# Patient Record
Sex: FEMALE | Race: WHITE | NOT HISPANIC OR LATINO | Employment: FULL TIME | URBAN - METROPOLITAN AREA
[De-identification: names, ages, dates, MRNs, and addresses within clinical notes are randomized per-mention and may not be internally consistent; named-entity substitution may affect disease eponyms.]

---

## 2017-01-03 ENCOUNTER — COMMUNICATION - HEALTHEAST (OUTPATIENT)
Dept: FAMILY MEDICINE | Facility: CLINIC | Age: 37
End: 2017-01-03

## 2017-01-03 ENCOUNTER — OFFICE VISIT - HEALTHEAST (OUTPATIENT)
Dept: FAMILY MEDICINE | Facility: CLINIC | Age: 37
End: 2017-01-03

## 2017-01-03 DIAGNOSIS — F39 MOOD DISORDER (H): ICD-10-CM

## 2017-01-03 ASSESSMENT — MIFFLIN-ST. JEOR: SCORE: 1221.98

## 2017-01-10 ENCOUNTER — COMMUNICATION - HEALTHEAST (OUTPATIENT)
Dept: FAMILY MEDICINE | Facility: CLINIC | Age: 37
End: 2017-01-10

## 2017-01-31 ENCOUNTER — OFFICE VISIT - HEALTHEAST (OUTPATIENT)
Dept: FAMILY MEDICINE | Facility: CLINIC | Age: 37
End: 2017-01-31

## 2017-01-31 DIAGNOSIS — F39 MOOD DISORDER (H): ICD-10-CM

## 2017-01-31 DIAGNOSIS — G25.81 RESTLESS LEGS SYNDROME (RLS): ICD-10-CM

## 2017-02-02 ENCOUNTER — COMMUNICATION - HEALTHEAST (OUTPATIENT)
Dept: FAMILY MEDICINE | Facility: CLINIC | Age: 37
End: 2017-02-02

## 2017-02-12 ENCOUNTER — RECORDS - HEALTHEAST (OUTPATIENT)
Dept: ADMINISTRATIVE | Facility: OTHER | Age: 37
End: 2017-02-12

## 2017-07-23 ENCOUNTER — RECORDS - HEALTHEAST (OUTPATIENT)
Dept: ADMINISTRATIVE | Facility: OTHER | Age: 37
End: 2017-07-23

## 2017-08-04 ENCOUNTER — COMMUNICATION - HEALTHEAST (OUTPATIENT)
Dept: FAMILY MEDICINE | Facility: CLINIC | Age: 37
End: 2017-08-04

## 2017-08-07 ENCOUNTER — OFFICE VISIT - HEALTHEAST (OUTPATIENT)
Dept: FAMILY MEDICINE | Facility: CLINIC | Age: 37
End: 2017-08-07

## 2017-08-07 DIAGNOSIS — A64 STD (FEMALE): ICD-10-CM

## 2017-08-07 LAB — HIV 1+2 AB+HIV1 P24 AG SERPL QL IA: NEGATIVE

## 2017-08-07 ASSESSMENT — MIFFLIN-ST. JEOR: SCORE: 1192.5

## 2017-08-08 ENCOUNTER — COMMUNICATION - HEALTHEAST (OUTPATIENT)
Dept: FAMILY MEDICINE | Facility: CLINIC | Age: 37
End: 2017-08-08

## 2017-08-08 LAB — SYPHILIS RPR SCREEN - HISTORICAL: NORMAL

## 2017-08-23 ENCOUNTER — RECORDS - HEALTHEAST (OUTPATIENT)
Dept: ADMINISTRATIVE | Facility: OTHER | Age: 37
End: 2017-08-23

## 2017-08-23 ENCOUNTER — COMMUNICATION - HEALTHEAST (OUTPATIENT)
Dept: FAMILY MEDICINE | Facility: CLINIC | Age: 37
End: 2017-08-23

## 2017-08-25 ENCOUNTER — RECORDS - HEALTHEAST (OUTPATIENT)
Dept: ADMINISTRATIVE | Facility: OTHER | Age: 37
End: 2017-08-25

## 2017-09-08 ENCOUNTER — OFFICE VISIT - HEALTHEAST (OUTPATIENT)
Dept: FAMILY MEDICINE | Facility: CLINIC | Age: 37
End: 2017-09-08

## 2017-09-08 DIAGNOSIS — J01.90 ACUTE SINUSITIS: ICD-10-CM

## 2017-10-30 ENCOUNTER — OFFICE VISIT - HEALTHEAST (OUTPATIENT)
Dept: FAMILY MEDICINE | Facility: CLINIC | Age: 37
End: 2017-10-30

## 2017-10-30 ENCOUNTER — COMMUNICATION - HEALTHEAST (OUTPATIENT)
Dept: FAMILY MEDICINE | Facility: CLINIC | Age: 37
End: 2017-10-30

## 2017-10-30 DIAGNOSIS — R09.89 SINUS COMPLAINT: ICD-10-CM

## 2017-11-14 ENCOUNTER — OFFICE VISIT - HEALTHEAST (OUTPATIENT)
Dept: FAMILY MEDICINE | Facility: CLINIC | Age: 37
End: 2017-11-14

## 2017-11-14 ENCOUNTER — HOSPITAL ENCOUNTER (OUTPATIENT)
Dept: LAB | Age: 37
Setting detail: SPECIMEN
Discharge: HOME | End: 2017-11-14

## 2017-11-14 DIAGNOSIS — K14.6 TONGUE SORE: ICD-10-CM

## 2017-11-14 DIAGNOSIS — K14.6 TONGUE BURNING SENSATION: ICD-10-CM

## 2018-01-23 ENCOUNTER — COMMUNICATION - HEALTHEAST (OUTPATIENT)
Dept: TELEHEALTH | Facility: CLINIC | Age: 38
End: 2018-01-23

## 2018-01-23 ENCOUNTER — COMMUNICATION - HEALTHEAST (OUTPATIENT)
Dept: HEALTH INFORMATION MANAGEMENT | Facility: CLINIC | Age: 38
End: 2018-01-23

## 2018-01-31 ENCOUNTER — COMMUNICATION - HEALTHEAST (OUTPATIENT)
Dept: FAMILY MEDICINE | Facility: CLINIC | Age: 38
End: 2018-01-31

## 2018-02-02 ENCOUNTER — OFFICE VISIT - HEALTHEAST (OUTPATIENT)
Dept: FAMILY MEDICINE | Facility: CLINIC | Age: 38
End: 2018-02-02

## 2018-02-02 DIAGNOSIS — F48.9 MENTAL HEALTH PROBLEM: ICD-10-CM

## 2018-02-15 ENCOUNTER — COMMUNICATION - HEALTHEAST (OUTPATIENT)
Dept: PHYSICAL MEDICINE AND REHAB | Facility: CLINIC | Age: 38
End: 2018-02-15

## 2018-02-19 ENCOUNTER — HOSPITAL ENCOUNTER (OUTPATIENT)
Dept: PHYSICAL MEDICINE AND REHAB | Facility: CLINIC | Age: 38
Discharge: HOME | End: 2018-02-19

## 2018-02-19 DIAGNOSIS — F34.1 PERSISTENT DEPRESSIVE DISORDER: ICD-10-CM

## 2018-03-08 ENCOUNTER — OFFICE VISIT - HEALTHEAST (OUTPATIENT)
Dept: BEHAVIORAL HEALTH | Facility: CLINIC | Age: 38
End: 2018-03-08

## 2018-03-08 DIAGNOSIS — F41.1 ANXIETY STATE: ICD-10-CM

## 2018-03-08 DIAGNOSIS — F32.9 MDD (MAJOR DEPRESSIVE DISORDER): ICD-10-CM

## 2018-03-08 ASSESSMENT — MIFFLIN-ST. JEOR: SCORE: 1218.81

## 2018-03-15 ENCOUNTER — COMMUNICATION - HEALTHEAST (OUTPATIENT)
Dept: BEHAVIORAL HEALTH | Facility: CLINIC | Age: 38
End: 2018-03-15

## 2018-03-16 ENCOUNTER — COMMUNICATION - HEALTHEAST (OUTPATIENT)
Dept: FAMILY MEDICINE | Facility: CLINIC | Age: 38
End: 2018-03-16

## 2018-03-22 ENCOUNTER — COMMUNICATION - HEALTHEAST (OUTPATIENT)
Dept: BEHAVIORAL HEALTH | Facility: CLINIC | Age: 38
End: 2018-03-22

## 2018-03-28 ENCOUNTER — COMMUNICATION - HEALTHEAST (OUTPATIENT)
Dept: BEHAVIORAL HEALTH | Facility: CLINIC | Age: 38
End: 2018-03-28

## 2018-03-28 ENCOUNTER — OFFICE VISIT - HEALTHEAST (OUTPATIENT)
Dept: BEHAVIORAL HEALTH | Facility: CLINIC | Age: 38
End: 2018-03-28

## 2018-03-28 DIAGNOSIS — F32.A DEPRESSION: ICD-10-CM

## 2018-03-28 DIAGNOSIS — F43.23 ADJUSTMENT DISORDER WITH MIXED ANXIETY AND DEPRESSED MOOD: ICD-10-CM

## 2018-04-06 ENCOUNTER — COMMUNICATION - HEALTHEAST (OUTPATIENT)
Dept: FAMILY MEDICINE | Facility: CLINIC | Age: 38
End: 2018-04-06

## 2018-04-06 ENCOUNTER — COMMUNICATION - HEALTHEAST (OUTPATIENT)
Dept: BEHAVIORAL HEALTH | Facility: CLINIC | Age: 38
End: 2018-04-06

## 2018-04-06 DIAGNOSIS — G25.81 RESTLESS LEGS SYNDROME (RLS): ICD-10-CM

## 2018-05-03 ENCOUNTER — COMMUNICATION - HEALTHEAST (OUTPATIENT)
Dept: BEHAVIORAL HEALTH | Facility: CLINIC | Age: 38
End: 2018-05-03

## 2018-05-03 ENCOUNTER — OFFICE VISIT - HEALTHEAST (OUTPATIENT)
Dept: BEHAVIORAL HEALTH | Facility: CLINIC | Age: 38
End: 2018-05-03

## 2018-05-03 DIAGNOSIS — F32.9 MDD (MAJOR DEPRESSIVE DISORDER): ICD-10-CM

## 2018-05-03 ASSESSMENT — MIFFLIN-ST. JEOR: SCORE: 1209.74

## 2018-07-13 ENCOUNTER — COMMUNICATION - HEALTHEAST (OUTPATIENT)
Dept: FAMILY MEDICINE | Facility: CLINIC | Age: 38
End: 2018-07-13

## 2018-07-13 ENCOUNTER — OFFICE VISIT - HEALTHEAST (OUTPATIENT)
Dept: FAMILY MEDICINE | Facility: CLINIC | Age: 38
End: 2018-07-13

## 2018-07-13 DIAGNOSIS — R10.11 RIGHT UPPER QUADRANT PAIN: ICD-10-CM

## 2018-07-13 DIAGNOSIS — R30.0 DYSURIA: ICD-10-CM

## 2018-07-13 LAB
ALBUMIN SERPL-MCNC: 4.1 G/DL (ref 3.5–5)
ALBUMIN UR-MCNC: NEGATIVE MG/DL
ALP SERPL-CCNC: 71 U/L (ref 45–120)
ALT SERPL W P-5'-P-CCNC: 11 U/L (ref 0–45)
ANION GAP SERPL CALCULATED.3IONS-SCNC: 9 MMOL/L (ref 5–18)
APPEARANCE UR: CLEAR
AST SERPL W P-5'-P-CCNC: 13 U/L (ref 0–40)
ATRIAL RATE - MUSE: 70 BPM
BACTERIA #/AREA URNS HPF: ABNORMAL HPF
BILIRUB SERPL-MCNC: 0.7 MG/DL (ref 0–1)
BILIRUB UR QL STRIP: NEGATIVE
BUN SERPL-MCNC: 10 MG/DL (ref 8–22)
CALCIUM SERPL-MCNC: 9.2 MG/DL (ref 8.5–10.5)
CHLORIDE BLD-SCNC: 106 MMOL/L (ref 98–107)
CO2 SERPL-SCNC: 24 MMOL/L (ref 22–31)
COLOR UR AUTO: YELLOW
CREAT SERPL-MCNC: 0.76 MG/DL (ref 0.6–1.1)
DIASTOLIC BLOOD PRESSURE - MUSE: NORMAL MMHG
GFR SERPL CREATININE-BSD FRML MDRD: >60 ML/MIN/1.73M2
GLUCOSE BLD-MCNC: 89 MG/DL (ref 70–125)
GLUCOSE UR STRIP-MCNC: NEGATIVE MG/DL
HGB UR QL STRIP: ABNORMAL
INTERPRETATION ECG - MUSE: NORMAL
KETONES UR STRIP-MCNC: NEGATIVE MG/DL
LEUKOCYTE ESTERASE UR QL STRIP: ABNORMAL
NITRATE UR QL: NEGATIVE
P AXIS - MUSE: 41 DEGREES
PH UR STRIP: 6.5 [PH] (ref 5–8)
POTASSIUM BLD-SCNC: 4.2 MMOL/L (ref 3.5–5)
PR INTERVAL - MUSE: 122 MS
PROT SERPL-MCNC: 7 G/DL (ref 6–8)
QRS DURATION - MUSE: 82 MS
QT - MUSE: 390 MS
QTC - MUSE: 421 MS
R AXIS - MUSE: 50 DEGREES
RBC #/AREA URNS AUTO: ABNORMAL HPF
SODIUM SERPL-SCNC: 139 MMOL/L (ref 136–145)
SP GR UR STRIP: 1.02 (ref 1–1.03)
SQUAMOUS #/AREA URNS AUTO: >100 LPF
SYSTOLIC BLOOD PRESSURE - MUSE: NORMAL MMHG
T AXIS - MUSE: 35 DEGREES
UROBILINOGEN UR STRIP-ACNC: ABNORMAL
VENTRICULAR RATE- MUSE: 70 BPM
WBC #/AREA URNS AUTO: ABNORMAL HPF

## 2018-07-13 ASSESSMENT — MIFFLIN-ST. JEOR: SCORE: 1227.88

## 2018-07-14 ENCOUNTER — COMMUNICATION - HEALTHEAST (OUTPATIENT)
Dept: FAMILY MEDICINE | Facility: CLINIC | Age: 38
End: 2018-07-14

## 2018-07-14 ENCOUNTER — HOSPITAL ENCOUNTER (OUTPATIENT)
Dept: ULTRASOUND IMAGING | Facility: HOSPITAL | Age: 38
Discharge: HOME | End: 2018-07-14

## 2018-07-14 DIAGNOSIS — K80.50 BILIARY COLIC: ICD-10-CM

## 2018-07-14 DIAGNOSIS — R10.11 RIGHT UPPER QUADRANT PAIN: ICD-10-CM

## 2018-07-14 DIAGNOSIS — R30.0 DYSURIA: ICD-10-CM

## 2018-07-14 LAB — BACTERIA SPEC CULT: NO GROWTH

## 2018-07-17 ENCOUNTER — OFFICE VISIT - HEALTHEAST (OUTPATIENT)
Dept: SURGERY | Facility: CLINIC | Age: 38
End: 2018-07-17

## 2018-07-17 DIAGNOSIS — K80.20 CALCULUS OF GALLBLADDER WITHOUT CHOLECYSTITIS WITHOUT OBSTRUCTION: ICD-10-CM

## 2018-07-17 ASSESSMENT — MIFFLIN-ST. JEOR: SCORE: 1230.14

## 2018-07-18 ENCOUNTER — ANESTHESIA - HEALTHEAST (OUTPATIENT)
Dept: SURGERY | Facility: AMBULATORY SURGERY CENTER | Age: 38
End: 2018-07-18

## 2018-07-18 ASSESSMENT — MIFFLIN-ST. JEOR: SCORE: 1230.14

## 2018-07-19 ENCOUNTER — SURGERY - HEALTHEAST (OUTPATIENT)
Dept: SURGERY | Facility: AMBULATORY SURGERY CENTER | Age: 38
End: 2018-07-19

## 2018-07-25 ENCOUNTER — COMMUNICATION - HEALTHEAST (OUTPATIENT)
Dept: SURGERY | Facility: CLINIC | Age: 38
End: 2018-07-25

## 2018-07-26 ENCOUNTER — COMMUNICATION - HEALTHEAST (OUTPATIENT)
Dept: SURGERY | Facility: CLINIC | Age: 38
End: 2018-07-26

## 2018-08-09 ENCOUNTER — COMMUNICATION - HEALTHEAST (OUTPATIENT)
Dept: FAMILY MEDICINE | Facility: CLINIC | Age: 38
End: 2018-08-09

## 2018-08-09 ENCOUNTER — COMMUNICATION - HEALTHEAST (OUTPATIENT)
Dept: SCHEDULING | Facility: CLINIC | Age: 38
End: 2018-08-09

## 2018-08-13 ENCOUNTER — OFFICE VISIT - HEALTHEAST (OUTPATIENT)
Dept: FAMILY MEDICINE | Facility: CLINIC | Age: 38
End: 2018-08-13

## 2018-08-13 DIAGNOSIS — R61 NIGHT SWEATS: ICD-10-CM

## 2018-08-13 DIAGNOSIS — Z90.49 S/P LAPAROSCOPIC CHOLECYSTECTOMY: ICD-10-CM

## 2018-08-13 LAB
ALBUMIN SERPL-MCNC: 3.2 G/DL (ref 3.5–5)
ALP SERPL-CCNC: 362 U/L (ref 45–120)
ALT SERPL W P-5'-P-CCNC: 74 U/L (ref 0–45)
ANION GAP SERPL CALCULATED.3IONS-SCNC: 13 MMOL/L (ref 5–18)
AST SERPL W P-5'-P-CCNC: 48 U/L (ref 0–40)
BASOPHILS # BLD AUTO: 0.1 THOU/UL (ref 0–0.2)
BASOPHILS NFR BLD AUTO: 1 % (ref 0–2)
BILIRUB SERPL-MCNC: 0.5 MG/DL (ref 0–1)
BUN SERPL-MCNC: 8 MG/DL (ref 8–22)
C REACTIVE PROTEIN LHE: 3.2 MG/DL (ref 0–0.8)
CALCIUM SERPL-MCNC: 9.3 MG/DL (ref 8.5–10.5)
CHLORIDE BLD-SCNC: 104 MMOL/L (ref 98–107)
CO2 SERPL-SCNC: 22 MMOL/L (ref 22–31)
CREAT SERPL-MCNC: 0.62 MG/DL (ref 0.6–1.1)
D DIMER PPP FEU-MCNC: 3.31 FEU UG/ML
EOSINOPHIL # BLD AUTO: 0.1 THOU/UL (ref 0–0.4)
EOSINOPHIL NFR BLD AUTO: 1 % (ref 0–6)
ERYTHROCYTE [DISTWIDTH] IN BLOOD BY AUTOMATED COUNT: 11 % (ref 11–14.5)
ERYTHROCYTE [SEDIMENTATION RATE] IN BLOOD BY WESTERGREN METHOD: 106 MM/HR (ref 0–20)
GFR SERPL CREATININE-BSD FRML MDRD: >60 ML/MIN/1.73M2
GLUCOSE BLD-MCNC: 88 MG/DL (ref 70–125)
HCT VFR BLD AUTO: 31.8 % (ref 35–47)
HGB BLD-MCNC: 10.8 G/DL (ref 12–16)
LDH SERPL L TO P-CCNC: 199 U/L (ref 125–220)
LYMPHOCYTES # BLD AUTO: 1.5 THOU/UL (ref 0.8–4.4)
LYMPHOCYTES NFR BLD AUTO: 17 % (ref 20–40)
MCH RBC QN AUTO: 28.8 PG (ref 27–34)
MCHC RBC AUTO-ENTMCNC: 33.8 G/DL (ref 32–36)
MCV RBC AUTO: 85 FL (ref 80–100)
MONOCYTES # BLD AUTO: 0.6 THOU/UL (ref 0–0.9)
MONOCYTES NFR BLD AUTO: 6 % (ref 2–10)
NEUTROPHILS # BLD AUTO: 6.9 THOU/UL (ref 2–7.7)
NEUTROPHILS NFR BLD AUTO: 76 % (ref 50–70)
PLATELET # BLD AUTO: 440 THOU/UL (ref 140–440)
PMV BLD AUTO: 7.2 FL (ref 7–10)
POTASSIUM BLD-SCNC: 4.4 MMOL/L (ref 3.5–5)
PROCALCITONIN SERPL-MCNC: 0.06 NG/ML (ref 0–0.49)
PROT SERPL-MCNC: 7.2 G/DL (ref 6–8)
RBC # BLD AUTO: 3.73 MILL/UL (ref 3.8–5.4)
SODIUM SERPL-SCNC: 139 MMOL/L (ref 136–145)
WBC: 9.1 THOU/UL (ref 4–11)

## 2018-08-13 ASSESSMENT — MIFFLIN-ST. JEOR: SCORE: 1221.07

## 2018-08-14 ENCOUNTER — SURGERY - HEALTHEAST (OUTPATIENT)
Dept: SURGERY | Facility: HOSPITAL | Age: 38
End: 2018-08-14

## 2018-08-14 ENCOUNTER — ANESTHESIA - HEALTHEAST (OUTPATIENT)
Dept: SURGERY | Facility: HOSPITAL | Age: 38
End: 2018-08-14

## 2018-08-14 ASSESSMENT — MIFFLIN-ST. JEOR: SCORE: 1223.79

## 2018-08-17 ENCOUNTER — OFFICE VISIT - HEALTHEAST (OUTPATIENT)
Dept: SURGERY | Facility: CLINIC | Age: 38
End: 2018-08-17

## 2018-08-17 ENCOUNTER — COMMUNICATION - HEALTHEAST (OUTPATIENT)
Dept: CARE COORDINATION | Facility: CLINIC | Age: 38
End: 2018-08-17

## 2018-08-20 ENCOUNTER — OFFICE VISIT - HEALTHEAST (OUTPATIENT)
Dept: FAMILY MEDICINE | Facility: CLINIC | Age: 38
End: 2018-08-20

## 2018-08-20 DIAGNOSIS — Z90.49 S/P LAPAROSCOPIC CHOLECYSTECTOMY: ICD-10-CM

## 2018-08-20 DIAGNOSIS — K83.9 BILE LEAK: ICD-10-CM

## 2018-08-20 DIAGNOSIS — D64.9 ANEMIA: ICD-10-CM

## 2018-08-20 DIAGNOSIS — R06.02 SOB (SHORTNESS OF BREATH): ICD-10-CM

## 2018-08-20 ASSESSMENT — MIFFLIN-ST. JEOR: SCORE: 1202.93

## 2018-08-30 ENCOUNTER — COMMUNICATION - HEALTHEAST (OUTPATIENT)
Dept: FAMILY MEDICINE | Facility: CLINIC | Age: 38
End: 2018-08-30

## 2018-08-31 ENCOUNTER — OFFICE VISIT - HEALTHEAST (OUTPATIENT)
Dept: FAMILY MEDICINE | Facility: CLINIC | Age: 38
End: 2018-08-31

## 2018-08-31 DIAGNOSIS — I80.8: ICD-10-CM

## 2018-08-31 DIAGNOSIS — R19.7 DIARRHEA: ICD-10-CM

## 2018-08-31 DIAGNOSIS — R11.2 NAUSEA WITH VOMITING: ICD-10-CM

## 2018-08-31 DIAGNOSIS — M79.662 PAIN OF LEFT LOWER LEG: ICD-10-CM

## 2018-09-05 ENCOUNTER — COMMUNICATION - HEALTHEAST (OUTPATIENT)
Dept: FAMILY MEDICINE | Facility: CLINIC | Age: 38
End: 2018-09-05

## 2018-09-13 ENCOUNTER — COMMUNICATION - HEALTHEAST (OUTPATIENT)
Dept: INTERNAL MEDICINE | Facility: CLINIC | Age: 38
End: 2018-09-13

## 2018-09-17 ENCOUNTER — COMMUNICATION - HEALTHEAST (OUTPATIENT)
Dept: FAMILY MEDICINE | Facility: CLINIC | Age: 38
End: 2018-09-17

## 2018-09-17 DIAGNOSIS — I80.9 SUPERFICIAL PHLEBITIS: ICD-10-CM

## 2018-09-19 ENCOUNTER — COMMUNICATION - HEALTHEAST (OUTPATIENT)
Dept: FAMILY MEDICINE | Facility: CLINIC | Age: 38
End: 2018-09-19

## 2018-09-19 ENCOUNTER — COMMUNICATION - HEALTHEAST (OUTPATIENT)
Dept: INTERNAL MEDICINE | Facility: CLINIC | Age: 38
End: 2018-09-19

## 2018-10-11 ENCOUNTER — COMMUNICATION - HEALTHEAST (OUTPATIENT)
Dept: BEHAVIORAL HEALTH | Facility: CLINIC | Age: 38
End: 2018-10-11

## 2018-11-01 ENCOUNTER — COMMUNICATION - HEALTHEAST (OUTPATIENT)
Dept: BEHAVIORAL HEALTH | Facility: CLINIC | Age: 38
End: 2018-11-01

## 2018-11-05 ENCOUNTER — OFFICE VISIT - HEALTHEAST (OUTPATIENT)
Dept: FAMILY MEDICINE | Facility: CLINIC | Age: 38
End: 2018-11-05

## 2018-11-05 DIAGNOSIS — R50.9 FEVER: ICD-10-CM

## 2018-11-05 DIAGNOSIS — J01.00 ACUTE MAXILLARY SINUSITIS, RECURRENCE NOT SPECIFIED: ICD-10-CM

## 2018-11-05 LAB
FLUAV AG SPEC QL IA: NORMAL
FLUBV AG SPEC QL IA: NORMAL

## 2018-11-05 ASSESSMENT — MIFFLIN-ST. JEOR: SCORE: 1221.07

## 2019-01-31 ENCOUNTER — COMMUNICATION - HEALTHEAST (OUTPATIENT)
Dept: ALLERGY | Facility: CLINIC | Age: 39
End: 2019-01-31

## 2019-05-03 ENCOUNTER — AMBULATORY - HEALTHEAST (OUTPATIENT)
Dept: OTHER | Facility: CLINIC | Age: 39
End: 2019-05-03

## 2019-05-03 ENCOUNTER — COMMUNICATION - HEALTHEAST (OUTPATIENT)
Dept: FAMILY MEDICINE | Facility: CLINIC | Age: 39
End: 2019-05-03

## 2019-05-03 ENCOUNTER — OFFICE VISIT - HEALTHEAST (OUTPATIENT)
Dept: FAMILY MEDICINE | Facility: CLINIC | Age: 39
End: 2019-05-03

## 2019-05-03 DIAGNOSIS — G25.81 RESTLESS LEGS SYNDROME (RLS): ICD-10-CM

## 2019-05-03 DIAGNOSIS — R35.0 URINARY FREQUENCY: ICD-10-CM

## 2019-05-03 LAB
ALBUMIN UR-MCNC: ABNORMAL MG/DL
APPEARANCE UR: CLEAR
BACTERIA #/AREA URNS HPF: ABNORMAL HPF
BILIRUB UR QL STRIP: ABNORMAL
COLOR UR AUTO: YELLOW
GLUCOSE UR STRIP-MCNC: NEGATIVE MG/DL
HGB UR QL STRIP: ABNORMAL
KETONES UR STRIP-MCNC: NEGATIVE MG/DL
LEUKOCYTE ESTERASE UR QL STRIP: NEGATIVE
MUCOUS THREADS #/AREA URNS LPF: ABNORMAL LPF
NITRATE UR QL: NEGATIVE
PH UR STRIP: 5.5 [PH] (ref 5–8)
RBC #/AREA URNS AUTO: ABNORMAL HPF
SP GR UR STRIP: 1.02 (ref 1–1.03)
SQUAMOUS #/AREA URNS AUTO: ABNORMAL LPF
UROBILINOGEN UR STRIP-ACNC: ABNORMAL
WBC #/AREA URNS AUTO: ABNORMAL HPF
YEAST #/AREA URNS HPF: ABNORMAL HPF

## 2019-05-04 LAB — BACTERIA SPEC CULT: NO GROWTH

## 2019-05-06 ENCOUNTER — COMMUNICATION - HEALTHEAST (OUTPATIENT)
Dept: FAMILY MEDICINE | Facility: CLINIC | Age: 39
End: 2019-05-06

## 2019-08-05 ENCOUNTER — OFFICE VISIT - HEALTHEAST (OUTPATIENT)
Dept: FAMILY MEDICINE | Facility: CLINIC | Age: 39
End: 2019-08-05

## 2019-08-05 DIAGNOSIS — I88.9 CERVICAL LYMPHADENITIS: ICD-10-CM

## 2019-08-05 DIAGNOSIS — J03.90 EXUDATIVE TONSILLITIS: ICD-10-CM

## 2019-08-05 DIAGNOSIS — R07.0 THROAT PAIN: ICD-10-CM

## 2019-08-05 LAB — DEPRECATED S PYO AG THROAT QL EIA: NORMAL

## 2019-08-06 LAB — GROUP A STREP BY PCR: NORMAL

## 2019-08-22 ENCOUNTER — COMMUNICATION - HEALTHEAST (OUTPATIENT)
Dept: TELEHEALTH | Facility: CLINIC | Age: 39
End: 2019-08-22

## 2019-09-26 ENCOUNTER — OFFICE VISIT - HEALTHEAST (OUTPATIENT)
Dept: INTERNAL MEDICINE | Facility: CLINIC | Age: 39
End: 2019-09-26

## 2019-09-26 DIAGNOSIS — J35.8 TONSIL STONE: ICD-10-CM

## 2019-09-26 ASSESSMENT — MIFFLIN-ST. JEOR: SCORE: 1261.89

## 2019-11-19 ENCOUNTER — OFFICE VISIT (OUTPATIENT)
Dept: PSYCHOLOGY | Facility: CLINIC | Age: 39
End: 2019-11-19
Payer: COMMERCIAL

## 2019-11-19 DIAGNOSIS — F41.1 GAD (GENERALIZED ANXIETY DISORDER): Primary | ICD-10-CM

## 2019-11-19 DIAGNOSIS — F33.0 MDD (MAJOR DEPRESSIVE DISORDER), RECURRENT EPISODE, MILD (H): ICD-10-CM

## 2019-11-19 PROCEDURE — 90791 PSYCH DIAGNOSTIC EVALUATION: CPT | Performed by: SOCIAL WORKER

## 2019-11-19 ASSESSMENT — COLUMBIA-SUICIDE SEVERITY RATING SCALE - C-SSRS
6. HAVE YOU EVER DONE ANYTHING, STARTED TO DO ANYTHING, OR PREPARED TO DO ANYTHING TO END YOUR LIFE?: NO
TOTAL  NUMBER OF INTERRUPTED ATTEMPTS PAST 3 MONTHS: NO
ATTEMPT LIFETIME: NO
1. IN THE PAST MONTH, HAVE YOU WISHED YOU WERE DEAD OR WISHED YOU COULD GO TO SLEEP AND NOT WAKE UP?: NO
TOTAL  NUMBER OF INTERRUPTED ATTEMPTS LIFETIME: NO
TOTAL  NUMBER OF ABORTED OR SELF INTERRUPTED ATTEMPTS PAST LIFETIME: NO
1. IN THE PAST MONTH, HAVE YOU WISHED YOU WERE DEAD OR WISHED YOU COULD GO TO SLEEP AND NOT WAKE UP?: NO
2. HAVE YOU ACTUALLY HAD ANY THOUGHTS OF KILLING YOURSELF LIFETIME?: NO
ATTEMPT PAST THREE MONTHS: NO
6. HAVE YOU EVER DONE ANYTHING, STARTED TO DO ANYTHING, OR PREPARED TO DO ANYTHING TO END YOUR LIFE?: NO
TOTAL  NUMBER OF ABORTED OR SELF INTERRUPTED ATTEMPTS PAST 3 MONTHS: NO
2. HAVE YOU ACTUALLY HAD ANY THOUGHTS OF KILLING YOURSELF?: NO

## 2019-11-19 ASSESSMENT — ANXIETY QUESTIONNAIRES
6. BECOMING EASILY ANNOYED OR IRRITABLE: SEVERAL DAYS
2. NOT BEING ABLE TO STOP OR CONTROL WORRYING: NEARLY EVERY DAY
GAD7 TOTAL SCORE: 13
IF YOU CHECKED OFF ANY PROBLEMS ON THIS QUESTIONNAIRE, HOW DIFFICULT HAVE THESE PROBLEMS MADE IT FOR YOU TO DO YOUR WORK, TAKE CARE OF THINGS AT HOME, OR GET ALONG WITH OTHER PEOPLE: VERY DIFFICULT
1. FEELING NERVOUS, ANXIOUS, OR ON EDGE: MORE THAN HALF THE DAYS
4. TROUBLE RELAXING: NEARLY EVERY DAY
5. BEING SO RESTLESS THAT IT IS HARD TO SIT STILL: SEVERAL DAYS
3. WORRYING TOO MUCH ABOUT DIFFERENT THINGS: NEARLY EVERY DAY
7. FEELING AFRAID AS IF SOMETHING AWFUL MIGHT HAPPEN: NOT AT ALL

## 2019-11-19 ASSESSMENT — PATIENT HEALTH QUESTIONNAIRE - PHQ9: SUM OF ALL RESPONSES TO PHQ QUESTIONS 1-9: 14

## 2019-11-19 NOTE — PROGRESS NOTES
"[facility]    PATIENT'S NAME: Mendy Roe  PREFERRED NAME:   Paula  PREFERRED PRONOUNS: She/Her/Hers/Herself  MRN:   [id number]  :   1980  ACCT. NUMBER: [id number]  DATE OF SERVICE: 19  START TIME: 1134  END TIME: 1235  PREFERRED PHONE: [phone]  May we leave a program related message: Yes    STANDARD DIAGNOSTIC ASSESSMENT    VIDEO VISIT: No    Identifying Information:  Patient is a 39 year old, .  The pronoun use throughout this assessment reflects the sex of the patient at birth.  Patient was referred for an assessment by writer, patient seen in previous clinic.  Patient attended the session alone.     The patient describes their cultural background as [place] citizen, with no report of cultural bias as a stressor.  Cultural influences and impact on patient's life structure, values, norms, and healthcare: N/A.  The patient reports there are no ethnic, cultural or Spiritism factors that may be relevant for therapy.  Patient identified her preferred language to be English. Patient reported she does not need the assistance of an  or other support involved in therapy. Modifications will not be used to assist communication in therapy. N/A  Patient reports she is able to understand written materials.    Chief Complaint:   The reason for seeking services at this time is: \" depression and anxiety \"      History of Presenting Concern:  The problem(s) began since 11 years of age. Patient has attempted to resolve these concerns in the past through psychiatry and therapy. Patient reports that other professional(s) are currently involved in providing support / services.      Social/Family History:  Patient reported she grew up in [place], [place].  They were raised by biological parents.  They were the third born of 3 children.  Parents  when the client was 10 years old. The client's mother did not remarry and remains single The client's father did not remarry " and remains single Patient reported that her childhood was good until 10, then divorce and moved to [place].  Patient described her current relationships with family of origin as get along.      Patient's highest education level was associate degree / vocational certificate. Patient did not identify any learning problems.     Patient reported the following relationship history  and seeing someone for the past 3 years.  Patient's current relationship status is partnered / significant other for 3 years.   Patient identified their sexual orientation as heterosexual.  Patient reported having 4 children.     Patient's current living/housing situation involves renting a property.  Patient identified partner, adult child, friends, therapist and co-worker as part of their support system.  Patient identified the quality of these relationships as good.      Patient is currently employed full time and reports they are able to function appropriately at work..  Patient did not serve in the .  Patient reports their finances are obtained through employment.  Patient does identify finances as a current stressor.      Patient reported that she has not been involved with the legal system.   Patient denies being on probation / parole / under the jurisdiction of the court.    Medical Issues:  Patient reports family history is not on file.    Patient has had a physical exam to rule out medical causes for current symptoms.  Date of last physical exam was within the past year. Client was encouraged to follow up with PCP if symptoms were to develop. The patient has a [facility] Provider, who is named Susan Galan.  Patient reports no current medical concerns.  They did not report dental concerns.  There are not significant appetite / nutritional concerns / weight changes.  The patient has not been diagnosed with an eating disorder.  The patient denies the presence of chronic or episodic pain.  Patient does report  a history of head injury / trauma / cognitive impairment.  Loss of mom and dad    Patient reports current meds as: lorazepam      Medication Adherence:  Patient reports taking prescribed medications as prescribed    Patient Allergies: [facility] chart      Medical History:  History reviewed. No pertinent past medical history.    Mental Health History:  Patient did report a family history of mental health concerns; see medical history section for details.  Patient previously received the following mental health diagnosis: Anxiety and Depression.  Patient reported symptoms began 11 years of age.   Patient has received the following mental health services in the past: therapy with writer and psychiatry with Dr. Monterroso. .  Hospitalizations: None.  Patient denies a history of civil commitment.  Patient is currently receiving the following services: psychiatry with Dr. Monterroso.  Next appointment: this week.        Current Mental Status Exam:   Appearance:  Appropriate   Eye Contact:  Good   Psychomotor:  Normal       Gait / station:  no problem  Attitude / Demeanor: Cooperative   Speech      Rate / Production: Normal       Volume:  Normal  volume      Language:  Rate/Production: Normal    Mood:   Anxious   Affect:   Appropriate   Thought Content: Clear   Thought Process: Coherent  Logical       Associations: Volume: Normal    Insight:   Fair   Judgment:  Intact   Orientation:  All  Attention/concentration: Good      Review of Symptoms:  Depression: Change in sleep, Lack of interest, Excessive or inappropriate guilt, Change in energy level, Difficulties concentrating, Change in appetite, Feelings of hopelessness, Low self-worth, Ruminations, Irritability, Feling sad, down, or depressed and Withdrawn  Patricia:  No Symptoms  Psychosis: No Symptoms  Anxiety: Excessive worry, Nervousness, Social anxiety, Sleep disturbance, Ruminations and Irritaiblity  Panic:  No symptoms  Post Traumatic Stress Disorder: No Symptoms  Eating  Disorder: No Symptoms  Oppositional Defiant Disorder:  No Symptoms  ADD / ADHD:  No symptoms  Conduct Disorder: No symptoms  Autism Spectrum Disorder: No symptoms  Obsessive Compulsive Disorder: No Symptoms  Other Compulsive Behaviors: N/A   Substance Use: No symptoms    Rating Scales:  PHQ9     PHQ-9 SCORE 11/19/2019   PHQ-9 Total Score 14     GAD7     MEG-7 SCORE 11/19/2019   Total Score 13     CGI   Clinical Global Impressions  Initial result:  Considering your total clinical experience with this particular patient population, how severe are the patient's symptoms at this time?: 3 (11/19/19 1130)  Most recent result:  Considering your total clinical experience with this particular patient population, how severe are the patient's symptoms at this time?: 3 (11/19/19 1130)    Substance Use History:  Patient did report a family history of substance use concerns; see medical history section for details.  Patient has not received chemical dependency treatment in the past.  Patient has not ever been to detox.      Patient is not currently receiving any chemical dependency treatment. Patient reported the following problems as a result of their substance use: N/A.     Patient reports using alcohol 3 times per year and has 1 beers, glasses of wine and mixed drinks at a time. Patient first started drinking at age 14.  Patient reported date of last use was long ago.  Patient reports heaviest use was when younger.  Patient denies using tobacco.  Patient denies using marijuana.  Patient denies using caffeine.  Patient denies cocaine/crack use.  Patient denies meth/amphetamine use.  Patient denies use of heroin  Patient denies use of other opiates.  Patient denies inhalant use  Patient denies use of benzodiazepines.  Patient denies use of hallucinogens.  Patient denies use of barbiturates, sedatives, or hypnotics.  Patient denies use of over the counter drugs.  Patient denies use of other substances.    CAGE-AID Total Score  11/19/2019   Total Score 0       Patient is not concerned about substance use.       Based on the negative CAGE score and clinical interview there  are not indications of drug or alcohol abuse.    Significant Losses / Trauma / Abuse / Neglect Issues:   There are indications or report of significant loss, trauma, abuse or neglect issues related to: death of parents and divorce / relational changes client's mother and father  and father attemted suicide    Concerns for possible neglect are not present.     Safety Assessment:  Current Safety Concerns:  [place] Suicide Severity Rating Scale (Lifetime/Recent)  [place] Suicide Severity Rating (Lifetime/Recent) 11/19/2019   1. Wish to be Dead (Lifetime) No   1. Wish to be Dead (Recent) No   2. Non-Specific Active Suicidal Thoughts (Lifetime) No   2. Non-Specific Active Suicidal Thoughts (Recent) No   Actual Attempt (Lifetime) No   Actual Attempt (Past 3 Months) No   Has subject engaged in non-suicidal self-injurious behavior? (Lifetime) No   Has subject engaged in non-suicidal self-injurious behavior? (Past 3 Months) No   Interrupted Attempts (Lifetime) No   Interrupted Attempts (Past 3 Months) No   Aborted or Self-Interrupted Attempt (Lifetime) No   Aborted or Self-Interrupted Attempt (Past 3 Months) No   Preparatory Acts or Behavior (Lifetime) No   Preparatory Acts or Behavior (Past 3 Months) No     Patient denies current homicidal ideation and behaviors.  Patient denies current self-injurious ideation and behaviors.    Patient denied risk behaviors associated with substance use.  Patient denies any high risk behaviors associated with mental health symptoms.  Patient reports the following current concerns for their personal safety: None.  Patient reports there are no firearms in the house.     History of Safety Concerns:  Patient denied a history of homicidal ideation.     Patient denied a history of self-injurious ideation and behaviors.    Patient denied a  history of personal safety concerns.    Patient denied a history of assaultive behaviors.    Patient denied a history of assaultive behaviors.    Patient denied a history of risk behaviors associated with substance use.  Patient denies any history of high risk behaviors associated with mental health symptoms.    Patient reports the following protective factors: positive relationships positive social network, sober network and positive family connections, forward/future oriented thinking, dedication to family/friends, safe and stable environment, effectively controls impulses, secure attachment, abstinence from substances, living with other people and structured day    See Preliminary Treatment Plan for Safety and Risk Management Plan    Patient's Strengths and Limitations:  Patient identified the following strengths or resources that will help her succeed in treatment: friends / good social support, family support, insight, intelligence, positive work environment, motivation, sense of humor and work ethic. Things that may interfere with the patient's success in treatment include: financial hardship.     Diagnostic Criteria:  A. Excessive anxiety and worry about a number of events or activities (such as work or school performance).   B. The person finds it difficult to control the worry.  C. Select 3 or more symptoms (required for diagnosis). Only one item is required in children.   - Restlessness or feeling keyed up or on edge.    - Being easily fatigued.    - Difficulty concentrating or mind going blank.    - Irritability.    - Muscle tension.    - Sleep disturbance (difficulty falling or staying asleep, or restless unsatisfying sleep).   D. The focus of the anxiety and worry is not confined to features of an Axis I disorder.  E. The anxiety, worry, or physical symptoms cause clinically significant distress or impairment in social, occupational, or other important areas of functioning.   F. The disturbance is not  due to the direct physiological effects of a substance (e.g., a drug of abuse, a medication) or a general medical condition (e.g., hyperthyroidism) and does not occur exclusively during a Mood Disorder, a Psychotic Disorder, or a Pervasive Developmental Disorder.  A) Recurrent episode(s) - symptoms have been present during the same 2-week period and represent a change from previous functioning 5 or more symptoms (required for diagnosis)   - Depressed mood. Note: In children and adolescents, can be irritable mood.     - Diminished interest or pleasure in all, or almost all, activities.    - Significant weight loss when not dieting decrease in appetite.    - Increased sleep.    - Psychomotor activity agitation.    - Fatigue or loss of energy.    - Feelings of worthlessness or inappropriate and excessive guilt.    - Diminished ability to think or concentrate, or indecisiveness.    - Recurrent thoughts of death (not just fear of dying), recurrent suicidal ideation without a specific plan, or a suicide attempt or a specific plan for committing suicide.   B) The symptoms cause clinically significant distress or impairment in social, occupational, or other important areas of functioning  C) The episode is not attributable to the physiological effects of a substance or to another medical condition  D) The occurence of major depressive episode is not better explained by other thought / psychotic disorders  E) There has never been a manic episode or hypomanic episode    Functional Status:  Patient's  symptoms have resulted in the following functional impairments: academic performance, childcare / parenting, educational activities, health maintenance, home life with exhusband, money management, relationship(s) and social interactions    DSM5 Diagnoses: (Sustained by DSM5 Criteria Listed Above)  Diagnoses: 296.31 (F33.0) Major Depressive Disorder, Recurrent Episode, Mild _ and With mixed features  300.02 (F41.1) Generalized  Anxiety Disorder  Psychosocial & Contextual Factors: ex-, children, grandchildren and financial situation   WHODAS:   WHODAS 2.0 Total Score 11/19/2019   Total Score 27       Preliminary Treatment Plan:  Plan for Safety and Risk Management:   Recommended that patient call 911 or go to the local ED should there be a change in any of these risk factors.     Collaboration:  Collaboration / coordination with other professionals is not indicated at this time.    Referral to another professional/service is not indicated at this time..  A Release of Information is not needed at this time.     Patient's identified n/A    Initial Treatment will focus on: Depressed Mood - motivation for change  Anxiety - mananging anxiety in the moment.     Resources/Service Plan:       services are not indicated.     Modifications to assist communication are not indicated.     Additional disability accomodations are not indicated     Discussed the general effects of drugs and alcohol on health and well-being.     Records were reviewed at time of assessment.    Report to child / adult protection services was NA.    Information in this assessment was obtained from the medical record and provided by patient who is a good historian.     Patient will have open access to their mental health medical record.    Patito Humphries, [facility]  November 19, 2019

## 2019-11-20 ASSESSMENT — ANXIETY QUESTIONNAIRES: GAD7 TOTAL SCORE: 13

## 2019-12-17 ENCOUNTER — OFFICE VISIT (OUTPATIENT)
Dept: PSYCHOLOGY | Facility: CLINIC | Age: 39
End: 2019-12-17
Payer: COMMERCIAL

## 2019-12-17 DIAGNOSIS — F41.1 GAD (GENERALIZED ANXIETY DISORDER): ICD-10-CM

## 2019-12-17 DIAGNOSIS — F33.0 MDD (MAJOR DEPRESSIVE DISORDER), RECURRENT EPISODE, MILD (H): Primary | ICD-10-CM

## 2019-12-17 PROCEDURE — 90834 PSYTX W PT 45 MINUTES: CPT | Performed by: SOCIAL WORKER

## 2019-12-17 ASSESSMENT — ANXIETY QUESTIONNAIRES
IF YOU CHECKED OFF ANY PROBLEMS ON THIS QUESTIONNAIRE, HOW DIFFICULT HAVE THESE PROBLEMS MADE IT FOR YOU TO DO YOUR WORK, TAKE CARE OF THINGS AT HOME, OR GET ALONG WITH OTHER PEOPLE: SOMEWHAT DIFFICULT
6. BECOMING EASILY ANNOYED OR IRRITABLE: SEVERAL DAYS
GAD7 TOTAL SCORE: 9
4. TROUBLE RELAXING: MORE THAN HALF THE DAYS
3. WORRYING TOO MUCH ABOUT DIFFERENT THINGS: MORE THAN HALF THE DAYS
7. FEELING AFRAID AS IF SOMETHING AWFUL MIGHT HAPPEN: NOT AT ALL
2. NOT BEING ABLE TO STOP OR CONTROL WORRYING: SEVERAL DAYS
5. BEING SO RESTLESS THAT IT IS HARD TO SIT STILL: MORE THAN HALF THE DAYS
1. FEELING NERVOUS, ANXIOUS, OR ON EDGE: SEVERAL DAYS

## 2019-12-17 ASSESSMENT — PATIENT HEALTH QUESTIONNAIRE - PHQ9: SUM OF ALL RESPONSES TO PHQ QUESTIONS 1-9: 13

## 2019-12-17 NOTE — PROGRESS NOTES
Progress Note    Patient Name: Mendy Roe  Date: 12/17/2019           Service Type: Individual  Video Visit: No     Session Start Time: 1536  Session End Time: 1626     Session Length: 50 minutes    Session #: 1    Attendees: Client     Treatment Plan Last Reviewed: 12/17/2019      PHQ-9 / MEG-7 : 13/9    DATA  Interactive Complexity: No  Crisis: No       Progress Since Last Session (Related to Symptoms / Goals / Homework):   Symptoms: first session since DA    Homework: first session since DA      Episode of Care Goals: first session since DA     Current / Ongoing Stressors and Concerns:   Ex, kids, grandchildren, partner     Treatment Objective(s) Addressed in This Session:     Patient will demonstrate and report a level of anxiety that can be managed at a lower level of care.  Absence of persistent anxious mood and report of reduced frequency and intensity of worry and absence of persistent anxious mood to acceptable levels, no panic attacks, report subjective comfort with rumination for a period of 90 days, within 6 months as clinically observed and by patient self-report.  Patient will demonstrate and report a level of depression that can be managed at a lower level of care.  Absence of persistent depression mood and report of reduced frequency and intensity of low mood and absence of persistent low energy and motivation to acceptable levels, report subjective improved motivation and increased energy for a period of 90 days, within 6 months as clinically observed and by patient self-report.     Intervention:   Motivational Interviewing    MI Intervention: Co-Developed Goal: depression and anxiety, Expressed Empathy/Understanding, Supported Autonomy, Collaboration, Evocation, Permission to raise concern or advise and Open-ended questions     Change Talk Expressed by the Patient: Desire to change Ability to change Reasons to change Need to change    Provider  Response to Change Talk: E - Evoked more info from patient about behavior change and A - Affirmed patient's thoughts, decisions, or attempts at behavior change          ASSESSMENT: Current Emotional / Mental Status (status of significant symptoms):   Risk status (Self / Other harm or suicidal ideation)   Patient denies current fears or concerns for personal safety.   Patient denies current or recent suicidal ideation or behaviors.   Patientdenies current or recent homicidal ideation or behaviors.   Patient denies current or recent self injurious behavior or ideation.   Patient denies other safety concerns.   Patient Patient reports there has been no change in risk factors since their last session.     PatientPatient reports there has been no change in protective factors since their last session.     Recommended that patient call 911 or go to the local ED should there be a change in any of these risk factors.     Appearance:   Appropriate    Eye Contact:   Good    Psychomotor Behavior: Normal    Attitude:   Cooperative    Orientation:   All   Speech    Rate / Production: Normal     Volume:  Normal    Mood:    Anxious    Affect:    Worrisome    Thought Content:  Clear    Thought Form:  Coherent    Insight:    Fair      Medication Review:   No changes to current psychiatric medication(s)     Medication Compliance:   Yes     Changes in Health Issues:   None reported     Chemical Use Review:   Substance Use: Chemical use reviewed, no active concerns identified      Tobacco Use: No current tobacco use.      Diagnosis:  1. MDD (major depressive disorder), recurrent episode, mild (H)    2. MEG (generalized anxiety disorder)        Collateral Reports Completed:   Not Applicable    PLAN: (Patient Tasks / Therapist Tasks / Other)  Patient to name her anxiety in the moment        RADHA Neves                                                          ______________________________________________________________________    Treatment Plan    Patient's Name: Mendy Roe  YOB: 1980    Date: 12/17/2019      DSM5 Diagnoses: 296.31 (F33.0) Major Depressive Disorder, Recurrent Episode, Mild _ and With mixed features or 300.02 (F41.1) Generalized Anxiety Disorder  Psychosocial / Contextual Factors: Ex, kids, grandchildren, partner  WHODAS: 27    Referral / Collaboration:  Referral to another professional/service is not indicated at this time..    Anticipated number of session or this episode of care: 20      MeasurableTreatment Goal(s) related to diagnosis / functional impairment(s)  Goal 1: Patient will report absence of persistent anxiety mood and report of reduced frequency and intensity of worry and absence of persistent anxious mood to acceptable levels, no panic attacks, report subjective comfort with rumination or a period of 90 days. Within 6 months as clinically observed and by patient self-report    I will know I've met my goal when manage my anxiety.      Objective #A (Patient Action)    Patient will demonstrate and report a level of anxiety that can be managed at a lower level of care.  Absence of persistent anxious mood and report of reduced frequency and intensity of worry and absence of persistent anxious mood to acceptable levels, no panic attacks, report subjective comfort with rumination for a period of 90 days, within 6 months as clinically observed and by patient self-report.  Status: New - Date: 12.17.19     Intervention(s)  Therapist will provide individual therapy to identify triggers to anxiety, gain feedback on helpful coping tools and thought-reframing techniques, and identify preferred way of being. Tx to include discuss current stressors and interpersonal conflicts and how to cope with these, coaching, diagnostic testing, referral for medication as indicated, use prescribed medication, cognitive restructuring,  interpersonal, family therapy, supportive therapy services.        Goal 2: Patient will report absence of persistent depression mood and report of reduced frequency and intensity of low mood and absence of persistent low energy and motivation to acceptable levels, report subjective improved motivation and increased energy for a period of 90 days, within 6 months as clinically observed and by patient self-report    I will know I've met my goal when feeling more balanced.      Objective #A (Patient Action)    Status: New - Date: 12.17.19     Patient will demonstrate and report a level of depression that can be managed at a lower level of care.  Absence of persistent depression mood and report of reduced frequency and intensity of low mood and absence of persistent low energy and motivation to acceptable levels, report subjective improved motivation and increased energy for a period of 90 days, within 6 months as clinically observed and by patient self-report.    Intervention(s)  Therapist will provide individual therapy to identify triggers to depression, gain feedback on helpful coping tools and thought-reframing techniques, and identify preferred way of being.  Tx to include discussion of current stressors and interpersonal conflicts and how to cope with these, coaching, diagnostic testing, referral for medication as indicated, use prescribed medication, cognitive restructuring, interpersonal, family therapy, supportive therapy services.        Patient has reviewed and agreed to the above plan.      Patito Humphries, [facility]  December 17, 2019

## 2019-12-18 ASSESSMENT — ANXIETY QUESTIONNAIRES: GAD7 TOTAL SCORE: 9

## 2020-01-14 ENCOUNTER — OFFICE VISIT (OUTPATIENT)
Dept: PSYCHOLOGY | Facility: CLINIC | Age: 40
End: 2020-01-14
Payer: COMMERCIAL

## 2020-01-14 DIAGNOSIS — F33.0 MDD (MAJOR DEPRESSIVE DISORDER), RECURRENT EPISODE, MILD (H): Primary | ICD-10-CM

## 2020-01-14 DIAGNOSIS — F41.1 GAD (GENERALIZED ANXIETY DISORDER): ICD-10-CM

## 2020-01-14 PROCEDURE — 90834 PSYTX W PT 45 MINUTES: CPT | Performed by: SOCIAL WORKER

## 2020-01-14 ASSESSMENT — ANXIETY QUESTIONNAIRES
1. FEELING NERVOUS, ANXIOUS, OR ON EDGE: MORE THAN HALF THE DAYS
3. WORRYING TOO MUCH ABOUT DIFFERENT THINGS: SEVERAL DAYS
IF YOU CHECKED OFF ANY PROBLEMS ON THIS QUESTIONNAIRE, HOW DIFFICULT HAVE THESE PROBLEMS MADE IT FOR YOU TO DO YOUR WORK, TAKE CARE OF THINGS AT HOME, OR GET ALONG WITH OTHER PEOPLE: VERY DIFFICULT
6. BECOMING EASILY ANNOYED OR IRRITABLE: SEVERAL DAYS
7. FEELING AFRAID AS IF SOMETHING AWFUL MIGHT HAPPEN: NOT AT ALL
2. NOT BEING ABLE TO STOP OR CONTROL WORRYING: SEVERAL DAYS
4. TROUBLE RELAXING: NEARLY EVERY DAY
GAD7 TOTAL SCORE: 10
5. BEING SO RESTLESS THAT IT IS HARD TO SIT STILL: MORE THAN HALF THE DAYS

## 2020-01-14 ASSESSMENT — PATIENT HEALTH QUESTIONNAIRE - PHQ9: SUM OF ALL RESPONSES TO PHQ QUESTIONS 1-9: 14

## 2020-01-14 NOTE — PROGRESS NOTES
Progress Note    Patient Name: Mendy Roe  Date: 1/14/2020             Service Type: Individual  Video Visit: No     Session Start Time: 1540 Session End Time: 1630     Session Length: 50 minutes    Session #: 2    Attendees: Client     Treatment Plan Last Reviewed:1/14/2020        PHQ-9 / MEG-7 : 14/10    DATA  Interactive Complexity: No  Crisis: No       Progress Since Last Session (Related to Symptoms / Goals / Homework):   Symptoms: No change patient reported depression and anxiety with living situation    Homework: Achieved / completed to satisfaction      Episode of Care Goals: Minimal progress - CONTEMPLATION (Considering change and yet undecided); Intervened by assessing the negative and positive thinking (ambivalence) about behavior change     Current / Ongoing Stressors and Concerns:   Ex, kids, grandchildren, partner     Treatment Objective(s) Addressed in This Session:     Patient will demonstrate and report a level of anxiety that can be managed at a lower level of care.  Absence of persistent anxious mood and report of reduced frequency and intensity of worry and absence of persistent anxious mood to acceptable levels, no panic attacks, report subjective comfort with rumination for a period of 90 days, within 6 months as clinically observed and by patient self-report.  Patient will demonstrate and report a level of depression that can be managed at a lower level of care.  Absence of persistent depression mood and report of reduced frequency and intensity of low mood and absence of persistent low energy and motivation to acceptable levels, report subjective improved motivation and increased energy for a period of 90 days, within 6 months as clinically observed and by patient self-report.     Intervention:   Therapist met with patient to review goals and interventions. Therapist utilized reflected listening as patient gave brief reflection of week.  Patient processed needing surgery and stress with living situation. Therapist reviewed with patient CPS would be contacted and need to assess safety if she moves in with her partner and he or she could face consequences if there were findings. Therapist encouraged patient to call CPS before moving in together and have them assess now. Therapist validated patient s frustrations and reiterated the importance of planning ahead.           Patient presented with an anxious affect. Patient was engaged in session and open to feedback. Patient reported no safety concerns.        ASSESSMENT: Current Emotional / Mental Status (status of significant symptoms):   Risk status (Self / Other harm or suicidal ideation)   Patient denies current fears or concerns for personal safety.   Patient denies current or recent suicidal ideation or behaviors.   Patientdenies current or recent homicidal ideation or behaviors.   Patient denies current or recent self injurious behavior or ideation.   Patient denies other safety concerns.   Patient reports there has been no change in risk factors since their last session.     Patientreports there has been no change in protective factors since their last session.  ]   Recommended that patient call 911 or go to the local ED should there be a change in any of these risk factors.     Appearance:   Appropriate    Eye Contact:   Good    Psychomotor Behavior: Normal    Attitude:   Cooperative    Orientation:   All   Speech    Rate / Production: Normal     Volume:  Normal    Mood:    Anxious  Depressed  Irritable    Affect:    Labile  Worrisome    Thought Content:  Clear    Thought Form:  Coherent    Insight:    Fair      Medication Review:   No changes to current psychiatric medication(s)     Medication Compliance:   Yes     Changes in Health Issues:   None reported     Chemical Use Review:   Substance Use: Chemical use reviewed, no active concerns identified      Tobacco Use: No current tobacco use.       Diagnosis:  1. MDD (major depressive disorder), recurrent episode, mild (H)    2. MEG (generalized anxiety disorder)        Collateral Reports Completed:   Not Applicable    PLAN: (Patient Tasks / Therapist Tasks / Other)  Patient to continue to name her anxiety in the moment  Patient to communicate CPS information Caputo RONNIE Humphries, LICSW                                                         ______________________________________________________________________    Treatment Plan    Patient's Name: Mendy Roe  YOB: 1980    Date: 12/17/2019      DSM5 Diagnoses: 296.31 (F33.0) Major Depressive Disorder, Recurrent Episode, Mild _ and With mixed features or 300.02 (F41.1) Generalized Anxiety Disorder  Psychosocial / Contextual Factors: Ex, kids, grandchildren, partner  WHODAS: 27    Referral / Collaboration:  Referral to another professional/service is not indicated at this time..    Anticipated number of session or this episode of care: 20      MeasurableTreatment Goal(s) related to diagnosis / functional impairment(s)  Goal 1: Patient will report absence of persistent anxiety mood and report of reduced frequency and intensity of worry and absence of persistent anxious mood to acceptable levels, no panic attacks, report subjective comfort with rumination or a period of 90 days. Within 6 months as clinically observed and by patient self-report    I will know I've met my goal when manage my anxiety.      Objective #A (Patient Action)    Patient will demonstrate and report a level of anxiety that can be managed at a lower level of care.  Absence of persistent anxious mood and report of reduced frequency and intensity of worry and absence of persistent anxious mood to acceptable levels, no panic attacks, report subjective comfort with rumination for a period of 90 days, within 6 months as clinically observed and by patient self-report.  Status: New - Date: 12.17.19      Intervention(s)  Therapist will provide individual therapy to identify triggers to anxiety, gain feedback on helpful coping tools and thought-reframing techniques, and identify preferred way of being. Tx to include discuss current stressors and interpersonal conflicts and how to cope with these, coaching, diagnostic testing, referral for medication as indicated, use prescribed medication, cognitive restructuring, interpersonal, family therapy, supportive therapy services.        Goal 2: Patient will report absence of persistent depression mood and report of reduced frequency and intensity of low mood and absence of persistent low energy and motivation to acceptable levels, report subjective improved motivation and increased energy for a period of 90 days, within 6 months as clinically observed and by patient self-report    I will know I've met my goal when feeling more balanced.      Objective #A (Patient Action)    Status: New - Date: 12.17.19     Patient will demonstrate and report a level of depression that can be managed at a lower level of care.  Absence of persistent depression mood and report of reduced frequency and intensity of low mood and absence of persistent low energy and motivation to acceptable levels, report subjective improved motivation and increased energy for a period of 90 days, within 6 months as clinically observed and by patient self-report.    Intervention(s)  Therapist will provide individual therapy to identify triggers to depression, gain feedback on helpful coping tools and thought-reframing techniques, and identify preferred way of being.  Tx to include discussion of current stressors and interpersonal conflicts and how to cope with these, coaching, diagnostic testing, referral for medication as indicated, use prescribed medication, cognitive restructuring, interpersonal, family therapy, supportive therapy services.        Patient has reviewed and agreed to the above  plan.      Autumn RONNIE Humphries, [facility]  December 17, 2019

## 2020-01-15 ASSESSMENT — ANXIETY QUESTIONNAIRES: GAD7 TOTAL SCORE: 10

## 2020-01-20 ENCOUNTER — OFFICE VISIT - HEALTHEAST (OUTPATIENT)
Dept: FAMILY MEDICINE | Facility: CLINIC | Age: 40
End: 2020-01-20

## 2020-01-20 DIAGNOSIS — J20.9 ACUTE BRONCHITIS WITH SYMPTOMS > 10 DAYS: ICD-10-CM

## 2020-01-20 ASSESSMENT — MIFFLIN-ST. JEOR: SCORE: 1276.07

## 2020-01-28 ENCOUNTER — OFFICE VISIT (OUTPATIENT)
Dept: PSYCHOLOGY | Facility: CLINIC | Age: 40
End: 2020-01-28
Payer: COMMERCIAL

## 2020-01-28 DIAGNOSIS — F33.0 MDD (MAJOR DEPRESSIVE DISORDER), RECURRENT EPISODE, MILD (H): Primary | ICD-10-CM

## 2020-01-28 DIAGNOSIS — F41.1 GAD (GENERALIZED ANXIETY DISORDER): ICD-10-CM

## 2020-01-28 PROCEDURE — 90834 PSYTX W PT 45 MINUTES: CPT | Performed by: SOCIAL WORKER

## 2020-01-28 ASSESSMENT — ANXIETY QUESTIONNAIRES
5. BEING SO RESTLESS THAT IT IS HARD TO SIT STILL: MORE THAN HALF THE DAYS
6. BECOMING EASILY ANNOYED OR IRRITABLE: MORE THAN HALF THE DAYS
IF YOU CHECKED OFF ANY PROBLEMS ON THIS QUESTIONNAIRE, HOW DIFFICULT HAVE THESE PROBLEMS MADE IT FOR YOU TO DO YOUR WORK, TAKE CARE OF THINGS AT HOME, OR GET ALONG WITH OTHER PEOPLE: VERY DIFFICULT
7. FEELING AFRAID AS IF SOMETHING AWFUL MIGHT HAPPEN: NOT AT ALL
2. NOT BEING ABLE TO STOP OR CONTROL WORRYING: MORE THAN HALF THE DAYS
GAD7 TOTAL SCORE: 12
3. WORRYING TOO MUCH ABOUT DIFFERENT THINGS: MORE THAN HALF THE DAYS
1. FEELING NERVOUS, ANXIOUS, OR ON EDGE: SEVERAL DAYS
4. TROUBLE RELAXING: NEARLY EVERY DAY

## 2020-01-28 ASSESSMENT — PATIENT HEALTH QUESTIONNAIRE - PHQ9: SUM OF ALL RESPONSES TO PHQ QUESTIONS 1-9: 15

## 2020-01-28 NOTE — PROGRESS NOTES
Progress Note    Patient Name: Mendy Roe  Date: 1/28/2020             Service Type: Individual  Video Visit: No     Session Start Time: 1503 Session End Time: 1553     Session Length: 50 minutes    Session #: 3    Attendees: Client     Treatment Plan Last Reviewed:1/28/2020        PHQ-9 / MEG-7 : 15/12    DATA  Interactive Complexity: No  Crisis: No       Progress Since Last Session (Related to Symptoms / Goals / Homework):   Symptoms: Worsening increase in PHQ-9 and MEG-7    Homework: Achieved / completed to satisfaction      Episode of Care Goals: Minimal progress - CONTEMPLATION (Considering change and yet undecided); Intervened by assessing the negative and positive thinking (ambivalence) about behavior change     Current / Ongoing Stressors and Concerns:   Ex, kids, grandchildren, partner     Treatment Objective(s) Addressed in This Session:     Patient will demonstrate and report a level of anxiety that can be managed at a lower level of care.  Absence of persistent anxious mood and report of reduced frequency and intensity of worry and absence of persistent anxious mood to acceptable levels, no panic attacks, report subjective comfort with rumination for a period of 90 days, within 6 months as clinically observed and by patient self-report.  Patient will demonstrate and report a level of depression that can be managed at a lower level of care.  Absence of persistent depression mood and report of reduced frequency and intensity of low mood and absence of persistent low energy and motivation to acceptable levels, report subjective improved motivation and increased energy for a period of 90 days, within 6 months as clinically observed and by patient self-report.     Intervention:   Therapist met with patient to review goals and interventions. Therapist utilized reflected listening as patient gave brief reflection of week. Patient reported feeling more prepared  for moving after discussing concerns with partner and understanding the process. Patient processed her continued frustrations with some family issues. Therapist supported patient and offered patient alternative perspective and reviewed strength-based modality with patient. Therapist encouraged patient to set healthy firm boundaries.      Patient presented with a bright affect. Patient was engaged in session and open to feedback. Patient reported no safety concerns.        ASSESSMENT: Current Emotional / Mental Status (status of significant symptoms):   Risk status (Self / Other harm or suicidal ideation)   Patient denies current fears or concerns for personal safety.   Patient denies current or recent suicidal ideation or behaviors.   Patient denies current or recent homicidal ideation or behaviors.   Patient denies current or recent self injurious behavior or ideation.   Patient denies other safety concerns.   Patient reports there has been no change in risk factors since their last session.     Patient reports there has been no change in protective factors since their last session.  ]   Recommended that patient call 911 or go to the local ED should there be a change in any of these risk factors.     Appearance:   Appropriate    Eye Contact:   Good    Psychomotor Behavior: Normal    Attitude:   Cooperative    Orientation:   All   Speech    Rate / Production: Normal     Volume:  Normal    Mood:    Anxious    Affect:    Bright    Thought Content:  Clear    Thought Form:  Coherent    Insight:    Fair      Medication Review:   No changes to current psychiatric medication(s)     Medication Compliance:   Yes     Changes in Health Issues:   None reported     Chemical Use Review:   Substance Use: Chemical use reviewed, no active concerns identified      Tobacco Use: No current tobacco use.      Diagnosis:  1. MDD (major depressive disorder), recurrent episode, mild (H)    2. MEG (generalized anxiety disorder)         Collateral Reports Completed:   Not Applicable    PLAN: (Patient Tasks / Therapist Tasks / Other)  Patient to continue to name her anxiety in the moment  Patient to communicate CPS information        Patito RONNIE Humphries, LICSW                                                         ______________________________________________________________________    Treatment Plan    Patient's Name: Mendy Roe  YOB: 1980    Date: 12/17/2019      DSM5 Diagnoses: 296.31 (F33.0) Major Depressive Disorder, Recurrent Episode, Mild _ and With mixed features or 300.02 (F41.1) Generalized Anxiety Disorder  Psychosocial / Contextual Factors: Ex, kids, grandchildren, partner  WHODAS: 27    Referral / Collaboration:  Referral to another professional/service is not indicated at this time..    Anticipated number of session or this episode of care: 20      MeasurableTreatment Goal(s) related to diagnosis / functional impairment(s)  Goal 1: Patient will report absence of persistent anxiety mood and report of reduced frequency and intensity of worry and absence of persistent anxious mood to acceptable levels, no panic attacks, report subjective comfort with rumination or a period of 90 days. Within 6 months as clinically observed and by patient self-report    I will know I've met my goal when manage my anxiety.      Objective #A (Patient Action)    Patient will demonstrate and report a level of anxiety that can be managed at a lower level of care.  Absence of persistent anxious mood and report of reduced frequency and intensity of worry and absence of persistent anxious mood to acceptable levels, no panic attacks, report subjective comfort with rumination for a period of 90 days, within 6 months as clinically observed and by patient self-report.  Status: New - Date: 12.17.19     Intervention(s)  Therapist will provide individual therapy to identify triggers to anxiety, gain feedback on helpful coping tools and  thought-reframing techniques, and identify preferred way of being. Tx to include discuss current stressors and interpersonal conflicts and how to cope with these, coaching, diagnostic testing, referral for medication as indicated, use prescribed medication, cognitive restructuring, interpersonal, family therapy, supportive therapy services.        Goal 2: Patient will report absence of persistent depression mood and report of reduced frequency and intensity of low mood and absence of persistent low energy and motivation to acceptable levels, report subjective improved motivation and increased energy for a period of 90 days, within 6 months as clinically observed and by patient self-report    I will know I've met my goal when feeling more balanced.      Objective #A (Patient Action)    Status: New - Date: 12.17.19     Patient will demonstrate and report a level of depression that can be managed at a lower level of care.  Absence of persistent depression mood and report of reduced frequency and intensity of low mood and absence of persistent low energy and motivation to acceptable levels, report subjective improved motivation and increased energy for a period of 90 days, within 6 months as clinically observed and by patient self-report.    Intervention(s)  Therapist will provide individual therapy to identify triggers to depression, gain feedback on helpful coping tools and thought-reframing techniques, and identify preferred way of being.  Tx to include discussion of current stressors and interpersonal conflicts and how to cope with these, coaching, diagnostic testing, referral for medication as indicated, use prescribed medication, cognitive restructuring, interpersonal, family therapy, supportive therapy services.        Patient has reviewed and agreed to the above plan.      Patito Humphries, [facility]  December 17, 2019

## 2020-01-29 ASSESSMENT — ANXIETY QUESTIONNAIRES: GAD7 TOTAL SCORE: 12

## 2020-02-06 ENCOUNTER — OFFICE VISIT - HEALTHEAST (OUTPATIENT)
Dept: FAMILY MEDICINE | Facility: CLINIC | Age: 40
End: 2020-02-06

## 2020-02-06 DIAGNOSIS — G25.81 RESTLESS LEGS SYNDROME (RLS): ICD-10-CM

## 2020-02-06 DIAGNOSIS — Z13.220 LIPID SCREENING: ICD-10-CM

## 2020-02-06 DIAGNOSIS — Z01.818 PRE-OP EVALUATION: ICD-10-CM

## 2020-02-06 DIAGNOSIS — J35.01 CHRONIC TONSILLITIS: ICD-10-CM

## 2020-02-06 LAB
ANION GAP SERPL CALCULATED.3IONS-SCNC: 8 MMOL/L (ref 5–18)
BASOPHILS # BLD AUTO: 0 THOU/UL (ref 0–0.2)
BASOPHILS NFR BLD AUTO: 1 % (ref 0–2)
BUN SERPL-MCNC: 9 MG/DL (ref 8–22)
CALCIUM SERPL-MCNC: 8.8 MG/DL (ref 8.5–10.5)
CHLORIDE BLD-SCNC: 107 MMOL/L (ref 98–107)
CHOLEST SERPL-MCNC: 170 MG/DL
CO2 SERPL-SCNC: 21 MMOL/L (ref 22–31)
CREAT SERPL-MCNC: 0.71 MG/DL (ref 0.6–1.1)
EOSINOPHIL # BLD AUTO: 0 THOU/UL (ref 0–0.4)
EOSINOPHIL NFR BLD AUTO: 1 % (ref 0–6)
ERYTHROCYTE [DISTWIDTH] IN BLOOD BY AUTOMATED COUNT: 10.7 % (ref 11–14.5)
FASTING STATUS PATIENT QL REPORTED: YES
GFR SERPL CREATININE-BSD FRML MDRD: >60 ML/MIN/1.73M2
GLUCOSE BLD-MCNC: 87 MG/DL (ref 70–125)
HCG UR QL: NEGATIVE
HCT VFR BLD AUTO: 40.5 % (ref 35–47)
HDLC SERPL-MCNC: 33 MG/DL
HGB BLD-MCNC: 13.7 G/DL (ref 12–16)
LDLC SERPL CALC-MCNC: 112 MG/DL
LYMPHOCYTES # BLD AUTO: 1.6 THOU/UL (ref 0.8–4.4)
LYMPHOCYTES NFR BLD AUTO: 28 % (ref 20–40)
MCH RBC QN AUTO: 29.9 PG (ref 27–34)
MCHC RBC AUTO-ENTMCNC: 33.7 G/DL (ref 32–36)
MCV RBC AUTO: 89 FL (ref 80–100)
MONOCYTES # BLD AUTO: 0.4 THOU/UL (ref 0–0.9)
MONOCYTES NFR BLD AUTO: 7 % (ref 2–10)
NEUTROPHILS # BLD AUTO: 3.7 THOU/UL (ref 2–7.7)
NEUTROPHILS NFR BLD AUTO: 64 % (ref 50–70)
PLATELET # BLD AUTO: 222 THOU/UL (ref 140–440)
PMV BLD AUTO: 8.6 FL (ref 7–10)
POTASSIUM BLD-SCNC: 4.3 MMOL/L (ref 3.5–5)
RBC # BLD AUTO: 4.57 MILL/UL (ref 3.8–5.4)
SODIUM SERPL-SCNC: 136 MMOL/L (ref 136–145)
TRIGL SERPL-MCNC: 125 MG/DL
WBC: 5.8 THOU/UL (ref 4–11)

## 2020-02-07 ENCOUNTER — COMMUNICATION - HEALTHEAST (OUTPATIENT)
Dept: FAMILY MEDICINE | Facility: CLINIC | Age: 40
End: 2020-02-07

## 2020-02-11 ENCOUNTER — OFFICE VISIT (OUTPATIENT)
Dept: PSYCHOLOGY | Facility: CLINIC | Age: 40
End: 2020-02-11
Payer: COMMERCIAL

## 2020-02-11 DIAGNOSIS — F33.0 MDD (MAJOR DEPRESSIVE DISORDER), RECURRENT EPISODE, MILD (H): Primary | ICD-10-CM

## 2020-02-11 DIAGNOSIS — F41.1 GAD (GENERALIZED ANXIETY DISORDER): ICD-10-CM

## 2020-02-11 PROCEDURE — 90834 PSYTX W PT 45 MINUTES: CPT | Performed by: SOCIAL WORKER

## 2020-02-11 ASSESSMENT — ANXIETY QUESTIONNAIRES
7. FEELING AFRAID AS IF SOMETHING AWFUL MIGHT HAPPEN: MORE THAN HALF THE DAYS
4. TROUBLE RELAXING: NEARLY EVERY DAY
3. WORRYING TOO MUCH ABOUT DIFFERENT THINGS: MORE THAN HALF THE DAYS
IF YOU CHECKED OFF ANY PROBLEMS ON THIS QUESTIONNAIRE, HOW DIFFICULT HAVE THESE PROBLEMS MADE IT FOR YOU TO DO YOUR WORK, TAKE CARE OF THINGS AT HOME, OR GET ALONG WITH OTHER PEOPLE: EXTREMELY DIFFICULT
GAD7 TOTAL SCORE: 14
6. BECOMING EASILY ANNOYED OR IRRITABLE: MORE THAN HALF THE DAYS
2. NOT BEING ABLE TO STOP OR CONTROL WORRYING: MORE THAN HALF THE DAYS
1. FEELING NERVOUS, ANXIOUS, OR ON EDGE: MORE THAN HALF THE DAYS
5. BEING SO RESTLESS THAT IT IS HARD TO SIT STILL: SEVERAL DAYS

## 2020-02-11 ASSESSMENT — PATIENT HEALTH QUESTIONNAIRE - PHQ9: SUM OF ALL RESPONSES TO PHQ QUESTIONS 1-9: 14

## 2020-02-11 NOTE — PROGRESS NOTES
Progress Note    Patient Name: Mendy Roe  Date: 2/11/2020               Service Type: Individual  Video Visit: No     Session Start Time: 1535 Session End Time: 1625     Session Length: 50 minutes    Session #: 4    Attendees: Client     Treatment Plan Last Reviewed:2/11/2020          PHQ-9 / MEG-7 : 14/14    DATA  Interactive Complexity: No  Crisis: No       Progress Since Last Session (Related to Symptoms / Goals / Homework):   Symptoms: no change PHQ-9 and increase MEG-7    Homework: Partially completed      Episode of Care Goals: Minimal progress - CONTEMPLATION (Considering change and yet undecided); Intervened by assessing the negative and positive thinking (ambivalence) about behavior change     Current / Ongoing Stressors and Concerns:   Ex, kids, grandchildren, partner     Treatment Objective(s) Addressed in This Session:     Patient will demonstrate and report a level of anxiety that can be managed at a lower level of care.  Absence of persistent anxious mood and report of reduced frequency and intensity of worry and absence of persistent anxious mood to acceptable levels, no panic attacks, report subjective comfort with rumination for a period of 90 days, within 6 months as clinically observed and by patient self-report.  Patient will demonstrate and report a level of depression that can be managed at a lower level of care.  Absence of persistent depression mood and report of reduced frequency and intensity of low mood and absence of persistent low energy and motivation to acceptable levels, report subjective improved motivation and increased energy for a period of 90 days, within 6 months as clinically observed and by patient self-report.     Intervention:   Therapist met with patient to review goals and interventions. Therapist utilized reflected listening as patient gave brief reflection of week. Patient reported feelings of frustrations with her  living situation and her partner s family. Therapist supported patient as she processed and validated patient. Therapist offered patient alternative perspective and displacement of where her frustration is. Therapist guided patient through establishing boundaries with her partner and managing her expectations.      Patient presented with an anxious affect. Patient was engaged in session and open to feedback. Patient reported no safety concerns.     ASSESSMENT: Current Emotional / Mental Status (status of significant symptoms):   Risk status (Self / Other harm or suicidal ideation)   Patient denies current fears or concerns for personal safety.   Patient denies current or recent suicidal ideation or behaviors.   Patient denies current or recent homicidal ideation or behaviors.   Patient denies current or recent self injurious behavior or ideation.   Patient denies other safety concerns.   Patient reports there has been no change in risk factors since their last session.     Patient reports there has been no change in protective factors since their last session.     Recommended that patient call 911 or go to the local ED should there be a change in any of these risk factors.     Appearance:   Appropriate    Eye Contact:   Good    Psychomotor Behavior: Normal    Attitude:   Cooperative    Orientation:   All   Speech    Rate / Production: Normal     Volume:  Normal    Mood:    Anxious  Irritable    Affect:    Labile  Worrisome    Thought Content:  Clear    Thought Form:  Coherent    Insight:    Fair      Medication Review:   No changes to current psychiatric medication(s)     Medication Compliance:   Yes     Changes in Health Issues:   None reported     Chemical Use Review:   Substance Use: Chemical use reviewed, no active concerns identified      Tobacco Use: No current tobacco use.      Diagnosis:  1. MDD (major depressive disorder), recurrent episode, mild (H)    2. MEG (generalized anxiety disorder)         Collateral Reports Completed:   Not Applicable    PLAN: (Patient Tasks / Therapist Tasks / Other)  Patient to continue to name her anxiety in the moment  Patient to communicate CPS information  Patient to be clear about expectations      Patito Humphries, [facility] Center[facility]                                                         ______________________________________________________________________    Treatment Plan    Patient's Name: Mendy Roe  YOB: 1980    Date: 12/17/2019      DSM5 Diagnoses: 296.31 (F33.0) Major Depressive Disorder, Recurrent Episode, Mild _ and With mixed features or 300.02 (F41.1) Generalized Anxiety Disorder  Psychosocial / Contextual Factors: Ex, kids, grandchildren, partner  WHODAS: 27    Referral / Collaboration:  Referral to another professional/service is not indicated at this time..    Anticipated number of session or this episode of care: 20      MeasurableTreatment Goal(s) related to diagnosis / functional impairment(s)  Goal 1: Patient will report absence of persistent anxiety mood and report of reduced frequency and intensity of worry and absence of persistent anxious mood to acceptable levels, no panic attacks, report subjective comfort with rumination or a period of 90 days. Within 6 months as clinically observed and by patient self-report    I will know I've met my goal when manage my anxiety.      Objective #A (Patient Action)    Patient will demonstrate and report a level of anxiety that can be managed at a lower level of care.  Absence of persistent anxious mood and report of reduced frequency and intensity of worry and absence of persistent anxious mood to acceptable levels, no panic attacks, report subjective comfort with rumination for a period of 90 days, within 6 months as clinically observed and by patient self-report.  Status: New - Date: 12.17.19     Intervention(s)  Therapist will provide individual therapy to identify triggers to anxiety, gain feedback  on helpful coping tools and thought-reframing techniques, and identify preferred way of being. Tx to include discuss current stressors and interpersonal conflicts and how to cope with these, coaching, diagnostic testing, referral for medication as indicated, use prescribed medication, cognitive restructuring, interpersonal, family therapy, supportive therapy services.        Goal 2: Patient will report absence of persistent depression mood and report of reduced frequency and intensity of low mood and absence of persistent low energy and motivation to acceptable levels, report subjective improved motivation and increased energy for a period of 90 days, within 6 months as clinically observed and by patient self-report    I will know I've met my goal when feeling more balanced.      Objective #A (Patient Action)    Status: New - Date: 12.17.19     Patient will demonstrate and report a level of depression that can be managed at a lower level of care.  Absence of persistent depression mood and report of reduced frequency and intensity of low mood and absence of persistent low energy and motivation to acceptable levels, report subjective improved motivation and increased energy for a period of 90 days, within 6 months as clinically observed and by patient self-report.    Intervention(s)  Therapist will provide individual therapy to identify triggers to depression, gain feedback on helpful coping tools and thought-reframing techniques, and identify preferred way of being.  Tx to include discussion of current stressors and interpersonal conflicts and how to cope with these, coaching, diagnostic testing, referral for medication as indicated, use prescribed medication, cognitive restructuring, interpersonal, family therapy, supportive therapy services.        Patient has reviewed and agreed to the above plan.      Patito Humphries, [facility]  December 17, 2019

## 2020-02-12 ASSESSMENT — ANXIETY QUESTIONNAIRES: GAD7 TOTAL SCORE: 14

## 2020-02-14 ENCOUNTER — COMMUNICATION - HEALTHEAST (OUTPATIENT)
Dept: FAMILY MEDICINE | Facility: CLINIC | Age: 40
End: 2020-02-14

## 2020-03-10 ENCOUNTER — OFFICE VISIT (OUTPATIENT)
Dept: PSYCHOLOGY | Facility: CLINIC | Age: 40
End: 2020-03-10
Payer: COMMERCIAL

## 2020-03-10 DIAGNOSIS — F41.1 GAD (GENERALIZED ANXIETY DISORDER): ICD-10-CM

## 2020-03-10 DIAGNOSIS — F33.0 MDD (MAJOR DEPRESSIVE DISORDER), RECURRENT EPISODE, MILD (H): Primary | ICD-10-CM

## 2020-03-10 PROCEDURE — 90834 PSYTX W PT 45 MINUTES: CPT | Performed by: SOCIAL WORKER

## 2020-03-10 ASSESSMENT — ANXIETY QUESTIONNAIRES
IF YOU CHECKED OFF ANY PROBLEMS ON THIS QUESTIONNAIRE, HOW DIFFICULT HAVE THESE PROBLEMS MADE IT FOR YOU TO DO YOUR WORK, TAKE CARE OF THINGS AT HOME, OR GET ALONG WITH OTHER PEOPLE: SOMEWHAT DIFFICULT
5. BEING SO RESTLESS THAT IT IS HARD TO SIT STILL: MORE THAN HALF THE DAYS
3. WORRYING TOO MUCH ABOUT DIFFERENT THINGS: SEVERAL DAYS
7. FEELING AFRAID AS IF SOMETHING AWFUL MIGHT HAPPEN: NOT AT ALL
GAD7 TOTAL SCORE: 8
1. FEELING NERVOUS, ANXIOUS, OR ON EDGE: SEVERAL DAYS
4. TROUBLE RELAXING: MORE THAN HALF THE DAYS
6. BECOMING EASILY ANNOYED OR IRRITABLE: SEVERAL DAYS
2. NOT BEING ABLE TO STOP OR CONTROL WORRYING: SEVERAL DAYS

## 2020-03-10 ASSESSMENT — PATIENT HEALTH QUESTIONNAIRE - PHQ9: SUM OF ALL RESPONSES TO PHQ QUESTIONS 1-9: 13

## 2020-03-10 NOTE — PROGRESS NOTES
Progress Note    Patient Name: Mendy Roe  Date: 3/10/2020                 Service Type: Individual  Video Visit: No     Session Start Time: 1533 Session End Time: 1623     Session Length: 50 minutes    Session #: 5    Attendees: Client     Treatment Plan Last Reviewed:3/10/2020            PHQ-9 / MEG-7 : 13/8    DATA  Interactive Complexity: No  Crisis: No       Progress Since Last Session (Related to Symptoms / Goals / Homework):   Symptoms: Improving decrease PHQ-9 and MEG-7    Homework: Partially completed      Episode of Care Goals: Minimal progress - CONTEMPLATION (Considering change and yet undecided); Intervened by assessing the negative and positive thinking (ambivalence) about behavior change     Current / Ongoing Stressors and Concerns:   Ex, kids, grandchildren, partner     Treatment Objective(s) Addressed in This Session:     Patient will demonstrate and report a level of anxiety that can be managed at a lower level of care.  Absence of persistent anxious mood and report of reduced frequency and intensity of worry and absence of persistent anxious mood to acceptable levels, no panic attacks, report subjective comfort with rumination for a period of 90 days, within 6 months as clinically observed and by patient self-report.  Patient will demonstrate and report a level of depression that can be managed at a lower level of care.  Absence of persistent depression mood and report of reduced frequency and intensity of low mood and absence of persistent low energy and motivation to acceptable levels, report subjective improved motivation and increased energy for a period of 90 days, within 6 months as clinically observed and by patient self-report.     Intervention:   Therapist met with patient to review goals and interventions. Therapist utilized reflected listening as patient gave brief reflection of week.  Patient processed her frustrations with a persistent  cough she has had since her surgery. Therapist supported patient and validated patient s frustrations. Patient processed her daughter graduating and hopes to be moving soon. Patient processed her anxiety and therapist suggested patient communicate her feelings to her partner along with expectations.    Patient presented with an anxious affect. Patient was engaged in session and open to feedback. Patient reported no safety concerns.     ASSESSMENT: Current Emotional / Mental Status (status of significant symptoms):   Risk status (Self / Other harm or suicidal ideation)   Patient denies current fears or concerns for personal safety.   Patient denies current or recent suicidal ideation or behaviors.   Patient denies current or recent homicidal ideation or behaviors.   Patient denies current or recent self injurious behavior or ideation.   Patient denies other safety concerns.   Patient reports there has been no change in risk factors since their last session.     Patient reports there has been no change in protective factors since their last session.     Recommended that patient call 911 or go to the local ED should there be a change in any of these risk factors.     Appearance:   Appropriate    Eye Contact:   Good    Psychomotor Behavior: Normal    Attitude:   Cooperative    Orientation:   All   Speech    Rate / Production: Normal     Volume:  Normal    Mood:    Anxious  Depressed    Affect:    Labile  Worrisome    Thought Content:  Clear    Thought Form:  Coherent    Insight:    Fair      Medication Review:   No changes to current psychiatric medication(s)     Medication Compliance:   Yes     Changes in Health Issues:   None reported     Chemical Use Review:   Substance Use: Chemical use reviewed, no active concerns identified      Tobacco Use: No current tobacco use.      Diagnosis:  1. MDD (major depressive disorder), recurrent episode, mild (H)    2. MEG (generalized anxiety disorder)         Collateral Reports  Completed:   Not Applicable    PLAN: (Patient Tasks / Therapist Tasks / Other)  Patient to continue to name her anxiety in the moment    Patient to be clear about expectations and emotions      Patito Humphries, LICSW                                                         ______________________________________________________________________    Treatment Plan    Patient's Name: Mendy Roe  YOB: 1980    Date: 12/17/2019      DSM5 Diagnoses: 296.31 (F33.0) Major Depressive Disorder, Recurrent Episode, Mild _ and With mixed features or 300.02 (F41.1) Generalized Anxiety Disorder  Psychosocial / Contextual Factors: Ex, kids, grandchildren, partner  WHODAS: 27    Referral / Collaboration:  Referral to another professional/service is not indicated at this time..    Anticipated number of session or this episode of care: 20      MeasurableTreatment Goal(s) related to diagnosis / functional impairment(s)  Goal 1: Patient will report absence of persistent anxiety mood and report of reduced frequency and intensity of worry and absence of persistent anxious mood to acceptable levels, no panic attacks, report subjective comfort with rumination or a period of 90 days. Within 6 months as clinically observed and by patient self-report    I will know I've met my goal when manage my anxiety.      Objective #A (Patient Action)    Patient will demonstrate and report a level of anxiety that can be managed at a lower level of care.  Absence of persistent anxious mood and report of reduced frequency and intensity of worry and absence of persistent anxious mood to acceptable levels, no panic attacks, report subjective comfort with rumination for a period of 90 days, within 6 months as clinically observed and by patient self-report.  Status: New - Date: 12.17.19     Intervention(s)  Therapist will provide individual therapy to identify triggers to anxiety, gain feedback on helpful coping tools and thought-reframing  techniques, and identify preferred way of being. Tx to include discuss current stressors and interpersonal conflicts and how to cope with these, coaching, diagnostic testing, referral for medication as indicated, use prescribed medication, cognitive restructuring, interpersonal, family therapy, supportive therapy services.        Goal 2: Patient will report absence of persistent depression mood and report of reduced frequency and intensity of low mood and absence of persistent low energy and motivation to acceptable levels, report subjective improved motivation and increased energy for a period of 90 days, within 6 months as clinically observed and by patient self-report    I will know I've met my goal when feeling more balanced.      Objective #A (Patient Action)    Status: New - Date: 12.17.19     Patient will demonstrate and report a level of depression that can be managed at a lower level of care.  Absence of persistent depression mood and report of reduced frequency and intensity of low mood and absence of persistent low energy and motivation to acceptable levels, report subjective improved motivation and increased energy for a period of 90 days, within 6 months as clinically observed and by patient self-report.    Intervention(s)  Therapist will provide individual therapy to identify triggers to depression, gain feedback on helpful coping tools and thought-reframing techniques, and identify preferred way of being.  Tx to include discussion of current stressors and interpersonal conflicts and how to cope with these, coaching, diagnostic testing, referral for medication as indicated, use prescribed medication, cognitive restructuring, interpersonal, family therapy, supportive therapy services.        Patient has reviewed and agreed to the above plan.      Patito Humphries, [facility]  December 17, 2019

## 2020-03-11 ASSESSMENT — ANXIETY QUESTIONNAIRES: GAD7 TOTAL SCORE: 8

## 2020-03-24 ENCOUNTER — VIRTUAL VISIT (OUTPATIENT)
Dept: PSYCHOLOGY | Facility: CLINIC | Age: 40
End: 2020-03-24
Payer: COMMERCIAL

## 2020-03-24 DIAGNOSIS — F41.1 GAD (GENERALIZED ANXIETY DISORDER): Primary | ICD-10-CM

## 2020-03-24 DIAGNOSIS — F33.0 MDD (MAJOR DEPRESSIVE DISORDER), RECURRENT EPISODE, MILD (H): ICD-10-CM

## 2020-03-24 PROCEDURE — 90832 PSYTX W PT 30 MINUTES: CPT | Mod: TEL | Performed by: SOCIAL WORKER

## 2020-03-24 NOTE — PROGRESS NOTES
"  Telephone Visit Note    Patient Name: Mendy Roe  Date: 3.24.20         Service Type: Telephone Visit     The patient has been notified of following:     \"This telephone visit will be conducted via a call between you and your provider. We have found that certain health care needs can be provided without the need for an office visit.  This service lets us provide the care you need with a short phone conversation.    If during the course of the call the provider feels a telephone visit is not appropriate, you will not be charged for this service.\"      Session Start Time: 1629  Session End Time: 1656      Session Length: 27minutes    Session #: 6    Attendees: Client     DATA      Progress Since Last Session (Related to Symptoms / Goals / Homework):   Symptoms: Worsening pt reports increase in anxiety and depression due to COVID-19      Episode of Care Goals: Minimal progress - CONTEMPLATION (Considering change and yet undecided); Intervened by assessing the negative and positive thinking (ambivalence) about behavior change     Current / Ongoing Stressors and Concerns:   Work overload, COVID-19, exposure       Intervention:    Therapist met with patient to review goals and interventions, via phone. Therapist utilized reflected listening as patient gave brief reflection of week. 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 Therapist supported patient as she processed and validated patient. Therapist reviewed alternative activities patient can look at participating in with her children while on quarantine. Therapist utilized strength-based modality with patient. Therapist reviewed effective coping skills and grounding techniques to manage anxiety.  Patient presented with an anxious affect. Patient was engaged in session and open to feedback. Patient reported no safety concerns.        ASSESSMENT: Current Emotional / Mental Status (status of significant symptoms):   Risk status (Self " / Other harm or suicidal ideation)   Patient denies current fears or concerns for personal safety.   Patient denies current or recent suicidal ideation or behaviors.   Patient denies current or recent homicidal ideation or behaviors.   Patient denies current or recent self injurious behavior or ideation.   Patient denies other safety concerns.   Patient reports there has been no change in risk factors since their last session.     Patient reports there has been no change in protective factors since their last session.     Recommended that patient call 911 or go to the local ED should there be a change in any of these risk factors.     Attitude:   Cooperative    Orientation:   All   Speech    Rate / Production: Normal     Volume:  Loud    Mood:    Angry  Anxious  Depressed  Irritable    Thought Content:  Clear    Thought Form:  Coherent    Insight:    Fair      Medication Compliance:   Yes     Changes in Health Issues:   None reported     Chemical Use Review:   Substance Use: Chemical use reviewed, no active concerns identified      Tobacco Use: No current tobacco use.      Diagnosis:  1. MEG (generalized anxiety disorder)    2. MDD (major depressive disorder), recurrent episode, mild (H)          PLAN: (Patient Tasks / Therapist Tasks / Other)  Patient to implement self-care and participate in new activities with her pamen      I have reviewed the note as documented above.  This accurately captures the substance of my conversation with the patient.  Patito Humphries, [facility]

## 2020-04-16 ENCOUNTER — TELEPHONE (OUTPATIENT)
Dept: PSYCHOLOGY | Facility: CLINIC | Age: 40
End: 2020-04-16

## 2020-04-21 ENCOUNTER — VIRTUAL VISIT (OUTPATIENT)
Dept: PSYCHOLOGY | Facility: CLINIC | Age: 40
End: 2020-04-21
Payer: COMMERCIAL

## 2020-04-21 DIAGNOSIS — F33.0 MDD (MAJOR DEPRESSIVE DISORDER), RECURRENT EPISODE, MILD (H): ICD-10-CM

## 2020-04-21 DIAGNOSIS — F41.1 GAD (GENERALIZED ANXIETY DISORDER): Primary | ICD-10-CM

## 2020-04-21 PROCEDURE — 90834 PSYTX W PT 45 MINUTES: CPT | Mod: GT | Performed by: SOCIAL WORKER

## 2020-04-21 ASSESSMENT — ANXIETY QUESTIONNAIRES
7. FEELING AFRAID AS IF SOMETHING AWFUL MIGHT HAPPEN: SEVERAL DAYS
IF YOU CHECKED OFF ANY PROBLEMS ON THIS QUESTIONNAIRE, HOW DIFFICULT HAVE THESE PROBLEMS MADE IT FOR YOU TO DO YOUR WORK, TAKE CARE OF THINGS AT HOME, OR GET ALONG WITH OTHER PEOPLE: SOMEWHAT DIFFICULT
1. FEELING NERVOUS, ANXIOUS, OR ON EDGE: MORE THAN HALF THE DAYS
3. WORRYING TOO MUCH ABOUT DIFFERENT THINGS: SEVERAL DAYS
GAD7 TOTAL SCORE: 11
4. TROUBLE RELAXING: MORE THAN HALF THE DAYS
6. BECOMING EASILY ANNOYED OR IRRITABLE: MORE THAN HALF THE DAYS
5. BEING SO RESTLESS THAT IT IS HARD TO SIT STILL: MORE THAN HALF THE DAYS
2. NOT BEING ABLE TO STOP OR CONTROL WORRYING: SEVERAL DAYS

## 2020-04-21 ASSESSMENT — PATIENT HEALTH QUESTIONNAIRE - PHQ9: SUM OF ALL RESPONSES TO PHQ QUESTIONS 1-9: 16

## 2020-04-21 NOTE — PROGRESS NOTES
Progress Note    Patient Name: Mendy Roe  Date: 4/21/2020               Service Type: Individual  Video Visit: Yes, the patient's condition can be safely assessed and treated via synchronous audio and visual telemedicine encounter.      Reason for Video Visit: Services only offered telehealth    Location of Originating Site: Patient's other car    Distant Site: Provider Remote Setting     Session Start Time: 1736 Session End Time: 1623     Session Length: 47 minutes    Session #: 5    Attendees: Client     Treatment Plan Last Reviewed:4/21/2020              PHQ-9 / MEG-7 : 16/11    DATA  Interactive Complexity: No  Crisis: No       Progress Since Last Session (Related to Symptoms / Goals / Homework):   Symptoms: Worsening increase PHQ-9 MEG-7    Homework: Partially completed      Episode of Care Goals: Minimal progress - CONTEMPLATION (Considering change and yet undecided); Intervened by assessing the negative and positive thinking (ambivalence) about behavior change     Current / Ongoing Stressors and Concerns:   COVID-19, isolation, moving, ex, kids, grandchildren, partner     Treatment Objective(s) Addressed in This Session:     Patient will demonstrate and report a level of anxiety that can be managed at a lower level of care.  Absence of persistent anxious mood and report of reduced frequency and intensity of worry and absence of persistent anxious mood to acceptable levels, no panic attacks, report subjective comfort with rumination for a period of 90 days, within 6 months as clinically observed and by patient self-report.  Patient will demonstrate and report a level of depression that can be managed at a lower level of care.  Absence of persistent depression mood and report of reduced frequency and intensity of low mood and absence of persistent low energy and motivation to acceptable levels, report subjective improved motivation and increased energy for a  period of 90 days, within 6 months as clinically observed and by patient self-report.     Intervention:   Therapist met with patient to review goals and interventions. Therapist utilized reflected listening as patient gave brief reflection of week.  Patient processed feeling isolated and frustrated with emotions. Therapist supported patient as she processed and validated patient. Therapist reviewed alternatives to seeing children and grandchildren. Therapist utilized strength-based modality with patient.     Patient presented with an anxious affect. Patient was engaged in session and open to feedback. Patient reported no safety concerns.     ASSESSMENT: Current Emotional / Mental Status (status of significant symptoms):   Risk status (Self / Other harm or suicidal ideation)   Patient denies current fears or concerns for personal safety.   Patient denies current or recent suicidal ideation or behaviors.   Patient denies current or recent homicidal ideation or behaviors.   Patient denies current or recent self injurious behavior or ideation.   Patient denies other safety concerns.   Patient reports there has been no change in risk factors since their last session.     Patient reports there has been no change in protective factors since their last session.     Recommended that patient call 911 or go to the local ED should there be a change in any of these risk factors.     Appearance:   Appropriate    Eye Contact:   Good    Psychomotor Behavior: Normal    Attitude:   Cooperative  Friendly Pleasant   Orientation:   All   Speech    Rate / Production: Normal/ Responsive    Volume:  Normal    Mood:    Anxious  Depressed    Affect:    Bright  Worrisome    Thought Content:  Clear    Thought Form:  Coherent    Insight:    Fair      Medication Review:   No changes to current psychiatric medication(s)     Medication Compliance:   Yes     Changes in Health Issues:   None reported     Chemical Use Review:   Substance Use:  Chemical use reviewed, no active concerns identified      Tobacco Use: No current tobacco use.      Diagnosis:  1. MEG (generalized anxiety disorder)    2. MDD (major depressive disorder), recurrent episode, mild (H)         Collateral Reports Completed:   Not Applicable    PLAN: (Patient Tasks / Therapist Tasks / Other)  Patient to continue to name her anxiety in the moment    Look at alternatives for seeing family     Patito Humphries, LICSW                                                         ______________________________________________________________________    Treatment Plan    Patient's Name: Mendy Roe  YOB: 1980    Date: 4.21.20       DSM5 Diagnoses: 296.31 (F33.0) Major Depressive Disorder, Recurrent Episode, Mild _ and With mixed features or 300.02 (F41.1) Generalized Anxiety Disorder  Psychosocial / Contextual Factors: Ex, kids, grandchildren, partner  WHODAS: 37    Referral / Collaboration:  Referral to another professional/service is not indicated at this time..    Anticipated number of session or this episode of care: 20      MeasurableTreatment Goal(s) related to diagnosis / functional impairment(s)  Goal 1: Patient will report absence of persistent anxiety mood and report of reduced frequency and intensity of worry and absence of persistent anxious mood to acceptable levels, no panic attacks, report subjective comfort with rumination or a period of 90 days. Within 6 months as clinically observed and by patient self-report    I will know I've met my goal when manage my anxiety.      Objective #A (Patient Action)    Patient will demonstrate and report a level of anxiety that can be managed at a lower level of care.  Absence of persistent anxious mood and report of reduced frequency and intensity of worry and absence of persistent anxious mood to acceptable levels, no panic attacks, report subjective comfort with rumination for a period of 90 days, within 6 months as clinically  observed and by patient self-report.  Status: Continued - Date(s): 4.21.20      Intervention(s)  Therapist will provide individual therapy to identify triggers to anxiety, gain feedback on helpful coping tools and thought-reframing techniques, and identify preferred way of being. Tx to include discuss current stressors and interpersonal conflicts and how to cope with these, coaching, diagnostic testing, referral for medication as indicated, use prescribed medication, cognitive restructuring, interpersonal, family therapy, supportive therapy services.        Goal 2: Patient will report absence of persistent depression mood and report of reduced frequency and intensity of low mood and absence of persistent low energy and motivation to acceptable levels, report subjective improved motivation and increased energy for a period of 90 days, within 6 months as clinically observed and by patient self-report    I will know I've met my goal when feeling more balanced.      Objective #A (Patient Action)    Status: Continued - Date(s):4.21.20     Patient will demonstrate and report a level of depression that can be managed at a lower level of care.  Absence of persistent depression mood and report of reduced frequency and intensity of low mood and absence of persistent low energy and motivation to acceptable levels, report subjective improved motivation and increased energy for a period of 90 days, within 6 months as clinically observed and by patient self-report.    Intervention(s)  Therapist will provide individual therapy to identify triggers to depression, gain feedback on helpful coping tools and thought-reframing techniques, and identify preferred way of being.  Tx to include discussion of current stressors and interpersonal conflicts and how to cope with these, coaching, diagnostic testing, referral for medication as indicated, use prescribed medication, cognitive restructuring, interpersonal, family therapy, supportive  therapy services.        Patient has reviewed and agreed to the above plan.      Patito Humphries, [facility]  4/21/2020

## 2020-04-22 ASSESSMENT — ANXIETY QUESTIONNAIRES: GAD7 TOTAL SCORE: 11

## 2020-04-29 ENCOUNTER — COMMUNICATION - HEALTHEAST (OUTPATIENT)
Dept: FAMILY MEDICINE | Facility: CLINIC | Age: 40
End: 2020-04-29

## 2020-04-30 ENCOUNTER — COMMUNICATION - HEALTHEAST (OUTPATIENT)
Dept: FAMILY MEDICINE | Facility: CLINIC | Age: 40
End: 2020-04-30

## 2020-04-30 ENCOUNTER — OFFICE VISIT - HEALTHEAST (OUTPATIENT)
Dept: FAMILY MEDICINE | Facility: CLINIC | Age: 40
End: 2020-04-30

## 2020-04-30 DIAGNOSIS — R42 LIGHTHEADEDNESS: ICD-10-CM

## 2020-05-05 ENCOUNTER — OFFICE VISIT - HEALTHEAST (OUTPATIENT)
Dept: FAMILY MEDICINE | Facility: CLINIC | Age: 40
End: 2020-05-05

## 2020-05-05 ENCOUNTER — COMMUNICATION - HEALTHEAST (OUTPATIENT)
Dept: FAMILY MEDICINE | Facility: CLINIC | Age: 40
End: 2020-05-05

## 2020-05-05 ENCOUNTER — VIRTUAL VISIT (OUTPATIENT)
Dept: PSYCHOLOGY | Facility: CLINIC | Age: 40
End: 2020-05-05
Payer: COMMERCIAL

## 2020-05-05 DIAGNOSIS — F41.1 GAD (GENERALIZED ANXIETY DISORDER): Primary | ICD-10-CM

## 2020-05-05 DIAGNOSIS — F33.0 MDD (MAJOR DEPRESSIVE DISORDER), RECURRENT EPISODE, MILD (H): ICD-10-CM

## 2020-05-05 DIAGNOSIS — J01.30 ACUTE NON-RECURRENT SPHENOIDAL SINUSITIS: ICD-10-CM

## 2020-05-05 DIAGNOSIS — R42 LIGHTHEADEDNESS: ICD-10-CM

## 2020-05-05 PROCEDURE — 90834 PSYTX W PT 45 MINUTES: CPT | Mod: GT | Performed by: SOCIAL WORKER

## 2020-05-05 ASSESSMENT — PATIENT HEALTH QUESTIONNAIRE - PHQ9: SUM OF ALL RESPONSES TO PHQ QUESTIONS 1-9: 16

## 2020-05-05 ASSESSMENT — ANXIETY QUESTIONNAIRES
IF YOU CHECKED OFF ANY PROBLEMS ON THIS QUESTIONNAIRE, HOW DIFFICULT HAVE THESE PROBLEMS MADE IT FOR YOU TO DO YOUR WORK, TAKE CARE OF THINGS AT HOME, OR GET ALONG WITH OTHER PEOPLE: SOMEWHAT DIFFICULT
4. TROUBLE RELAXING: MORE THAN HALF THE DAYS
3. WORRYING TOO MUCH ABOUT DIFFERENT THINGS: MORE THAN HALF THE DAYS
GAD7 TOTAL SCORE: 14
2. NOT BEING ABLE TO STOP OR CONTROL WORRYING: MORE THAN HALF THE DAYS
6. BECOMING EASILY ANNOYED OR IRRITABLE: MORE THAN HALF THE DAYS
7. FEELING AFRAID AS IF SOMETHING AWFUL MIGHT HAPPEN: SEVERAL DAYS
1. FEELING NERVOUS, ANXIOUS, OR ON EDGE: NEARLY EVERY DAY
5. BEING SO RESTLESS THAT IT IS HARD TO SIT STILL: MORE THAN HALF THE DAYS

## 2020-05-05 NOTE — PROGRESS NOTES
Progress Note    Patient Name: Mendy Roe  Date: 5/5/2020           Service Type: Individual  Video Visit: Yes, the patient's condition can be safely assessed and treated via synchronous audio and visual telemedicine encounter.      Reason for Video Visit: Services only offered telehealth    Location of Originating Site: Patient's other car    Distant Site: Provider Remote Setting     Consent:  The patient/guardian has verbally consented to: the potential risks and benefits of telemedicine (video visit) versus in person care; bill my insurance or make self-payment for services provided; and responsibility for payment of non-covered services.       Mode of Communication:  Video Conference via [email or web address]    Session Start Time: 1638 Session End Time: 1724     Session Length: 46 minutes    Session #: 5    Attendees: Client     Treatment Plan Last Reviewed:5/5/2020      PHQ-9 / MEG-7 : 16/14    DATA  Interactive Complexity: No  Crisis: No       Progress Since Last Session (Related to Symptoms / Goals / Homework):   Symptoms: No change  PHQ-9 and worsening increase MEG-7    Homework: Partially completed      Episode of Care Goals: Minimal progress - CONTEMPLATION (Considering change and yet undecided); Intervened by assessing the negative and positive thinking (ambivalence) about behavior change     Current / Ongoing Stressors and Concerns:   COVID-19, isolation, moving, ex, kids, grandchildren, partner     Treatment Objective(s) Addressed in This Session:     Patient will demonstrate and report a level of anxiety that can be managed at a lower level of care.  Absence of persistent anxious mood and report of reduced frequency and intensity of worry and absence of persistent anxious mood to acceptable levels, no panic attacks, report subjective comfort with rumination for a period of 90 days, within 6 months as clinically observed and by patient self-report.  Patient will  demonstrate and report a level of depression that can be managed at a lower level of care.  Absence of persistent depression mood and report of reduced frequency and intensity of low mood and absence of persistent low energy and motivation to acceptable levels, report subjective improved motivation and increased energy for a period of 90 days, within 6 months as clinically observed and by patient self-report.     Intervention:   Therapist met with patient to review goals and interventions. Therapist utilized reflected listening as patient gave brief reflection of week.  Patient processed feeling emotional after partial move in with her partner. Therapist supported patient as she processed meeting with CPS and conflict within the house. Patient reported meeting with CPS went well and more steps to follow. Patient processed conflict with in the home with partner's mom. Therapist coached patient through effective communication and encouraged patient to sit down with her partner and share her concerns.   Patient presented with an anxious affect. Patient was engaged in session and open to feedback. Patient reported no safety concerns.     ASSESSMENT: Current Emotional / Mental Status (status of significant symptoms):   Risk status (Self / Other harm or suicidal ideation)   Patient denies current fears or concerns for personal safety.   Patient denies current or recent suicidal ideation or behaviors.   Patient denies current or recent homicidal ideation or behaviors.   Patient denies current or recent self injurious behavior or ideation.   Patient denies other safety concerns.   Patient reports there has been no change in risk factors since their last session.     Patient reports there has been no change in protective factors since their last session.     Recommended that patient call 911 or go to the local ED should there be a change in any of these risk factors.     Appearance:   Appropriate    Eye Contact:   Good     Psychomotor Behavior: Agitated    Attitude:   Cooperative  Friendly Pleasant Indifferent   Orientation:   All   Speech    Rate / Production: Emotional Talkative    Volume:  Normal    Mood:    Anxious  Depressed  Irritable    Affect:    Worrisome    Thought Content:  Clear    Thought Form:  Coherent    Insight:    Fair      Medication Review:   No changes to current psychiatric medication(s)     Medication Compliance:   Yes     Changes in Health Issues:   None reported     Chemical Use Review:   Substance Use: Chemical use reviewed, no active concerns identified      Tobacco Use: No current tobacco use.      Diagnosis:  1. MEG (generalized anxiety disorder)    2. MDD (major depressive disorder), recurrent episode, mild (H)         Collateral Reports Completed:   Not Applicable    PLAN: (Patient Tasks / Therapist Tasks / Other)  Patient to continue to name her anxiety in the moment    Patient to speak to her partner about her concerns    Patito Humphries, [facility]                                                         ______________________________________________________________________    Treatment Plan    Patient's Name: Mendy Roe  YOB: 1980    Date: 4.21.20       DSM5 Diagnoses: 296.31 (F33.0) Major Depressive Disorder, Recurrent Episode, Mild _ and With mixed features or 300.02 (F41.1) Generalized Anxiety Disorder  Psychosocial / Contextual Factors: Ex, kids, grandchildren, partner  WHODAS: 37    Referral / Collaboration:  Referral to another professional/service is not indicated at this time..    Anticipated number of session or this episode of care: 20      MeasurableTreatment Goal(s) related to diagnosis / functional impairment(s)  Goal 1: Patient will report absence of persistent anxiety mood and report of reduced frequency and intensity of worry and absence of persistent anxious mood to acceptable levels, no panic attacks, report subjective comfort with rumination or a period of 90 days.  Within 6 months as clinically observed and by patient self-report    I will know I've met my goal when manage my anxiety.      Objective #A (Patient Action)    Patient will demonstrate and report a level of anxiety that can be managed at a lower level of care.  Absence of persistent anxious mood and report of reduced frequency and intensity of worry and absence of persistent anxious mood to acceptable levels, no panic attacks, report subjective comfort with rumination for a period of 90 days, within 6 months as clinically observed and by patient self-report.  Status: Continued - Date(s): 4.21.20      Intervention(s)  Therapist will provide individual therapy to identify triggers to anxiety, gain feedback on helpful coping tools and thought-reframing techniques, and identify preferred way of being. Tx to include discuss current stressors and interpersonal conflicts and how to cope with these, coaching, diagnostic testing, referral for medication as indicated, use prescribed medication, cognitive restructuring, interpersonal, family therapy, supportive therapy services.        Goal 2: Patient will report absence of persistent depression mood and report of reduced frequency and intensity of low mood and absence of persistent low energy and motivation to acceptable levels, report subjective improved motivation and increased energy for a period of 90 days, within 6 months as clinically observed and by patient self-report    I will know I've met my goal when feeling more balanced.      Objective #A (Patient Action)    Status: Continued - Date(s):4.21.20     Patient will demonstrate and report a level of depression that can be managed at a lower level of care.  Absence of persistent depression mood and report of reduced frequency and intensity of low mood and absence of persistent low energy and motivation to acceptable levels, report subjective improved motivation and increased energy for a period of 90 days, within 6  months as clinically observed and by patient self-report.    Intervention(s)  Therapist will provide individual therapy to identify triggers to depression, gain feedback on helpful coping tools and thought-reframing techniques, and identify preferred way of being.  Tx to include discussion of current stressors and interpersonal conflicts and how to cope with these, coaching, diagnostic testing, referral for medication as indicated, use prescribed medication, cognitive restructuring, interpersonal, family therapy, supportive therapy services.        Patient has reviewed and agreed to the above plan.      Patito Humphries, [facility]  4/21/2020

## 2020-05-06 ASSESSMENT — ANXIETY QUESTIONNAIRES: GAD7 TOTAL SCORE: 14

## 2020-05-19 ENCOUNTER — VIRTUAL VISIT (OUTPATIENT)
Dept: PSYCHOLOGY | Facility: CLINIC | Age: 40
End: 2020-05-19
Payer: COMMERCIAL

## 2020-05-19 DIAGNOSIS — F33.0 MDD (MAJOR DEPRESSIVE DISORDER), RECURRENT EPISODE, MILD (H): ICD-10-CM

## 2020-05-19 DIAGNOSIS — F41.1 GAD (GENERALIZED ANXIETY DISORDER): Primary | ICD-10-CM

## 2020-05-19 PROCEDURE — 90834 PSYTX W PT 45 MINUTES: CPT | Mod: GT | Performed by: SOCIAL WORKER

## 2020-05-19 ASSESSMENT — ANXIETY QUESTIONNAIRES
GAD7 TOTAL SCORE: 7
5. BEING SO RESTLESS THAT IT IS HARD TO SIT STILL: SEVERAL DAYS
1. FEELING NERVOUS, ANXIOUS, OR ON EDGE: SEVERAL DAYS
2. NOT BEING ABLE TO STOP OR CONTROL WORRYING: SEVERAL DAYS
6. BECOMING EASILY ANNOYED OR IRRITABLE: SEVERAL DAYS
3. WORRYING TOO MUCH ABOUT DIFFERENT THINGS: SEVERAL DAYS
IF YOU CHECKED OFF ANY PROBLEMS ON THIS QUESTIONNAIRE, HOW DIFFICULT HAVE THESE PROBLEMS MADE IT FOR YOU TO DO YOUR WORK, TAKE CARE OF THINGS AT HOME, OR GET ALONG WITH OTHER PEOPLE: SOMEWHAT DIFFICULT
4. TROUBLE RELAXING: MORE THAN HALF THE DAYS
7. FEELING AFRAID AS IF SOMETHING AWFUL MIGHT HAPPEN: NOT AT ALL

## 2020-05-19 ASSESSMENT — PATIENT HEALTH QUESTIONNAIRE - PHQ9: SUM OF ALL RESPONSES TO PHQ QUESTIONS 1-9: 10

## 2020-05-19 NOTE — PROGRESS NOTES
-                                           Progress Note    Patient Name: Mendy Roe  Date: 5/19/2020           Service Type: Individual  Video Visit: Yes, the patient's condition can be safely assessed and treated via synchronous audio and visual telemedicine encounter.      Reason for Video Visit: Services only offered telehealth    Location of Originating Site: Patient's other car    Distant Site: Provider Remote Setting     Consent:  The patient/guardian has verbally consented to: the potential risks and benefits of telemedicine (video visit) versus in person care; bill my insurance or make self-payment for services provided; and responsibility for payment of non-covered services.       Mode of Communication:  Video Conference via [employer]     Session Start Time: 1548 Session End Time: 1630     Session Length: 42 minutes    Session #: 6    Attendees: Client     Treatment Plan Last Reviewed:5/19/2020      PHQ-9 / MEG-7 : 10/7    DATA  Interactive Complexity: No  Crisis: No       Progress Since Last Session (Related to Symptoms / Goals / Homework):   Symptoms: Improving decrease pHQ-=9 MEG-7    Homework: Partially completed      Episode of Care Goals: Minimal progress - CONTEMPLATION (Considering change and yet undecided); Intervened by assessing the negative and positive thinking (ambivalence) about behavior change     Current / Ongoing Stressors and Concerns:   COVID-19, isolation, moving, ex, kids, grandchildren, partner     Treatment Objective(s) Addressed in This Session:     Patient will demonstrate and report a level of anxiety that can be managed at a lower level of care.  Absence of persistent anxious mood and report of reduced frequency and intensity of worry and absence of persistent anxious mood to acceptable levels, no panic attacks, report subjective comfort with rumination for a period of 90 days, within 6 months as clinically observed and by patient self-report.  Patient will  demonstrate and report a level of depression that can be managed at a lower level of care.  Absence of persistent depression mood and report of reduced frequency and intensity of low mood and absence of persistent low energy and motivation to acceptable levels, report subjective improved motivation and increased energy for a period of 90 days, within 6 months as clinically observed and by patient self-report.     Intervention: Patient forgot about about session and session started via phone late   Therapist met with patient to review goals and interventions. Therapist utilized reflected listening as patient gave brief reflection of week.  Patient reported having a good week after struggling. Therapist supported patient as she processed. Therapist shared concerns with patient about her daughters hyper focus on the virus and suggested patient speak to her daughter about possible therapy or speaking to her and provide her with the CDC and the WHO for resources.  Patient presented with a brighter affect. Patient was engaged in session and open to feedback. Patient reported no safety concerns.     ASSESSMENT: Current Emotional / Mental Status (status of significant symptoms):   Risk status (Self / Other harm or suicidal ideation)   Patient denies current fears or concerns for personal safety.   Patient denies current or recent suicidal ideation or behaviors.   Patient denies current or recent homicidal ideation or behaviors.   Patient denies current or recent self injurious behavior or ideation.   Patient denies other safety concerns.   Patient reports there has been no change in risk factors since their last session.     Patient reports there has been no change in protective factors since their last session.     Recommended that patient call 911 or go to the local ED should there be a change in any of these risk factors.     Appearance:   Appropriate    Eye Contact:   Good    Psychomotor Behavior: Agitated     Attitude:   Cooperative  Friendly Pleasant Indifferent   Orientation:   All   Speech    Rate / Production: Emotional Talkative    Volume:  Normal    Mood:    Anxious  Depressed  Irritable    Affect:    Worrisome    Thought Content:  Clear    Thought Form:  Coherent    Insight:    Fair      Medication Review:   No changes to current psychiatric medication(s)     Medication Compliance:   Yes     Changes in Health Issues:   None reported     Chemical Use Review:   Substance Use: Chemical use reviewed, no active concerns identified      Tobacco Use: No current tobacco use.      Diagnosis:  No diagnosis found.     Collateral Reports Completed:   Not Applicable    PLAN: (Patient Tasks / Therapist Tasks / Other)  Patient to continue to name her anxiety in the moment    Speak to her daughter about virus       Patito Humphries, [facility]   5/19/2020                                                         ______________________________________________________________________    Treatment Plan    Patient's Name: Mendy Roe  YOB: 1980    Date: 4.21.20       DSM5 Diagnoses: 296.31 (F33.0) Major Depressive Disorder, Recurrent Episode, Mild _ and With mixed features or 300.02 (F41.1) Generalized Anxiety Disorder  Psychosocial / Contextual Factors: Ex, kids, grandchildren, partner  WHODAS: 37    Referral / Collaboration:  Referral to another professional/service is not indicated at this time..    Anticipated number of session or this episode of care: 20      MeasurableTreatment Goal(s) related to diagnosis / functional impairment(s)  Goal 1: Patient will report absence of persistent anxiety mood and report of reduced frequency and intensity of worry and absence of persistent anxious mood to acceptable levels, no panic attacks, report subjective comfort with rumination or a period of 90 days. Within 6 months as clinically observed and by patient self-report    I will know I've met my goal when manage my anxiety.       Objective #A (Patient Action)    Patient will demonstrate and report a level of anxiety that can be managed at a lower level of care.  Absence of persistent anxious mood and report of reduced frequency and intensity of worry and absence of persistent anxious mood to acceptable levels, no panic attacks, report subjective comfort with rumination for a period of 90 days, within 6 months as clinically observed and by patient self-report.  Status: Continued - Date(s): 4.21.20      Intervention(s)  Therapist will provide individual therapy to identify triggers to anxiety, gain feedback on helpful coping tools and thought-reframing techniques, and identify preferred way of being. Tx to include discuss current stressors and interpersonal conflicts and how to cope with these, coaching, diagnostic testing, referral for medication as indicated, use prescribed medication, cognitive restructuring, interpersonal, family therapy, supportive therapy services.        Goal 2: Patient will report absence of persistent depression mood and report of reduced frequency and intensity of low mood and absence of persistent low energy and motivation to acceptable levels, report subjective improved motivation and increased energy for a period of 90 days, within 6 months as clinically observed and by patient self-report    I will know I've met my goal when feeling more balanced.      Objective #A (Patient Action)    Status: Continued - Date(s):4.21.20     Patient will demonstrate and report a level of depression that can be managed at a lower level of care.  Absence of persistent depression mood and report of reduced frequency and intensity of low mood and absence of persistent low energy and motivation to acceptable levels, report subjective improved motivation and increased energy for a period of 90 days, within 6 months as clinically observed and by patient self-report.    Intervention(s)  Therapist will provide individual therapy to  identify triggers to depression, gain feedback on helpful coping tools and thought-reframing techniques, and identify preferred way of being.  Tx to include discussion of current stressors and interpersonal conflicts and how to cope with these, coaching, diagnostic testing, referral for medication as indicated, use prescribed medication, cognitive restructuring, interpersonal, family therapy, supportive therapy services.        Patient has reviewed and agreed to the above plan.      Patito Humphries, [facility]  4/21/2020

## 2020-05-20 ASSESSMENT — ANXIETY QUESTIONNAIRES: GAD7 TOTAL SCORE: 7

## 2020-05-26 ENCOUNTER — TELEPHONE (OUTPATIENT)
Dept: PSYCHOLOGY | Facility: CLINIC | Age: 40
End: 2020-05-26

## 2020-05-26 ENCOUNTER — FCC EXTENDED DOCUMENTATION (OUTPATIENT)
Dept: PSYCHOLOGY | Facility: CLINIC | Age: 40
End: 2020-05-26

## 2020-05-26 NOTE — TELEPHONE ENCOUNTER
spoke to pt   asked for the name of the CPS worker that came out to home.  Pt could not remember. Therapist will follow up with [facility]

## 2020-05-26 NOTE — PROGRESS NOTES
[phone]  5.26.2020    Therapist call [facility] CPS [phone] and spoke to Letitia. [place] reported not being able to speak to therapist about patient without a JOSEPHINE. Therapist asked if anything was reportable at this time and reviewed patient reported CPS had went to the home and interviewed the minor. CPS told therapist to speak to patient.

## 2020-06-02 ENCOUNTER — VIRTUAL VISIT (OUTPATIENT)
Dept: PSYCHOLOGY | Facility: CLINIC | Age: 40
End: 2020-06-02
Payer: COMMERCIAL

## 2020-06-02 DIAGNOSIS — F33.0 MDD (MAJOR DEPRESSIVE DISORDER), RECURRENT EPISODE, MILD (H): ICD-10-CM

## 2020-06-02 DIAGNOSIS — F41.1 GAD (GENERALIZED ANXIETY DISORDER): Primary | ICD-10-CM

## 2020-06-02 PROCEDURE — 90834 PSYTX W PT 45 MINUTES: CPT | Mod: GT | Performed by: SOCIAL WORKER

## 2020-06-02 ASSESSMENT — ANXIETY QUESTIONNAIRES
2. NOT BEING ABLE TO STOP OR CONTROL WORRYING: SEVERAL DAYS
3. WORRYING TOO MUCH ABOUT DIFFERENT THINGS: SEVERAL DAYS
1. FEELING NERVOUS, ANXIOUS, OR ON EDGE: MORE THAN HALF THE DAYS
6. BECOMING EASILY ANNOYED OR IRRITABLE: MORE THAN HALF THE DAYS
IF YOU CHECKED OFF ANY PROBLEMS ON THIS QUESTIONNAIRE, HOW DIFFICULT HAVE THESE PROBLEMS MADE IT FOR YOU TO DO YOUR WORK, TAKE CARE OF THINGS AT HOME, OR GET ALONG WITH OTHER PEOPLE: SOMEWHAT DIFFICULT
7. FEELING AFRAID AS IF SOMETHING AWFUL MIGHT HAPPEN: SEVERAL DAYS
5. BEING SO RESTLESS THAT IT IS HARD TO SIT STILL: SEVERAL DAYS
4. TROUBLE RELAXING: MORE THAN HALF THE DAYS
GAD7 TOTAL SCORE: 10

## 2020-06-02 ASSESSMENT — PATIENT HEALTH QUESTIONNAIRE - PHQ9: SUM OF ALL RESPONSES TO PHQ QUESTIONS 1-9: 12

## 2020-06-02 NOTE — PROGRESS NOTES
-                                           Progress Note    Patient Name: Mendy Roe  Date: 6/2/2020         Service Type: Individual  Video Visit: Yes, the patient's condition can be safely assessed and treated via synchronous audio and visual telemedicine encounter.      Reason for Video Visit: Services only offered telehealth    Location of Originating Site: Patient's other car    Distant Site: Provider Remote Setting     Consent:  The patient/guardian has verbally consented to: the potential risks and benefits of telemedicine (video visit) versus in person care; bill my insurance or make self-payment for services provided; and responsibility for payment of non-covered services.       Mode of Communication:  Video Conference via [employer]     Session Start Time: 1630 Session End Time: 1715     Session Length: 45 minutes    Session #: 7    Attendees: Client     Treatment Plan Last Reviewed:6/2/2020        PHQ-9 / MEG-7 : 12/10    DATA  Interactive Complexity: No  Crisis: No       Progress Since Last Session (Related to Symptoms / Goals / Homework):   Symptoms: Worsening increase PHQ-9 MEG-7    Homework: Partially completed      Episode of Care Goals: Minimal progress - CONTEMPLATION (Considering change and yet undecided); Intervened by assessing the negative and positive thinking (ambivalence) about behavior change     Current / Ongoing Stressors and Concerns:   COVID-19, isolation, moving, ex, kids, grandchildren, partner     Treatment Objective(s) Addressed in This Session:     Patient will demonstrate and report a level of anxiety that can be managed at a lower level of care.  Absence of persistent anxious mood and report of reduced frequency and intensity of worry and absence of persistent anxious mood to acceptable levels, no panic attacks, report subjective comfort with rumination for a period of 90 days, within 6 months as clinically observed and by patient self-report.  Patient will demonstrate  and report a level of depression that can be managed at a lower level of care.  Absence of persistent depression mood and report of reduced frequency and intensity of low mood and absence of persistent low energy and motivation to acceptable levels, report subjective improved motivation and increased energy for a period of 90 days, within 6 months as clinically observed and by patient self-report.     Intervention: Patient forgot about about session and session started via phone late   Therapist met with patient to review goals and interventions. Therapist utilized reflected listening as patient gave brief reflection of week.  Patient processed her daughter being hospitalized after being injured at the riots.  Therapist supported patient as she processed and validated patient's emotions. Patient initiated discussion with previous phone call of CPS. Therapist reviewed HIMS needing record or documentation that CPS had been out to the home. Patient is going to e-mail document to therapist from CPS.    Patient presented with a brighter affect. Patient was engaged in session and open to feedback. Patient reported no safety concerns.     ASSESSMENT: Current Emotional / Mental Status (status of significant symptoms):   Risk status (Self / Other harm or suicidal ideation)   Patient denies current fears or concerns for personal safety.   Patient denies current or recent suicidal ideation or behaviors.   Patient denies current or recent homicidal ideation or behaviors.   Patient denies current or recent self injurious behavior or ideation.   Patient denies other safety concerns.   Patient reports there has been no change in risk factors since their last session.     Patient reports there has been no change in protective factors since their last session.     Recommended that patient call 911 or go to the local ED should there be a change in any of these risk factors.     Appearance:   Appropriate    Eye Contact:   Good     Psychomotor Behavior: Restless    Attitude:   Cooperative  Friendly Pleasant Indifferent   Orientation:   All   Speech    Rate / Production: Emotional Talkative    Volume:  Normal    Mood:    Anxious  Depressed    Affect:    Worrisome    Thought Content:  Clear    Thought Form:  Coherent    Insight:    Fair      Medication Review:   No changes to current psychiatric medication(s)     Medication Compliance:   Yes     Changes in Health Issues:   None reported     Chemical Use Review:   Substance Use: Chemical use reviewed, no active concerns identified      Tobacco Use: No current tobacco use.      Diagnosis:  1. MEG (generalized anxiety disorder)    2. MDD (major depressive disorder), recurrent episode, mild (H)         Collateral Reports Completed:   Not Applicable    PLAN: (Patient Tasks / Therapist Tasks / Other)  Patient to continue to name her anxiety in the moment    e-mail CPS doc      Patito Humphries, [facility]   6/2/2020                                                           ______________________________________________________________________    Treatment Plan    Patient's Name: Mendy Roe  YOB: 1980    Date: 4.21.20       DSM5 Diagnoses: 296.31 (F33.0) Major Depressive Disorder, Recurrent Episode, Mild _ and With mixed features or 300.02 (F41.1) Generalized Anxiety Disorder  Psychosocial / Contextual Factors: Ex, kids, grandchildren, partner  WHODAS: 37    Referral / Collaboration:  Referral to another professional/service is not indicated at this time..    Anticipated number of session or this episode of care: 20      MeasurableTreatment Goal(s) related to diagnosis / functional impairment(s)  Goal 1: Patient will report absence of persistent anxiety mood and report of reduced frequency and intensity of worry and absence of persistent anxious mood to acceptable levels, no panic attacks, report subjective comfort with rumination or a period of 90 days. Within 6 months as clinically  observed and by patient self-report    I will know I've met my goal when manage my anxiety.      Objective #A (Patient Action)    Patient will demonstrate and report a level of anxiety that can be managed at a lower level of care.  Absence of persistent anxious mood and report of reduced frequency and intensity of worry and absence of persistent anxious mood to acceptable levels, no panic attacks, report subjective comfort with rumination for a period of 90 days, within 6 months as clinically observed and by patient self-report.  Status: Continued - Date(s): 4.21.20      Intervention(s)  Therapist will provide individual therapy to identify triggers to anxiety, gain feedback on helpful coping tools and thought-reframing techniques, and identify preferred way of being. Tx to include discuss current stressors and interpersonal conflicts and how to cope with these, coaching, diagnostic testing, referral for medication as indicated, use prescribed medication, cognitive restructuring, interpersonal, family therapy, supportive therapy services.        Goal 2: Patient will report absence of persistent depression mood and report of reduced frequency and intensity of low mood and absence of persistent low energy and motivation to acceptable levels, report subjective improved motivation and increased energy for a period of 90 days, within 6 months as clinically observed and by patient self-report    I will know I've met my goal when feeling more balanced.      Objective #A (Patient Action)    Status: Continued - Date(s):4.21.20     Patient will demonstrate and report a level of depression that can be managed at a lower level of care.  Absence of persistent depression mood and report of reduced frequency and intensity of low mood and absence of persistent low energy and motivation to acceptable levels, report subjective improved motivation and increased energy for a period of 90 days, within 6 months as clinically observed and  by patient self-report.    Intervention(s)  Therapist will provide individual therapy to identify triggers to depression, gain feedback on helpful coping tools and thought-reframing techniques, and identify preferred way of being.  Tx to include discussion of current stressors and interpersonal conflicts and how to cope with these, coaching, diagnostic testing, referral for medication as indicated, use prescribed medication, cognitive restructuring, interpersonal, family therapy, supportive therapy services.        Patient has reviewed and agreed to the above plan.      Patito Humphries, [facility]  4/21/2020

## 2020-06-03 ASSESSMENT — ANXIETY QUESTIONNAIRES: GAD7 TOTAL SCORE: 10

## 2020-06-16 ENCOUNTER — VIRTUAL VISIT (OUTPATIENT)
Dept: PSYCHOLOGY | Facility: CLINIC | Age: 40
End: 2020-06-16
Payer: COMMERCIAL

## 2020-06-16 DIAGNOSIS — F41.1 GAD (GENERALIZED ANXIETY DISORDER): Primary | ICD-10-CM

## 2020-06-16 DIAGNOSIS — F33.0 MDD (MAJOR DEPRESSIVE DISORDER), RECURRENT EPISODE, MILD (H): ICD-10-CM

## 2020-06-16 PROCEDURE — 90834 PSYTX W PT 45 MINUTES: CPT | Mod: GT | Performed by: SOCIAL WORKER

## 2020-06-16 NOTE — PROGRESS NOTES
Progress Note    Patient Name: Mendy Roe  Date: 6/16/2020         Service Type: Individual  Video Visit: Yes, the patient's condition can be safely assessed and treated via synchronous audio and visual telemedicine encounter.      Reason for Video Visit: Services only offered telehealth    Location of Originating Site: Patient's other car    Distant Site: Provider Remote Setting     Consent:  The patient/guardian has verbally consented to: the potential risks and benefits of telemedicine (video visit) versus in person care; bill my insurance or make self-payment for services provided; and responsibility for payment of non-covered services.       Mode of Communication:  Video Conference via [employer]     Session Start Time: 1630 Session End Time: 1715     Session Length: 45 minutes    Session #: 8    Attendees: Client     Treatment Plan Last Reviewed:6/16/2020        PHQ-9 / MEG-7 : 12/10    DATA  Interactive Complexity: No  Crisis: No       Progress Since Last Session (Related to Symptoms / Goals / Homework):   Symptoms: Worsening increase PHQ-9 MEG-7    Homework: Achieved / completed to satisfaction      Episode of Care Goals: Minimal progress - CONTEMPLATION (Considering change and yet undecided); Intervened by assessing the negative and positive thinking (ambivalence) about behavior change     Current / Ongoing Stressors and Concerns:   COVID-19, isolation, moving, ex, kids, grandchildren, partner     Treatment Objective(s) Addressed in This Session:     Patient will demonstrate and report a level of anxiety that can be managed at a lower level of care.  Absence of persistent anxious mood and report of reduced frequency and intensity of worry and absence of persistent anxious mood to acceptable levels, no panic attacks, report subjective comfort with rumination for a period of 90 days, within 6 months as clinically observed and by patient self-report.  Patient  will demonstrate and report a level of depression that can be managed at a lower level of care.  Absence of persistent depression mood and report of reduced frequency and intensity of low mood and absence of persistent low energy and motivation to acceptable levels, report subjective improved motivation and increased energy for a period of 90 days, within 6 months as clinically observed and by patient self-report.     Intervention:    Therapist met with patient to review goals and interventions. Therapist utilized reflected listening as patient gave brief reflection of week.  Patient reported some conflict and growing pains in the house with moving in. Therapist supported patient as she processed and validated patient. Therapist educated patient on blended families and encouraged patient speak to her daughter about expectations and heathy boundaries. Therapist encouraged patient to offer therapy to her daughter and offered resources.   Patient presented with a brighter affect. Patient was engaged in session and open to feedback. Patient reported no safety concerns.   Document from CPS was received   ASSESSMENT: Current Emotional / Mental Status (status of significant symptoms):   Risk status (Self / Other harm or suicidal ideation)   Patient denies current fears or concerns for personal safety.   Patient denies current or recent suicidal ideation or behaviors.   Patient denies current or recent homicidal ideation or behaviors.   Patient denies current or recent self injurious behavior or ideation.   Patient denies other safety concerns.   Patient reports there has been no change in risk factors since their last session.     Patient reports there has been no change in protective factors since their last session.     Recommended that patient call 911 or go to the local ED should there be a change in any of these risk factors.     Appearance:   Appropriate    Eye Contact:   Good    Psychomotor Behavior: Restless     Attitude:   Cooperative  Friendly Pleasant Indifferent   Orientation:   All   Speech    Rate / Production: Emotional Talkative    Volume:  Normal    Mood:    Anxious  Depressed    Affect:    Worrisome    Thought Content:  Clear    Thought Form:  Coherent    Insight:    Fair      Medication Review:   No changes to current psychiatric medication(s)     Medication Compliance:   Yes     Changes in Health Issues:   None reported     Chemical Use Review:   Substance Use: Chemical use reviewed, no active concerns identified      Tobacco Use: No current tobacco use.      Diagnosis:  1. MEG (generalized anxiety disorder)    2. MDD (major depressive disorder), recurrent episode, mild (H)         Collateral Reports Completed:   Not Applicable    PLAN: (Patient Tasks / Therapist Tasks / Other)  Patient to continue to name her anxiety in the moment    Speak to her daughter    Patito Humphries, [facility]   6/16/2020                                                           ______________________________________________________________________    Treatment Plan    Patient's Name: Mendy Roe  YOB: 1980    Date: 4.21.20       DSM5 Diagnoses: 296.31 (F33.0) Major Depressive Disorder, Recurrent Episode, Mild _ and With mixed features or 300.02 (F41.1) Generalized Anxiety Disorder  Psychosocial / Contextual Factors: Ex, kids, grandchildren, partner  WHODAS: 37    Referral / Collaboration:  Referral to another professional/service is not indicated at this time..    Anticipated number of session or this episode of care: 20      MeasurableTreatment Goal(s) related to diagnosis / functional impairment(s)  Goal 1: Patient will report absence of persistent anxiety mood and report of reduced frequency and intensity of worry and absence of persistent anxious mood to acceptable levels, no panic attacks, report subjective comfort with rumination or a period of 90 days. Within 6 months as clinically observed and by patient  self-report    I will know I've met my goal when manage my anxiety.      Objective #A (Patient Action)    Patient will demonstrate and report a level of anxiety that can be managed at a lower level of care.  Absence of persistent anxious mood and report of reduced frequency and intensity of worry and absence of persistent anxious mood to acceptable levels, no panic attacks, report subjective comfort with rumination for a period of 90 days, within 6 months as clinically observed and by patient self-report.  Status: Continued - Date(s): 4.21.20      Intervention(s)  Therapist will provide individual therapy to identify triggers to anxiety, gain feedback on helpful coping tools and thought-reframing techniques, and identify preferred way of being. Tx to include discuss current stressors and interpersonal conflicts and how to cope with these, coaching, diagnostic testing, referral for medication as indicated, use prescribed medication, cognitive restructuring, interpersonal, family therapy, supportive therapy services.        Goal 2: Patient will report absence of persistent depression mood and report of reduced frequency and intensity of low mood and absence of persistent low energy and motivation to acceptable levels, report subjective improved motivation and increased energy for a period of 90 days, within 6 months as clinically observed and by patient self-report    I will know I've met my goal when feeling more balanced.      Objective #A (Patient Action)    Status: Continued - Date(s):4.21.20     Patient will demonstrate and report a level of depression that can be managed at a lower level of care.  Absence of persistent depression mood and report of reduced frequency and intensity of low mood and absence of persistent low energy and motivation to acceptable levels, report subjective improved motivation and increased energy for a period of 90 days, within 6 months as clinically observed and by patient  self-report.    Intervention(s)  Therapist will provide individual therapy to identify triggers to depression, gain feedback on helpful coping tools and thought-reframing techniques, and identify preferred way of being.  Tx to include discussion of current stressors and interpersonal conflicts and how to cope with these, coaching, diagnostic testing, referral for medication as indicated, use prescribed medication, cognitive restructuring, interpersonal, family therapy, supportive therapy services.        Patient has reviewed and agreed to the above plan.      Patito Humphries, [facility]  4/21/2020

## 2020-07-28 ENCOUNTER — VIRTUAL VISIT (OUTPATIENT)
Dept: PSYCHOLOGY | Facility: CLINIC | Age: 40
End: 2020-07-28
Payer: COMMERCIAL

## 2020-07-28 DIAGNOSIS — F41.1 GAD (GENERALIZED ANXIETY DISORDER): Primary | ICD-10-CM

## 2020-07-28 DIAGNOSIS — F33.0 MDD (MAJOR DEPRESSIVE DISORDER), RECURRENT EPISODE, MILD (H): ICD-10-CM

## 2020-07-28 PROCEDURE — 90834 PSYTX W PT 45 MINUTES: CPT | Mod: GT | Performed by: SOCIAL WORKER

## 2020-07-28 NOTE — PROGRESS NOTES
Progress Note    Patient Name: Mendy Roe  Date: 7/28/2020             Service Type: Individual  Video Visit: Yes, the patient's condition can be safely assessed and treated via synchronous audio and visual telemedicine encounter.      Reason for Video Visit: Services only offered telehealth    Location of Originating Site: Patient's other car    Distant Site: Provider Remote Setting     Consent:  The patient/guardian has verbally consented to: the potential risks and benefits of telemedicine (video visit) versus in person care; bill my insurance or make self-payment for services provided; and responsibility for payment of non-covered services.       Mode of Communication:  Video Conference via [email or web address]    Session Start Time: 1712 Session End Time: 1802     Session Length: 50 minutes    Session #: 10    Attendees: Client     Treatment Plan Last Reviewed 7/28/2020        PHQ-9 / MEG-7 : not assessed     DATA  Interactive Complexity: No  Crisis: No       Progress Since Last Session (Related to Symptoms / Goals / Homework):   Symptoms: No change  PHQ-9 and worsening increase MEG-7    Homework: Partially completed      Episode of Care Goals: Minimal progress - CONTEMPLATION (Considering change and yet undecided); Intervened by assessing the negative and positive thinking (ambivalence) about behavior change     Current / Ongoing Stressors and Concerns:   COVID-19, isolation, moving, ex, kids, grandchildren, partner     Treatment Objective(s) Addressed in This Session:     Patient will demonstrate and report a level of anxiety that can be managed at a lower level of care.  Absence of persistent anxious mood and report of reduced frequency and intensity of worry and absence of persistent anxious mood to acceptable levels, no panic attacks, report subjective comfort with rumination for a period of 90 days, within 6 months as clinically observed and by patient  self-report.  Patient will demonstrate and report a level of depression that can be managed at a lower level of care.  Absence of persistent depression mood and report of reduced frequency and intensity of low mood and absence of persistent low energy and motivation to acceptable levels, report subjective improved motivation and increased energy for a period of 90 days, within 6 months as clinically observed and by patient self-report.     Intervention:   Therapist met with patient to review goals and interventions. Therapist utilized reflected listening as patient gave brief reflection of week.  Patient processed conflict with her partner's family and needing move again due to how they are making the environment toxic. Therapist supported patient as she processed and validated patient. Therapist offered patient hope, resources and utilized strength-based modality with patient.   Patient presented with an anxious affect. Patient was engaged in session and open to feedback. Patient reported no safety concerns.     ASSESSMENT: Current Emotional / Mental Status (status of significant symptoms):   Risk status (Self / Other harm or suicidal ideation)   Patient denies current fears or concerns for personal safety.   Patient denies current or recent suicidal ideation or behaviors.   Patient denies current or recent homicidal ideation or behaviors.   Patient denies current or recent self injurious behavior or ideation.   Patient denies other safety concerns.   Patient reports there has been no change in risk factors since their last session.     Patient reports there has been no change in protective factors since their last session.     Recommended that patient call 911 or go to the local ED should there be a change in any of these risk factors.     Appearance:   Appropriate    Eye Contact:   Good    Psychomotor Behavior: Restless    Attitude:   Cooperative  Friendly Pleasant  Indifferent   Orientation:   All   Speech    Rate / Production: Emotional Talkative    Volume:  Normal    Mood:    Anxious  Depressed    Affect:    Worrisome    Thought Content:  Clear    Thought Form:  Coherent    Insight:    Fair      Medication Review:   No changes to current psychiatric medication(s)     Medication Compliance:   Yes     Changes in Health Issues:   None reported     Chemical Use Review:   Substance Use: Chemical use reviewed, no active concerns identified      Tobacco Use: No current tobacco use.      Diagnosis:  1. MEG (generalized anxiety disorder)    2. MDD (major depressive disorder), recurrent episode, mild (H)         Collateral Reports Completed:   Not Applicable    PLAN: (Patient Tasks / Therapist Tasks / Other)  Patient to continue to name her anxiety in the moment    Continue to look for places    Patito RONNIE Humphries, [facility]  7/28/2020                                                         ______________________________________________________________________    Treatment Plan    Patient's Name: Mendy Roe  YOB: 1980    Date: 4.21.20       DSM5 Diagnoses: 296.31 (F33.0) Major Depressive Disorder, Recurrent Episode, Mild _ and With mixed features or 300.02 (F41.1) Generalized Anxiety Disorder  Psychosocial / Contextual Factors: Ex, kids, grandchildren, partner  WHODAS: 37    Referral / Collaboration:  Referral to another professional/service is not indicated at this time..    Anticipated number of session or this episode of care: 20      MeasurableTreatment Goal(s) related to diagnosis / functional impairment(s)  Goal 1: Patient will report absence of persistent anxiety mood and report of reduced frequency and intensity of worry and absence of persistent anxious mood to acceptable levels, no panic attacks, report subjective comfort with rumination or a period of 90 days. Within 6 months as clinically observed and by patient self-report    I will know I've met my goal when  manage my anxiety.      Objective #A (Patient Action)    Patient will demonstrate and report a level of anxiety that can be managed at a lower level of care.  Absence of persistent anxious mood and report of reduced frequency and intensity of worry and absence of persistent anxious mood to acceptable levels, no panic attacks, report subjective comfort with rumination for a period of 90 days, within 6 months as clinically observed and by patient self-report.  Status: Continued - Date(s): 4.21.20      Intervention(s)  Therapist will provide individual therapy to identify triggers to anxiety, gain feedback on helpful coping tools and thought-reframing techniques, and identify preferred way of being. Tx to include discuss current stressors and interpersonal conflicts and how to cope with these, coaching, diagnostic testing, referral for medication as indicated, use prescribed medication, cognitive restructuring, interpersonal, family therapy, supportive therapy services.        Goal 2: Patient will report absence of persistent depression mood and report of reduced frequency and intensity of low mood and absence of persistent low energy and motivation to acceptable levels, report subjective improved motivation and increased energy for a period of 90 days, within 6 months as clinically observed and by patient self-report    I will know I've met my goal when feeling more balanced.      Objective #A (Patient Action)    Status: Continued - Date(s):4.21.20     Patient will demonstrate and report a level of depression that can be managed at a lower level of care.  Absence of persistent depression mood and report of reduced frequency and intensity of low mood and absence of persistent low energy and motivation to acceptable levels, report subjective improved motivation and increased energy for a period of 90 days, within 6 months as clinically observed and by patient self-report.    Intervention(s)  Therapist will provide  individual therapy to identify triggers to depression, gain feedback on helpful coping tools and thought-reframing techniques, and identify preferred way of being.  Tx to include discussion of current stressors and interpersonal conflicts and how to cope with these, coaching, diagnostic testing, referral for medication as indicated, use prescribed medication, cognitive restructuring, interpersonal, family therapy, supportive therapy services.        Patient has reviewed and agreed to the above plan.      Patito Humphries, [facility]  4/21/2020

## 2020-08-10 ENCOUNTER — RECORDS - HEALTHEAST (OUTPATIENT)
Dept: ADMINISTRATIVE | Facility: OTHER | Age: 40
End: 2020-08-10

## 2020-08-11 ENCOUNTER — VIRTUAL VISIT (OUTPATIENT)
Dept: PSYCHOLOGY | Facility: CLINIC | Age: 40
End: 2020-08-11
Payer: COMMERCIAL

## 2020-08-11 DIAGNOSIS — F33.0 MDD (MAJOR DEPRESSIVE DISORDER), RECURRENT EPISODE, MILD (H): ICD-10-CM

## 2020-08-11 DIAGNOSIS — F41.1 GAD (GENERALIZED ANXIETY DISORDER): Primary | ICD-10-CM

## 2020-08-11 PROCEDURE — 90834 PSYTX W PT 45 MINUTES: CPT | Mod: GT | Performed by: SOCIAL WORKER

## 2020-08-11 ASSESSMENT — ANXIETY QUESTIONNAIRES
2. NOT BEING ABLE TO STOP OR CONTROL WORRYING: SEVERAL DAYS
1. FEELING NERVOUS, ANXIOUS, OR ON EDGE: SEVERAL DAYS
6. BECOMING EASILY ANNOYED OR IRRITABLE: MORE THAN HALF THE DAYS
3. WORRYING TOO MUCH ABOUT DIFFERENT THINGS: MORE THAN HALF THE DAYS
7. FEELING AFRAID AS IF SOMETHING AWFUL MIGHT HAPPEN: NOT AT ALL
5. BEING SO RESTLESS THAT IT IS HARD TO SIT STILL: MORE THAN HALF THE DAYS
GAD7 TOTAL SCORE: 10
IF YOU CHECKED OFF ANY PROBLEMS ON THIS QUESTIONNAIRE, HOW DIFFICULT HAVE THESE PROBLEMS MADE IT FOR YOU TO DO YOUR WORK, TAKE CARE OF THINGS AT HOME, OR GET ALONG WITH OTHER PEOPLE: SOMEWHAT DIFFICULT
4. TROUBLE RELAXING: MORE THAN HALF THE DAYS

## 2020-08-11 ASSESSMENT — PATIENT HEALTH QUESTIONNAIRE - PHQ9: SUM OF ALL RESPONSES TO PHQ QUESTIONS 1-9: 14

## 2020-08-11 NOTE — PROGRESS NOTES
Progress Note    Patient Name: Mendy Roe  Date: 8/11/2020         Service Type: Individual  Video Visit: Yes, the patient's condition can be safely assessed and treated via synchronous audio and visual telemedicine encounter.      Reason for Video Visit: Services only offered telehealth    Location of Originating Site: Patient's other car    Distant Site: Provider Remote Setting     Consent:  The patient/guardian has verbally consented to: the potential risks and benefits of telemedicine (video visit) versus in person care; bill my insurance or make self-payment for services provided; and responsibility for payment of non-covered services.       Mode of Communication:  Video Conference via [employer]    Session Start Time: 1733 Session End Time: 1823     Session Length: 50 minutes    Session #: 11    Attendees: Client     Treatment Plan Last Reviewed 8/11/2020    PHQ-9 / MEG-7 : 14/10    DATA  Interactive Complexity: No  Crisis: No       Progress Since Last Session (Related to Symptoms / Goals / Homework):   Symptoms: Improving decrease PHQ-9 MEG-7    Homework: Partially completed      Episode of Care Goals: Minimal progress - CONTEMPLATION (Considering change and yet undecided); Intervened by assessing the negative and positive thinking (ambivalence) about behavior change     Current / Ongoing Stressors and Concerns:   COVID-19, isolation, moving, ex, kids, grandchildren, partner     Treatment Objective(s) Addressed in This Session:     Patient will demonstrate and report a level of anxiety that can be managed at a lower level of care.  Absence of persistent anxious mood and report of reduced frequency and intensity of worry and absence of persistent anxious mood to acceptable levels, no panic attacks, report subjective comfort with rumination for a period of 90 days, within 6 months as clinically observed and by patient self-report.  Patient will demonstrate and  report a level of depression that can be managed at a lower level of care.  Absence of persistent depression mood and report of reduced frequency and intensity of low mood and absence of persistent low energy and motivation to acceptable levels, report subjective improved motivation and increased energy for a period of 90 days, within 6 months as clinically observed and by patient self-report.     Intervention:  Video froze continued audio via phone   Therapist met with patient to review goals and interventions. Therapist utilized reflected listening as patient gave brief reflection of week.  Patient processed continued stress at home and looking for a new place. Therapist supported patient as she processed. Therapist encouraged patient to speak to her daughter and and assist her in setting boundaries around the home. Patient was engaged in session and open to feedback. Patient reported no safety concerns.     ASSESSMENT: Current Emotional / Mental Status (status of significant symptoms):   Risk status (Self / Other harm or suicidal ideation)   Patient denies current fears or concerns for personal safety.   Patient denies current or recent suicidal ideation or behaviors.   Patient denies current or recent homicidal ideation or behaviors.   Patient denies current or recent self injurious behavior or ideation.   Patient denies other safety concerns.   Patient reports there has been no change in risk factors since their last session.     Patient reports there has been no change in protective factors since their last session.     Recommended that patient call 911 or go to the local ED should there be a change in any of these risk factors.     Appearance:   Appropriate    Eye Contact:   Good    Psychomotor Behavior: Restless    Attitude:   Cooperative  Friendly Pleasant Indifferent   Orientation:   All   Speech    Rate / Production: Emotional Talkative    Volume:  Normal    Mood:    Anxious  Depressed  Irritable     Affect:    Worrisome    Thought Content:  Clear    Thought Form:  Coherent    Insight:    Fair      Medication Review:   No changes to current psychiatric medication(s)     Medication Compliance:   Yes     Changes in Health Issues:   None reported     Chemical Use Review:   Substance Use: Chemical use reviewed, no active concerns identified      Tobacco Use: No current tobacco use.      Diagnosis:  1. MEG (generalized anxiety disorder)    2. MDD (major depressive disorder), recurrent episode, mild (H)         Collateral Reports Completed:   Not Applicable    PLAN: (Patient Tasks / Therapist Tasks / Other)  Patient to continue to name her anxiety in the moment    Continue to look for places  patient to speak to her daughter and and assist her in setting boundaries around the home.     Patito Humphries, [facility]  8/11/2020                                                             ______________________________________________________________________    Treatment Plan    Patient's Name: Mendy Roe  YOB: 1980    Date: 4.21.20       DSM5 Diagnoses: 296.31 (F33.0) Major Depressive Disorder, Recurrent Episode, Mild _ and With mixed features or 300.02 (F41.1) Generalized Anxiety Disorder  Psychosocial / Contextual Factors: Ex, kids, grandchildren, partner  WHODAS: 37    Referral / Collaboration:  Referral to another professional/service is not indicated at this time..    Anticipated number of session or this episode of care: 20      MeasurableTreatment Goal(s) related to diagnosis / functional impairment(s)  Goal 1: Patient will report absence of persistent anxiety mood and report of reduced frequency and intensity of worry and absence of persistent anxious mood to acceptable levels, no panic attacks, report subjective comfort with rumination or a period of 90 days. Within 6 months as clinically observed and by patient self-report    I will know I've met my goal when manage my anxiety.      Objective #A  (Patient Action)    Patient will demonstrate and report a level of anxiety that can be managed at a lower level of care.  Absence of persistent anxious mood and report of reduced frequency and intensity of worry and absence of persistent anxious mood to acceptable levels, no panic attacks, report subjective comfort with rumination for a period of 90 days, within 6 months as clinically observed and by patient self-report.  Status: Continued - Date(s): 4.21.20      Intervention(s)  Therapist will provide individual therapy to identify triggers to anxiety, gain feedback on helpful coping tools and thought-reframing techniques, and identify preferred way of being. Tx to include discuss current stressors and interpersonal conflicts and how to cope with these, coaching, diagnostic testing, referral for medication as indicated, use prescribed medication, cognitive restructuring, interpersonal, family therapy, supportive therapy services.        Goal 2: Patient will report absence of persistent depression mood and report of reduced frequency and intensity of low mood and absence of persistent low energy and motivation to acceptable levels, report subjective improved motivation and increased energy for a period of 90 days, within 6 months as clinically observed and by patient self-report    I will know I've met my goal when feeling more balanced.      Objective #A (Patient Action)    Status: Continued - Date(s):4.21.20     Patient will demonstrate and report a level of depression that can be managed at a lower level of care.  Absence of persistent depression mood and report of reduced frequency and intensity of low mood and absence of persistent low energy and motivation to acceptable levels, report subjective improved motivation and increased energy for a period of 90 days, within 6 months as clinically observed and by patient self-report.    Intervention(s)  Therapist will provide individual therapy to identify triggers to  depression, gain feedback on helpful coping tools and thought-reframing techniques, and identify preferred way of being.  Tx to include discussion of current stressors and interpersonal conflicts and how to cope with these, coaching, diagnostic testing, referral for medication as indicated, use prescribed medication, cognitive restructuring, interpersonal, family therapy, supportive therapy services.        Patient has reviewed and agreed to the above plan.      Patito Humphries, [facility]  4/21/2020

## 2020-08-12 ASSESSMENT — ANXIETY QUESTIONNAIRES: GAD7 TOTAL SCORE: 10

## 2020-08-18 ENCOUNTER — RECORDS - HEALTHEAST (OUTPATIENT)
Dept: ADMINISTRATIVE | Facility: OTHER | Age: 40
End: 2020-08-18

## 2020-08-18 LAB
ALT SERPL W/O P-5'-P-CCNC: 24 U/L (ref 0–78)
AST SERPL-CCNC: 16 U/L (ref 0–37)
CREAT SERPL-MCNC: 0.81 MG/DL (ref 0.6–1.02)

## 2020-08-21 ENCOUNTER — RECORDS - HEALTHEAST (OUTPATIENT)
Dept: HEALTH INFORMATION MANAGEMENT | Facility: CLINIC | Age: 40
End: 2020-08-21

## 2020-08-25 ENCOUNTER — VIRTUAL VISIT (OUTPATIENT)
Dept: PSYCHOLOGY | Facility: CLINIC | Age: 40
End: 2020-08-25
Payer: COMMERCIAL

## 2020-08-25 DIAGNOSIS — F41.1 GAD (GENERALIZED ANXIETY DISORDER): Primary | ICD-10-CM

## 2020-08-25 DIAGNOSIS — F33.0 MDD (MAJOR DEPRESSIVE DISORDER), RECURRENT EPISODE, MILD (H): ICD-10-CM

## 2020-08-25 PROCEDURE — 90834 PSYTX W PT 45 MINUTES: CPT | Mod: GT | Performed by: SOCIAL WORKER

## 2020-08-25 ASSESSMENT — ANXIETY QUESTIONNAIRES
2. NOT BEING ABLE TO STOP OR CONTROL WORRYING: SEVERAL DAYS
3. WORRYING TOO MUCH ABOUT DIFFERENT THINGS: MORE THAN HALF THE DAYS
4. TROUBLE RELAXING: MORE THAN HALF THE DAYS
5. BEING SO RESTLESS THAT IT IS HARD TO SIT STILL: MORE THAN HALF THE DAYS
GAD7 TOTAL SCORE: 11
6. BECOMING EASILY ANNOYED OR IRRITABLE: MORE THAN HALF THE DAYS
7. FEELING AFRAID AS IF SOMETHING AWFUL MIGHT HAPPEN: SEVERAL DAYS
1. FEELING NERVOUS, ANXIOUS, OR ON EDGE: SEVERAL DAYS

## 2020-08-25 ASSESSMENT — PATIENT HEALTH QUESTIONNAIRE - PHQ9: SUM OF ALL RESPONSES TO PHQ QUESTIONS 1-9: 14

## 2020-08-25 NOTE — PROGRESS NOTES
Progress Note    Patient Name: Mendy Roe  Date: 8/25/2020         Service Type: Individual  Video Visit: Yes, the patient's condition can be safely assessed and treated via synchronous audio and visual telemedicine encounter.      Reason for Video Visit: Services only offered telehealth    Location of Originating Site: Patient's other car    Distant Site: Provider Remote Setting     Consent:  The patient/guardian has verbally consented to: the potential risks and benefits of telemedicine (video visit) versus in person care; bill my insurance or make self-payment for services provided; and responsibility for payment of non-covered services.       Mode of Communication:  Video Conference via [employer]    Session Start Time: 1734 Session End Time: 1821     Session Length: 47 minutes    Session #: 12    Attendees: Client     Treatment Plan Last Reviewed 8/11/2020    PHQ-9 / MEG-7 : 14/11    DATA  Interactive Complexity: No  Crisis: No       Progress Since Last Session (Related to Symptoms / Goals / Homework):   Symptoms: No change  PHQ-9     worsening increase MEG-7    Homework: Partially completed      Episode of Care Goals: Minimal progress - CONTEMPLATION (Considering change and yet undecided); Intervened by assessing the negative and positive thinking (ambivalence) about behavior change     Current / Ongoing Stressors and Concerns:   COVID-19, isolation, moving, ex, kids, grandchildren, partner     Treatment Objective(s) Addressed in This Session:     Patient will demonstrate and report a level of anxiety that can be managed at a lower level of care.  Absence of persistent anxious mood and report of reduced frequency and intensity of worry and absence of persistent anxious mood to acceptable levels, no panic attacks, report subjective comfort with rumination for a period of 90 days, within 6 months as clinically observed and by patient self-report.  Patient will  demonstrate and report a level of depression that can be managed at a lower level of care.  Absence of persistent depression mood and report of reduced frequency and intensity of low mood and absence of persistent low energy and motivation to acceptable levels, report subjective improved motivation and increased energy for a period of 90 days, within 6 months as clinically observed and by patient self-report.     Intervention:  Video froze continued audio via phone   Therapist met with patient to review goals and interventions. Therapist utilized reflected listening as patient gave brief reflection of week.  Patient processed continued conflict and concerns with a friend. Therapist supported patient as she processed and validated patient. Therapist reviewed effective communication through conflict and encouraged patient to communicate her expectations with her partner. Patient was engaged in session and open to feedback. Patient reported no safety concerns.     ASSESSMENT: Current Emotional / Mental Status (status of significant symptoms):   Risk status (Self / Other harm or suicidal ideation)   Patient denies current fears or concerns for personal safety.   Patient denies current or recent suicidal ideation or behaviors.   Patient denies current or recent homicidal ideation or behaviors.   Patient denies current or recent self injurious behavior or ideation.   Patient denies other safety concerns.   Patient reports there has been no change in risk factors since their last session.     Patient reports there has been no change in protective factors since their last session.     Recommended that patient call 911 or go to the local ED should there be a change in any of these risk factors.     Appearance:   Appropriate    Eye Contact:   Good    Psychomotor Behavior: Restless    Attitude:   Cooperative  Friendly Pleasant   Orientation:   All   Speech    Rate / Production: Emotional Talkative    Volume:  Normal     Mood:    Anxious  Depressed    Affect:    Worrisome    Thought Content:  Clear    Thought Form:  Coherent    Insight:    Fair      Medication Review:   No changes to current psychiatric medication(s)     Medication Compliance:   Yes     Changes in Health Issues:   None reported     Chemical Use Review:   Substance Use: Chemical use reviewed, no active concerns identified      Tobacco Use: No current tobacco use.      Diagnosis:  1. MEG (generalized anxiety disorder)    2. MDD (major depressive disorder), recurrent episode, mild (H)         Collateral Reports Completed:   Not Applicable    PLAN: (Patient Tasks / Therapist Tasks / Other)  Patient to continue to name her anxiety in the moment    Continue to look for places  patient to speak to her daughter and and assist her in setting boundaries around the home.   Communicate expectations.    Patito Humphries, [facility]  8/25/2020                                                             ______________________________________________________________________    Treatment Plan    Patient's Name: Mendy Roe  YOB: 1980    Date: 4.21.20       DSM5 Diagnoses: 296.31 (F33.0) Major Depressive Disorder, Recurrent Episode, Mild _ and With mixed features or 300.02 (F41.1) Generalized Anxiety Disorder  Psychosocial / Contextual Factors: Ex, kids, grandchildren, partner  WHODAS: 37    Referral / Collaboration:  Referral to another professional/service is not indicated at this time..    Anticipated number of session or this episode of care: 20      MeasurableTreatment Goal(s) related to diagnosis / functional impairment(s)  Goal 1: Patient will report absence of persistent anxiety mood and report of reduced frequency and intensity of worry and absence of persistent anxious mood to acceptable levels, no panic attacks, report subjective comfort with rumination or a period of 90 days. Within 6 months as clinically observed and by patient self-report    I will know  I've met my goal when manage my anxiety.      Objective #A (Patient Action)    Patient will demonstrate and report a level of anxiety that can be managed at a lower level of care.  Absence of persistent anxious mood and report of reduced frequency and intensity of worry and absence of persistent anxious mood to acceptable levels, no panic attacks, report subjective comfort with rumination for a period of 90 days, within 6 months as clinically observed and by patient self-report.  Status: Continued - Date(s): 4.21.20      Intervention(s)  Therapist will provide individual therapy to identify triggers to anxiety, gain feedback on helpful coping tools and thought-reframing techniques, and identify preferred way of being. Tx to include discuss current stressors and interpersonal conflicts and how to cope with these, coaching, diagnostic testing, referral for medication as indicated, use prescribed medication, cognitive restructuring, interpersonal, family therapy, supportive therapy services.        Goal 2: Patient will report absence of persistent depression mood and report of reduced frequency and intensity of low mood and absence of persistent low energy and motivation to acceptable levels, report subjective improved motivation and increased energy for a period of 90 days, within 6 months as clinically observed and by patient self-report    I will know I've met my goal when feeling more balanced.      Objective #A (Patient Action)    Status: Continued - Date(s):4.21.20     Patient will demonstrate and report a level of depression that can be managed at a lower level of care.  Absence of persistent depression mood and report of reduced frequency and intensity of low mood and absence of persistent low energy and motivation to acceptable levels, report subjective improved motivation and increased energy for a period of 90 days, within 6 months as clinically observed and by patient  self-report.    Intervention(s)  Therapist will provide individual therapy to identify triggers to depression, gain feedback on helpful coping tools and thought-reframing techniques, and identify preferred way of being.  Tx to include discussion of current stressors and interpersonal conflicts and how to cope with these, coaching, diagnostic testing, referral for medication as indicated, use prescribed medication, cognitive restructuring, interpersonal, family therapy, supportive therapy services.        Patient has reviewed and agreed to the above plan.      Patito Humphries, [facility]  4/21/2020

## 2020-08-26 ASSESSMENT — ANXIETY QUESTIONNAIRES: GAD7 TOTAL SCORE: 11

## 2020-09-14 ENCOUNTER — OFFICE VISIT - HEALTHEAST (OUTPATIENT)
Dept: FAMILY MEDICINE | Facility: CLINIC | Age: 40
End: 2020-09-14

## 2020-09-14 DIAGNOSIS — R11.0 NAUSEA: ICD-10-CM

## 2020-09-14 DIAGNOSIS — Z23 NEED FOR VACCINATION: ICD-10-CM

## 2020-09-14 DIAGNOSIS — Z00.00 ROUTINE GENERAL MEDICAL EXAMINATION AT A HEALTH CARE FACILITY: ICD-10-CM

## 2020-09-14 ASSESSMENT — ANXIETY QUESTIONNAIRES
7. FEELING AFRAID AS IF SOMETHING AWFUL MIGHT HAPPEN: NOT AT ALL
IF YOU CHECKED OFF ANY PROBLEMS ON THIS QUESTIONNAIRE, HOW DIFFICULT HAVE THESE PROBLEMS MADE IT FOR YOU TO DO YOUR WORK, TAKE CARE OF THINGS AT HOME, OR GET ALONG WITH OTHER PEOPLE: SOMEWHAT DIFFICULT
5. BEING SO RESTLESS THAT IT IS HARD TO SIT STILL: SEVERAL DAYS
1. FEELING NERVOUS, ANXIOUS, OR ON EDGE: SEVERAL DAYS
2. NOT BEING ABLE TO STOP OR CONTROL WORRYING: SEVERAL DAYS
4. TROUBLE RELAXING: NEARLY EVERY DAY
6. BECOMING EASILY ANNOYED OR IRRITABLE: MORE THAN HALF THE DAYS
GAD7 TOTAL SCORE: 9
3. WORRYING TOO MUCH ABOUT DIFFERENT THINGS: SEVERAL DAYS

## 2020-09-14 ASSESSMENT — MIFFLIN-ST. JEOR: SCORE: 1308.04

## 2020-09-14 ASSESSMENT — PATIENT HEALTH QUESTIONNAIRE - PHQ9: SUM OF ALL RESPONSES TO PHQ QUESTIONS 1-9: 13

## 2020-09-22 ENCOUNTER — VIRTUAL VISIT (OUTPATIENT)
Dept: PSYCHOLOGY | Facility: CLINIC | Age: 40
End: 2020-09-22
Payer: COMMERCIAL

## 2020-09-22 DIAGNOSIS — F33.0 MDD (MAJOR DEPRESSIVE DISORDER), RECURRENT EPISODE, MILD (H): ICD-10-CM

## 2020-09-22 DIAGNOSIS — F41.1 GAD (GENERALIZED ANXIETY DISORDER): Primary | ICD-10-CM

## 2020-09-22 PROCEDURE — 90834 PSYTX W PT 45 MINUTES: CPT | Mod: TEL | Performed by: SOCIAL WORKER

## 2020-09-22 ASSESSMENT — ANXIETY QUESTIONNAIRES
7. FEELING AFRAID AS IF SOMETHING AWFUL MIGHT HAPPEN: SEVERAL DAYS
1. FEELING NERVOUS, ANXIOUS, OR ON EDGE: MORE THAN HALF THE DAYS
2. NOT BEING ABLE TO STOP OR CONTROL WORRYING: MORE THAN HALF THE DAYS
3. WORRYING TOO MUCH ABOUT DIFFERENT THINGS: MORE THAN HALF THE DAYS
GAD7 TOTAL SCORE: 16
6. BECOMING EASILY ANNOYED OR IRRITABLE: NEARLY EVERY DAY
4. TROUBLE RELAXING: NEARLY EVERY DAY
5. BEING SO RESTLESS THAT IT IS HARD TO SIT STILL: NEARLY EVERY DAY

## 2020-09-22 ASSESSMENT — PATIENT HEALTH QUESTIONNAIRE - PHQ9: SUM OF ALL RESPONSES TO PHQ QUESTIONS 1-9: 18

## 2020-09-22 NOTE — PROGRESS NOTES
"                                           Progress Note    Patient Name: Mendy Roe  Date: 9/22/2020         Service Type: Individual  Video Visit: no  The patient has been notified of the following:      \"We have found that certain health care needs can be provided without the need for a face to face visit.  This service lets us provide the care you need with a phone conversation.       I will have full access to your [facility] medical record during this entire phone call.   I will be taking notes for your medical record.      Since this is like an office visit, we will bill your insurance company for this service.       There are potential benefits and risks of telephone visits (e.g. limits to patient confidentiality) that differ from in-person visits.?  Confidentiality still applies for telephone services, and nobody will record the visit.  It is important to be in a quiet, private space that is free of distractions (including cell phone or other devices) during the visit.??      If during the course of the call I believe a telephone visit is not appropriate, you will not be charged for this service\"     Consent has been obtained for this service by care team member: Yes     Session Start Time: 1535 Session End Time: 1625     Session Length: 50 minutes    Session #: 13    Attendees: Client     Treatment Plan Last Reviewed 9/22/2020 due 12/22/2020    PHQ-9 / MEG-7 : 18/16    DATA  Interactive Complexity: No  Crisis: No       Progress Since Last Session (Related to Symptoms / Goals / Homework):   Symptoms: Worsening increase PHQ-9 MEG-7    Homework: Partially completed      Episode of Care Goals: Minimal progress - CONTEMPLATION (Considering change and yet undecided); Intervened by assessing the negative and positive thinking (ambivalence) about behavior change     Current / Ongoing Stressors and Concerns:   COVID-19, isolation, moving, ex, kids, grandchildren, partner     Treatment Objective(s) Addressed in " This Session:     Patient will demonstrate and report a level of anxiety that can be managed at a lower level of care.  Absence of persistent anxious mood and report of reduced frequency and intensity of worry and absence of persistent anxious mood to acceptable levels, no panic attacks, report subjective comfort with rumination for a period of 90 days, within 6 months as clinically observed and by patient self-report.  Patient will demonstrate and report a level of depression that can be managed at a lower level of care.  Absence of persistent depression mood and report of reduced frequency and intensity of low mood and absence of persistent low energy and motivation to acceptable levels, report subjective improved motivation and increased energy for a period of 90 days, within 6 months as clinically observed and by patient self-report.     Intervention:  Video froze continued audio via phone   Therapist met with patient to review goals and interventions. Therapist utilized reflected listening as patient gave brief reflection of week.  Patient reported high stress with living situation and feeling uneasy. Therapist supported patient as she processed and validated patient. Therapist suggested patient spend some time outside her house with friends. Therapist utilized strength-based modality with patient and ACT.  Patient was engaged in session and open to feedback. Patient reported no safety concerns.     ASSESSMENT: Current Emotional / Mental Status (status of significant symptoms):   Risk status (Self / Other harm or suicidal ideation)   Patient denies current fears or concerns for personal safety.   Patient denies current or recent suicidal ideation or behaviors.   Patient denies current or recent homicidal ideation or behaviors.   Patient denies current or recent self injurious behavior or ideation.   Patient denies other safety concerns.   Patient reports there has been no change in risk factors since their last  session.     Patient reports there has been no change in protective factors since their last session.     Recommended that patient call 911 or go to the local ED should there be a change in any of these risk factors.     Appearance:   Appropriate    Eye Contact:   Good    Psychomotor Behavior: Restless    Attitude:   Cooperative  Friendly Pleasant   Orientation:   All   Speech    Rate / Production: Emotional Talkative    Volume:  Normal    Mood:    Anxious  Depressed    Affect:    Worrisome    Thought Content:  Clear    Thought Form:  Coherent    Insight:    Fair      Medication Review:   No changes to current psychiatric medication(s)     Medication Compliance:   Yes     Changes in Health Issues:   None reported     Chemical Use Review:   Substance Use: Chemical use reviewed, no active concerns identified      Tobacco Use: No current tobacco use.      Diagnosis:  1. MEG (generalized anxiety disorder)    2. MDD (major depressive disorder), recurrent episode, mild (H)         Collateral Reports Completed:   Not Applicable    PLAN: (Patient Tasks / Therapist Tasks / Other)  Patient to continue to name her anxiety in the moment    Continue to look for places  patient to speak to her daughter and and assist her in setting boundaries around the home.   Communicate expectations  Make time for self outside the house with friends    Patito Humphries, [facility]  9/22/2020                                                             ______________________________________________________________________    Treatment Plan    Patient's Name: Mendy Roe  YOB: 1980    Date: 9/22/2020       DSM5 Diagnoses: 296.31 (F33.0) Major Depressive Disorder, Recurrent Episode, Mild _ and With mixed features or 300.02 (F41.1) Generalized Anxiety Disorder  Psychosocial / Contextual Factors: Ex, kids, grandchildren, partner  WHODAS: 28    Referral / Collaboration:  Referral to another professional/service is not indicated at this  time..    Anticipated number of session or this episode of care: 20      MeasurableTreatment Goal(s) related to diagnosis / functional impairment(s)  Goal 1: Patient will report absence of persistent anxiety mood and report of reduced frequency and intensity of worry and absence of persistent anxious mood to acceptable levels, no panic attacks, report subjective comfort with rumination or a period of 90 days. Within 6 months as clinically observed and by patient self-report    I will know I've met my goal when manage my anxiety.      Objective #A (Patient Action)    Patient will demonstrate and report a level of anxiety that can be managed at a lower level of care.  Absence of persistent anxious mood and report of reduced frequency and intensity of worry and absence of persistent anxious mood to acceptable levels, no panic attacks, report subjective comfort with rumination for a period of 90 days, within 6 months as clinically observed and by patient self-report.  Status: Continued - Date(s): 9/22/2020     Intervention(s)  Therapist will provide individual therapy to identify triggers to anxiety, gain feedback on helpful coping tools and thought-reframing techniques, and identify preferred way of being. Tx to include discuss current stressors and interpersonal conflicts and how to cope with these, coaching, diagnostic testing, referral for medication as indicated, use prescribed medication, cognitive restructuring, interpersonal, family therapy, supportive therapy services.        Goal 2: Patient will report absence of persistent depression mood and report of reduced frequency and intensity of low mood and absence of persistent low energy and motivation to acceptable levels, report subjective improved motivation and increased energy for a period of 90 days, within 6 months as clinically observed and by patient self-report    I will know I've met my goal when feeling more balanced.      Objective #A (Patient  Action)    Status: Continued - Date(s): 9/22/2020    Patient will demonstrate and report a level of depression that can be managed at a lower level of care.  Absence of persistent depression mood and report of reduced frequency and intensity of low mood and absence of persistent low energy and motivation to acceptable levels, report subjective improved motivation and increased energy for a period of 90 days, within 6 months as clinically observed and by patient self-report.    Intervention(s)  Therapist will provide individual therapy to identify triggers to depression, gain feedback on helpful coping tools and thought-reframing techniques, and identify preferred way of being.  Tx to include discussion of current stressors and interpersonal conflicts and how to cope with these, coaching, diagnostic testing, referral for medication as indicated, use prescribed medication, cognitive restructuring, interpersonal, family therapy, supportive therapy services.        Patient has reviewed and agreed to the above plan.      Patito Humphries, [facility]  9/22/2020

## 2020-09-23 ASSESSMENT — ANXIETY QUESTIONNAIRES: GAD7 TOTAL SCORE: 16

## 2020-10-06 ENCOUNTER — VIRTUAL VISIT (OUTPATIENT)
Dept: PSYCHOLOGY | Facility: CLINIC | Age: 40
End: 2020-10-06
Payer: COMMERCIAL

## 2020-10-06 DIAGNOSIS — F41.1 GAD (GENERALIZED ANXIETY DISORDER): Primary | ICD-10-CM

## 2020-10-06 PROCEDURE — 90834 PSYTX W PT 45 MINUTES: CPT | Mod: GT | Performed by: SOCIAL WORKER

## 2020-10-06 ASSESSMENT — ANXIETY QUESTIONNAIRES
5. BEING SO RESTLESS THAT IT IS HARD TO SIT STILL: MORE THAN HALF THE DAYS
3. WORRYING TOO MUCH ABOUT DIFFERENT THINGS: MORE THAN HALF THE DAYS
GAD7 TOTAL SCORE: 12
6. BECOMING EASILY ANNOYED OR IRRITABLE: MORE THAN HALF THE DAYS
4. TROUBLE RELAXING: NEARLY EVERY DAY
2. NOT BEING ABLE TO STOP OR CONTROL WORRYING: MORE THAN HALF THE DAYS
7. FEELING AFRAID AS IF SOMETHING AWFUL MIGHT HAPPEN: NOT AT ALL
1. FEELING NERVOUS, ANXIOUS, OR ON EDGE: SEVERAL DAYS

## 2020-10-06 ASSESSMENT — PATIENT HEALTH QUESTIONNAIRE - PHQ9: SUM OF ALL RESPONSES TO PHQ QUESTIONS 1-9: 15

## 2020-10-06 NOTE — PROGRESS NOTES
Progress Note    Patient Name: Mendy Roe  Date: 10/6/2020         Service Type: Individual  Video Visit:Yes, the patient's condition can be safely assessed and treated via synchronous audio and visual telemedicine encounter.      Reason for Video Visit: Services only offered telehealth    Location of Originating Site: Patient's home    Distant Site: Provider Remote Setting home office      Consent:  The patient/guardian has verbally consented to: the potential risks and benefits of telemedicine (video visit) versus in person care; bill my insurance or make self-payment for services provided; and responsibility for payment of non-covered services.      Mode of transmission-[employer]      Session Start Time: 1435 Session End Time: 1525     Session Length: 50 minutes    Session #: 14    Attendees: Client     Treatment Plan Last Reviewed 9/22/2020 due 12/22/2020    PHQ-9 / MEG-7 : 15/12    DATA  Interactive Complexity: No  Crisis: No       Progress Since Last Session (Related to Symptoms / Goals / Homework):   Symptoms: Improving decrease PHQ-9 MEG-7    Homework: Partially completed      Episode of Care Goals: Minimal progress - CONTEMPLATION (Considering change and yet undecided); Intervened by assessing the negative and positive thinking (ambivalence) about behavior change     Current / Ongoing Stressors and Concerns:   COVID-19, isolation, moving, ex, kids, grandchildren, partner     Treatment Objective(s) Addressed in This Session:     Patient will demonstrate and report a level of anxiety that can be managed at a lower level of care.  Absence of persistent anxious mood and report of reduced frequency and intensity of worry and absence of persistent anxious mood to acceptable levels, no panic attacks, report subjective comfort with rumination for a period of 90 days, within 6 months as clinically observed and by patient self-report.  Patient will demonstrate and report  a level of depression that can be managed at a lower level of care.  Absence of persistent depression mood and report of reduced frequency and intensity of low mood and absence of persistent low energy and motivation to acceptable levels, report subjective improved motivation and increased energy for a period of 90 days, within 6 months as clinically observed and by patient self-report.     Intervention:     Therapist met with patient to review goals and interventions. Therapist utilized reflected listening as patient gave brief reflection of week.  Patient reported looking at more places and getting the feeling her partner doesn't want to move in. Therapist supported patient as she processed Patient processed her plan to keep looking and for her and the girls to move out on their own. Therapist encouraged patient to speak to her partner and communicate her emotions. Patient presented with an anxious affect. Patient was engaged in session and open to feedback. Patient reported no safety concerns.     ASSESSMENT: Current Emotional / Mental Status (status of significant symptoms):   Risk status (Self / Other harm or suicidal ideation)   Patient denies current fears or concerns for personal safety.   Patient denies current or recent suicidal ideation or behaviors.   Patient denies current or recent homicidal ideation or behaviors.   Patient denies current or recent self injurious behavior or ideation.   Patient denies other safety concerns.   Patient reports there has been no change in risk factors since their last session.     Patient reports there has been no change in protective factors since their last session.     Recommended that patient call 911 or go to the local ED should there be a change in any of these risk factors.     Appearance:   Appropriate    Eye Contact:   Good    Psychomotor Behavior: Restless    Attitude:   Cooperative  Friendly Pleasant   Orientation:   All   Speech    Rate / Production: Emotional  Talkative    Volume:  Normal    Mood:    Anxious  Depressed    Affect:    Worrisome    Thought Content:  Clear    Thought Form:  Coherent    Insight:    Fair      Medication Review:   No changes to current psychiatric medication(s)     Medication Compliance:   Yes     Changes in Health Issues:   None reported     Chemical Use Review:   Substance Use: Chemical use reviewed, no active concerns identified      Tobacco Use: No current tobacco use.      Diagnosis:  1. MEG (generalized anxiety disorder)    2. MDD (major depressive disorder), recurrent episode, mild (H)         Collateral Reports Completed:   Not Applicable    PLAN: (Patient Tasks / Therapist Tasks / Other)  Patient to continue to name her anxiety in the moment    Continue to look for places  patient to speak to her daughter and and assist her in setting boundaries around the home.   Communicate expectations  Make time for self outside the house with friends  communicate   Patito Humphries, [facility]  10/6/2020                                                             ______________________________________________________________________    Treatment Plan    Patient's Name: Mendy Roe  YOB: 1980    Date: 9/22/2020       DSM5 Diagnoses: 296.31 (F33.0) Major Depressive Disorder, Recurrent Episode, Mild _ and With mixed features or 300.02 (F41.1) Generalized Anxiety Disorder  Psychosocial / Contextual Factors: Ex, kids, grandchildren, partner  WHODAS: 28    Referral / Collaboration:  Referral to another professional/service is not indicated at this time..    Anticipated number of session or this episode of care: 20      MeasurableTreatment Goal(s) related to diagnosis / functional impairment(s)  Goal 1: Patient will report absence of persistent anxiety mood and report of reduced frequency and intensity of worry and absence of persistent anxious mood to acceptable levels, no panic attacks, report subjective comfort with rumination or a period  of 90 days. Within 6 months as clinically observed and by patient self-report    I will know I've met my goal when manage my anxiety.      Objective #A (Patient Action)    Patient will demonstrate and report a level of anxiety that can be managed at a lower level of care.  Absence of persistent anxious mood and report of reduced frequency and intensity of worry and absence of persistent anxious mood to acceptable levels, no panic attacks, report subjective comfort with rumination for a period of 90 days, within 6 months as clinically observed and by patient self-report.  Status: Continued - Date(s): 9/22/2020     Intervention(s)  Therapist will provide individual therapy to identify triggers to anxiety, gain feedback on helpful coping tools and thought-reframing techniques, and identify preferred way of being. Tx to include discuss current stressors and interpersonal conflicts and how to cope with these, coaching, diagnostic testing, referral for medication as indicated, use prescribed medication, cognitive restructuring, interpersonal, family therapy, supportive therapy services.        Goal 2: Patient will report absence of persistent depression mood and report of reduced frequency and intensity of low mood and absence of persistent low energy and motivation to acceptable levels, report subjective improved motivation and increased energy for a period of 90 days, within 6 months as clinically observed and by patient self-report    I will know I've met my goal when feeling more balanced.      Objective #A (Patient Action)    Status: Continued - Date(s): 9/22/2020    Patient will demonstrate and report a level of depression that can be managed at a lower level of care.  Absence of persistent depression mood and report of reduced frequency and intensity of low mood and absence of persistent low energy and motivation to acceptable levels, report subjective improved motivation and increased energy for a period of 90  days, within 6 months as clinically observed and by patient self-report.    Intervention(s)  Therapist will provide individual therapy to identify triggers to depression, gain feedback on helpful coping tools and thought-reframing techniques, and identify preferred way of being.  Tx to include discussion of current stressors and interpersonal conflicts and how to cope with these, coaching, diagnostic testing, referral for medication as indicated, use prescribed medication, cognitive restructuring, interpersonal, family therapy, supportive therapy services.        Patient has reviewed and agreed to the above plan.      Patito Humphries, [facility]  9/22/2020

## 2020-10-07 ASSESSMENT — ANXIETY QUESTIONNAIRES: GAD7 TOTAL SCORE: 12

## 2020-10-30 ENCOUNTER — VIRTUAL VISIT (OUTPATIENT)
Dept: FAMILY MEDICINE | Facility: OTHER | Age: 40
End: 2020-10-30
Payer: COMMERCIAL

## 2020-10-30 PROCEDURE — 99421 OL DIG E/M SVC 5-10 MIN: CPT | Performed by: FAMILY MEDICINE

## 2020-10-30 NOTE — PROGRESS NOTES
"Date: 10/30/2020 14:46:37  Clinician: Benita Park  Clinician NPI: [id number]  Patient: Mendy Roe  Patient : 1980  Patient Address: [address], [place] [address]  Patient Phone: [phone]  Visit Protocol: URI  Patient Summary:  Mendy is a 40 year old ( : 1980 ) female who initiated a [facility] Visit for COVID-19 (Coronavirus) evaluation and screening. When asked the question \"Please sign me up to receive news, health information and promotions from [facility].\", Mendy responded \"No\".    Mendy states her symptoms started 1-2 days ago.   Her symptoms consist of a headache, nasal congestion, nausea, facial pain or pressure, myalgia, malaise, a sore throat, and rhinitis.   Symptom details     Nasal secretions: The color of her mucus is clear.    Sore throat: Mendy reports having mild throat pain (1-3 on a 10 point pain scale), does not have exudate on her tonsils, and can swallow liquids. She is not sure if the lymph nodes in her neck are enlarged. A rash has not appeared on the skin since the sore throat started.     Facial pain or pressure: The facial pain or pressure does not feel worse when bending or leaning forward.     Headache: She states the headache is severe (7-9 on a 10 point pain scale).      Mendy denies having ear pain, wheezing, fever, cough, chills, teeth pain, ageusia, diarrhea, anosmia, and vomiting. She also denies having recent facial or sinus surgery in the past 60 days and taking antibiotic medication in the past month. She is not experiencing dyspnea.   Precipitating events  Within the past week, Mendy has not been exposed to someone with strep throat. She has not recently been exposed to someone with influenza. Mendy has been in close contact with the following high risk individuals: children under the age of 5, immunocompromised people, adults 65 or older, pregnant women, and people with asthma, heart disease or diabetes.   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 In the past 14 days, Mendy has worked or volunteered at a hospital or a clinic. 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   Mendy has had a close contact with a laboratory-confirmed COVID-19 patient within 14 days of symptom onset. She was not exposed at her work. Date Mendy was exposed to the laboratory-confirmed COVID-19 patient: 10/19/2020   Additional information about contact with COVID-19 (Coronavirus) patient as reported by the patient (free text): My daughter. She gave me a ride to and from an appointment to last week.    Since December 2019, Mendy has not been diagnosed with lab-confirmed COVID-19 test and has had upper respiratory infection (URI) or influenza-like illness.      Date(s) of previous URI or influenza-like illness (free-text): March 2020     Symptoms Mendy experienced during previous URI or influenza-like illness as reported by the patient (free-text): Bronchitis        Pertinent medical history  Mendy typically gets a yeast infection when she takes antibiotics. She has used fluconazole (Diflucan) to treat previous yeast infections. 2 doses of fluconazole (Diflucan) has typically been needed for symptoms to resolve in the past.  Mendy does not need a return to work/school note.   Weight: 145 lbs   Mendy does not smoke or use smokeless tobacco.   She denies pregnancy and denies breastfeeding. She does not menstruate.   Weight: 145 lbs  Reason for repeat visit for the same protocol within 24 hours:  I didn't mean to hit yes to rash after sore throat.  See the History of referred by protocol and completed visits section for details on previous visits (visits currently in queue to be diagnosed will not appear in this section).    MEDICATIONS:  "Tylenol 8 Hour oral, ibuprofen oral, rizatriptan oral, lorazepam oral, ALLERGIES: Cymbalta, Augmentin  Clinician Response:  Dear Mendy,   Your symptoms show that you may have coronavirus (COVID-19). This illness can cause fever, cough and trouble breathing. Many people get a mild case and get better on their own. Some people can get very sick.  What should I do?  We would like to test you for this virus.   1. Please call [phone] to schedule your visit. Explain that you were referred by [facility] to have a COVID-19 test. Be [facility].  The following will serve as your written order for this COVID Test, ordered by me, for the indication of suspected COVID [Z20.828]: The test will be ordered in [employer], our electronic health record, after you are scheduled. It will show as ordered and authorized by Denilson Priest MD.  Order: COVID-19 (Coronavirus) PCR for SYMPTOMATIC testing from [facility].   2. When it's time for your COVID test:  Stay at least 6 feet away from others. (If someone will drive you to your test, stay in the backseat, as far away from the  as you can.)   Cover your mouth and nose with a mask, tissue or washcloth.  Go straight to the testing site. Don't make any stops on the way there or back.      3.Starting now: Stay home and away from others (self-isolate) until:   You've had no fever---and no medicine that reduces fever---for one full day (24 hours). And...   Your other symptoms have gotten better. For example, your cough or breathing has improved. And...   At least 10 days have passed since your symptoms started.       During this time, don't leave the house except for testing or medical care.   Stay in your own room, even for meals. Use your own bathroom if you can.   Stay away from others in your home. No hugging, kissing or shaking hands. No visitors.  Don't go to work, school or anywhere else.    Clean \"high touch\" surfaces often (doorknobs, counters, handles, " etc.). Use a household cleaning spray or wipes. You'll find a full list of  on the EPA website: www.epa.gov/pesticide-registration/list-n-disinfectants-use-against-sars-cov-2.   Cover your mouth and nose with a mask, tissue or washcloth to avoid spreading germs.  Wash your hands and face often. Use soap and water.  Caregivers in these groups are at risk for severe illness due to COVID-19:  o People 65 years and older  o People who live in a nursing home or long-term care facility  o People with chronic disease (lung, heart, cancer, diabetes, kidney, liver, immunologic)  o People who have a weakened immune system, including those who:   Are in cancer treatment  Take medicine that weakens the immune system, such as corticosteroids  Had a bone marrow or organ transplant  Have an immune deficiency  Have poorly controlled HIV or AIDS  Are obese (body mass index of 40 or higher)  Smoke regularly   o Caregivers should wear gloves while washing dishes, handling laundry and cleaning bedrooms and bathrooms.  o Use caution when washing and drying laundry: Don't shake dirty laundry, and use the warmest water setting that you can.  o For more tips, go to www.cdc.gov/coronavirus/2019-ncov/downloads/10Things.pdf.    4.Sign up for [employer]. We know it's scary to hear that you might have COVID-19. We want to track your symptoms to make sure you're okay over the next 2 weeks. Please look for an email from [employer]iz---this is a free, online program that we'll use to keep in touch. To sign up, follow the link in the email. Learn more at [email or web address]  How can I take care of myself?   Get lots of rest. Drink extra fluids (unless a doctor has told you not to).   Take Tylenol (acetaminophen) for fever or pain. If you have liver or kidney problems, ask your family doctor if it's okay to take Tylenol.   Adults can take either:    650 mg (two 325 mg pills) every 4 to 6 hours, or...   1,000 mg (two 500 mg  pills) every 8 hours as needed.    Note: Don't take more than 3,000 mg in one day. Acetaminophen is found in many medicines (both prescribed and over-the-counter medicines). Read all labels to be sure you don't take too much.   For children, check the Tylenol bottle for the right dose. The dose is based on the child's age or weight.    If you have other health problems (like cancer, heart failure, an organ transplant or severe kidney disease): Call your specialty clinic if you don't feel better in the next 2 days.       Know when to call 911. Emergency warning signs include:    Trouble breathing or shortness of breath Pain or pressure in the chest that doesn't go away Feeling confused like you haven't felt before, or not being able to wake up Bluish-colored lips or face.  Where can I get more information?   [facility] -- About COVID-19: [email or web address]   CDC -- What to Do If You're Sick: www.cdc.gov/coronavirus/2019-ncov/about/steps-when-sick.html   CDC -- Ending Home Isolation: www.cdc.gov/coronavirus/2019-ncov/hcp/disposition-in-home-patients.html   CDC -- Caring for Someone: www.cdc.gov/coronavirus/2019-ncov/if-you-are-sick/care-for-someone.html   [facility] -- Interim Guidance for Hospital Discharge to Home: www.health.[facility].[place]/diseases/coronavirus/hcp/hospdischarge.pdf   [facility] clinical trials (COVID-19 research studies): clinicalaffairs.[facility]/n-clinical-trials    Below are the COVID-19 hotlines at the [place] Department of Health ([facility]). Interpreters are available.    For health questions: Call 584-963-9677 or 1-595.849.7442 (7 a.m. to 7 p.m.) For questions about schools and childcare: Call 943-608-8022 or 1-512.552.6057 (7 a.m. to 7 p.m.)    Diagnosis: Cough  Diagnosis ICD: R05

## 2020-11-03 ENCOUNTER — AMBULATORY - HEALTHEAST (OUTPATIENT)
Dept: FAMILY MEDICINE | Facility: CLINIC | Age: 40
End: 2020-11-03

## 2020-11-03 DIAGNOSIS — Z20.822 SUSPECTED 2019 NOVEL CORONAVIRUS INFECTION: ICD-10-CM

## 2020-11-04 ENCOUNTER — OFFICE VISIT - HEALTHEAST (OUTPATIENT)
Dept: FAMILY MEDICINE | Facility: CLINIC | Age: 40
End: 2020-11-04

## 2020-11-04 DIAGNOSIS — J01.91 ACUTE RECURRENT SINUSITIS, UNSPECIFIED LOCATION: ICD-10-CM

## 2020-11-05 ENCOUNTER — COMMUNICATION - HEALTHEAST (OUTPATIENT)
Dept: SCHEDULING | Facility: CLINIC | Age: 40
End: 2020-11-05

## 2020-11-17 ENCOUNTER — VIRTUAL VISIT (OUTPATIENT)
Dept: PSYCHOLOGY | Facility: CLINIC | Age: 40
End: 2020-11-17
Payer: COMMERCIAL

## 2020-11-17 DIAGNOSIS — F33.0 MDD (MAJOR DEPRESSIVE DISORDER), RECURRENT EPISODE, MILD (H): Primary | ICD-10-CM

## 2020-11-17 DIAGNOSIS — F41.1 GAD (GENERALIZED ANXIETY DISORDER): ICD-10-CM

## 2020-11-17 PROCEDURE — 90834 PSYTX W PT 45 MINUTES: CPT | Mod: GT | Performed by: SOCIAL WORKER

## 2020-11-17 ASSESSMENT — ANXIETY QUESTIONNAIRES
5. BEING SO RESTLESS THAT IT IS HARD TO SIT STILL: NEARLY EVERY DAY
2. NOT BEING ABLE TO STOP OR CONTROL WORRYING: SEVERAL DAYS
6. BECOMING EASILY ANNOYED OR IRRITABLE: MORE THAN HALF THE DAYS
3. WORRYING TOO MUCH ABOUT DIFFERENT THINGS: MORE THAN HALF THE DAYS
7. FEELING AFRAID AS IF SOMETHING AWFUL MIGHT HAPPEN: NOT AT ALL
4. TROUBLE RELAXING: NEARLY EVERY DAY
GAD7 TOTAL SCORE: 13
1. FEELING NERVOUS, ANXIOUS, OR ON EDGE: MORE THAN HALF THE DAYS

## 2020-11-17 ASSESSMENT — PATIENT HEALTH QUESTIONNAIRE - PHQ9: SUM OF ALL RESPONSES TO PHQ QUESTIONS 1-9: 13

## 2020-11-17 NOTE — PROGRESS NOTES
Progress Note    Patient Name: Mendy Roe  Date: 11/17/2020         Service Type: Individual  Video Visit:Yes, the patient's condition can be safely assessed and treated via synchronous audio and visual telemedicine encounter.      Reason for Video Visit: Services only offered telehealth    Location of Originating Site: Patient's home    Distant Site: Provider Remote Setting home office      Consent:  The patient/guardian has verbally consented to: the potential risks and benefits of telemedicine (video visit) versus in person care; bill my insurance or make self-payment for services provided; and responsibility for payment of non-covered services.      Mode of transmission-[employer]      Session Start Time: 1635 Session End Time: 1725     Session Length: 50 minutes    Session #: 15    Attendees: Client     Treatment Plan Last Reviewed 9/22/2020 due 12/22/2020    PHQ-9 / MEG-7 : 13/13    DATA  Interactive Complexity: No  Crisis: No       Progress Since Last Session (Related to Symptoms / Goals / Homework):   Symptoms: Worsening increase MEG-7    Homework: Partially completed      Episode of Care Goals: Minimal progress - CONTEMPLATION (Considering change and yet undecided); Intervened by assessing the negative and positive thinking (ambivalence) about behavior change     Current / Ongoing Stressors and Concerns:   COVID-19, isolation, moving, ex, kids, grandchildren, partner     Treatment Objective(s) Addressed in This Session:     Patient will demonstrate and report a level of anxiety that can be managed at a lower level of care.  Absence of persistent anxious mood and report of reduced frequency and intensity of worry and absence of persistent anxious mood to acceptable levels, no panic attacks, report subjective comfort with rumination for a period of 90 days, within 6 months as clinically observed and by patient self-report.  Patient will demonstrate and report a  level of depression that can be managed at a lower level of care.  Absence of persistent depression mood and report of reduced frequency and intensity of low mood and absence of persistent low energy and motivation to acceptable levels, report subjective improved motivation and increased energy for a period of 90 days, within 6 months as clinically observed and by patient self-report.     Intervention:     Therapist met with patient to review goals and interventions. Therapist utilized reflected listening as patient gave brief reflection of week.  Patient processed finding a house, making an offer and the offer being accepted. Therapist supported patient as she processed and validated patient's hard work and emotions. Patient processed some continued stress and therapist reviewed different options with patient. Patient reported some restlessness and therapist encouraged patient to identify trigger and assisted patient.   Patient presented with an anxious affect. Patient was engaged in session and open to feedback. Patient reported no safety concerns.     ASSESSMENT: Current Emotional / Mental Status (status of significant symptoms):   Risk status (Self / Other harm or suicidal ideation)   Patient denies current fears or concerns for personal safety.   Patient denies current or recent suicidal ideation or behaviors.   Patient denies current or recent homicidal ideation or behaviors.   Patient denies current or recent self injurious behavior or ideation.   Patient denies other safety concerns.   Patient reports there has been no change in risk factors since their last session.     Patient reports there has been no change in protective factors since their last session.     Recommended that patient call 911 or go to the local ED should there be a change in any of these risk factors.     Appearance:   Appropriate    Eye Contact:   Good    Psychomotor Behavior: Restless    Attitude:   Cooperative  Friendly  Pleasant   Orientation:   All   Speech    Rate / Production: Emotional Talkative    Volume:  Normal    Mood:    Anxious  Depressed    Affect:    Worrisome    Thought Content:  Clear    Thought Form:  Coherent    Insight:    Fair      Medication Review:   No changes to current psychiatric medication(s)     Medication Compliance:   Yes     Changes in Health Issues:   None reported     Chemical Use Review:   Substance Use: Chemical use reviewed, no active concerns identified      Tobacco Use: No current tobacco use.      Diagnosis:  1. MDD (major depressive disorder), recurrent episode, mild (H)    2. MEG (generalized anxiety disorder)         Collateral Reports Completed:   Not Applicable    PLAN: (Patient Tasks / Therapist Tasks / Other)  Get ready for move and make list for furniture       Patito RONNIE Humphries, [facility]  11/17/2020                                                             ______________________________________________________________________    Treatment Plan    Patient's Name: Mendy Roe  YOB: 1980    Date: 9/22/2020       DSM5 Diagnoses: 296.31 (F33.0) Major Depressive Disorder, Recurrent Episode, Mild _ and With mixed features or 300.02 (F41.1) Generalized Anxiety Disorder  Psychosocial / Contextual Factors: Ex, kids, grandchildren, partner  WHODAS: 28    Referral / Collaboration:  Referral to another professional/service is not indicated at this time..    Anticipated number of session or this episode of care: 20      MeasurableTreatment Goal(s) related to diagnosis / functional impairment(s)  Goal 1: Patient will report absence of persistent anxiety mood and report of reduced frequency and intensity of worry and absence of persistent anxious mood to acceptable levels, no panic attacks, report subjective comfort with rumination or a period of 90 days. Within 6 months as clinically observed and by patient self-report    I will know I've met my goal when manage my anxiety.       Objective #A (Patient Action)    Patient will demonstrate and report a level of anxiety that can be managed at a lower level of care.  Absence of persistent anxious mood and report of reduced frequency and intensity of worry and absence of persistent anxious mood to acceptable levels, no panic attacks, report subjective comfort with rumination for a period of 90 days, within 6 months as clinically observed and by patient self-report.  Status: Continued - Date(s): 9/22/2020     Intervention(s)  Therapist will provide individual therapy to identify triggers to anxiety, gain feedback on helpful coping tools and thought-reframing techniques, and identify preferred way of being. Tx to include discuss current stressors and interpersonal conflicts and how to cope with these, coaching, diagnostic testing, referral for medication as indicated, use prescribed medication, cognitive restructuring, interpersonal, family therapy, supportive therapy services.        Goal 2: Patient will report absence of persistent depression mood and report of reduced frequency and intensity of low mood and absence of persistent low energy and motivation to acceptable levels, report subjective improved motivation and increased energy for a period of 90 days, within 6 months as clinically observed and by patient self-report    I will know I've met my goal when feeling more balanced.      Objective #A (Patient Action)    Status: Continued - Date(s): 9/22/2020    Patient will demonstrate and report a level of depression that can be managed at a lower level of care.  Absence of persistent depression mood and report of reduced frequency and intensity of low mood and absence of persistent low energy and motivation to acceptable levels, report subjective improved motivation and increased energy for a period of 90 days, within 6 months as clinically observed and by patient self-report.    Intervention(s)  Therapist will provide individual therapy to  identify triggers to depression, gain feedback on helpful coping tools and thought-reframing techniques, and identify preferred way of being.  Tx to include discussion of current stressors and interpersonal conflicts and how to cope with these, coaching, diagnostic testing, referral for medication as indicated, use prescribed medication, cognitive restructuring, interpersonal, family therapy, supportive therapy services.        Patient has reviewed and agreed to the above plan.      Patito Humphries, [facility]  9/22/2020

## 2020-11-18 ASSESSMENT — ANXIETY QUESTIONNAIRES: GAD7 TOTAL SCORE: 13

## 2020-12-08 ENCOUNTER — COMMUNICATION - HEALTHEAST (OUTPATIENT)
Dept: NURSING | Facility: CLINIC | Age: 40
End: 2020-12-08

## 2020-12-08 ENCOUNTER — AMBULATORY - HEALTHEAST (OUTPATIENT)
Dept: CARE COORDINATION | Facility: CLINIC | Age: 40
End: 2020-12-08

## 2020-12-08 DIAGNOSIS — J31.0 CHRONIC RHINITIS: ICD-10-CM

## 2020-12-22 ENCOUNTER — VIRTUAL VISIT (OUTPATIENT)
Dept: PSYCHOLOGY | Facility: CLINIC | Age: 40
End: 2020-12-22
Payer: COMMERCIAL

## 2020-12-22 DIAGNOSIS — F33.0 MDD (MAJOR DEPRESSIVE DISORDER), RECURRENT EPISODE, MILD (H): ICD-10-CM

## 2020-12-22 DIAGNOSIS — F41.1 GAD (GENERALIZED ANXIETY DISORDER): Primary | ICD-10-CM

## 2020-12-22 PROCEDURE — 90834 PSYTX W PT 45 MINUTES: CPT | Mod: GT | Performed by: SOCIAL WORKER

## 2020-12-22 ASSESSMENT — ANXIETY QUESTIONNAIRES
4. TROUBLE RELAXING: NEARLY EVERY DAY
3. WORRYING TOO MUCH ABOUT DIFFERENT THINGS: MORE THAN HALF THE DAYS
7. FEELING AFRAID AS IF SOMETHING AWFUL MIGHT HAPPEN: SEVERAL DAYS
2. NOT BEING ABLE TO STOP OR CONTROL WORRYING: MORE THAN HALF THE DAYS
6. BECOMING EASILY ANNOYED OR IRRITABLE: MORE THAN HALF THE DAYS
GAD7 TOTAL SCORE: 14
1. FEELING NERVOUS, ANXIOUS, OR ON EDGE: MORE THAN HALF THE DAYS
5. BEING SO RESTLESS THAT IT IS HARD TO SIT STILL: MORE THAN HALF THE DAYS

## 2020-12-22 ASSESSMENT — PATIENT HEALTH QUESTIONNAIRE - PHQ9: SUM OF ALL RESPONSES TO PHQ QUESTIONS 1-9: 16

## 2020-12-22 NOTE — PROGRESS NOTES
Progress Note    Patient Name: Mendy Roe  Date: 12/22/2020         Service Type: Individual  Video Visit:Yes, the patient's condition can be safely assessed and treated via synchronous audio and visual telemedicine encounter.      Reason for Video Visit: Services only offered telehealth    Location of Originating Site: Patient's home    Distant Site: Provider Remote Setting home office      Consent:  The patient/guardian has verbally consented to: the potential risks and benefits of telemedicine (video visit) versus in person care; bill my insurance or make self-payment for services provided; and responsibility for payment of non-covered services.      Mode of transmission-[employer]      Session Start Time: 1635 Session End Time: 1721     Session Length: 46 minutes    Session #: 16    Attendees: Client     Treatment Plan Last Reviewed 12/22/2020 due 3/22/2021    PHQ-9 / MEG-7 : 16/14    DATA  Interactive Complexity: No  Crisis: No       Progress Since Last Session (Related to Symptoms / Goals / Homework):   Symptoms: Worsening increase PHQ-9    Homework: Partially completed      Episode of Care Goals: Minimal progress - PREPARATION (Decided to change - considering how); Intervened by negotiating a change plan and determining options / strategies for behavior change, identifying triggers, exploring social supports, and working towards setting a date to begin behavior change     Current / Ongoing Stressors and Concerns:   COVID-19, isolation, moving, ex, kids, grandchildren, partner     Treatment Objective(s) Addressed in This Session:     Patient will demonstrate and report a level of anxiety that can be managed at a lower level of care.  Absence of persistent anxious mood and report of reduced frequency and intensity of worry and absence of persistent anxious mood to acceptable levels, no panic attacks, report subjective comfort with rumination for a period of 90 days,  within 6 months as clinically observed and by patient self-report.  Patient will demonstrate and report a level of depression that can be managed at a lower level of care.  Absence of persistent depression mood and report of reduced frequency and intensity of low mood and absence of persistent low energy and motivation to acceptable levels, report subjective improved motivation and increased energy for a period of 90 days, within 6 months as clinically observed and by patient self-report.     Intervention:     Therapist met with patient to review goals and interventions. Therapist utilized reflected listening as patient gave brief reflection of week.  Patient processed getting ready to move in 9 days and anxiety. Therapist supported patient as she processed and validated patient. Therapist encouraged patient to be present and for patient to manage her anxiety in the moment so she can enjoy and be excited.  Patient presented with an anxious affect. Patient was engaged in session and open to feedback. Patient reported no safety concerns.   Lost feed at 1711 finished via phone   ASSESSMENT: Current Emotional / Mental Status (status of significant symptoms):   Risk status (Self / Other harm or suicidal ideation)   Patient denies current fears or concerns for personal safety.   Patient denies current or recent suicidal ideation or behaviors.   Patient denies current or recent homicidal ideation or behaviors.   Patient denies current or recent self injurious behavior or ideation.   Patient denies other safety concerns.   Patient reports there has been no change in risk factors since their last session.     Patient reports there has been no change in protective factors since their last session.     Recommended that patient call 911 or go to the local ED should there be a change in any of these risk factors.     Appearance:   Appropriate    Eye Contact:   Good    Psychomotor Behavior: Restless    Attitude:   Cooperative   Friendly Pleasant   Orientation:   All   Speech    Rate / Production: Emotional Talkative    Volume:  Normal    Mood:    Anxious    Affect:    Bright  Worrisome    Thought Content:  Clear    Thought Form:  Coherent    Insight:    Fair      Medication Review:   No changes to current psychiatric medication(s)     Medication Compliance:   Yes     Changes in Health Issues:   None reported     Chemical Use Review:   Substance Use: Chemical use reviewed, no active concerns identified      Tobacco Use: No current tobacco use.      Diagnosis:  1. MEG (generalized anxiety disorder)    2. MDD (major depressive disorder), recurrent episode, mild (H)         Collateral Reports Completed:   Not Applicable    PLAN: (Patient Tasks / Therapist Tasks / Other)  Manage anxiety and be present      Patito RONNIE Humphries, [facility]  12/22/2020                                                             ______________________________________________________________________    Treatment Plan    Patient's Name: Mendy Roe  YOB: 1980    Date: 12/22/2020       DSM5 Diagnoses: 296.31 (F33.0) Major Depressive Disorder, Recurrent Episode, Mild _ and With mixed features or 300.02 (F41.1) Generalized Anxiety Disorder  Psychosocial / Contextual Factors: Ex, kids, grandchildren, partner  WHODAS: 30    Referral / Collaboration:  Referral to another professional/service is not indicated at this time..    Anticipated number of session or this episode of care: 20      MeasurableTreatment Goal(s) related to diagnosis / functional impairment(s)  Goal 1: Patient will report absence of persistent anxiety mood and report of reduced frequency and intensity of worry and absence of persistent anxious mood to acceptable levels, no panic attacks, report subjective comfort with rumination or a period of 90 days. Within 6 months as clinically observed and by patient self-report    I will know I've met my goal when manage my anxiety.      Objective #A  (Patient Action)    Patient will demonstrate and report a level of anxiety that can be managed at a lower level of care.  Absence of persistent anxious mood and report of reduced frequency and intensity of worry and absence of persistent anxious mood to acceptable levels, no panic attacks, report subjective comfort with rumination for a period of 90 days, within 6 months as clinically observed and by patient self-report.  Status: Continued - Date(s): 12/22/2020    Intervention(s)  Therapist will provide individual therapy to identify triggers to anxiety, gain feedback on helpful coping tools and thought-reframing techniques, and identify preferred way of being. Tx to include discuss current stressors and interpersonal conflicts and how to cope with these, coaching, diagnostic testing, referral for medication as indicated, use prescribed medication, cognitive restructuring, interpersonal, family therapy, supportive therapy services.        Goal 2: Patient will report absence of persistent depression mood and report of reduced frequency and intensity of low mood and absence of persistent low energy and motivation to acceptable levels, report subjective improved motivation and increased energy for a period of 90 days, within 6 months as clinically observed and by patient self-report    I will know I've met my goal when feeling more balanced.      Objective #A (Patient Action)    Status: Continued - Date(s): 12/22/2020    Patient will demonstrate and report a level of depression that can be managed at a lower level of care.  Absence of persistent depression mood and report of reduced frequency and intensity of low mood and absence of persistent low energy and motivation to acceptable levels, report subjective improved motivation and increased energy for a period of 90 days, within 6 months as clinically observed and by patient self-report.    Intervention(s)  Therapist will provide individual therapy to identify  triggers to depression, gain feedback on helpful coping tools and thought-reframing techniques, and identify preferred way of being.  Tx to include discussion of current stressors and interpersonal conflicts and how to cope with these, coaching, diagnostic testing, referral for medication as indicated, use prescribed medication, cognitive restructuring, interpersonal, family therapy, supportive therapy services.        Patient has reviewed and agreed to the above plan.      Patito Humphries, [facility]  12/22/2020

## 2020-12-23 ASSESSMENT — ANXIETY QUESTIONNAIRES: GAD7 TOTAL SCORE: 14

## 2021-01-03 ENCOUNTER — HEALTH MAINTENANCE LETTER (OUTPATIENT)
Age: 41
End: 2021-01-03

## 2021-01-05 ENCOUNTER — VIRTUAL VISIT (OUTPATIENT)
Dept: PSYCHOLOGY | Facility: CLINIC | Age: 41
End: 2021-01-05
Payer: COMMERCIAL

## 2021-01-05 DIAGNOSIS — F41.1 GAD (GENERALIZED ANXIETY DISORDER): Primary | ICD-10-CM

## 2021-01-05 DIAGNOSIS — F33.0 MDD (MAJOR DEPRESSIVE DISORDER), RECURRENT EPISODE, MILD (H): ICD-10-CM

## 2021-01-05 PROCEDURE — 90834 PSYTX W PT 45 MINUTES: CPT | Mod: GT | Performed by: SOCIAL WORKER

## 2021-01-05 ASSESSMENT — PATIENT HEALTH QUESTIONNAIRE - PHQ9: SUM OF ALL RESPONSES TO PHQ QUESTIONS 1-9: 12

## 2021-01-05 ASSESSMENT — ANXIETY QUESTIONNAIRES
2. NOT BEING ABLE TO STOP OR CONTROL WORRYING: MORE THAN HALF THE DAYS
3. WORRYING TOO MUCH ABOUT DIFFERENT THINGS: NEARLY EVERY DAY
1. FEELING NERVOUS, ANXIOUS, OR ON EDGE: MORE THAN HALF THE DAYS
7. FEELING AFRAID AS IF SOMETHING AWFUL MIGHT HAPPEN: SEVERAL DAYS
5. BEING SO RESTLESS THAT IT IS HARD TO SIT STILL: MORE THAN HALF THE DAYS
6. BECOMING EASILY ANNOYED OR IRRITABLE: MORE THAN HALF THE DAYS
GAD7 TOTAL SCORE: 15
4. TROUBLE RELAXING: NEARLY EVERY DAY

## 2021-01-05 NOTE — PROGRESS NOTES
Progress Note    Patient Name: Mendy Roe  Date: 1/5/2021         Service Type: Individual  Video Visit:Yes, the patient's condition can be safely assessed and treated via synchronous audio and visual telemedicine encounter.      Reason for Video Visit: Services only offered telehealth    Location of Originating Site: Patient's home    Distant Site: Provider Remote Setting home office      Consent:  The patient/guardian has verbally consented to: the potential risks and benefits of telemedicine (video visit) versus in person care; bill my insurance or make self-payment for services provided; and responsibility for payment of non-covered services.      Mode of transmission-[employer]      Session Start Time: 1636 Session End Time: 1722     Session Length: 46 minutes    Session #: 17    Attendees: Client     Treatment Plan Last Reviewed 12/22/2020 due 3/22/2021    PHQ-9 / MEG-7 : 12/15    DATA  Interactive Complexity: No  Crisis: No       Progress Since Last Session (Related to Symptoms / Goals / Homework):   Symptoms: Worsening increase MEG-7    Homework: Partially completed      Episode of Care Goals: Minimal progress - PREPARATION (Decided to change - considering how); Intervened by negotiating a change plan and determining options / strategies for behavior change, identifying triggers, exploring social supports, and working towards setting a date to begin behavior change     Current / Ongoing Stressors and Concerns:   COVID-19, isolation, moving, ex, kids, grandchildren, partner     Treatment Objective(s) Addressed in This Session:     Patient will demonstrate and report a level of anxiety that can be managed at a lower level of care.  Absence of persistent anxious mood and report of reduced frequency and intensity of worry and absence of persistent anxious mood to acceptable levels, no panic attacks, report subjective comfort with rumination for a period of 90 days,  within 6 months as clinically observed and by patient self-report.  Patient will demonstrate and report a level of depression that can be managed at a lower level of care.  Absence of persistent depression mood and report of reduced frequency and intensity of low mood and absence of persistent low energy and motivation to acceptable levels, report subjective improved motivation and increased energy for a period of 90 days, within 6 months as clinically observed and by patient self-report.     Intervention:     Therapist met with patient to review goals and interventions. Therapist utilized reflected listening as patient gave brief reflection of week.  Patient processed heightened anxiety after conflict continued with partner's mother. Therapist supported patient and validated patients emotions. Therapist named patient's anxiety with future predicting behavior and naming all outcomes. Therapist utilized strength-based modality with patient.  Patient presented with an anxious affect. Patient was engaged in session and open to feedback. Patient reported no safety concerns.     ASSESSMENT: Current Emotional / Mental Status (status of significant symptoms):   Risk status (Self / Other harm or suicidal ideation)   Patient denies current fears or concerns for personal safety.   Patient denies current or recent suicidal ideation or behaviors.   Patient denies current or recent homicidal ideation or behaviors.   Patient denies current or recent self injurious behavior or ideation.   Patient denies other safety concerns.   Patient reports there has been no change in risk factors since their last session.     Patient reports there has been no change in protective factors since their last session.     Recommended that patient call 911 or go to the local ED should there be a change in any of these risk factors.     Appearance:   Appropriate    Eye Contact:   Good    Psychomotor Behavior: Restless    Attitude:   Cooperative   Friendly Pleasant   Orientation:   All   Speech    Rate / Production: Emotional Talkative    Volume:  Normal    Mood:    Anxious    Affect:    Bright  Worrisome    Thought Content:  Clear    Thought Form:  Coherent    Insight:    Fair      Medication Review:   No changes to current psychiatric medication(s)     Medication Compliance:   Yes     Changes in Health Issues:   None reported     Chemical Use Review:   Substance Use: Chemical use reviewed, no active concerns identified      Tobacco Use: No current tobacco use.      Diagnosis:  1. MEG (generalized anxiety disorder)    2. MDD (major depressive disorder), recurrent episode, mild (H)         Collateral Reports Completed:   Not Applicable    PLAN: (Patient Tasks / Therapist Tasks / Other)  Manage anxiety and be present    Name all outcomes  Patito Humphries, [facility]  1/5/2021                                                             ______________________________________________________________________    Treatment Plan    Patient's Name: Mendy Roe  YOB: 1980    Date: 12/22/2020       DSM5 Diagnoses: 296.31 (F33.0) Major Depressive Disorder, Recurrent Episode, Mild _ and With mixed features or 300.02 (F41.1) Generalized Anxiety Disorder  Psychosocial / Contextual Factors: Ex, kids, grandchildren, partner  WHODAS: 30    Referral / Collaboration:  Referral to another professional/service is not indicated at this time..    Anticipated number of session or this episode of care: 20      MeasurableTreatment Goal(s) related to diagnosis / functional impairment(s)  Goal 1: Patient will report absence of persistent anxiety mood and report of reduced frequency and intensity of worry and absence of persistent anxious mood to acceptable levels, no panic attacks, report subjective comfort with rumination or a period of 90 days. Within 6 months as clinically observed and by patient self-report    I will know I've met my goal when manage my anxiety.       Objective #A (Patient Action)    Patient will demonstrate and report a level of anxiety that can be managed at a lower level of care.  Absence of persistent anxious mood and report of reduced frequency and intensity of worry and absence of persistent anxious mood to acceptable levels, no panic attacks, report subjective comfort with rumination for a period of 90 days, within 6 months as clinically observed and by patient self-report.  Status: Continued - Date(s): 12/22/2020    Intervention(s)  Therapist will provide individual therapy to identify triggers to anxiety, gain feedback on helpful coping tools and thought-reframing techniques, and identify preferred way of being. Tx to include discuss current stressors and interpersonal conflicts and how to cope with these, coaching, diagnostic testing, referral for medication as indicated, use prescribed medication, cognitive restructuring, interpersonal, family therapy, supportive therapy services.        Goal 2: Patient will report absence of persistent depression mood and report of reduced frequency and intensity of low mood and absence of persistent low energy and motivation to acceptable levels, report subjective improved motivation and increased energy for a period of 90 days, within 6 months as clinically observed and by patient self-report    I will know I've met my goal when feeling more balanced.      Objective #A (Patient Action)    Status: Continued - Date(s): 12/22/2020    Patient will demonstrate and report a level of depression that can be managed at a lower level of care.  Absence of persistent depression mood and report of reduced frequency and intensity of low mood and absence of persistent low energy and motivation to acceptable levels, report subjective improved motivation and increased energy for a period of 90 days, within 6 months as clinically observed and by patient self-report.    Intervention(s)  Therapist will provide individual therapy to  identify triggers to depression, gain feedback on helpful coping tools and thought-reframing techniques, and identify preferred way of being.  Tx to include discussion of current stressors and interpersonal conflicts and how to cope with these, coaching, diagnostic testing, referral for medication as indicated, use prescribed medication, cognitive restructuring, interpersonal, family therapy, supportive therapy services.        Patient has reviewed and agreed to the above plan.      Patito Humphries, [facility]  12/22/2020

## 2021-01-06 ASSESSMENT — ANXIETY QUESTIONNAIRES: GAD7 TOTAL SCORE: 15

## 2021-02-02 ENCOUNTER — VIRTUAL VISIT (OUTPATIENT)
Dept: PSYCHOLOGY | Facility: CLINIC | Age: 41
End: 2021-02-02
Payer: COMMERCIAL

## 2021-02-02 DIAGNOSIS — F33.0 MDD (MAJOR DEPRESSIVE DISORDER), RECURRENT EPISODE, MILD (H): ICD-10-CM

## 2021-02-02 DIAGNOSIS — F41.1 GAD (GENERALIZED ANXIETY DISORDER): Primary | ICD-10-CM

## 2021-02-02 PROCEDURE — 90834 PSYTX W PT 45 MINUTES: CPT | Mod: 95 | Performed by: SOCIAL WORKER

## 2021-02-02 ASSESSMENT — ANXIETY QUESTIONNAIRES
1. FEELING NERVOUS, ANXIOUS, OR ON EDGE: MORE THAN HALF THE DAYS
7. FEELING AFRAID AS IF SOMETHING AWFUL MIGHT HAPPEN: SEVERAL DAYS
GAD7 TOTAL SCORE: 16
5. BEING SO RESTLESS THAT IT IS HARD TO SIT STILL: NEARLY EVERY DAY
3. WORRYING TOO MUCH ABOUT DIFFERENT THINGS: MORE THAN HALF THE DAYS
6. BECOMING EASILY ANNOYED OR IRRITABLE: NEARLY EVERY DAY
2. NOT BEING ABLE TO STOP OR CONTROL WORRYING: MORE THAN HALF THE DAYS
4. TROUBLE RELAXING: NEARLY EVERY DAY

## 2021-02-02 ASSESSMENT — PATIENT HEALTH QUESTIONNAIRE - PHQ9: SUM OF ALL RESPONSES TO PHQ QUESTIONS 1-9: 16

## 2021-02-02 NOTE — PROGRESS NOTES
Progress Note    Patient Name: Mendy Roe  Date: 2/2/2021         Service Type: Individual  Video Visit:Yes, the patient's condition can be safely assessed and treated via synchronous audio and visual telemedicine encounter.      Reason for Video Visit: Services only offered telehealth    Location of Originating Site: Patient's home    Distant Site: Provider Remote Setting home office      Consent:  The patient/guardian has verbally consented to: the potential risks and benefits of telemedicine (video visit) versus in person care; bill my insurance or make self-payment for services provided; and responsibility for payment of non-covered services.      Mode of transmission-[employer]      Session Start Time: 1638 Session End Time: 1725     Session Length: 47 minutes    Session #: 18    Attendees: Client     Treatment Plan Last Reviewed 12/22/2020 due 3/22/2021    PHQ-9 / MEG-7 : 16/16    DATA  Interactive Complexity: No  Crisis: No       Progress Since Last Session (Related to Symptoms / Goals / Homework):   Symptoms: Worsening  increase PHQ-9 MEG-7    Homework: Partially completed      Episode of Care Goals: Minimal progress - PREPARATION (Decided to change - considering how); Intervened by negotiating a change plan and determining options / strategies for behavior change, identifying triggers, exploring social supports, and working towards setting a date to begin behavior change     Current / Ongoing Stressors and Concerns:   COVID-19, isolation, moving, ex, kids, grandchildren, partner     Treatment Objective(s) Addressed in This Session:     Patient will demonstrate and report a level of anxiety that can be managed at a lower level of care.  Absence of persistent anxious mood and report of reduced frequency and intensity of worry and absence of persistent anxious mood to acceptable levels, no panic attacks, report subjective comfort with rumination for a period of 90  days, within 6 months as clinically observed and by patient self-report.  Patient will demonstrate and report a level of depression that can be managed at a lower level of care.  Absence of persistent depression mood and report of reduced frequency and intensity of low mood and absence of persistent low energy and motivation to acceptable levels, report subjective improved motivation and increased energy for a period of 90 days, within 6 months as clinically observed and by patient self-report.     Intervention:     Therapist met with patient to review goals and interventions. Therapist utilized reflected listening as patient gave brief reflection of week.  Patient processed some conflict within the house. Therapist supported patient as she processed. Therapist encouraged patient to communicate expectations together or separately with her daughters and setting limits. Therapist shared concern with a disconnect and/or power play for her attention.  Patient presented with an anxious affect. Patient was engaged in session and open to feedback. Patient reported no safety concerns.     ASSESSMENT: Current Emotional / Mental Status (status of significant symptoms):   Risk status (Self / Other harm or suicidal ideation)   Patient denies current fears or concerns for personal safety.   Patient denies current or recent suicidal ideation or behaviors.   Patient denies current or recent homicidal ideation or behaviors.   Patient denies current or recent self injurious behavior or ideation.   Patient denies other safety concerns.   Patient reports there has been no change in risk factors since their last session.     Patient reports there has been no change in protective factors since their last session.     Recommended that patient call 911 or go to the local ED should there be a change in any of these risk factors.     Appearance:   Appropriate    Eye Contact:   Good    Psychomotor Behavior: Restless     Attitude:   Cooperative  Friendly Pleasant   Orientation:   All   Speech    Rate / Production: Emotional Talkative    Volume:  Normal    Mood:    Anxious    Affect:    Bright  Worrisome    Thought Content:  Clear    Thought Form:  Coherent    Insight:    Fair      Medication Review:   No changes to current psychiatric medication(s)     Medication Compliance:   Yes     Changes in Health Issues:   None reported     Chemical Use Review:   Substance Use: Chemical use reviewed, no active concerns identified      Tobacco Use: No current tobacco use.      Diagnosis:  1. MEG (generalized anxiety disorder)    2. MDD (major depressive disorder), recurrent episode, mild (H)         Collateral Reports Completed:   Not Applicable    PLAN: (Patient Tasks / Therapist Tasks / Other)  Communicate expectations    Patito Humphries, [facility]  2/2/2021                                                             ______________________________________________________________________    Treatment Plan    Patient's Name: Mendy Roe  YOB: 1980    Date: 12/22/2020       DSM5 Diagnoses: 296.31 (F33.0) Major Depressive Disorder, Recurrent Episode, Mild _ and With mixed features or 300.02 (F41.1) Generalized Anxiety Disorder  Psychosocial / Contextual Factors: Ex, kids, grandchildren, partner  WHODAS: 30    Referral / Collaboration:  Referral to another professional/service is not indicated at this time..    Anticipated number of session or this episode of care: 20      MeasurableTreatment Goal(s) related to diagnosis / functional impairment(s)  Goal 1: Patient will report absence of persistent anxiety mood and report of reduced frequency and intensity of worry and absence of persistent anxious mood to acceptable levels, no panic attacks, report subjective comfort with rumination or a period of 90 days. Within 6 months as clinically observed and by patient self-report    I will know I've met my goal when manage my anxiety.       Objective #A (Patient Action)    Patient will demonstrate and report a level of anxiety that can be managed at a lower level of care.  Absence of persistent anxious mood and report of reduced frequency and intensity of worry and absence of persistent anxious mood to acceptable levels, no panic attacks, report subjective comfort with rumination for a period of 90 days, within 6 months as clinically observed and by patient self-report.  Status: Continued - Date(s): 12/22/2020    Intervention(s)  Therapist will provide individual therapy to identify triggers to anxiety, gain feedback on helpful coping tools and thought-reframing techniques, and identify preferred way of being. Tx to include discuss current stressors and interpersonal conflicts and how to cope with these, coaching, diagnostic testing, referral for medication as indicated, use prescribed medication, cognitive restructuring, interpersonal, family therapy, supportive therapy services.        Goal 2: Patient will report absence of persistent depression mood and report of reduced frequency and intensity of low mood and absence of persistent low energy and motivation to acceptable levels, report subjective improved motivation and increased energy for a period of 90 days, within 6 months as clinically observed and by patient self-report    I will know I've met my goal when feeling more balanced.      Objective #A (Patient Action)    Status: Continued - Date(s): 12/22/2020    Patient will demonstrate and report a level of depression that can be managed at a lower level of care.  Absence of persistent depression mood and report of reduced frequency and intensity of low mood and absence of persistent low energy and motivation to acceptable levels, report subjective improved motivation and increased energy for a period of 90 days, within 6 months as clinically observed and by patient self-report.    Intervention(s)  Therapist will provide individual therapy to  identify triggers to depression, gain feedback on helpful coping tools and thought-reframing techniques, and identify preferred way of being.  Tx to include discussion of current stressors and interpersonal conflicts and how to cope with these, coaching, diagnostic testing, referral for medication as indicated, use prescribed medication, cognitive restructuring, interpersonal, family therapy, supportive therapy services.        Patient has reviewed and agreed to the above plan.      Patito Humphries, [facility]  12/22/2020

## 2021-02-03 ASSESSMENT — ANXIETY QUESTIONNAIRES: GAD7 TOTAL SCORE: 16

## 2021-02-09 ENCOUNTER — OFFICE VISIT - HEALTHEAST (OUTPATIENT)
Dept: FAMILY MEDICINE | Facility: CLINIC | Age: 41
End: 2021-02-09

## 2021-02-09 ENCOUNTER — COMMUNICATION - HEALTHEAST (OUTPATIENT)
Dept: FAMILY MEDICINE | Facility: CLINIC | Age: 41
End: 2021-02-09

## 2021-02-09 ENCOUNTER — AMBULATORY - HEALTHEAST (OUTPATIENT)
Dept: FAMILY MEDICINE | Facility: CLINIC | Age: 41
End: 2021-02-09

## 2021-02-09 DIAGNOSIS — Z20.822 SUSPECTED COVID-19 VIRUS INFECTION: ICD-10-CM

## 2021-02-09 DIAGNOSIS — J02.9 SORE THROAT: ICD-10-CM

## 2021-02-10 ENCOUNTER — COMMUNICATION - HEALTHEAST (OUTPATIENT)
Dept: FAMILY MEDICINE | Facility: CLINIC | Age: 41
End: 2021-02-10

## 2021-02-10 ENCOUNTER — COMMUNICATION - HEALTHEAST (OUTPATIENT)
Dept: SCHEDULING | Facility: CLINIC | Age: 41
End: 2021-02-10

## 2021-02-10 LAB
SARS-COV-2 PCR COMMENT: NORMAL
SARS-COV-2 RNA SPEC QL NAA+PROBE: NEGATIVE
SARS-COV-2 VIRUS SPECIMEN SOURCE: NORMAL

## 2021-02-16 ENCOUNTER — VIRTUAL VISIT (OUTPATIENT)
Dept: PSYCHOLOGY | Facility: CLINIC | Age: 41
End: 2021-02-16
Payer: COMMERCIAL

## 2021-02-16 DIAGNOSIS — F41.1 GAD (GENERALIZED ANXIETY DISORDER): Primary | ICD-10-CM

## 2021-02-16 DIAGNOSIS — F33.0 MDD (MAJOR DEPRESSIVE DISORDER), RECURRENT EPISODE, MILD (H): ICD-10-CM

## 2021-02-16 PROCEDURE — 90834 PSYTX W PT 45 MINUTES: CPT | Mod: GT | Performed by: SOCIAL WORKER

## 2021-02-16 NOTE — PROGRESS NOTES
Progress Note    Patient Name: Mendy Roe  Date: 2/16/2021         Service Type: Individual  Video Visit:Yes, the patient's condition can be safely assessed and treated via synchronous audio and visual telemedicine encounter.      Reason for Video Visit: Services only offered telehealth    Location of Originating Site: Patient's home    Distant Site: Provider Remote Setting home office      Consent:  The patient/guardian has verbally consented to: the potential risks and benefits of telemedicine (video visit) versus in person care; bill my insurance or make self-payment for services provided; and responsibility for payment of non-covered services.      Mode of transmission-[employer]      Session Start Time: 1636 Session End Time: 1724     Session Length: 48 minutes    Session #: 18    Attendees: Client     Treatment Plan Last Reviewed 12/22/2020 due 3/22/2021    PHQ-9 / MEG-7 : not assessed    DATA  Interactive Complexity: No  Crisis: No       Progress Since Last Session (Related to Symptoms / Goals / Homework):   Symptoms: Worsening per patient    Homework: Partially completed      Episode of Care Goals: Minimal progress - PREPARATION (Decided to change - considering how); Intervened by negotiating a change plan and determining options / strategies for behavior change, identifying triggers, exploring social supports, and working towards setting a date to begin behavior change     Current / Ongoing Stressors and Concerns:   COVID-19, isolation, moving, ex, kids, grandchildren, partner     Treatment Objective(s) Addressed in This Session:     Patient will demonstrate and report a level of anxiety that can be managed at a lower level of care.  Absence of persistent anxious mood and report of reduced frequency and intensity of worry and absence of persistent anxious mood to acceptable levels, no panic attacks, report subjective comfort with rumination for a period of 90  days, within 6 months as clinically observed and by patient self-report.  Patient will demonstrate and report a level of depression that can be managed at a lower level of care.  Absence of persistent depression mood and report of reduced frequency and intensity of low mood and absence of persistent low energy and motivation to acceptable levels, report subjective improved motivation and increased energy for a period of 90 days, within 6 months as clinically observed and by patient self-report.     Intervention:     Therapist met with patient to review goals and interventions. Therapist utilized reflected listening as patient gave brief reflection of week.  Patient processed continued conflict within the home. Therapist supported patient as she processed. Therapist encouraged patient to sit down individually with her daughters and utilize effective communication to acknowledge feelings and set expectations.   Patient presented with an anxious affect. Patient was engaged in session and open to feedback. Patient reported no safety concerns.     ASSESSMENT: Current Emotional / Mental Status (status of significant symptoms):   Risk status (Self / Other harm or suicidal ideation)   Patient denies current fears or concerns for personal safety.   Patient denies current or recent suicidal ideation or behaviors.   Patient denies current or recent homicidal ideation or behaviors.   Patient denies current or recent self injurious behavior or ideation.   Patient denies other safety concerns.   Patient reports there has been no change in risk factors since their last session.     Patient reports there has been no change in protective factors since their last session.     Recommended that patient call 911 or go to the local ED should there be a change in any of these risk factors.     Appearance:   Appropriate    Eye Contact:   Good    Psychomotor Behavior: Restless    Attitude:   Cooperative  Friendly  Pleasant   Orientation:   All   Speech    Rate / Production: Emotional Talkative    Volume:  Normal    Mood:    Anxious    Affect:    Worrisome    Thought Content:  Clear    Thought Form:  Coherent    Insight:    Fair      Medication Review:   No changes to current psychiatric medication(s)     Medication Compliance:   Yes     Changes in Health Issues:   None reported     Chemical Use Review:   Substance Use: Chemical use reviewed, no active concerns identified      Tobacco Use: No current tobacco use.      Diagnosis:  1. MEG (generalized anxiety disorder)    2. MDD (major depressive disorder), recurrent episode, mild (H)         Collateral Reports Completed:   Not Applicable    PLAN: (Patient Tasks / Therapist Tasks / Other)  Communicate expectations validate emotions and set boundaries     Patito RONNIE Humphries, [facility]  2/16/2021                                                             ______________________________________________________________________    Treatment Plan    Patient's Name: Mendy Roe  YOB: 1980    Date: 12/22/2020       DSM5 Diagnoses: 296.31 (F33.0) Major Depressive Disorder, Recurrent Episode, Mild _ and With mixed features or 300.02 (F41.1) Generalized Anxiety Disorder  Psychosocial / Contextual Factors: Ex, kids, grandchildren, partner  WHODAS: 30    Referral / Collaboration:  Referral to another professional/service is not indicated at this time..    Anticipated number of session or this episode of care: 20      MeasurableTreatment Goal(s) related to diagnosis / functional impairment(s)  Goal 1: Patient will report absence of persistent anxiety mood and report of reduced frequency and intensity of worry and absence of persistent anxious mood to acceptable levels, no panic attacks, report subjective comfort with rumination or a period of 90 days. Within 6 months as clinically observed and by patient self-report    I will know I've met my goal when manage my anxiety.       Objective #A (Patient Action)    Patient will demonstrate and report a level of anxiety that can be managed at a lower level of care.  Absence of persistent anxious mood and report of reduced frequency and intensity of worry and absence of persistent anxious mood to acceptable levels, no panic attacks, report subjective comfort with rumination for a period of 90 days, within 6 months as clinically observed and by patient self-report.  Status: Continued - Date(s): 12/22/2020    Intervention(s)  Therapist will provide individual therapy to identify triggers to anxiety, gain feedback on helpful coping tools and thought-reframing techniques, and identify preferred way of being. Tx to include discuss current stressors and interpersonal conflicts and how to cope with these, coaching, diagnostic testing, referral for medication as indicated, use prescribed medication, cognitive restructuring, interpersonal, family therapy, supportive therapy services.        Goal 2: Patient will report absence of persistent depression mood and report of reduced frequency and intensity of low mood and absence of persistent low energy and motivation to acceptable levels, report subjective improved motivation and increased energy for a period of 90 days, within 6 months as clinically observed and by patient self-report    I will know I've met my goal when feeling more balanced.      Objective #A (Patient Action)    Status: Continued - Date(s): 12/22/2020    Patient will demonstrate and report a level of depression that can be managed at a lower level of care.  Absence of persistent depression mood and report of reduced frequency and intensity of low mood and absence of persistent low energy and motivation to acceptable levels, report subjective improved motivation and increased energy for a period of 90 days, within 6 months as clinically observed and by patient self-report.    Intervention(s)  Therapist will provide individual therapy to  identify triggers to depression, gain feedback on helpful coping tools and thought-reframing techniques, and identify preferred way of being.  Tx to include discussion of current stressors and interpersonal conflicts and how to cope with these, coaching, diagnostic testing, referral for medication as indicated, use prescribed medication, cognitive restructuring, interpersonal, family therapy, supportive therapy services.        Patient has reviewed and agreed to the above plan.      Patito Humphries, [facility]  12/22/2020

## 2021-03-02 ENCOUNTER — RECORDS - HEALTHEAST (OUTPATIENT)
Dept: ADMINISTRATIVE | Facility: OTHER | Age: 41
End: 2021-03-02

## 2021-03-23 ENCOUNTER — OFFICE VISIT - HEALTHEAST (OUTPATIENT)
Dept: FAMILY MEDICINE | Facility: CLINIC | Age: 41
End: 2021-03-23

## 2021-03-23 ENCOUNTER — RECORDS - HEALTHEAST (OUTPATIENT)
Dept: GENERAL RADIOLOGY | Facility: CLINIC | Age: 41
End: 2021-03-23

## 2021-03-23 DIAGNOSIS — M25.511 PAIN IN RIGHT SHOULDER: ICD-10-CM

## 2021-03-23 DIAGNOSIS — M25.511 ACUTE PAIN OF RIGHT SHOULDER: ICD-10-CM

## 2021-03-30 ENCOUNTER — VIRTUAL VISIT (OUTPATIENT)
Dept: PSYCHOLOGY | Facility: CLINIC | Age: 41
End: 2021-03-30
Payer: COMMERCIAL

## 2021-03-30 DIAGNOSIS — F41.1 GAD (GENERALIZED ANXIETY DISORDER): Primary | ICD-10-CM

## 2021-03-30 DIAGNOSIS — F33.0 MDD (MAJOR DEPRESSIVE DISORDER), RECURRENT EPISODE, MILD (H): ICD-10-CM

## 2021-03-30 PROCEDURE — 90834 PSYTX W PT 45 MINUTES: CPT | Mod: GT | Performed by: SOCIAL WORKER

## 2021-03-30 ASSESSMENT — ANXIETY QUESTIONNAIRES
1. FEELING NERVOUS, ANXIOUS, OR ON EDGE: MORE THAN HALF THE DAYS
2. NOT BEING ABLE TO STOP OR CONTROL WORRYING: MORE THAN HALF THE DAYS
7. FEELING AFRAID AS IF SOMETHING AWFUL MIGHT HAPPEN: SEVERAL DAYS
GAD7 TOTAL SCORE: 16
6. BECOMING EASILY ANNOYED OR IRRITABLE: NEARLY EVERY DAY
3. WORRYING TOO MUCH ABOUT DIFFERENT THINGS: MORE THAN HALF THE DAYS
5. BEING SO RESTLESS THAT IT IS HARD TO SIT STILL: NEARLY EVERY DAY
4. TROUBLE RELAXING: NEARLY EVERY DAY

## 2021-03-30 ASSESSMENT — PATIENT HEALTH QUESTIONNAIRE - PHQ9: SUM OF ALL RESPONSES TO PHQ QUESTIONS 1-9: 21

## 2021-03-30 NOTE — PROGRESS NOTES
Progress Note    Patient Name: Mendy Roe  Date: 3/30/2021         Service Type: Individual  Video Visit:Yes, the patient's condition can be safely assessed and treated via synchronous audio and visual telemedicine encounter.      Reason for Video Visit: Services only offered telehealth    Location of Originating Site: Patient's home    Distant Site: Provider Remote Setting home office      Consent:  The patient/guardian has verbally consented to: the potential risks and benefits of telemedicine (video visit) versus in person care; bill my insurance or make self-payment for services provided; and responsibility for payment of non-covered services.      Mode of transmission-[employer]      Session Start Time: 1333       Session End Time: 1420     Session Length: 47 minutes    Session #: 19    Attendees: Client     Treatment Plan Last Reviewed 3/30/21 due 6/30/21    PHQ-9 / MEG-7 : 21/16    DATA  Interactive Complexity: No  Crisis: No       Progress Since Last Session (Related to Symptoms / Goals / Homework):   Symptoms: Worsening phq-9 meg-7    Homework: Partially completed      Episode of Care Goals: Minimal progress - PREPARATION (Decided to change - considering how); Intervened by negotiating a change plan and determining options / strategies for behavior change, identifying triggers, exploring social supports, and working towards setting a date to begin behavior change     Current / Ongoing Stressors and Concerns:   COVID-19, isolation, moving, ex, kids, grandchildren, partner     Treatment Objective(s) Addressed in This Session:     Patient will demonstrate and report a level of anxiety that can be managed at a lower level of care.  Absence of persistent anxious mood and report of reduced frequency and intensity of worry and absence of persistent anxious mood to acceptable levels, no panic attacks, report subjective comfort with rumination for a period of 90 days,  within 6 months as clinically observed and by patient self-report.  Patient will demonstrate and report a level of depression that can be managed at a lower level of care.  Absence of persistent depression mood and report of reduced frequency and intensity of low mood and absence of persistent low energy and motivation to acceptable levels, report subjective improved motivation and increased energy for a period of 90 days, within 6 months as clinically observed and by patient self-report.     Intervention:  [id number] 24 minutes video  [id number] 23 phone   Therapist met with patient to review goals and interventions. Therapist utilized reflected listening as patient gave brief reflection of week.  Patient processed work, relationship and conflict. Therapist supported patient as she processed and shared concerns with patient's increase in depression and anxiety. Therapist reviewed changes in patients self-care and encouraged patient to invest in self.  Patient presented with an anxious affect. Patient was engaged in session and open to feedback. Patient reported no safety concerns.     ASSESSMENT: Current Emotional / Mental Status (status of significant symptoms):   Risk status (Self / Other harm or suicidal ideation)   Patient denies current fears or concerns for personal safety.   Patient denies current or recent suicidal ideation or behaviors.   Patient denies current or recent homicidal ideation or behaviors.   Patient denies current or recent self injurious behavior or ideation.   Patient denies other safety concerns.   Patient reports there has been no change in risk factors since their last session.     Patient reports there has been no change in protective factors since their last session.     Recommended that patient call 911 or go to the local ED should there be a change in any of these risk factors.     Appearance:   Appropriate    Eye Contact:   Good    Psychomotor Behavior: Restless     Attitude:   Cooperative  Friendly Pleasant   Orientation:   All   Speech    Rate / Production: Emotional Talkative    Volume:  Normal    Mood:    Anxious    Affect:    Worrisome    Thought Content:  Clear    Thought Form:  Coherent    Insight:    Fair      Medication Review:   No changes to current psychiatric medication(s)     Medication Compliance:   Yes     Changes in Health Issues:   None reported     Chemical Use Review:   Substance Use: Chemical use reviewed, no active concerns identified      Tobacco Use: No current tobacco use.      Diagnosis:  1. MEG (generalized anxiety disorder)    2. MDD (major depressive disorder), recurrent episode, mild (H)         Collateral Reports Completed:   Not Applicable    PLAN: (Patient Tasks / Therapist Tasks / Other)   patient to invest in self.     Patito TRUJILLO Whitneywan, [facility] 3/30/2021                                                             ______________________________________________________________________    Treatment Plan    Patient's Name: Mendy Roe  YOB: 1980    Date: 3/30/2021      DSM5 Diagnoses: 296.31 (F33.0) Major Depressive Disorder, Recurrent Episode, Mild _ and With mixed features or 300.02 (F41.1) Generalized Anxiety Disorder  Psychosocial / Contextual Factors: Ex, kids, grandchildren, partner  WHODAS: 45    Referral / Collaboration:  Referral to another professional/service is not indicated at this time..    Anticipated number of session or this episode of care: 20      MeasurableTreatment Goal(s) related to diagnosis / functional impairment(s)  Goal 1: Patient will report absence of persistent anxiety mood and report of reduced frequency and intensity of worry and absence of persistent anxious mood to acceptable levels, no panic attacks, report subjective comfort with rumination or a period of 90 days. Within 6 months as clinically observed and by patient self-report    I will know I've met my goal when manage my anxiety.       Objective #A (Patient Action)    Patient will demonstrate and report a level of anxiety that can be managed at a lower level of care.  Absence of persistent anxious mood and report of reduced frequency and intensity of worry and absence of persistent anxious mood to acceptable levels, no panic attacks, report subjective comfort with rumination for a period of 90 days, within 6 months as clinically observed and by patient self-report.  Status: Continued - Date(s): 3/30/2021    Intervention(s)  Therapist will provide individual therapy to identify triggers to anxiety, gain feedback on helpful coping tools and thought-reframing techniques, and identify preferred way of being. Tx to include discuss current stressors and interpersonal conflicts and how to cope with these, coaching, diagnostic testing, referral for medication as indicated, use prescribed medication, cognitive restructuring, interpersonal, family therapy, supportive therapy services.        Goal 2: Patient will report absence of persistent depression mood and report of reduced frequency and intensity of low mood and absence of persistent low energy and motivation to acceptable levels, report subjective improved motivation and increased energy for a period of 90 days, within 6 months as clinically observed and by patient self-report    I will know I've met my goal when feeling more balanced.      Objective #A (Patient Action)    Status: Continued - Date(s): 3/30/2021    Patient will demonstrate and report a level of depression that can be managed at a lower level of care.  Absence of persistent depression mood and report of reduced frequency and intensity of low mood and absence of persistent low energy and motivation to acceptable levels, report subjective improved motivation and increased energy for a period of 90 days, within 6 months as clinically observed and by patient self-report.    Intervention(s)  Therapist will provide individual therapy to  identify triggers to depression, gain feedback on helpful coping tools and thought-reframing techniques, and identify preferred way of being.  Tx to include discussion of current stressors and interpersonal conflicts and how to cope with these, coaching, diagnostic testing, referral for medication as indicated, use prescribed medication, cognitive restructuring, interpersonal, family therapy, supportive therapy services.        Patient has reviewed and agreed to the above plan.      Patito Humphries, [facility]  3/30/2021

## 2021-03-31 ASSESSMENT — ANXIETY QUESTIONNAIRES: GAD7 TOTAL SCORE: 16

## 2021-04-05 ENCOUNTER — COMMUNICATION - HEALTHEAST (OUTPATIENT)
Dept: ADMINISTRATIVE | Facility: CLINIC | Age: 41
End: 2021-04-05

## 2021-04-19 ENCOUNTER — VIRTUAL VISIT (OUTPATIENT)
Dept: PSYCHOLOGY | Facility: CLINIC | Age: 41
End: 2021-04-19
Payer: COMMERCIAL

## 2021-04-19 DIAGNOSIS — F41.1 GAD (GENERALIZED ANXIETY DISORDER): Primary | ICD-10-CM

## 2021-04-19 DIAGNOSIS — F33.0 MDD (MAJOR DEPRESSIVE DISORDER), RECURRENT EPISODE, MILD (H): ICD-10-CM

## 2021-04-19 PROCEDURE — 90834 PSYTX W PT 45 MINUTES: CPT | Mod: 95 | Performed by: SOCIAL WORKER

## 2021-04-19 ASSESSMENT — ANXIETY QUESTIONNAIRES
GAD7 TOTAL SCORE: 17
2. NOT BEING ABLE TO STOP OR CONTROL WORRYING: MORE THAN HALF THE DAYS
3. WORRYING TOO MUCH ABOUT DIFFERENT THINGS: MORE THAN HALF THE DAYS
5. BEING SO RESTLESS THAT IT IS HARD TO SIT STILL: NEARLY EVERY DAY
1. FEELING NERVOUS, ANXIOUS, OR ON EDGE: MORE THAN HALF THE DAYS
7. FEELING AFRAID AS IF SOMETHING AWFUL MIGHT HAPPEN: MORE THAN HALF THE DAYS
6. BECOMING EASILY ANNOYED OR IRRITABLE: NEARLY EVERY DAY
4. TROUBLE RELAXING: NEARLY EVERY DAY

## 2021-04-19 ASSESSMENT — PATIENT HEALTH QUESTIONNAIRE - PHQ9: SUM OF ALL RESPONSES TO PHQ QUESTIONS 1-9: 16

## 2021-04-19 NOTE — PROGRESS NOTES
Progress Note    Patient Name: Mendy Roe  Date: 4/19/2021         Service Type: Individual  Video Visit:Yes, the patient's condition can be safely assessed and treated via synchronous audio and visual telemedicine encounter.      Reason for Video Visit: Services only offered telehealth    Location of Originating Site: Patient's home    Distant Site: Provider Remote Setting home office      Consent:  The patient/guardian has verbally consented to: the potential risks and benefits of telemedicine (video visit) versus in person care; bill my insurance or make self-payment for services provided; and responsibility for payment of non-covered services.      Mode of transmission-[employer]      Session Start Time: 1732       Session End Time: 1821     Session Length: 38-52 minutes    Session #: 20    Attendees: Client     Treatment Plan Last Reviewed 3/30/21 due 6/30/21    PHQ-9 / MEG-7 : 16/17    DATA  Interactive Complexity: No  Crisis: No       Progress Since Last Session (Related to Symptoms / Goals / Homework):   Symptoms: Improving phq-9 worsening meg-7    Homework: Partially completed      Episode of Care Goals: Minimal progress - PREPARATION (Decided to change - considering how); Intervened by negotiating a change plan and determining options / strategies for behavior change, identifying triggers, exploring social supports, and working towards setting a date to begin behavior change     Current / Ongoing Stressors and Concerns:   COVID-19, isolation, moving, ex, kids, grandchildren, partner     Treatment Objective(s) Addressed in This Session:     Patient will demonstrate and report a level of anxiety that can be managed at a lower level of care.  Absence of persistent anxious mood and report of reduced frequency and intensity of worry and absence of persistent anxious mood to acceptable levels, no panic attacks, report subjective comfort with rumination for a period  of 90 days, within 6 months as clinically observed and by patient self-report.  Patient will demonstrate and report a level of depression that can be managed at a lower level of care.  Absence of persistent depression mood and report of reduced frequency and intensity of low mood and absence of persistent low energy and motivation to acceptable levels, report subjective improved motivation and increased energy for a period of 90 days, within 6 months as clinically observed and by patient self-report.     Intervention:     Therapist met with patient to review goals and interventions. Therapist utilized reflected listening as patient gave brief reflection of week.  Patient processed continued frustrations with daughters and partner. Therapist supported patient as she processed and validated patient. Therapist suggested family session with therapist or daughters therapist to open up communications of emotions and understanding expectations.  Patient presented with an anxious affect. Patient was engaged in session and open to feedback. Patient reported no safety concerns.     ASSESSMENT: Current Emotional / Mental Status (status of significant symptoms):   Risk status (Self / Other harm or suicidal ideation)   Patient denies current fears or concerns for personal safety.   Patient denies current or recent suicidal ideation or behaviors.   Patient denies current or recent homicidal ideation or behaviors.   Patient denies current or recent self injurious behavior or ideation.   Patient denies other safety concerns.   Patient reports there has been no change in risk factors since their last session.     Patient reports there has been no change in protective factors since their last session.     Recommended that patient call 911 or go to the local ED should there be a change in any of these risk factors.     Appearance:   Appropriate    Eye Contact:   Good    Psychomotor Behavior: Restless    Attitude:   Cooperative   Friendly Pleasant   Orientation:   All   Speech    Rate / Production: Emotional Talkative    Volume:  Normal    Mood:    Anxious    Affect:    Worrisome    Thought Content:  Clear    Thought Form:  Coherent    Insight:    Fair      Medication Review:   No changes to current psychiatric medication(s)     Medication Compliance:   Yes     Changes in Health Issues:   None reported     Chemical Use Review:   Substance Use: Chemical use reviewed, no active concerns identified      Tobacco Use: No current tobacco use.      Diagnosis:  1. MEG (generalized anxiety disorder)    2. MDD (major depressive disorder), recurrent episode, mild (H)         Collateral Reports Completed:   Not Applicable    PLAN: (Patient Tasks / Therapist Tasks / Other)  Speak to daughters about household expectations.     Patito RUIZAndres Olsnowan, [facility] 4/19/2021                                                             ______________________________________________________________________    Treatment Plan    Patient's Name: Mendy Roe  YOB: 1980    Date: 3/30/2021      DSM5 Diagnoses: 296.31 (F33.0) Major Depressive Disorder, Recurrent Episode, Mild _ and With mixed features or 300.02 (F41.1) Generalized Anxiety Disorder  Psychosocial / Contextual Factors: Ex, kids, grandchildren, partner  WHODAS: 45    Referral / Collaboration:  Referral to another professional/service is not indicated at this time..    Anticipated number of session or this episode of care: 20      MeasurableTreatment Goal(s) related to diagnosis / functional impairment(s)  Goal 1: Patient will report absence of persistent anxiety mood and report of reduced frequency and intensity of worry and absence of persistent anxious mood to acceptable levels, no panic attacks, report subjective comfort with rumination or a period of 90 days. Within 6 months as clinically observed and by patient self-report    I will know I've met my goal when manage my anxiety.      Objective  #A (Patient Action)    Patient will demonstrate and report a level of anxiety that can be managed at a lower level of care.  Absence of persistent anxious mood and report of reduced frequency and intensity of worry and absence of persistent anxious mood to acceptable levels, no panic attacks, report subjective comfort with rumination for a period of 90 days, within 6 months as clinically observed and by patient self-report.  Status: Continued - Date(s): 3/30/2021    Intervention(s)  Therapist will provide individual therapy to identify triggers to anxiety, gain feedback on helpful coping tools and thought-reframing techniques, and identify preferred way of being. Tx to include discuss current stressors and interpersonal conflicts and how to cope with these, coaching, diagnostic testing, referral for medication as indicated, use prescribed medication, cognitive restructuring, interpersonal, family therapy, supportive therapy services.        Goal 2: Patient will report absence of persistent depression mood and report of reduced frequency and intensity of low mood and absence of persistent low energy and motivation to acceptable levels, report subjective improved motivation and increased energy for a period of 90 days, within 6 months as clinically observed and by patient self-report    I will know I've met my goal when feeling more balanced.      Objective #A (Patient Action)    Status: Continued - Date(s): 3/30/2021    Patient will demonstrate and report a level of depression that can be managed at a lower level of care.  Absence of persistent depression mood and report of reduced frequency and intensity of low mood and absence of persistent low energy and motivation to acceptable levels, report subjective improved motivation and increased energy for a period of 90 days, within 6 months as clinically observed and by patient self-report.    Intervention(s)  Therapist will provide individual therapy to identify  triggers to depression, gain feedback on helpful coping tools and thought-reframing techniques, and identify preferred way of being.  Tx to include discussion of current stressors and interpersonal conflicts and how to cope with these, coaching, diagnostic testing, referral for medication as indicated, use prescribed medication, cognitive restructuring, interpersonal, family therapy, supportive therapy services.        Patient has reviewed and agreed to the above plan.      Patito Humphries, [facility]  3/30/2021

## 2021-04-20 ASSESSMENT — ANXIETY QUESTIONNAIRES: GAD7 TOTAL SCORE: 17

## 2021-05-03 ENCOUNTER — OFFICE VISIT - HEALTHEAST (OUTPATIENT)
Dept: PHYSICAL THERAPY | Facility: REHABILITATION | Age: 41
End: 2021-05-03

## 2021-05-03 ENCOUNTER — RECORDS - HEALTHEAST (OUTPATIENT)
Dept: ADMINISTRATIVE | Facility: OTHER | Age: 41
End: 2021-05-03

## 2021-05-03 DIAGNOSIS — M25.511 ACUTE PAIN OF RIGHT SHOULDER: ICD-10-CM

## 2021-05-03 DIAGNOSIS — M25.611 DECREASED RIGHT SHOULDER RANGE OF MOTION: ICD-10-CM

## 2021-05-24 ENCOUNTER — VIRTUAL VISIT (OUTPATIENT)
Dept: PSYCHOLOGY | Facility: CLINIC | Age: 41
End: 2021-05-24
Payer: COMMERCIAL

## 2021-05-24 ENCOUNTER — OFFICE VISIT - HEALTHEAST (OUTPATIENT)
Dept: PHYSICAL THERAPY | Facility: REHABILITATION | Age: 41
End: 2021-05-24

## 2021-05-24 ENCOUNTER — RECORDS - HEALTHEAST (OUTPATIENT)
Dept: ADMINISTRATIVE | Facility: CLINIC | Age: 41
End: 2021-05-24

## 2021-05-24 DIAGNOSIS — M25.611 DECREASED RIGHT SHOULDER RANGE OF MOTION: ICD-10-CM

## 2021-05-24 DIAGNOSIS — F33.0 MDD (MAJOR DEPRESSIVE DISORDER), RECURRENT EPISODE, MILD (H): ICD-10-CM

## 2021-05-24 DIAGNOSIS — F41.1 GAD (GENERALIZED ANXIETY DISORDER): Primary | ICD-10-CM

## 2021-05-24 DIAGNOSIS — M25.511 ACUTE PAIN OF RIGHT SHOULDER: ICD-10-CM

## 2021-05-24 PROCEDURE — 90834 PSYTX W PT 45 MINUTES: CPT | Mod: 95 | Performed by: SOCIAL WORKER

## 2021-05-24 NOTE — PROGRESS NOTES
Progress Note    Patient Name: Mendy Roe  Date: 5/24/2021         Service Type: Individual  Video Visit:Yes, the patient's condition can be safely assessed and treated via synchronous audio and visual telemedicine encounter.      Reason for Video Visit: Services only offered telehealth    Location of Originating Site: Patient's home    Distant Site: Provider Remote Setting home office      Consent:  The patient/guardian has verbally consented to: the potential risks and benefits of telemedicine (video visit) versus in person care; bill my insurance or make self-payment for services provided; and responsibility for payment of non-covered services.      Mode of transmission-[employer]      Session Start Time: 1732       Session End Time: 1822     Session Length: 38-52 minutes    Session #: 21    Attendees: Client     Treatment Plan Last Reviewed 3/30/21 due 6/30/21    PHQ-9 / MEG-7 : 16/17 last session    DATA  Interactive Complexity: No  Crisis: No       Progress Since Last Session (Related to Symptoms / Goals / Homework):   Symptoms: Improving phq-9 worsening meg-7    Homework: Partially completed      Episode of Care Goals: Minimal progress - PREPARATION (Decided to change - considering how); Intervened by negotiating a change plan and determining options / strategies for behavior change, identifying triggers, exploring social supports, and working towards setting a date to begin behavior change     Current / Ongoing Stressors and Concerns:   COVID-19, isolation, moving, ex, kids, grandchildren, partner     Treatment Objective(s) Addressed in This Session:     Patient will demonstrate and report a level of anxiety that can be managed at a lower level of care.  Absence of persistent anxious mood and report of reduced frequency and intensity of worry and absence of persistent anxious mood to acceptable levels, no panic attacks, report subjective comfort with rumination  for a period of 90 days, within 6 months as clinically observed and by patient self-report.  Patient will demonstrate and report a level of depression that can be managed at a lower level of care.  Absence of persistent depression mood and report of reduced frequency and intensity of low mood and absence of persistent low energy and motivation to acceptable levels, report subjective improved motivation and increased energy for a period of 90 days, within 6 months as clinically observed and by patient self-report.     Intervention:     Therapist met with patient to review goals and interventions. Therapist utilized reflected listening as patient gave brief reflection of week.  Patient processed her partner moving out due to conflict with her children. Therapist supported, validated and shared concerns with patient. Therapist offered patient hope and encouraged patient to be open and honest with her duaghters and her partner. Patient presented with an anxious affect. Patient was engaged in session and open to feedback. Patient reported no safety concerns.     ASSESSMENT: Current Emotional / Mental Status (status of significant symptoms):   Risk status (Self / Other harm or suicidal ideation)   Patient denies current fears or concerns for personal safety.   Patient denies current or recent suicidal ideation or behaviors.   Patient denies current or recent homicidal ideation or behaviors.   Patient denies current or recent self injurious behavior or ideation.   Patient denies other safety concerns.   Patient reports there has been no change in risk factors since their last session.     Patient reports there has been no change in protective factors since their last session.     Recommended that patient call 911 or go to the local ED should there be a change in any of these risk factors.     Appearance:   Appropriate    Eye Contact:   Good    Psychomotor Behavior: Restless    Attitude:   Cooperative  Friendly  Pleasant   Orientation:   All   Speech    Rate / Production: Emotional Talkative    Volume:  Normal    Mood:    Anxious  Depressed  Sad    Affect:    Tearful Worrisome    Thought Content:  Clear    Thought Form:  Coherent    Insight:    Fair      Medication Review:   No changes to current psychiatric medication(s)     Medication Compliance:   Yes     Changes in Health Issues:   None reported     Chemical Use Review:   Substance Use: Chemical use reviewed, no active concerns identified      Tobacco Use: No current tobacco use.      Diagnosis:  1. MEG (generalized anxiety disorder)    2. MDD (major depressive disorder), recurrent episode, mild (H)         Collateral Reports Completed:   Not Applicable    PLAN: (Patient Tasks / Therapist Tasks / Other)  Therapist offered patient hope and encouraged patient to be open and honest with her duaghters and her partner.    Patito Humphries, [facility] 5/24/2021                                                             ______________________________________________________________________    Treatment Plan    Patient's Name: Mendy Roe  YOB: 1980    Date: 3/30/2021      DSM5 Diagnoses: 296.31 (F33.0) Major Depressive Disorder, Recurrent Episode, Mild _ and With mixed features or 300.02 (F41.1) Generalized Anxiety Disorder  Psychosocial / Contextual Factors: Ex, kids, grandchildren, partner  WHODAS: 45    Referral / Collaboration:  Referral to another professional/service is not indicated at this time..    Anticipated number of session or this episode of care: 20      MeasurableTreatment Goal(s) related to diagnosis / functional impairment(s)  Goal 1: Patient will report absence of persistent anxiety mood and report of reduced frequency and intensity of worry and absence of persistent anxious mood to acceptable levels, no panic attacks, report subjective comfort with rumination or a period of 90 days. Within 6 months as clinically observed and by patient  self-report    I will know I've met my goal when manage my anxiety.      Objective #A (Patient Action)    Patient will demonstrate and report a level of anxiety that can be managed at a lower level of care.  Absence of persistent anxious mood and report of reduced frequency and intensity of worry and absence of persistent anxious mood to acceptable levels, no panic attacks, report subjective comfort with rumination for a period of 90 days, within 6 months as clinically observed and by patient self-report.  Status: Continued - Date(s): 3/30/2021    Intervention(s)  Therapist will provide individual therapy to identify triggers to anxiety, gain feedback on helpful coping tools and thought-reframing techniques, and identify preferred way of being. Tx to include discuss current stressors and interpersonal conflicts and how to cope with these, coaching, diagnostic testing, referral for medication as indicated, use prescribed medication, cognitive restructuring, interpersonal, family therapy, supportive therapy services.        Goal 2: Patient will report absence of persistent depression mood and report of reduced frequency and intensity of low mood and absence of persistent low energy and motivation to acceptable levels, report subjective improved motivation and increased energy for a period of 90 days, within 6 months as clinically observed and by patient self-report    I will know I've met my goal when feeling more balanced.      Objective #A (Patient Action)    Status: Continued - Date(s): 3/30/2021    Patient will demonstrate and report a level of depression that can be managed at a lower level of care.  Absence of persistent depression mood and report of reduced frequency and intensity of low mood and absence of persistent low energy and motivation to acceptable levels, report subjective improved motivation and increased energy for a period of 90 days, within 6 months as clinically observed and by patient  self-report.    Intervention(s)  Therapist will provide individual therapy to identify triggers to depression, gain feedback on helpful coping tools and thought-reframing techniques, and identify preferred way of being.  Tx to include discussion of current stressors and interpersonal conflicts and how to cope with these, coaching, diagnostic testing, referral for medication as indicated, use prescribed medication, cognitive restructuring, interpersonal, family therapy, supportive therapy services.        Patient has reviewed and agreed to the above plan.      Patito Humphries, [facility]  3/30/2021

## 2021-05-25 ENCOUNTER — RECORDS - HEALTHEAST (OUTPATIENT)
Dept: ADMINISTRATIVE | Facility: CLINIC | Age: 41
End: 2021-05-25

## 2021-05-26 ASSESSMENT — PATIENT HEALTH QUESTIONNAIRE - PHQ9: SUM OF ALL RESPONSES TO PHQ QUESTIONS 1-9: 13

## 2021-05-28 ASSESSMENT — ANXIETY QUESTIONNAIRES: GAD7 TOTAL SCORE: 9

## 2021-05-28 NOTE — TELEPHONE ENCOUNTER
----- Message from Jamir Galan MD sent at 5/3/2019  3:31 PM CDT -----  UA not suggestive of UTI, however, based on the presenting symptoms will go ahead and treat empirically,and wait for the culture for further direction.   antibiotic order has been sent to pharmacy .

## 2021-05-28 NOTE — PROGRESS NOTES
UA not suggestive of UTI, however, based on the presenting symptoms will go ahead and treat empirically,and wait for the culture for further direction.   See epic for antibiotic order.

## 2021-05-28 NOTE — PROGRESS NOTES
Assessment/Plan:        1. Urinary frequency  - Urinalysis-UC if Indicated  UA not suggestive of UTI, however, based on the presenting symptoms will go ahead and treat empirically,and wait for the culture for further direction.   antibiotic order has been sent to pharmacy     2. Restless Legs Syndrome  Refill   - rOPINIRole (REQUIP) 0.25 MG tablet; Take 1 tablet (0.25 mg total) by mouth at bedtime as needed.  Dispense: 30 tablet; Refill: 5            Subjective:    Patient ID:   Mendy Roe is a 38 y.o. female Presenting with urinary frequency, and dysuria for the past week with associated lower abdominal discomfort, and nausea in the past few days.  She has felt chilled at times but denies any fevers or having back pain and she wants to rule out urinary tract infection.     She also needs a refill on her Requip for treatment of    Review of Systems  Allergy: reviewed  General : negative  A complete 5 point review of systems was obtained and is negative other than what is stated in the HPI.      The following patient's history were reviewed and updated as appropriate:   She  has a past medical history of Anxiety, Depression, Shortness of breath, and Sleep apnea..      Outpatient Encounter Medications as of 5/3/2019   Medication Sig Dispense Refill     cetirizine (ZYRTEC) 10 MG tablet Take 10 mg by mouth daily as needed for allergies.       ibuprofen (ADVIL,MOTRIN) 200 MG tablet Take 800 mg by mouth every 6 (six) hours as needed for pain.       LORazepam (ATIVAN) 1 MG tablet Take 1 mg by mouth every 6 (six) hours as needed for anxiety.       ondansetron (ZOFRAN) 4 MG tablet Take 1 tablet (4 mg total) by mouth daily as needed for nausea. 30 tablet 1     ranitidine (ZANTAC) 150 MG tablet Take 150 mg by mouth every 12 (twelve) hours.       rOPINIRole (REQUIP) 0.25 MG tablet Take 0.25-0.5 mg by mouth at bedtime as needed.       [DISCONTINUED] clindamycin (CLEOCIN T) 1 % lotion Apply 1 application topically as  needed.   2     [DISCONTINUED] ferrous sulfate 325 (65 FE) MG tablet Take 1 tablet (325 mg total) by mouth 3 (three) times a day with meals. 90 tablet 1     [DISCONTINUED] HYDROcodone-acetaminophen (NORCO) 7.5-325 mg per tablet Take 1 tablet by mouth every 6 (six) hours as needed for pain.       [DISCONTINUED] hydrOXYzine HCl (ATARAX) 10 MG tablet Take 1 tablet (10 mg total) by mouth 3 (three) times a day as needed for anxiety. 90 tablet 0     [DISCONTINUED] methylPREDNISolone (MEDROL DOSEPACK) 4 mg tablet Follow package directions 21 tablet 0     [DISCONTINUED] metroNIDAZOLE (FLAGYL) 500 MG tablet Take 1 tablet (500 mg total) by mouth 3 (three) times a day. 21 tablet 0     [DISCONTINUED] mometasone (ELOCON) 0.1 % cream Apply 1 application topically as needed.   2     [DISCONTINUED] ondansetron (ZOFRAN ODT) 4 MG disintegrating tablet Take 1 tablet (4 mg total) by mouth every 8 (eight) hours as needed for nausea. 15 tablet 0     No facility-administered encounter medications on file as of 5/3/2019.          Objective:   /58 (Patient Site: Right Arm, Patient Position: Sitting, Cuff Size: Adult Regular)   Pulse 78   Wt 136 lb 9.6 oz (62 kg)   SpO2 97%   BMI 24.98 kg/m        Physical Exam  General Appearance:    Alert,  no acute distress, well hydrated    Abdomen:      Soft, Non tender non distended  , normal bowel sounds, no rebound or guarding,      Skin:   Skin color, texture, turgor normal, no rashes or lesions

## 2021-05-28 NOTE — TELEPHONE ENCOUNTER
Patient Returning Call  Reason for call:  Patient called back  Information relayed to patient:  Yes  Patient has additional questions:  No  If YES, what are your questions/concerns:  N/A  Okay to leave a detailed message?: Task complete

## 2021-05-30 VITALS — HEIGHT: 61 IN | BODY MASS INDEX: 26 KG/M2 | WEIGHT: 137.7 LBS

## 2021-05-31 VITALS — BODY MASS INDEX: 25.51 KG/M2 | WEIGHT: 135 LBS

## 2021-05-31 VITALS — HEIGHT: 61 IN | WEIGHT: 131.2 LBS | BODY MASS INDEX: 24.77 KG/M2

## 2021-05-31 VITALS — BODY MASS INDEX: 25.62 KG/M2 | WEIGHT: 135.6 LBS

## 2021-06-01 ENCOUNTER — COMMUNICATION - HEALTHEAST (OUTPATIENT)
Dept: PHYSICAL THERAPY | Facility: REHABILITATION | Age: 41
End: 2021-06-01

## 2021-06-01 VITALS
WEIGHT: 136 LBS | WEIGHT: 136 LBS | BODY MASS INDEX: 25.03 KG/M2 | BODY MASS INDEX: 25.03 KG/M2 | HEIGHT: 62 IN | HEIGHT: 62 IN

## 2021-06-01 VITALS — HEIGHT: 61 IN | BODY MASS INDEX: 25.49 KG/M2 | WEIGHT: 135 LBS

## 2021-06-01 VITALS — BODY MASS INDEX: 24.18 KG/M2 | WEIGHT: 132.2 LBS

## 2021-06-01 VITALS — BODY MASS INDEX: 25.89 KG/M2 | HEIGHT: 61 IN

## 2021-06-01 VITALS — BODY MASS INDEX: 25.86 KG/M2 | HEIGHT: 61 IN | WEIGHT: 137 LBS

## 2021-06-01 VITALS — BODY MASS INDEX: 23.92 KG/M2 | WEIGHT: 130 LBS | HEIGHT: 62 IN

## 2021-06-01 VITALS — HEIGHT: 61 IN | BODY MASS INDEX: 26.24 KG/M2 | WEIGHT: 139 LBS

## 2021-06-01 VITALS — WEIGHT: 134.6 LBS | BODY MASS INDEX: 24.77 KG/M2 | HEIGHT: 62 IN

## 2021-06-01 VITALS — WEIGHT: 134.2 LBS | BODY MASS INDEX: 24.55 KG/M2

## 2021-06-01 NOTE — PROGRESS NOTES
"Internal Medicine Office Visit  [facility]  Patient Name: Mendy Roe  Patient Age: 39 y.o.  YOB: 1980  MRN: [id number]    Date of Visit: 2019  Reason for Office Visit:   Chief Complaint   Patient presents with     Sore Throat     Patient has been getting tonsil stones for a month. Always has a feeling of \"food\" being stuck. Rash on roof of mouth. No fevers. Pain while swallowing somtimes.            Assessment / Plan / Medical Decision Makin. Tonsil stone  - Ambulatory referral to ENT    For patient's petechial rash on her from all thought to be secondary to her denture with recommendation to follow-up with her dentist.    Patient advised if symptoms persist, worsen or new symptoms arise they are to seek medical care.  All patients questions addressed. Patient verbalized understanding and agreement with plan.       Orders Placed This Encounter   Procedures     Ambulatory referral to ENT   Followup: Return in about 4 weeks (around 10/24/2019). earlier if needed.    Health Maintenance Review  Health Maintenance   Topic Date Due     PREVENTIVE CARE VISIT  1980     HPV TEST  1978     TD 18+ HE  2018     INFLUENZA VACCINE RULE BASED (1) 2019     PAP SMEAR  2021     ADVANCE CARE PLANNING  2024     HIV SCREENING  Completed     TDAP ADULT ONE TIME DOSE  Completed         I am having La Roe maintain her ranitidine, ibuprofen, cetirizine, LORazepam, ondansetron, and rOPINIRole.      HPI:  Mendy Roe is a 39 y.o. year old who presents to the office today Presents to clinic today with concern for recurrent tonsillar stones.  She reports ongoing for the last month she does state she feels like she gets food stuck in her tonsils.  She has previously been seen by ENT for sinus though not for her tonsils she was seen in 2019 due to sore throat Quick strep was negative.    Patient reports a petechial rash " on the roof of her mouth she does have a full upper denture that she wears daily she notes it does change and feels it has worsened.        Review of Systems- pertinent positive in bold:  Constitutional: Fever, chills, night sweats, fainting, weight change, fatigue, dizziness, sleeping difficulties, loud snoring/pauses in breathing  Eyes: change in vision, blurred or double vision, redness/eye pain  Ears, nose, mouth, throat: change in hearing, ear pain, hoarseness, difficulty swallowing  Respiratory: shortness of breath, cough, bloody sputum, wheezing  Cardiovascular: chest pain, palpitations   Gastrointestinal: abdominal pain, heartburn/indigestion, nausea/vomiting, change in appetite, change in bowel habits, constipation or diarrhea, rectal bleeding/dark stools, difficulty swallowing  Urinary: painful urination, frequent urination, urinary urgency/incontinence, blood in urine/dark urine, nocturia (new or increasing), muscle cramps, swelling of hands, feet, ankles, leg pain/redness  Skin: change in moles/freckles, rash, nodules  Hematologic/lymphatic: swollen lymph glands, abnormal bruising/bleeding  Endocrine: excessive thirst/urination, cold or heat intolerance  Neurologic/emotional: worrisome memory change, numbness/tingling, anxiety, mood swings      Current Scheduled Meds:  Outpatient Encounter Medications as of 9/26/2019   Medication Sig Dispense Refill     cetirizine (ZYRTEC) 10 MG tablet Take 10 mg by mouth daily as needed for allergies.       ibuprofen (ADVIL,MOTRIN) 200 MG tablet Take 800 mg by mouth every 6 (six) hours as needed for pain.       LORazepam (ATIVAN) 1 MG tablet Take 1 mg by mouth every 6 (six) hours as needed for anxiety.       ondansetron (ZOFRAN) 4 MG tablet Take 1 tablet (4 mg total) by mouth daily as needed for nausea. 30 tablet 1     ranitidine (ZANTAC) 150 MG tablet Take 150 mg by mouth every 12 (twelve) hours.       rOPINIRole (REQUIP) 0.25 MG tablet Take 1 tablet (0.25 mg total) by  "mouth at bedtime as needed. 30 tablet 5     No facility-administered encounter medications on file as of 2019.      Past Medical History:   Diagnosis Date     Anxiety      Depression      Shortness of breath      Sleep apnea     Has had 2 sleep studies in past     Past Surgical History:   Procedure Laterality Date      SECTION, CLASSIC       [place] LAP,CHOLECYSTECTOMY N/A 2018    Procedure: CHOLECYSTECTOMY, LAPAROSCOPIC;  Surgeon: Marta Bishop MD;  Location: [facility];  Service: General     [place] LAP,DIAGNOSTIC ABDOMEN N/A 2018    Procedure:  DIAGNOSTIC LAPAROSCOPY;  Surgeon: Marta Bishop MD;  [place] [facility] [place]  Service: General     SINUS SURGERY       TUBAL LIGATION       TYMPANOSTOMY TUBE PLACEMENT       WISDOM TOOTH EXTRACTION       Social History     Tobacco Use     Smoking status: Former Smoker     Types: Cigarettes     Last attempt to quit: 2016     Years since quittin.8     Smokeless tobacco: Never Used   Substance Use Topics     Alcohol use: No     Drug use: No     Family History   Problem Relation Age of Onset     Depression Mother      Valvular heart disease Mother      Depression Father      Cerebral aneurysm Father      Depression Daughter      Heart attack Paternal Grandmother      Mental illness Daughter      Depression Daughter      No Medical Problems Daughter      Bipolar disorder Brother      Objective / Physical Examination:  Vitals:    19 0931   BP: 110/70   Pulse: 79   Resp: 20   Temp: 99  F (37.2  C)   SpO2: 98%   Weight: 143 lb (64.9 kg)   Height: 5' 2\" (1.575 m)     Wt Readings from Last 3 Encounters:   19 143 lb (64.9 kg)   19 144 lb 1 oz (65.3 kg)   19 136 lb 9.6 oz (62 kg)     Body mass index is 26.16 kg/m .     General Appearance: Alert and oriented, cooperative, affect appropriate, speech clear, in no apparent distress  Head: Normocephalic, atraumatic  Eyes:  Conjunctivae clear and sclerae non-icteric  Nose: " Septum midline, nares patent,  mucosa moist and without drainage  Throat: Lips and mucosa moist.  Patient removes her fold upper denture she does have a petechial rash on both of her mouth posteriorly posterior pharynx no erythema exudate no tonsillary stones noted  Neck: Supple, trachea midline. No cervical adenopathy  Lungs: Clear to auscultation bilaterally. No adventitious lung sounds noted. Normal inspiratory and expiratory effort  Cardiovascular: Regular rate, normal S1, S2. No murmurs, rubs, or gallops  Neuro: Alert and oriented, follows commands appropriately.         Lilly Lino, CNP

## 2021-06-02 VITALS — BODY MASS INDEX: 24.66 KG/M2 | HEIGHT: 62 IN | WEIGHT: 134 LBS

## 2021-06-03 VITALS — BODY MASS INDEX: 24.98 KG/M2 | WEIGHT: 136.6 LBS

## 2021-06-03 VITALS
RESPIRATION RATE: 20 BRPM | HEIGHT: 62 IN | SYSTOLIC BLOOD PRESSURE: 110 MMHG | OXYGEN SATURATION: 98 % | WEIGHT: 143 LBS | TEMPERATURE: 99 F | HEART RATE: 79 BPM | BODY MASS INDEX: 26.31 KG/M2 | DIASTOLIC BLOOD PRESSURE: 70 MMHG

## 2021-06-03 VITALS — WEIGHT: 144.06 LBS | BODY MASS INDEX: 26.35 KG/M2

## 2021-06-04 VITALS
OXYGEN SATURATION: 99 % | SYSTOLIC BLOOD PRESSURE: 105 MMHG | HEIGHT: 62 IN | TEMPERATURE: 98.8 F | DIASTOLIC BLOOD PRESSURE: 72 MMHG | BODY MASS INDEX: 26.89 KG/M2 | WEIGHT: 146.13 LBS | HEART RATE: 87 BPM | RESPIRATION RATE: 16 BRPM

## 2021-06-04 VITALS
DIASTOLIC BLOOD PRESSURE: 76 MMHG | WEIGHT: 151.2 LBS | TEMPERATURE: 98.7 F | HEART RATE: 100 BPM | OXYGEN SATURATION: 98 % | SYSTOLIC BLOOD PRESSURE: 126 MMHG | BODY MASS INDEX: 27.65 KG/M2

## 2021-06-04 VITALS
HEART RATE: 82 BPM | DIASTOLIC BLOOD PRESSURE: 70 MMHG | SYSTOLIC BLOOD PRESSURE: 98 MMHG | BODY MASS INDEX: 26.94 KG/M2 | WEIGHT: 147.3 LBS | OXYGEN SATURATION: 96 %

## 2021-06-04 VITALS — TEMPERATURE: 98.1 F | OXYGEN SATURATION: 99 %

## 2021-06-05 VITALS
HEIGHT: 61 IN | HEART RATE: 64 BPM | BODY MASS INDEX: 29.42 KG/M2 | DIASTOLIC BLOOD PRESSURE: 64 MMHG | SYSTOLIC BLOOD PRESSURE: 118 MMHG | WEIGHT: 155.8 LBS | OXYGEN SATURATION: 100 %

## 2021-06-05 VITALS
OXYGEN SATURATION: 98 % | WEIGHT: 153.2 LBS | BODY MASS INDEX: 28.95 KG/M2 | DIASTOLIC BLOOD PRESSURE: 70 MMHG | SYSTOLIC BLOOD PRESSURE: 100 MMHG | HEART RATE: 80 BPM

## 2021-06-05 NOTE — PROGRESS NOTES
Subjective:      Patient ID: Mendy Roe is a 39 y.o. female.    Chief Complaint:    Patient presents with weeks worth of ST.  Getting a tonsillectomy in 2 weeks.  Patient has also had a productive cough for 10 days.        The following portions of the patient's history were reviewed and updated as appropriate: allergies, current medications, past family history, past medical history, past social history, past surgical history and problem list.       Past Medical History:   Diagnosis Date     Anxiety      Depression      Shortness of breath      Sleep apnea     Has had 2 sleep studies in past       Past Surgical History:   Procedure Laterality Date      SECTION, CLASSIC       [place] LAP,CHOLECYSTECTOMY N/A 2018    Procedure: CHOLECYSTECTOMY, LAPAROSCOPIC;  Surgeon: Marta Bishop MD;  Location: [facility];  Service: General     [place] LAP,DIAGNOSTIC ABDOMEN N/A 2018    Procedure:  DIAGNOSTIC LAPAROSCOPY;  Surgeon: Marta Bishop MD;  Location: [facility] [place];  Service: General     SINUS SURGERY       TUBAL LIGATION       TYMPANOSTOMY TUBE PLACEMENT       WISDOM TOOTH EXTRACTION         Family History   Problem Relation Age of Onset     Depression Mother      Valvular heart disease Mother      Depression Father      Cerebral aneurysm Father      Depression Daughter      Heart attack Paternal Grandmother      Mental illness Daughter      Depression Daughter      No Medical Problems Daughter      Bipolar disorder Brother        Social History     Tobacco Use     Smoking status: Former Smoker     Types: Cigarettes     Last attempt to quit: 2016     Years since quitting: 3.1     Smokeless tobacco: Never Used   Substance Use Topics     Alcohol use: No     Drug use: No       Review of Systems   Constitutional: Positive for fatigue and fever.   HENT: Positive for sore throat.    Eyes: Negative.    Respiratory: Positive for cough.    Cardiovascular: Negative.    Gastrointestinal: Negative.   "  Skin: Negative.    All other systems reviewed and are negative.      Objective:     /72 (Patient Site: Right Arm, Patient Position: Sitting, Cuff Size: Adult Regular)   Pulse 87   Temp 98.8  F (37.1  C) (Oral)   Resp 16   Ht 5' 2\" (1.575 m)   Wt 146 lb 2 oz (66.3 kg)   SpO2 99%   BMI 26.73 kg/m      Physical Exam  Vitals signs and nursing note reviewed.   Constitutional:       General: She is not in acute distress.     Appearance: Normal appearance. She is normal weight. She is not ill-appearing, toxic-appearing or diaphoretic.   HENT:      Right Ear: Tympanic membrane, ear canal and external ear normal.      Left Ear: Tympanic membrane, ear canal and external ear normal.      Nose: Nose normal.      Mouth/Throat:      Mouth: Mucous membranes are moist.      Pharynx: Oropharynx is clear. Posterior oropharyngeal erythema present.   Eyes:      Extraocular Movements: Extraocular movements intact.      Conjunctiva/sclera: Conjunctivae normal.   Neck:      Musculoskeletal: No neck rigidity or muscular tenderness.   Cardiovascular:      Rate and Rhythm: Normal rate and regular rhythm.      Pulses: Normal pulses.      Heart sounds: Normal heart sounds.   Pulmonary:      Effort: Pulmonary effort is normal. No respiratory distress.      Breath sounds: No stridor. Rhonchi present. No wheezing or rales.   Skin:     General: Skin is warm and dry.      Capillary Refill: Capillary refill takes less than 2 seconds.   Neurological:      General: No focal deficit present.      Mental Status: She is alert.   Psychiatric:         Mood and Affect: Mood normal.         Assessment:     Procedures    The encounter diagnosis was Acute bronchitis with symptoms > 10 days.    Plan:     zpak and tessalon perls     "

## 2021-06-05 NOTE — PROGRESS NOTES
Preoperative Exam    Scheduled Procedure: Tonsillectomy W/ Adenoidectomy   Surgery Date:  02/24/2020  Surgery Location: [facility] day surgery    Surgeon:  Dr. Alvarez      Assessment/Plan:     1. Pre-op evaluation    - Basic Metabolic Panel  - HM1(CBC and Differential)  - Pregnancy (Beta-hCG, Qual), Urine  - HM1 (CBC with Diff)    2. Chronic tonsillitis  Per ENT    3. Restless Legs Syndrome      Surgical Procedure Risk: Low (reported cardiac risk generally < 1%)  Have you had prior anesthesia?: Yes  Have you or any family members had a previous anesthesia reaction:  No  Do you or any family members have a history of a clotting or bleeding disorder?: No  Cardiac Risk Assessment: no increased risk for major cardiac complications    APPROVAL GIVEN to proceed with proposed procedure, without further diagnostic evaluation        Functional Status: Independent  Patient plans to recover at home with family.     Subjective:      Mendy Roe is a 39 y.o. female with a history of chronic inflamed tonsils, tonsil stones and recurrent tonsillitis, presenting for a preoperative consultation undergoing aforementioned procedure.      All other systems reviewed and are negative, other than those listed in the HPI.    Pertinent History  Do you have difficulty breathing or chest pain after walking up a flight of stairs: No  History of obstructive sleep apnea: No  Steroid use in the last 6 months: No  Frequent Aspirin/NSAID use: No  Prior Blood Transfusion: No  Prior Blood Transfusion Reaction: No  If for some reason prior to, during or after the procedure, if it is medically indicated, would you be willing to have a blood transfusion?:  There is no transfusion refusal.    Current Outpatient Medications   Medication Sig Dispense Refill     cetirizine (ZYRTEC) 10 MG tablet Take 10 mg by mouth daily as needed for allergies.       ibuprofen (ADVIL,MOTRIN) 200 MG tablet Take 800 mg by mouth every 6 (six) hours as needed for  pain.       LORazepam (ATIVAN) 1 MG tablet Take 1 mg by mouth every 6 (six) hours as needed for anxiety.       ondansetron (ZOFRAN) 4 MG tablet Take 1 tablet (4 mg total) by mouth daily as needed for nausea. 30 tablet 1     ranitidine (ZANTAC) 150 MG tablet Take 150 mg by mouth every 12 (twelve) hours.       rOPINIRole (REQUIP) 0.25 MG tablet Take 1 tablet (0.25 mg total) by mouth at bedtime as needed. 30 tablet 5     No current facility-administered medications for this visit.         Allergies   Allergen Reactions     Augmentin [Amoxicillin-Pot Clavulanate] Nausea Only     Buspirone Nausea Only     Duloxetine Rash     Lamotrigine Rash       Patient Active Problem List   Diagnosis     Attention Deficit Disorder Without Hyperactivity     Amenorrhea     Sudden Redness Of The Skin (Flushing)     Nicotine Dependence     Constipation     Abdominal Pain In Multiple Locations     Midback Pain     Obstructive Sleep Apnea     Restless Legs Syndrome     Chronic Rhinitis     Cervical Radiculopathy     Disease Of The Nails     Mood disorder (H)     Calculus of gallbladder without cholecystitis without obstruction     Postoperative intra-abdominal abscess, initial encounter     S/P laparoscopic cholecystectomy     Elevated lipase     LFT elevation     Bile leak       Past Medical History:   Diagnosis Date     Anxiety      Depression      Shortness of breath      Sleep apnea     Has had 2 sleep studies in past       Past Surgical History:   Procedure Laterality Date      SECTION, CLASSIC       [place] LAP,CHOLECYSTECTOMY N/A 2018    Procedure: CHOLECYSTECTOMY, LAPAROSCOPIC;  Surgeon: Marta Bishop MD;  Location: [facility] [place];  Service: General     NM LAP,DIAGNOSTIC ABDOMEN N/A 2018    Procedure:  DIAGNOSTIC LAPAROSCOPY;  Surgeon: Marta Bishop MD;  Location: [facility];  Service: General     SINUS SURGERY       TUBAL LIGATION       TYMPANOSTOMY TUBE PLACEMENT       WISDOM TOOTH EXTRACTION          Social History     Socioeconomic History     Marital status:      Spouse name: Not on file     Number of children: Not on file     Years of education: Not on file     Highest education level: Not on file   Occupational History     Not on file   Social Needs     Financial resource strain: Not on file     Food insecurity:     Worry: Not on file     Inability: Not on file     Transportation needs:     Medical: Not on file     Non-medical: Not on file   Tobacco Use     Smoking status: Former Smoker     Types: Cigarettes     Last attempt to quit: 12/2/2016     Years since quitting: 3.1     Smokeless tobacco: Never Used   Substance and Sexual Activity     Alcohol use: No     Drug use: No     Sexual activity: Yes     Partners: Male     Birth control/protection: Surgical   Lifestyle     Physical activity:     Days per week: Not on file     Minutes per session: Not on file     Stress: Not on file   Relationships     Social connections:     Talks on phone: Not on file     Gets together: Not on file     Attends Je[employer]'s witness service: Not on file     Active member of club or organization: Not on file     Attends meetings of clubs or organizations: Not on file     Relationship status: Not on file     Intimate partner violence:     Fear of current or ex partner: Not on file     Emotionally abused: Not on file     Physically abused: Not on file     Forced sexual activity: Not on file   Other Topics Concern     Not on file   Social History Narrative     Not on file             Objective:     Vitals:    02/06/20 1005   BP: 98/70   Pulse: 82   SpO2: 96%   Weight: 147 lb 4.8 oz (66.8 kg)         Physical Exam:  General Appearance:    Alert, well hydrated, no distress,    Eyes:    PERRL, conjunctiva/corneas clear,    Throat:   Lips, mucosa, and tongue normal; gums normal   Neck:   Supple, symmetrical, trachea midline, no adenopathy;        thyroid:  No enlargement/tenderness/nodules; no carotid    bruit or JVD   Lungs:      Clear to auscultation bilaterally, respirations unlabored   Heart:    Regular rate and rhythm, S1 and S2 normal, no murmur, rub   or gallop   Abdomen:     Soft, non-tender, normal bowel sounds, no rebound or guarding, no masses, no organomegaly   Extremities:   Extremities normal, atraumatic, no cyanosis or edema   Skin:   Skin color, texture, turgor normal, no rashes or lesions        Labs:  Recent Results (from the past 24 hour(s))   Basic Metabolic Panel    Collection Time: 02/06/20 10:31 AM   Result Value Ref Range    Sodium 136 136 - 145 mmol/L    Potassium 4.3 3.5 - 5.0 mmol/L    Chloride 107 98 - 107 mmol/L    CO2 21 (L) 22 - 31 mmol/L    Anion Gap, Calculation 8 5 - 18 mmol/L    Glucose 87 70 - 125 mg/dL    Calcium 8.8 8.5 - 10.5 mg/dL    BUN 9 8 - 22 mg/dL    Creatinine 0.71 0.60 - 1.10 mg/dL    GFR MDRD Af Amer >60 >60 mL/min/1.73m2    GFR MDRD Non Af Amer >60 >60 mL/min/1.73m2                                         Yes    HM1 (CBC with Diff)    Collection Time: 02/06/20 10:31 AM   Result Value Ref Range    WBC 5.8 4.0 - 11.0 thou/uL    RBC 4.57 3.80 - 5.40 mill/uL    Hemoglobin 13.7 12.0 - 16.0 g/dL    Hematocrit 40.5 35.0 - 47.0 %    MCV 89 80 - 100 fL    MCH 29.9 27.0 - 34.0 pg    MCHC 33.7 32.0 - 36.0 g/dL    RDW 10.7 (L) 11.0 - 14.5 %    Platelets 222 140 - 440 thou/uL    MPV 8.6 7.0 - 10.0 fL    Neutrophils % 64 50 - 70 %    Lymphocytes % 28 20 - 40 %    Monocytes % 7 2 - 10 %    Eosinophils % 1 0 - 6 %    Basophils % 1 0 - 2 %    Neutrophils Absolute 3.7 2.0 - 7.7 thou/uL    Lymphocytes Absolute 1.6 0.8 - 4.4 thou/uL    Monocytes Absolute 0.4 0.0 - 0.9 thou/uL    Eosinophils Absolute 0.0 0.0 - 0.4 thou/uL    Basophils Absolute 0.0 0.0 - 0.2 thou/uL   Pregnancy (Beta-hCG, Qual), Urine    Collection Time: 02/06/20 10:51 AM   Result Value Ref Range    Pregnancy Test, Urine Negative Negative       Immunization History   Administered Date(s) Administered     DT (pediatric) 09/01/2006     HPV  Quadrivalent 02/13/2007, 04/23/2007, 08/24/2007     Hep A, historic 09/24/2008, 03/12/2009     Influenza,seasonal quad, PF 11/05/2013     Pneumo Polysac 23-V 09/11/2008     Td,adult,historic,unspecified 09/24/2008     Tdap 09/24/2008           Electronically signed by Jamir Galan MD 02/06/20 9:56 AM

## 2021-06-06 NOTE — TELEPHONE ENCOUNTER
Who is calling:  Patient  Reason for Call:  The patient is asking to add thyroid cascade.  Writer checked with lab and sample from 2/6/20 cannot be used.  Patient stated no order needed and she will get it drawn at her job.  FYI.  Date of last appointment with primary care: NA  Okay to leave a detailed message: No

## 2021-06-07 NOTE — PROGRESS NOTES
"Mendy Roe is a 39 y.o. female who is being evaluated via a billable video visit.      The patient has been notified of following:     \"This video visit will be conducted via a call between you and your physician/provider. We have found that certain health care needs can be provided without the need for an in-person physical exam.  This service lets us provide the care you need with a video conversation.  If a prescription is necessary we can send it directly to your pharmacy.  If lab work is needed we can place an order for that and you can then stop by our lab to have the test done at a later time.    Video visits are billed at different rates depending on your insurance coverage. Please reach out to your insurance provider with any questions.    If during the course of the call the physician/provider feels a video visit is not appropriate, you will not be charged for this service.\"    Patient has given verbal consent to a Video visit? Yes    Patient would like to receive their AVS by AVS Preference: Matt.    Patient would like the video invitation sent by: Text to cell phone: [phone]    Will anyone else be joining your video visit? No        Video Start Time: 12:54 PM      Assessment/Plan:        1. Lightheadedness  Discussed the common and usual culprit for his problem to consider middle ear problems versus orthostatic hypotension.  According to the comment of blood pressure check by her friend today is within the normal range.  She is advised to keep hydrated and if not better to follow-up in office for face-to-face evaluation  Patient is understanding agreeable to the plan of care          Subjective:    Patient ID:   Mendy Roe is a 39 y.o. female having intermittent sensation of lightheadedness and spinning sensation with no rhyme or reason she can think of for the past 2 weeks.  This may happen when just sitting or standing or with position changes.  She attest to trying to keeping " herself hydrated drinking lots of water.  She denies any hearing or sinus problems and her blood pressure has not been checked regularly and does not think that she has low blood pressure.      Review of Systems  Allergy: reviewed  General : negative  A complete 5 point review of systems was obtained and is negative other than what is stated in the HPI.       The following patient's history were reviewed and updated as appropriate:   She  has a past medical history of Anxiety, Depression, Shortness of breath, and Sleep apnea..      Outpatient Encounter Medications as of 4/30/2020   Medication Sig Dispense Refill     azelastine (ASTELIN) 137 mcg (0.1 %) nasal spray U 1 SPR IEN BID       cetirizine (ZYRTEC) 10 MG tablet Take 10 mg by mouth daily as needed for allergies.       ibuprofen (ADVIL,MOTRIN) 200 MG tablet Take 800 mg by mouth every 6 (six) hours as needed for pain.       LORazepam (ATIVAN) 1 MG tablet Take 1 mg by mouth every 6 (six) hours as needed for anxiety.       ondansetron (ZOFRAN) 4 MG tablet Take 1 tablet (4 mg total) by mouth daily as needed for nausea. 30 tablet 1     ranitidine (ZANTAC) 150 MG tablet Take 150 mg by mouth every 12 (twelve) hours.       rOPINIRole (REQUIP) 0.25 MG tablet Take 1 tablet (0.25 mg total) by mouth at bedtime as needed. 30 tablet 5     No facility-administered encounter medications on file as of 4/30/2020.          Objective:   /76 Comment: pt reported  Pulse 100 Comment: pt reported  Temp 98.7  F (37.1  C) Comment: pt reported  Wt 151 lb 3.2 oz (68.6 kg) Comment: pt reported  SpO2 98%   BMI 27.65 kg/m            Video-Visit Details     Type of service:  Video Visit    Video End Time (time video stopped): 1:07 PM  Originating Location (pt. Location): Home    Distant Location (provider location):  [facility]     Platform used for Video Visit: Christofer Galan MD

## 2021-06-07 NOTE — TELEPHONE ENCOUNTER
New Appointment Needed  What is the reason for the visit:    Office visit in person, PCP not available on Tuesdays - Follow up dizziness, PCP request  Provider Preference: Any available  How soon do you need to be seen?: 5/5/20  Waitlist offered?: No  Okay to leave a detailed message:  Yes

## 2021-06-07 NOTE — PROGRESS NOTES
Subjective:   Mendy Roe is a 39 y.o. female who is here for evaluation of     Mendy Roe is a 39 y.o. female having intermittent sensation of lightheadedness and spinning sensation with no rhyme or reason she can think of for the past 3 weeks.  Dizziness happens at different positions, including sitting or standing or lying down. She reports that she had the last episode last night and it usually last for 5 to 10 minutes. She reports that she gets up to 5 times a day. She reports that she drinks enough water and she eats healthy. . She denies aural pressure, otalgia, otorrhea tinnitus, hearing loss.       Outside reports reviewed: Lab reports and Office notes.  The following portions of the patient's history were reviewed and updated as appropriate: allergies, current medications, past family history, past medical history, past social history, past surgical history and problem list.    Review of Systems  A 12 point comprehensive review of systems was negative except as noted.      Objective:   Temp 98.1  F (36.7  C) (Oral)   SpO2 99%    Temp 98.1  F (36.7  C) (Oral)   SpO2 99%   General appearance: alert, appears stated age and cooperative  Head: Normocephalic, without obvious abnormality, atraumatic, sinuses tender to percussion  Eyes: conjunctivae/corneas clear. PERRL, EOM's intact. Fundi benign.  Ears: abnormal TM right ear - bulging and abnormal TM left ear - bulging  Nose: Nares normal. Septum midline. Mucosa normal. No drainage or sinus tenderness.  Throat: lips, mucosa, and tongue normal; teeth and gums normal  Neck: no adenopathy, no carotid bruit, no JVD, supple, symmetrical, trachea midline and thyroid not enlarged, symmetric, no tenderness/mass/nodules  Pulses: 2+ and symmetric  Skin: Skin color, texture, turgor normal. No rashes or lesions  Lymph nodes: Cervical, supraclavicular, and axillary nodes normal.  Neurologic: Grossly normal      Assessment/Plan:   1. Lightheadedness  2. Acute  non-recurrent sphenoidal sinusitis  Normal orthostatic blood pressure. Examination was unremarkable except for inflamed TMs. Recommend treatment with oral steroid and have patient follow up with neurologist if symptoms persist. Patient verbalized understanding.   - predniSONE (DELTASONE) 20 MG tablet; Take 30 mg by mouth daily for 5 days.  Dispense: 8 tablet; Refill: 0

## 2021-06-07 NOTE — TELEPHONE ENCOUNTER
Pt is not available at anytime with PCP availability - please advise if exception or if pt should see other provider

## 2021-06-08 NOTE — PROGRESS NOTES
ASSESSMENT:  1. Mood disorder  Start weaning off Celexa and start taking Seroquel. Start with 25 mg at bedtime and go up to 50 mg at bed time and then follow up with Jaclyn Watts on January 31st.   - QUEtiapine (SEROQUEL) 50 MG tablet; Take 1 tablet (50 mg total) by mouth at bedtime.  Dispense: 60 tablet; Refill: 0    PLAN:  Patient Instructions   Slowly wean off of Celexa 20 mg over the next 2 weeks. Take 20 mg daily for the next 3 days, then begin 10 mg daily, then wean to 10 mg every other day before discontinuing entirely.  Begin Seroquel 15 mg once daily at this time. Increase to 15 mg of Seroquel twice daily after weaning off of Celexa entirely.   Present to your therapist next week as scheduled.   Present to Jaclyn Watts CNP on December 31 as scheduled.   Continue on your regimen of Requip 3-4 nights per week.       No orders of the defined types were placed in this encounter.    There are no discontinued medications.    No Follow-up on file.    CHIEF COMPLAINT:  Chief Complaint   Patient presents with     Follow-up     No concerns feeling good       HISTORY OF PRESENT ILLNESS:  Mendy is a 36 y.o. female presenting to the clinic today for follow up for her mental health. She is beginning psychotherapy next week. She is scheduled to consult with Jaclyn Watts CNP in mental health on January 31. Her PHQ-9 score is currently 12. She reports that she is doing well mentally at this time. She has continued to take 20 mg of Celexa daily, as prescribed. She does not carlos. She does not have ideations of suicide or self harm at this time. Her mood disorder questionnaire is positive for bipolar. She has experienced significant relief from 1 mg of Ativan every 6 hours as needed for her anxiety symptoms.     Sleeping difficulty: She has had significant sleeping difficulty for many years. She has attempted treatment with Unisom, Benadryl, Advil PM, melatonin, and trazodone, but has not experienced relief from doing  "so. She clarifies that her sleeping difficulty worsened while on trazodone. She is agreeable to beginning a trial of Seroquel at this time. Her daytime wakefulness is significantly inhibited secondary to her sleeping difficulty.     Restless leg syndrome: Requip has provided relief from her restless leg syndrome symptoms. She has decreased her Requip dosage to 0.25-0.50 mg 3-4 times per week secondary to developing leg cramps on 0.25 mg of Requip daily.     REVIEW OF SYSTEMS:   She denies sudden mood shifts, periods of \"extreme\" happiness, or periods of \"extreme\" sadness. She develops significant leg cramps if she takes Requip nightly. All other systems are negative.    PFSH:  Her 15 year old daughter lives with her. Her 13 year old daughter lives with her ex-. She has two adult children who live on their own.   Allergies   Allergen Reactions     Augmentin [Amoxicillin-Pot Clavulanate]        TOBACCO USE:  History   Smoking Status     Former Smoker     Packs/day: 0.25     Types: Cigarettes     Quit date: 12/2/2016   Smokeless Tobacco     Not on file     Comment: In the process of quiting        VITALS:  Vitals:    01/03/17 1004   BP: 120/76   Pulse: 80   SpO2: 99%   Weight: 137 lb 11.2 oz (62.5 kg)   Height: 5' 1\" (1.549 m)     Wt Readings from Last 3 Encounters:   01/03/17 137 lb 11.2 oz (62.5 kg)   11/28/16 136 lb 12.8 oz (62.1 kg)   08/29/16 140 lb 1.6 oz (63.5 kg)     Body mass index is 26.02 kg/(m^2).    PHYSICAL EXAM:  Physical Examination: General appearance - alert, well appearing, and in no distress  Neurological: alert, oriented, normal speech, no focal findings or movement disorder noted  Psychiatric: Normal affect. Does not appear anxious or depressed.    QUALITY MEASURES:  The following are part of a depression follow up plan for the patient:  under care of mental health counselor, mental health screening assessment and psychiatric follow-up    ADDITIONAL HISTORY SUMMARIZED (2): None.  DECISION " TO OBTAIN EXTRA INFORMATION (1): None.   RADIOLOGY TESTS (1): None.  LABS (1): None.  MEDICINE TESTS (1): None.  INDEPENDENT REVIEW (2 each): None.       The visit lasted a total of 26 minutes face to face with the patient. Over 50% of the time was spent counseling and educating the patient about mental health.    Rober KING, am scribing for and in the presence of, Promise Amajiri, FNP.    IRosangela FNP, personally performed the services described in this documentation, as scribed by Rober Tuttle in my presence, and it is both accurate and complete.    MEDICATIONS:  Current Outpatient Prescriptions   Medication Sig Dispense Refill     citalopram (CELEXA) 20 MG tablet Take 1 tablet (20 mg total) by mouth daily. 30 tablet 0     ibuprofen (ADVIL,MOTRIN) 800 MG tablet Take 800 mg by mouth 3 (three) times a day.       loratadine (CLARITIN) 10 mg tablet Take 10 mg by mouth daily as needed for allergies.       LORazepam (ATIVAN) 1 MG tablet Take 1 tablet (1 mg total) by mouth every 6 (six) hours as needed for anxiety. 10 tablet 0     ranitidine (ZANTAC) 150 MG tablet Take 150 mg by mouth every 12 (twelve) hours.       buPROPion (WELLBUTRIN SR) 150 MG 12 hr tablet TAKE 1 TABLET BY MOUTH TWICE A DAY  1     citalopram (CELEXA) 10 MG tablet TAKE 1 TABLET (10 MG TOTAL) BY MOUTH DAILY. 90 tablet 1     hydrOXYzine (ATARAX) 25 MG tablet Take  mg by mouth bedtime as needed (FOR SLEEP).       ondansetron (ZOFRAN) 4 MG tablet Take 4 mg by mouth every 8 (eight) hours as needed for nausea.       QUEtiapine (SEROQUEL) 50 MG tablet Take 1 tablet (50 mg total) by mouth 2 (two) times a day. 60 tablet 0     rOPINIRole (REQUIP) 0.25 MG tablet TAKE 1-2 TABLETS (0.25-0.5 MG TOTAL) BY MOUTH DAILY. 1-3 HOURS BEFORE BEDTIME 90 tablet 1     No current facility-administered medications for this visit.        Total data points: 0

## 2021-06-08 NOTE — PROGRESS NOTES
PSYCHIATRY  CLINIC CONSULT     DIAGNOSIS   1. Restless Legs Syndrome  Ferritin   2. Mood disorder  Vitamin D, Total (25-Hydroxy)     Rule out bipolar disorder      PLAN   1. Ongoing education given regarding diagnostic and treatment options risks, benefits and alternatives with adequate verbalization of understanding.  2.  Lamictal 25 mg daily at bedtime ×2 weeks then 50 mg daily at bedtime ×2 weeks then 100 mg daily at bedtime.  Hydroxyzine pamoate 25-50 milligrams daily at bedtime when necessary for sleep.  3. Crisis management planning in place.  4. Continue with Primary Care Provider.  For the restless leg symptoms will check her ferritin level.  Also check a vitamin D level due to mood symptoms and follow-up as needed  5. Continue regular therapy sessions  6. Psychotherapy provided in-session.  7. Medication side effects/warning signs reviewed including SJS s/s.   8.  I did inform patient about my departure from [facility] system.  Patient was recommended to follow up with a psychiatric provider at [facility] or myself at my [place] office.  Follow-up appointment in 7-8 weeks.    Risk Assessment: Patient able to contract for safety           DATE OF SERVICE   1/31/2017         CHIEF COMPLAINT   Chief Complaint   Patient presents with     Consult          CONSULT REQUEST BY   PCP     HISTORY OF PRESENT ILLNESS   This is a 36 y.o. female with history of depression and anxiety.  Patient states that first developed depression symptoms around the age of 10 and first treated with antidepressants at the age of 17.  No hospitalizations related to this.  She did have a couple suicidal attempts as a teenager.    Patient has been on multiple antidepressants in the past.  She states that a few have helped her feel slightly better but she's never had good symptom relief.  Finds that trazodone is actually activating and increases her insomnia.  He is able to endorse some manic symptoms in the past including excessive  shopping, racing thoughts, chronic insomnia and being more talkative and outgoing that will last for extended period of time.    She did see her PCP for her mood symptoms in the last few months.  Seroquel was prescribed.  Patient states she took 25 mg daily at bedtime but found it too much a.m. sedation so she stopped this..    Patient does see Patito at  [facility] in [place] for individual therapy.  They did discuss the possibility of a borderline personality diagnosis however patient denies any chronic suicidal ideations or SIB    PSYCHIATRIC ROS:  Mood:  -Depression:currently mild sx  -Patricia:MDQ positive 10/13  Impaired Sleep: yes  Impaired Energy: yes  Change of Interest/Anhedonia: yes  Appetite/Weight Changes: no  Concentration Changes: yes  Negative cognitions of self: no  Tearfulness: no  Anxiety/Panic: yes  SI/VI/HI: no  Psychosis:  no  Eating Disorder: History of anorexic behavior in her teenage years  SIB: NO     CHEMICAL DEPENDENCY HISTORY   History   Drug Use No       History   Alcohol Use No       History   Smoking Status     Former Smoker     Packs/day: 0.25     Types: Cigarettes     Quit date: 12/2/2016   Smokeless Tobacco     Not on file     Comment: In the process of quiting        Treatment: denies  Detox: denies  Legal: denies     website reviewed to check CS use     PAST PSYCHIATRIC HISTORY   Psychiatric provider: PCP  Therapist: Patito at [facility]  Hospitalizations: none  History of Commitments: none  Past Medications:  Paxil-no effect, Celexa-mild effect, Wellbutrin-no effect, trazodone-increased insomnia, Effexor-no effect  ECT:  no  Suicide Attempts/Gun Access: as a teen       PAST MEDICAL HISTORY   Past Medical History   Diagnosis Date     Anxiety      Depression        Past Surgical History   Procedure Laterality Date     [place] tooth extraction       Tubal ligation       Sinus surgery       Tympanostomy tube placement         Primary Care Provider: POLO Martínez      ALLERGIES:  Augmentin [amoxicillin-pot clavulanate]       MEDICATIONS   Current Outpatient Prescriptions   Medication Sig Dispense Refill     loratadine (CLARITIN) 10 mg tablet Take 10 mg by mouth daily as needed for allergies.       LORazepam (ATIVAN) 1 MG tablet Take 1 tablet (1 mg total) by mouth every 6 (six) hours as needed for anxiety. 10 tablet 0     rOPINIRole (REQUIP) 0.25 MG tablet TAKE 1-2 TABLETS (0.25-0.5 MG TOTAL) BY MOUTH DAILY. 1-3 HOURS BEFORE BEDTIME 90 tablet 1     sulfamethoxazole-trimethoprim (SEPTRA DS) 800-160 mg per tablet Take by mouth.       buPROPion (WELLBUTRIN SR) 150 MG 12 hr tablet TAKE 1 TABLET BY MOUTH TWICE A DAY  1     citalopram (CELEXA) 10 MG tablet TAKE 1 TABLET (10 MG TOTAL) BY MOUTH DAILY. 90 tablet 1     citalopram (CELEXA) 20 MG tablet Take 1 tablet (20 mg total) by mouth daily. 30 tablet 0     hydrOXYzine (ATARAX) 25 MG tablet Take  mg by mouth bedtime as needed (FOR SLEEP).       ondansetron (ZOFRAN) 4 MG tablet Take 4 mg by mouth every 8 (eight) hours as needed for nausea.       QUEtiapine (SEROQUEL) 50 MG tablet Take 1 tablet (50 mg total) by mouth 2 (two) times a day. 60 tablet 0     ranitidine (ZANTAC) 150 MG tablet Take 150 mg by mouth every 12 (twelve) hours.       No current facility-administered medications for this visit.        Medication adherence: Reviewed risk/benefits of medication , Patient able to verbalize understanding of side effects  and Patient verbally consents to taking medications  Medication side effects: hypersomnolence  Benefit: yes       ROS   A 12 point comprehensive review of systems was negative except as noted.  complain of chronic restless leg symptoms.  States she did have her normal sleep study and these symptoms were not noted at that time.  She does take Requip on a when necessary basis.  Finds that the daily use of this does cause  night time leg cramps.        FAMILY HISTORY   Family History   Problem Relation Age of Onset     Depression  Mother      Depression Father      Cerebral aneurysm Father      Depression Daughter      Mental illness Daughter      Depression Daughter      No Medical Problems Daughter      Bipolar disorder Brother         Psychiatric:  parents both with mood symptoms including depression unsure if there is any bipolar disorder.  Her brother was given a bipolar diagnosis in the past.       SOCIAL HISTORY   Social History     Social History     Marital status:      Spouse name: N/A     Number of children: N/A     Years of education: N/A     Occupational History     Not on file.     Social History Main Topics     Smoking status: Former Smoker     Packs/day: 0.25     Types: Cigarettes     Quit date: 12/2/2016     Smokeless tobacco: Not on file      Comment: In the process of quiting      Alcohol use No     Drug use: No     Sexual activity: Yes     Partners: Male     Birth control/ protection: Surgical     Other Topics Concern     Not on file     Social History Narrative       Born and Raised:  born in [place] and raised in [place] and [place].  Parents  when she was 10 years old.  She has 2 older siblings.  Denies any trauma or abuse growing up.  Occupation:  full-time as a LPN at [employer]  Marital Status:  currently going through a divorce.  They were together for 24 years.  Children:  4 children ages 22, 19, 15, 13 with the same father.  Living Situation: Living arrangements - the patient lives with their family.   from her  since September 2016.  Living with her 15-year-old daughter.  13-year-old daughter lives with her .       MENTAL STATUS EXAM   Appearance:  Clean/neat  Mood:  dysthymic  Affect: mood congruent  Suicidal Ideation: absent  Homicidal Ideation: absent  Thought process: normal  Thought content: Normal  Fund of Knowledge: Sufficient  Attention/Concentration: intact  Language ability: intact  Memory: recent and remote memory intact  Insight and  Judgement: good  Orientation: person, place, time and situation  Psychomotor Behavior: normal  Muscle Strength and Tone: normal  Gait and Station: normal gait and station       PHYSICAL EXAM   Vitals:   Visit Vitals     LMP 12/08/2016       General appearance - alert, well appearing, and in no distress  Mental status - alert, oriented to person, place, and time  Neurological - alert, oriented, normal speech, no focal findings or movement disorder noted         LABS   personally reviewed.   Office Visit on 08/29/2016   Component Date Value     Color, UA 08/29/2016 Yellow      Clarity, UA 08/29/2016 Clear      Glucose, UA 08/29/2016 Negative      Bilirubin, UA 08/29/2016 Negative      Ketones, UA 08/29/2016 Negative      Specific Gravity, UA 08/29/2016 <=1.005      Blood, UA 08/29/2016 Trace*     pH, UA 08/29/2016 6.0      Protein, UA 08/29/2016 Negative      Urobilinogen, UA 08/29/2016 0.2 E.U./dL      Nitrite, UA 08/29/2016 Negative      Leukocytes, UA 08/29/2016 Negative      Bacteria, UA 08/29/2016 Few*     RBC, UA 08/29/2016 0-2      WBC, UA 08/29/2016 0-5      Squam Epithel, UA 08/29/2016 0-5      Chlamydia trachomatis, A* 08/29/2016 Negative      Neisseria gonorrhoeae, A* 08/29/2016 Negative      HIV Antigen / Antibody 08/29/2016 Negative      Hepatitis A Antibody, IgM 08/29/2016 Negative      Hepatitis B Core Layla, IgM 08/29/2016 Negative      Hepatitis B Surface Ag 08/29/2016 Negative      Hepatitis C Ab 08/29/2016 Negative      Syphilis Screen Cascade 08/29/2016 Non-Reactive      Yeast Result 08/29/2016 No yeast seen      Trichomonas 08/29/2016 No Trichomonas seen      Clue Cells, Wet Prep 08/29/2016 No Clue cells seen      Culture 08/29/2016 No Growth              Total time  50 minutes with > 50% spent on coordination of cares and psycho-education.        Jaclyn Watts, CNP

## 2021-06-11 NOTE — PROGRESS NOTES
FEMALE PREVENTATIVE EXAM    Assessment and Plan:       1. Routine general medical examination at a health care facility    2. Need for vaccination  - Tdap vaccine,  6yo or older,  IM    3. Nausea  Refill   - ondansetron (ZOFRAN) 4 MG tablet; Take 1 tablet (4 mg total) by mouth daily as needed for nausea.  Dispense: 30 tablet; Refill: 3   Recent CMP, cholesterol and CBC reviewed.     Next follow up:  Return in about 1 year (around 9/14/2021) for Annual physical, or sooner with any issues or concerns.    Immunization Review  Adult Imm Review: Due today, orders placed    I discussed the following with the patient:   Adult Healthy Living: Importance of regular exercise  Healthy nutrition    She had a nl pap done by her GYN abou 2 months ago.   Nl Mammogram on 7/14/20     Subjective:   Chief Complaint: Mendy Roe is an 40 y.o. female here for a preventative health visit.      she has no other issues or concerns.       Healthy Habits  Are you taking a daily aspirin? No  Do you typically exercising at least 40 min, 3-4 times per week?  Yes  Do you usually eat at least 4 servings of fruit and vegetables a day, include whole grains and fiber and avoid regularly eating high fat foods? NO  Have you had an eye exam in the past two years? Yes  Do you see a dentist twice per year? Yes  Do you have any concerns regarding sleep? No    Safety Screen  If you own firearms, are they secured in a locked gun cabinet or with trigger locks? NO  Do you feel you are safe where you are living?: Yes (9/14/2020  8:28 AM)  Do you feel you are safe in your relationship(s)?: Yes (9/14/2020  8:28 AM)      Review of Systems  Allergy: reviewed  General : negative  Ophthalmic : negative  ENT : negative  Respiratory : no cough, shortness of breath, or wheezing  Cardiovascular : no chest pain or dyspnea on exertion  Gastrointestinal : intermittent nausea, abdominal pain, consulting with [facility], change in bowel habits, or black or bloody  "stools  Genito-Urinary :  no dysuria, trouble voiding, or hematuria  Dermatological : negative    Musculoskeletal : negative  Neurological : negative  Hematological and Lymphatic : negative  Endocrine : negative          Cancer Screening       Status Date      PAP SMEAR Overdue 8/30/2019      Done 8/30/2016 [facility].  Per pt     Patient has more history with this topic...          Patient Care Team:  Jamir Galan MD as PCP - General (Family Medicine)  Jamir Galan MD as Assigned PCP        History     Reviewed By Date/Time Sections Reviewed    Jmair Galan MD 9/14/2020  8:37 AM Medical, Surgical, Family    QingArvind chester CMA 9/14/2020  8:26 AM Tobacco            Objective:   Vital Signs:   Visit Vitals  /64   Pulse 64   Ht 5' 1.25\" (1.556 m)   Wt 155 lb 12.8 oz (70.7 kg)   SpO2 100%   BMI 29.20 kg/m           PHYSICAL EXAM  /64   Pulse 64   Ht 5' 1.25\" (1.556 m)   Wt 155 lb 12.8 oz (70.7 kg)   SpO2 100%   BMI 29.20 kg/m      General Appearance:    Alert, cooperative, no distress, appears stated age   Head:    Normocephalic, without obvious abnormality, atraumatic   Eyes:    PERRL, conjunctiva/corneas clear, EOM's intact, fundi     benign, both eyes   Ears:    Normal TM's and external ear canals, both ears   Nose:   Nares normal, septum midline, mucosa normal, no drainage     or sinus tenderness   Throat:   Lips, mucosa, and tongue normal; teeth and gums normal   Neck:   Supple, symmetrical, trachea midline, no adenopathy;     thyroid:  no enlargement/tenderness/nodules; no carotid    bruit or JVD   Back:     Symmetric, no curvature, ROM normal, no CVA tenderness   Lungs:     Clear to auscultation bilaterally, respirations unlabored   Chest Wall:    No tenderness or deformity    Heart:    Regular rate and rhythm, S1 and S2 normal, no murmur, rub    or gallop   Abdomen:     Soft, non-tender, bowel sounds active all four quadrants,     no masses, no " organomegaly   Extremities:   Extremities normal, atraumatic, no cyanosis or edema   Pulses:   2+ and symmetric all extremities   Skin:   Skin color, texture, turgor normal, no rashes or lesions   Lymph nodes:   Cervical, supraclavicular, and axillary nodes normal   Neurologic:   CNII-XII intact, normal strength, sensation and reflexes     throughout                   Additional Screenings Completed Today:

## 2021-06-12 NOTE — PROGRESS NOTES
"  Office Visit - Follow Up   Mendy Roe   36 y.o. female    Date of Visit: 8/7/2017    Chief Complaint   Patient presents with     Exposure to STD     No exposure-NO sx        Assessment and Plan   1. STD (female)  Discussed safe sexual practice in detail  See orders for STD cultures and assays  Will call pt with results   - Chlamydia trachomatis & Neisseria gonorrhoeae, Amplified Detection  - Syphilis Screen, [facility]  - HIV Antigen/Antibody Screening [facility]       History of Present Illness   This 36 y.o. old female patient presents for sexually transmitted disease check. Sexual history reviewed with the patient. STD exposure: sexual contact with individual with uncertain background 1 month ago.  Previous history of STD:  none. Current symptoms include none.  Contraception: IUD.  Patient reports that she has been somewhat anxious and depressed.  She will be seeing Jaclyn Watts tomorrow at [facility] in [place].  She reported the Lamictal is working fine but her anxiety is still out that because she has been going through a lot with her ex- and her teenage children.    Review of Systems: A comprehensive review of systems was negative except as noted.     Medications, Allergies and Problem List   Reviewed and updated     Physical Exam   General Appearance: Well groomed    /70  Pulse 64  Ht 5' 1\" (1.549 m)  Wt 131 lb 3.2 oz (59.5 kg)  LMP 06/15/2017  SpO2 98%  BMI 24.79 kg/m2    Pelvic:Normally developed genitalia with no external lesions or eruptions. Vagina and cervix show no lesions, inflammation, discharge or tenderness. No cystocele, No rectocele.  No adnexal mass or tenderness.         Additional Information   Current Outpatient Prescriptions   Medication Sig Dispense Refill     ibuprofen (ADVIL,MOTRIN) 200 MG tablet Take 800 mg by mouth every 6 (six) hours as needed for pain.       lamoTRIgine (LAMICTAL) 100 MG tablet Take 1 tablet (100 mg total) by mouth daily. " 30 tablet 1     loratadine (CLARITIN) 10 mg tablet Take 10 mg by mouth daily as needed for allergies.       LORazepam (ATIVAN) 1 MG tablet Take 1 tablet (1 mg total) by mouth every 6 (six) hours as needed for anxiety. 10 tablet 0     ranitidine (ZANTAC) 150 MG tablet Take 150 mg by mouth every 12 (twelve) hours.       rOPINIRole (REQUIP) 0.25 MG tablet TAKE 1-2 TABLETS (0.25-0.5 MG TOTAL) BY MOUTH DAILY. 1-3 HOURS BEFORE BEDTIME 90 tablet 1     famotidine (PEPCID) 20 MG tablet Take 1 tablet (20 mg total) by mouth 2 (two) times a day. 30 tablet 0     HYDROcodone-acetaminophen 5-325 mg per tablet Take 1 tablet by mouth every 4 (four) hours as needed for pain. 10 tablet 0     ondansetron (ZOFRAN) 4 MG tablet Take 4 mg by mouth every 8 (eight) hours as needed for nausea.       No current facility-administered medications for this visit.      Allergies   Allergen Reactions     Augmentin [Amoxicillin-Pot Clavulanate]      Social History   Substance Use Topics     Smoking status: Former Smoker     Packs/day: 0.25     Types: Cigarettes     Quit date: 12/2/2016     Smokeless tobacco: None      Comment: In the process of quiting      Alcohol use No     Reviewed previous STD results.    Time: total time spent with the patient was 25 minutes of which >50% was spent in counseling and coordination of care     Rosangela Kahn, CNP

## 2021-06-12 NOTE — PROGRESS NOTES
Assessment:   The encounter diagnosis was Acute sinusitis.     Plan:     Medications Ordered   Medications     azithromycin (ZITHROMAX) 250 MG tablet     Sig: Take 500 mg (2 x 250 mg tablets) on day 1 followed by 250 mg (1 tablet) on days 2-5.     Dispense:  6 tablet     Refill:  0     methylPREDNISolone (MEDROL DOSEPACK) 4 mg tablet     Sig: follow package directions     Dispense:  21 tablet     Refill:  0     Patient Instructions     You may want to try warm salt water gargles or rinses to feel better or help prevent another bout in the future. Mix 1 teaspoon of salt in 8 ounces of water, gargle, and spit. Do this several times a day for several days. Do not swallow the mixture.  Based on the information that you have provided, I have placed an order for you to start treatment.  View your full visit summary for details. Click on the link below to access your visit summary.    Your pharmacist will address any questions you may have about taking the medication.    Return for further follow up if needed. Call [phone] or schedule an appointment via [employer]..    Subjective:   Mendy Roe is a 37 y.o. female who submitted an eVisit request for evaluation of her Sinus Problem.  See the questionnaire and message section of encounter report for information related to history of present illness and review of systems.    The following portions of the patient's history were reviewed and updated as appropriate:  She  does not have any pertinent problems on file.  She is allergic to augmentin [amoxicillin-pot clavulanate]..     Objective:   No exam performed today, patient submitted as eVisit.

## 2021-06-13 NOTE — PROGRESS NOTES
Assessment:   The encounter diagnosis was Sinus complaint.   Mendy,    I sent in a prescription for you per your request on your evisit. It is a little early for antibiotic treatment. I would recommend you wait a few days before starting your antibiotic. If you do not feel better in a 3-4 more days then you may start the prescription. The reason why I am asking you to do this is because many sinusitis infections are due to viruses which do not need antibiotics. If symptoms are not better by 7-10 days then it might be reasonable to treat with antibiotics. It is important to not overuse antibiotics too much because bacteria are getting smarter and they can develop resistance if you use it too often and when you really need it for a much more serious infections, it might not work as well. Drink plenty of fluids and get some rest. I also recommend nasal irrigation like netti pots if needed and ibuprofen as needed.     David Ordaz MD   Plan:     Medications Ordered   Medications     sulfamethoxazole-trimethoprim (SEPTRA DS) 800-160 mg per tablet     Sig: Take 1 tablet by mouth 2 (two) times a day for 10 days. For infection     Dispense:  20 tablet     Refill:  0     There are no Patient Instructions on file for this visit.  Return in 2 weeks (on 11/13/2017) for further follow up if needed. Call [phone] or schedule an appointment via [employer]..    Subjective:   Mendy Roe is a 37 y.o. female who submitted an eVisit request for evaluation of her Sinus Problem.  See the questionnaire and message section of encounter report for information related to history of present illness and review of systems.    The following portions of the patient's history were reviewed and updated as appropriate:  She  does not have any pertinent problems on file.  She is allergic to augmentin [amoxicillin-pot clavulanate]..     Objective:   No exam performed today, patient submitted as eVisit.

## 2021-06-13 NOTE — PROGRESS NOTES
"TCM DISCHARGE FOLLOW UP CALL      \"Hi, my name is  Elmira, and I am calling from [facility].  I am calling to follow up and see how things are going for you after your recent hospitalization.      Tell me how you are doing now that you are home?\"  - Doing much better, still have some pain but I am trying to take ibuprofen and I am back to work today.      Discharge Instructions     Do you understand your discharge instructions?  Pt. Response:  Yes      \"Has an appointment with your primary care provider been scheduled?\"  No    \"If yes, when is that appointment?    N/A    If no, date of appointment scheduled: Not interested in scheduling       Medications    \"Did you have any medication changes?   No     Do you have any concerns with obtaining the medications or questions about your medications that you would like a RN to review with you?  No      \"When you see the provider, I would recommend that you bring your medications with you.\"      Call Summary    \"What questions or concerns do you have about your recent visit and your follow-up care?\"  None              - RN Triage Number is [phone] if patient needs to be immediatly transferred -         \"If you have questions or things don't continue to improve, we encourage you contact us through the main clinic number at [phone].  Even if the clinic is not open, triage nurses are available 24/7 to help you.          The Clinic Community Health Worker spoke with the patient today due to ED/Hospital Discharge to discuss possible Clinic Care Coordination enrollment.  The service was described to the patient and immediate needs were discussed.  The patient declined enrollment at this time.  The PCP is encouraged to refer in the future if the patient's needs change.  "

## 2021-06-14 ENCOUNTER — OFFICE VISIT - HEALTHEAST (OUTPATIENT)
Dept: PHYSICAL THERAPY | Facility: REHABILITATION | Age: 41
End: 2021-06-14

## 2021-06-14 DIAGNOSIS — M25.611 DECREASED RIGHT SHOULDER RANGE OF MOTION: ICD-10-CM

## 2021-06-14 DIAGNOSIS — M25.511 ACUTE PAIN OF RIGHT SHOULDER: ICD-10-CM

## 2021-06-14 NOTE — PROGRESS NOTES
Subjective:      Patient ID: Mendy Roe is a 37 y.o. female.    Chief Complaint:    HPI Mendy Roe is a 37 y.o. female who presents today complaining of mouth sores x 3 weeks. The sores are like enlarged taste buds. She was in the urgency room for similar symptoms and she was given a shot of decadron to bring down the swelling. She has been feeling fatigued along with the tongue sores. The tongue is very sore and painful and spreads to the top of the back of the throat. She does wear a full top set of dentures which she started wearing at the end of last December. She has the sensation that she burnt her tongue all the time. Denies any fever. She is a former smoker and she has a 20 pack year history. She denies any new sexual partners. She has had cancer sores on the inside of the lip before, but never a cold sore.         Past Medical History:   Diagnosis Date     Anxiety      Depression        Past Surgical History:   Procedure Laterality Date     SINUS SURGERY       TUBAL LIGATION       TYMPANOSTOMY TUBE PLACEMENT       WISDOM TOOTH EXTRACTION         Family History   Problem Relation Age of Onset     Depression Mother      Valvular heart disease Mother      Depression Father      Cerebral aneurysm Father      Depression Daughter      Heart attack Paternal Grandmother      Mental illness Daughter      Depression Daughter      No Medical Problems Daughter      Bipolar disorder Brother        Social History   Substance Use Topics     Smoking status: Former Smoker     Packs/day: 0.25     Types: Cigarettes     Quit date: 12/2/2016     Smokeless tobacco: None      Comment: In the process of quiting      Alcohol use No       Review of Systems   Constitutional: Positive for fatigue. Negative for fever.   HENT: Positive for sore throat. Negative for ear pain.         Positive for tongue sores.    Skin: Negative for rash.       Objective:     /58  Pulse 94  Temp 99.3  F (37.4  C) (Oral)   Resp 16   Wt 135 lb (61.2 kg)  SpO2 99%  BMI 25.51 kg/m2    Physical Exam   Constitutional: She appears well-developed and well-nourished. No distress.   HENT:   Head: Normocephalic and atraumatic.   Mouth/Throat: She has dentures. No oral lesions.   Posterior taste buds appear enlarged, but are normal in color.  There are no oral lesions present.  She does have tongue fissures present there are no gingival or lip lesions or chelations.  She is wearing dentures on the top, but has her original teeth on bottom.   Eyes: Conjunctivae are normal.   Neck: Normal range of motion. Neck supple.   Lymphadenopathy:     She has no cervical adenopathy.   Skin: She is not diaphoretic.     Labs:  Recent Results (from the past 24 hour(s))   Rapid Strep A Screen-Throat   Result Value Ref [facility]    Rapid Strep A Antigen No Group A Strep detected, presumptive negative No Group A Strep detected, presumptive negative         Assessment:     Procedures    1. Tongue burning sensation  viscous lidocaine HC (LIDOCAINE) 2 % Soln viscous solution    dexamethasone injection 10 mg (DECADRON)   2. Tongue sore  Rapid Strep A Screen-Throat    Group A Strep, RNA Direct Detection, Throat         Patient Instructions   1. Your tongue does not have an specific lesion that has a viral appearance to it. Some tongue pain and sores can be caused by vitamin deficiencies (Vitamin B12, iron, folate, zinc, and vitamin B6). These may be tested by your primary care provider.   2. Your rapid strep test was negative today. You will only be notified of your confirmatory strep test results if they are positive and require a treatment.  3. Follow up with your primary for further evaluation.  4. There is a burning tongue syndrome that does not have a known cause, but other test's should be run before giving this diagnosis.

## 2021-06-15 PROBLEM — F39 MOOD DISORDER (H): Status: ACTIVE | Noted: 2017-01-31

## 2021-06-15 NOTE — PROGRESS NOTES
Assessment/Plan:        1. Mental health problem  Previously diagnosed severe depression vs bipolar depression.   Discussed referral to psychiatry for further evaluation and management  - Ambulatory referral to Psychiatry     Visit we also discussed weaning off Lamictal over the next 2 weeks and start medication pending psychiatry input          Subjective:    Patient ID:   Mendy Roe is a 37 y.o. female who is on Lamictal for treating Bipolar Depression, comes in with concerns about taking this medication.  She states that been having side effects from taking this medication, causing her to have mouth sores, getting rashes on her chest and feeling dizzy at times and wants to discuss alternative option.  In further view of her diagnosis, she questions her diagnosis and says that she's been told different diagnosis ranging from severe depression to bipolar.  She would like a second or third opinion on her diagnosis before even trying a different medication.      allergies, current medications, past family history, past medical history, past social history, past surgical history and problem list.    Review of Systems  A complete 10 point review of systems was obtained and is negative other than what is stated in the HPI.           Objective:   /68 (Patient Site: Right Arm, Patient Position: Sitting, Cuff Size: Adult Regular)  Pulse 76  Temp 98.5  F (36.9  C) (Oral)   Wt 135 lb 9.6 oz (61.5 kg)  SpO2 99%  BMI 25.62 kg/m2      Physical Exam  General Appearance:    Alert, cooperative, no distress,    Eyes:    PERRL, conjunctiva/corneas clear,    Throat:   Lips, mucosa, and tongue normal; teeth and gums normal   Neck:   Supple, symmetrical, trachea midline, no adenopathy;        thyroid:  No enlargement/tenderness/nodules; no carotid    bruit or JVD   Lungs:     Clear to auscultation bilaterally, respirations unlabored   Heart:    Regular rate and rhythm, S1 and S2 normal, no murmur, rub   or gallop    Abdomen:     Soft, non-tender, normal bowel sounds, no rebound or guarding, no masses, no organomegaly   Extremities:   Extremities normal, atraumatic, no cyanosis or edema   Skin:   Skin color, texture, turgor normal, no rashes or lesions

## 2021-06-15 NOTE — TELEPHONE ENCOUNTER
Covid19 result came back negative. Ok to return to work? Needs work note to return. Was out 2/8/21 thru now.  Kristen Gudino CSS

## 2021-06-15 NOTE — PATIENT INSTRUCTIONS - HE
Dear Mendy Roe,    Your symptoms show that you may have coronavirus (COVID-19). This illness can cause fever, cough and trouble breathing. Many people get a mild case and get better on their own. Some people can get very sick.    Because you also reported sore throat I would like to also test you for Strep Throat to determine if we need to treat you for that as well.    What should I do?  We would like to test you for Covid-19 virus and Strep Throat. I have placed orders for these tests.     For all employees or close contacts (except [employer] [facility] and [employer] - see below), go to your [employer] home page and scroll down to the section that says  You have an appointment that needs to be scheduled  and click the large green button that says  Schedule Now  and follow the steps to find the next available opening.  It is important that when you are asked what the reason for your appointment is that you mention you need BOTH Covid and Strep tests.  tests.     If you are unable to complete these steps or if you cannot find any available times, please call [phone] to schedule employee testing.     [facility] employees or close contacts, please call [phone].   [facility] ([employer]) employees or close contacts call [phone].    Return to work/school/ guidance:  Please let your workplace manager and staffing office know when your quarantine ends     We can t give you an exact date as it depends on the above. You can calculate this on your own or work with your manager/staffing office to calculate this. (For example if you were exposed on 10/4, you would have to quarantine for 14 full days. That would be through 10/18. You could return on 10/19.)      If you receive a positive COVID-19 test result, follow the guidance of the those who are giving you the results. Usually the return to work is 10 (or in some cases 20 days from symptom onset.) If you work at [employer], you must also be cleared by  Employee Occupational Health and Safety to return to work.        If you receive a negative COVID-19 test result and did not have a high risk exposure to someone with a known positive COVID-19 test, you can return to work once you're free of fever for 24 hours without fever-reducing medication and your symptoms are improving or resolved.      If you receive a negative COVID-19 test and If you had a high risk exposure to someone who has tested positive for COVID-19 then you can return to work 14 days after your last contact with the positive individual    Note: If you have ongoing exposure to the covid positive person, this quarantine period may be more than 14 days. (For example, if you are continued to be exposed to your child who tested positive and cannot isolate from them, then the quarantine of 7-14 days can't start until your child is no longer contagious. This is typically 10 days from onset of the child's symptoms. So the total duration may be 17-24 days in this case.)    Sign up for [employer].   We know it's scary to hear that you might have COVID-19. We want to track your symptoms to make sure you're okay over the next 2 weeks. Please look for an email from [employer]--this is a free, online program that we'll use to keep in touch. To sign up, follow the link in the email you will receive. Learn more at [email or web address]    How can I take care of myself?    Get lots of rest. Drink extra fluids (unless a doctor has told you not to)    Take Tylenol (acetaminophen) or ibuprofen for fever or pain. If you have liver or kidney problems, ask your family doctor if it's okay to take Tylenol o ibuprofen    If you have other health problems (like cancer, heart failure, an organ transplant or severe kidney disease): Call your specialty clinic if you don't feel better in the next 2 days.    Know when to call 911. Emergency warning signs include:  o Trouble breathing or shortness of breath  o Pain  or pressure in the chest that doesn't go away  o Feeling confused like you haven't felt before, or not being able to wake up  o Bluish-colored lips or face    Where can I get more information?  [facility] - About COVID-19:   [email or web address]    CDC - What to Do If You're Sick:   www.cdc.gov/coronavirus/2019-ncov/about/steps-when-sick.html

## 2021-06-16 PROBLEM — K83.9 BILE LEAK: Status: ACTIVE | Noted: 2018-08-13

## 2021-06-16 PROBLEM — R10.2 PELVIC PAIN IN FEMALE: Status: ACTIVE | Noted: 2020-12-07

## 2021-06-16 PROBLEM — K80.20 CALCULUS OF GALLBLADDER WITHOUT CHOLECYSTITIS WITHOUT OBSTRUCTION: Status: ACTIVE | Noted: 2018-07-17

## 2021-06-16 NOTE — PROGRESS NOTES
Correct pharmacy verified with patient and confirmed in snapshot? [x] yes []no    Charge captured ? [x] yes  [] no    Medications Phoned  to Pharmacy [] yes [x]no  Name of Pharmacist:  List Medications, including dose, quantity and instructions      Medication Prescriptions given to patient   [] yes  [x] no   List the name of the drug the prescription was written for.       Medications ordered this visit were e-scribed.  Verified by order class [x] yes  [] no   citalopram 10mg, hydroxyzine 10mg  Medication changes or discontinuations were communicated to patient's pharmacy: [] yes  [x] no    UA collected [] yes  [x] no    [place] Prescription Monitoring Program Reviewed? [x] yes  [] no    Referrals were made to:   Genetic testing    Future appointment was made: [x] yes  [] no  5/3/18  Dictation completed at time of chart check: [x] yes  [] no    I have checked the documentation for today s encounters and the above information has been reviewed and completed.

## 2021-06-16 NOTE — TELEPHONE ENCOUNTER
Incoming fax from pharmacy for acetaminophen-codeine (TYLENOL #3) 300-30 mg per tablet.  Please initiate PA process.    Ramandeep Head

## 2021-06-16 NOTE — PROGRESS NOTES
ASSESSMENT:  1. Acute pain of right shoulder  - XR Shoulder Right 2 or More VWS; Future  - acetaminophen-codeine (TYLENOL #3) 300-30 mg per tablet; Take 1 tablet by mouth 2 (two) times a day as needed for pain.  Dispense: 20 tablet; Refill: 0  - Ambulatory referral to PT/OT    PLAN / MDM:  I do think that she has a musculoskeletal pain though it appears to be more of soft tissue.  I will get an x-ray to evaluated at this time, and the x-ray did show normal alignment, no fractures which was discussed with the patient at the clinic.  I think that we will have her do physical therapy at this point and if there is no improvement with physical therapy will consider further evaluation.  She is agreeable with the plan.    Orders Placed This Encounter   Procedures     XR Shoulder Right 2 or More VWS     Standing Status:   Future     Number of Occurrences:   1     Standing Expiration Date:   3/23/2022     Order Specific Question:   Reason for Exam (Describe Symptoms):     Answer:   Shoulder Pain     Order Specific Question:   Is the patient pregnant?     Answer:   No     Order Specific Question:   Can the procedure be changed per Radiologist protocol?     Answer:   Yes     Ambulatory referral to PT/OT     Referral Priority:   Routine     Referral Type:   Physical Therapy or Occupational Therapy     Referral Reason:   Evaluation and Treatment     Requested Specialty:   Physical Therapy     Number of Visits Requested:   1     Medications Discontinued During This Encounter   Medication Reason     azithromycin (ZITHROMAX Z-CRISTIANE) 250 MG tablet Therapy completed     ibuprofen (ADVIL,MOTRIN) 600 MG tablet Therapy completed       No follow-ups on file.      CHIEF COMPLAINT:  Chief Complaint   Patient presents with     Shoulder Pain     right shoulder  no injury x 3 weeks- just woke up with pain       HISTORY OF PRESENT ILLNESS:  Mendy is a 40 y.o. female presenting to the clinic today with concerns of from having sudden onset of  right-sided shoulder pain that she noticed that it suddenly about 3 weeks ago.  I noted that she had woken up with the pain.  She had no prior injury.  Noted that she is unable to move shoulder without having a lot of discomfort and pain.  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  She noted some numbness in the hand which could be as a result of her previous history of neuropathy.  But she has no weaknesses.  She has been taking some ibuprofen as she can and that has not been very helpful.    REVIEW OF SYSTEMS:   She noted no headaches or complaints.  Does not have any skin rash.  Has had no fevers and no chills.  All other systems are negative.    PFSH:  Reviewed, as below.    Social History     Tobacco Use   Smoking Status Former Smoker     Types: Cigarettes     Quit date: 2016     Years since quittin.3   Smokeless Tobacco Never Used       Family History   Problem Relation Age of Onset     Depression Mother      Valvular heart disease Mother      Depression Father      Cerebral aneurysm Father      Depression Daughter      Heart attack Paternal Grandmother      Mental illness Daughter      Depression Daughter      No Medical Problems Daughter      Bipolar disorder Brother        Social History     Socioeconomic History     Marital status: Single     Spouse name: Not on file     Number of children: Not on file     Years of education: Not on file     Highest education level: Not on file   Occupational History     Not on file   Social Needs     Financial resource strain: Not on file     Food insecurity     Worry: Not on file     Inability: Not on file     Transportation needs     Medical: Not on file     Non-medical: Not on file   Tobacco Use     Smoking status: Former Smoker     Types: Cigarettes     Quit date: 2016     Years since quittin.3     Smokeless tobacco: Never Used   Substance and Sexual Activity     Alcohol use: No     Drug use: No     Sexual activity: Yes     Partners:  Male     Birth control/protection: Surgical   Lifestyle     Physical activity     Days per week: Not on file     Minutes per session: Not on file     Stress: Not on file   Relationships     Social connections     Talks on phone: Not on file     Gets together: Not on file     Attends [employer] service: Not on file     Active member of club or organization: Not on file     Attends meetings of clubs or organizations: Not on file     Relationship status: Not on file     Intimate partner violence     Fear of current or ex partner: Not on file     Emotionally abused: Not on file     Physically abused: Not on file     Forced sexual activity: Not on file   Other Topics Concern     Not on file   Social History Narrative     Not on file       Past Surgical History:   Procedure Laterality Date      SECTION, CLASSIC       CO LAP,CHOLECYSTECTOMY N/A 2018    Procedure: CHOLECYSTECTOMY, LAPAROSCOPIC;  Surgeon: Marta Bishop MD;  Location: [facility];  Service: General     CO LAP,DIAGNOSTIC ABDOMEN N/A 2018    Procedure:  DIAGNOSTIC LAPAROSCOPY;  Surgeon: Marta Bishop MD;  Location: [facility] [place];  Service: General     SINUS SURGERY       TUBAL LIGATION       TYMPANOSTOMY TUBE PLACEMENT       WISDOM TOOTH EXTRACTION         Allergies   Allergen Reactions     Augmentin [Amoxicillin-Pot Clavulanate] Nausea Only     Buspirone Nausea Only     Duloxetine Rash     Lamotrigine Rash       Active Ambulatory Problems     Diagnosis Date Noted     Attention Deficit Disorder Without Hyperactivity      Amenorrhea      Sudden Redness Of The Skin (Flushing)      Nicotine Dependence      Constipation      Abdominal Pain In Multiple Locations      Midback Pain      Obstructive Sleep Apnea      Restless Legs Syndrome      Chronic Rhinitis      Cervical Radiculopathy      Disease Of The Nails      Mood disorder (H) 2017     Calculus of gallbladder without cholecystitis without obstruction 2018      Postoperative intra-abdominal abscess, initial encounter 08/13/2018     S/P laparoscopic cholecystectomy      Elevated lipase      LFT elevation      Bile leak 08/13/2018     Pelvic pain in female 12/07/2020     Resolved Ambulatory Problems     Diagnosis Date Noted     Acute bronchitis      Drip Or Drainage Down Throat From Above      Acute serous otitis media      Acute Sinusitis      Acute pharyngitis      Stye In The Right Eye      Hematuria      Cough      Snoring (Symptom)      Past Medical History:   Diagnosis Date     Anxiety      Depression      Shortness of breath      Sleep apnea        Current Outpatient Medications   Medication Sig Dispense Refill     azelastine (ASTELIN) 137 mcg (0.1 %) nasal spray U 1 SPR IEN BID       cetirizine (ZYRTEC) 10 MG tablet Take 10 mg by mouth daily as needed for allergies.       cholecalciferol, vitamin D3, 5,000 unit Tab Take 5,000 Units by mouth.       fluticasone propionate (FLONASE ALLERGY RELIEF) 50 mcg/actuation nasal spray 1 spray into each nostril 2 (two) times a day. 16 g 1     gabapentin (NEURONTIN) 100 MG capsule        hyoscyamine (LEVSIN/SL) 0.125 mg SL tablet PLACE 1 T SUBLINGUAL ROUTE TID PRN       ibuprofen (ADVIL,MOTRIN) 200 MG tablet Take 800 mg by mouth every 6 (six) hours as needed for pain.       LORazepam (ATIVAN) 1 MG tablet Take 1 mg by mouth every 6 (six) hours as needed for anxiety.       ondansetron (ZOFRAN) 4 MG tablet Take 1 tablet (4 mg total) by mouth daily as needed for nausea. 30 tablet 3     rizatriptan (MAXALT) 5 MG tablet        rOPINIRole (REQUIP) 0.25 MG tablet Take 1 tablet (0.25 mg total) by mouth at bedtime as needed. 30 tablet 5     spironolactone (ALDACTONE) 50 MG tablet TAKE 2 TABLETS BY MOUTH AT BEDTIME. DO NOT TAKE IF PREGNANT. MAY LOWER BLOOD PRESSURE. CALL PHYSICIAN IF DIZZINESS DEVELOPS       acetaminophen-codeine (TYLENOL #3) 300-30 mg per tablet Take 1 tablet by mouth 2 (two) times a day as needed for pain. 20 tablet 0      No current facility-administered medications for this visit.        VITALS:  Vitals:    03/23/21 1631   BP: 100/70   Patient Site: Left Arm   Patient Position: Sitting   Cuff Size: Adult Regular   Pulse: 80   SpO2: 98%   Weight: 153 lb 3.2 oz (69.5 kg)     Wt Readings from Last 3 Encounters:   03/23/21 153 lb 3.2 oz (69.5 kg)   12/07/20 150 lb (68 kg)   09/14/20 155 lb 12.8 oz (70.7 kg)     Body mass index is 28.95 kg/m .    PHYSICAL EXAM:  General Appearance: Alert, cooperative, no distress, appears stated age  HEENT: Pupils are equal and reactive, extraocular motions is normal.   Lungs: Clear to auscultation bilaterally, respirations unlabored  Heart: Regular rhythm and normal rate,S1 and S2 normal, no murmur, rub, or gallop  Abdomen: Soft  Musculoskeletal: Normal range of motion noted on the other upper lower extremities. No joint swelling or deformity.  She does have some mild tenderness noted in the anterior aspect of the right shoulder joint.  There is limitation of movement on the right shoulder joint.  Limitation involves the external and internal rotation both with resistance and without resistance.  Also with abduction.  There is also difficulty with patient attempting to touch the back which is a deep internal rotation.  Her neck is supple and trapezius muscles are not in spasms.  Neurologic:  Alert and oriented times 3 Cranial nerves II-XII intact.   Psychiatric: Normal mood and affect.    MEDICATIONS:  Current Outpatient Medications   Medication Sig Dispense Refill     azelastine (ASTELIN) 137 mcg (0.1 %) nasal spray U 1 SPR IEN BID       cetirizine (ZYRTEC) 10 MG tablet Take 10 mg by mouth daily as needed for allergies.       cholecalciferol, vitamin D3, 5,000 unit Tab Take 5,000 Units by mouth.       fluticasone propionate (FLONASE ALLERGY RELIEF) 50 mcg/actuation nasal spray 1 spray into each nostril 2 (two) times a day. 16 g 1     gabapentin (NEURONTIN) 100 MG capsule        hyoscyamine  (LEVSIN/SL) 0.125 mg SL tablet PLACE 1 T SUBLINGUAL ROUTE TID PRN       ibuprofen (ADVIL,MOTRIN) 200 MG tablet Take 800 mg by mouth every 6 (six) hours as needed for pain.       LORazepam (ATIVAN) 1 MG tablet Take 1 mg by mouth every 6 (six) hours as needed for anxiety.       ondansetron (ZOFRAN) 4 MG tablet Take 1 tablet (4 mg total) by mouth daily as needed for nausea. 30 tablet 3     rizatriptan (MAXALT) 5 MG tablet        rOPINIRole (REQUIP) 0.25 MG tablet Take 1 tablet (0.25 mg total) by mouth at bedtime as needed. 30 tablet 5     spironolactone (ALDACTONE) 50 MG tablet TAKE 2 TABLETS BY MOUTH AT BEDTIME. DO NOT TAKE IF PREGNANT. MAY LOWER BLOOD PRESSURE. CALL PHYSICIAN IF DIZZINESS DEVELOPS       acetaminophen-codeine (TYLENOL #3) 300-30 mg per tablet Take 1 tablet by mouth 2 (two) times a day as needed for pain. 20 tablet 0     No current facility-administered medications for this visit.

## 2021-06-16 NOTE — TELEPHONE ENCOUNTER
Central PA team  [phone]  Pool: [facility] PA MED ([id number])          PA has been initiated.       PA form completed and faxed insurance via Cover My Meds     Key:  [id number]     Medication:  Acetaminophen-Codeine #3 300-30MG tablets    Insurance:  [employer]        Response will be received via fax and may take up to 5-10 business days depending on plan

## 2021-06-16 NOTE — PROGRESS NOTES
Correct pharmacy verified with patient and confirmed in snapshot? [x] yes []no    Charge captured ? [x] yes  [] no    Medications Phoned  to Pharmacy [] yes [x]no  Name of Pharmacist:  List Medications, including dose, quantity and instructions      Medication Prescriptions given to patient   [] yes  [x] no   List the name of the drug the prescription was written for.       Medications ordered this visit were e-scribed.  Verified by order class [x] yes  [] no    Medication changes or discontinuations were communicated to patient's pharmacy: [x] yes  [] no   Start Buspar 5 mg TID PRN   Start Cymbalta 30 mg once daily  Discontinue Lamictal    UA collected [] yes  [x] no    [place] Prescription Monitoring Program Reviewed? [x] yes  [] no    Referrals were made to: NA      Future appointment was made: [x] yes  [] no  5/3/18 with Melissa Hanson CNP    Dictation completed at time of chart check: [x] yes  [] no    I have checked the documentation for today s encounters and the above information has been reviewed and completed.

## 2021-06-16 NOTE — PROGRESS NOTES
Psychiatric  Progress Note  Date of visit:3/28/2018         Discussion of Care and Treatment Recommendations:   This is a 37 y.o. female with  a history of depression anxiety restless leg syndrome who presents to the clinic to establish psychiatric care for medication management for a follow up appointment   Past Medications:  Paxil-no effect, Celexa-mild effect, Wellbutrin-no effect,  Effexor - migraine , trazodone -increased insomnia, Effexor-no effect.  Lamotrigine-per patient gave her rashes and mild sores  Duloxetine - Allergic reaction = itching, rashes    Buspar - , nausea    Last visit 3/8/18 Recommendation at last visit .  1. Continue psychotherapy :   at the [facility] and plans to continue working with her  2.  Discontinue lamotrigine: Patient has not been taking it for more than 3 weeks and does not want to continue taking it she is citing side effects-rashes and mouth sores  3.  Start Cymbalta 30 mg daily and titrate up in the next few weeks per patient's preference  4. Start BuSpar 5 mg 3 times daily as needed -anxiety  5.  Wean off lorazepam then discontinue: Has some pills left from her last prescription by previous provider in November 2017.  Patient tells me she does not take it frequently  6.  Return to the clinic in 6-8 weeks call in between visits with any questions or concerns     Patient and I reviewed diagnosis and treatment plan and patient agrees with following recommendations:  Ongoing education given regarding diagnostic and treatment options with adequate verbalization of understanding.  Plan   1.Restart Celexa 10 mg daily   2- Start Hydroxyzine - Atarax - 10 mg TID PRN   3- GeneSight testing   7- RTC- MAy 3rd         Diagnoses:     MDD  Anxiety unspecified     Patient Active Problem List   Diagnosis     Attention Deficit Disorder Without Hyperactivity     Acute Bronchitis     Amenorrhea     Sudden Redness Of The Skin (Flushing)     Drip Or Drainage Down Throat From  "Above     Nicotine Dependence     Acute Serous Otitis Media     Acute Sinusitis     Acute Pharyngitis     Stye In The Right Eye     Constipation     Hematuria     Cough     Abdominal Pain In Multiple Locations     Snoring (Symptom)     Midback Pain     Obstructive Sleep Apnea     Restless Legs Syndrome     Chronic Rhinitis     Cervical Radiculopathy     Disease Of The Nails     Mood disorder             Chief Complaint / Subjective:    Chief complaint: \" I had a reaction to Cymbalta \"     History of Present Illness:   Per patient's statement -she had an allergic reaction to Cymbalta and did not tolerate BuSpar.  She stopped taking both medications and is now considering restarting Celexa which she tried over 10 years ago.  She is also open to trying hydroxyzine instead of BuSpar for anxiety.  My recommendation is to do GeneSight testing as she has not tolerated many medications in the past .  She is agreeable to this.  She will get in touch with her insurance company first to figure out coverage before we could proceed.                                            Mental Status Examination:   General: Adequate hygiene, cooperative  Speech: Normal in rate and tone  Language: Intact  Thought process: Coherent  Thought content:                           Auditory hallucinations- absent                           Visual Hallucinations - Absent                           Delusions Absent                           Loose Associations:  No                          Suicidal thoughts: Absent                          Homicidal thoughts: Absent                       Affect: Neutral  Mood: Neutral   Intellectual functioning: Within normal limits  Memory: Within normal limits  Fund of knowledge: Average  Attention and concentration: Within normal limits  Gait: Steady  Psychomotor activity: Calm, no agitation  Muscles: No atrophy, no abnormal movements  InSight and judgment: Fair    Medication changes: See above   Medication adherence: " "compliant  Medication side effects: se above    Information about medications: Side effects, benefits and alternative treatments discussed and patient agrees with capacity to do so.    Psychotherapy: Supportive therapy day-to-day living    Education: Diet, exercise, abstinence from drugs and alcohol, patient will not drive if sedated and medications or  under influence of any substance    Lab Results:   Personally reviewed and discussed with the patient    Lab Results   Component Value Date    WBC 14.2 (H) 10/30/2014    HGB 15.1 10/30/2014    HCT 44.5 10/30/2014     10/30/2014    ALT 18 03/13/2017    AST 14 03/13/2017     03/13/2017    K 3.4 (L) 03/13/2017     (H) 03/13/2017    CREATININE 0.78 03/13/2017    BUN 7 (L) 03/13/2017    CO2 23 03/13/2017    TSH 0.90 04/26/2011       Vital signs:    Vitals:    03/28/18 0831   BP: 123/74   Patient Site: Right Arm   Patient Position: Sitting   Cuff Size: Adult Regular   Pulse: 80   Resp: 18   Temp: 98.5  F (36.9  C)   TempSrc: Oral   Height: 5' 1\" (1.549 m)     Allergies:   Allergies   Allergen Reactions     Augmentin [Amoxicillin-Pot Clavulanate]      Buspirone Nausea Only     Duloxetine Rash          Medications:       Current Outpatient Prescriptions on File Prior to Visit   Medication Sig Dispense Refill     busPIRone (BUSPAR) 5 MG tablet Take 1 tablet (5 mg total) by mouth 3 (three) times a day as needed. 90 tablet 0     DULoxetine (CYMBALTA) 30 MG capsule Take 1 capsule (30 mg total) by mouth daily. 30 capsule 1     ibuprofen (ADVIL,MOTRIN) 200 MG tablet Take 800 mg by mouth every 6 (six) hours as needed for pain.       loratadine (CLARITIN) 10 mg tablet Take 10 mg by mouth daily as needed for allergies.       LORazepam (ATIVAN) 1 MG tablet Take 1 tablet (1 mg total) by mouth every 6 (six) hours as needed for anxiety. 10 tablet 0     ranitidine (ZANTAC) 150 MG tablet Take 150 mg by mouth every 12 (twelve) hours.       rOPINIRole (REQUIP) 0.25 MG " tablet TAKE 1-2 TABLETS (0.25-0.5 MG TOTAL) BY MOUTH DAILY. 1-3 HOURS BEFORE BEDTIME 90 tablet 1     No current facility-administered medications on file prior to visit.             Review of Systems:    Otherwise reminder of review of systems is negative    Coordination of Care:   More than 25 minutes spent on this visit  with more than 50% of time spent on coordination of care including: Educating patient about diagnosis, prognosis, side effects and benefits of medications, diet, exercise.  Time also spent on entering orders and preparing documentation for the visit.Time also spent providing supportive therapy regarding above issues.    This note was created using a dictation system. All typing errors or contextual distortion is unintentional and software inherent.
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
## 2021-06-16 NOTE — PROGRESS NOTES
Reason for visit - Initial Consult  Sleep - Doesn't sleep well. Averages 6 hours a night. Difficulty falling asleep and staying asleep.   Depression -  Yes, severe, all day, everyday. Ever since going off the Lamictal it has gotten worse.   Anxiety - Moderate. Daily. Ativan helps with it, also helps her fall asleep sometimes.  Panic attacks - Yes. Often, but not daily.  SI/HI - Denies.  Therapist - Yes, sees Patito Humphries at [facility], every couple of weeks. Found it to be helpful.  Side effects from medication - Lamictal. Stopped taking it due to the side effects, mouth sores, dizziness and rashes.     ZANE HARRIS  Search Criteria: Last Name 'Zane' and First Name 'Mendy' and  =  and Request Period = '  to ' - 3 out of 3 Recipients Selected.  Fill Date Product, Str, Form Qty Days Pt ID Prescriber Written RX# N/R* Pharm **MED+  ---------- -------------------------------- ------ ---- --------- ---------- ---------- ------------ ----- --------- ------  2017 LORAZEPAM 1 MG TABLET 30.00 30 [id number] 2017 [id number] R [id number] 00.0  2017 LORAZEPAM 1 MG TABLET 30.00 30 [id number] 2017 [id number] N [id number] 00.0  2017 LORAZEPAM 0.5 MG TABLET 10.00 10 [id number] 2017 [id number] N [id number] 00.0  2017 HYDROCODONE-ACETAMIN 5-325 MG 10.00 2 [id number] 2017 [id number] N [id number] 25.0  *N/R N=New R=Refill  +MED Daily  Prescribers for prescriptions listed  ----------------------------------------------------------------------------------------------------------------------------------  [id number] ERYN KURTZ MD; [address], [place] [address]  [id number] TATA ABRAMS NP; [facility], [address] [address], [facility] [address]  [id number] ANKIT MANJARREZ; DR[facility] ANKIT [employer]IR[facility] [employer], [address] [facility] [address]  Pharmacies that dispensed prescriptions  listed  ----------------------------------------------------------------------------------------------------------------------------------  [id number] [facility] : [id number], [address] [address],  Patients that match search criteria  ----------------------------------------------------------------------------------------------------------------------------------  [id number] ZANE HARRIS,  76; [address], [place] [address]  [id number] ZANE HARRIS,  [birth year]; [address], [place] [address]  [id number] ZANE HARRIS,  76;  [address], [place] [address]  MED Summary  This section displays cumulative MED values by unique recipient. The MED Max value is the maximum occurrence of cumulative MED  sustained for any 3 consecutive days. This value is calculated based on prescriptions dispensed during the date range requested.  -----------------------------------------------------------------------------------------------------------------------------------  0 STEVEN DEGROOT; 1980; [facility] [address] [place] [address]  0 STEVEN DEGROOT; 1980; [address], [place] [address]

## 2021-06-16 NOTE — PROGRESS NOTES
"Reason for visit - follow up  Sleep - disrupted. Trouble falling asleep and staying asleep  Depression - 5/5  Anxiety - 4/5  Panic attacks - \"little bit\"  SI/HI - denies  Therapist - was seeing every week, now every 2 weeks  Side effects from medication - cymbalta and buspirone    Patient was referred to genetic testing. Patient was called after she left appointment to advise her to check with her insurance to see if they would cover this lab.  "

## 2021-06-16 NOTE — PROGRESS NOTES
Date of Service:  3/8/2018    Name:  Mendy Roe  :  1980  MRN:  [id number]    HPI:   Mendy Roe is a 37 y.o. female with a history of depression anxiety restless leg syndrome who presents to the clinic to establish psychiatric care for medication management.  Patient  previously saw Jaclyn Watts before she left [facility].  At this time she had a diagnosis of bipolar affective disorder.  However patient does not agree with his diagnosis and recently had a new DA completed which concluded her diagnoses were discussed depression.      Past Medications:  Paxil-no effect, Celexa-mild effect, Wellbutrin-no effect,  Effexor - migraine , trazodone -increased insomnia, Effexor-no effect.  Lamotrigine-per patient gave her rashes and mild sores  Today patient is denies yary or  hypomania symptoms.  She is endorsing depression symptoms which include lack of motivation, feeling down, isolation, having no energy, lack of interest in pleasurable activities, easily irritable, and feeling most of the days.  She denies suicidal homicidal ideations.  She does not appear to be in any apparent distress.  She does not appear to be in any imminent danger to herself or others.  She tells me she feels safe and verbally contracts for safety.  She also denies hallucinations delusions or paranoia.   She tells me that she is currently going through a divorce but has also started dating.  They have been  from her ex-about a year and a half now.  Financial constraints enforcement contributed to depression.  She is currently feeling therapist to [facility].  She stopped taking her lamotrigine over 3 weeks ago.  She has continued to take lorazepam at bedtime from a prescription from November and states that she is using it sparingly because he had nothing as prescribed for her.  She would like to be started on antidepressant medications today.  After reviewing her past trials of medications  lisinopril on trying her on Cymbalta which she has never tried in the past.  She tells me she is very sensitive to medications and would like to start on a low-dose.  And therefore we will start him on 30 mg of Cymbalta plan to titrate in the next few weeks to 60 mg.  In addition we will start on BuSpar 5 mg 3 times daily as needed for anxiety.  He denies cutting herself from the lorazepam as my plan is to discontinue this medications and treat anxiety with none- benzodiazepines options if possible.  Patient is agreeable to this plan.      Psychiatric History:  Current psychiatrist: None   Current psychotherapist:  Patito Humphries for psychotherapy at the [facility] [place] and plans to continue working with her  Current : None  Diagnoses: Depression   Suicide attempts:: Patient reports she had a suicide attempt when she was a teenager but did not tell anyone about it at the time and did not require medical treatment  Current medications: Lamotrigine , Lorazepam - currently not taking .  Electroconvulsive therapy: Denies   Judicial commitments: Denies .    Previous mental health diagnosis:    History of bipolar disorder and borderline personality disorder diagnoses in the chart; also history of depressive disorder    Chemical use History:                      Patient denied any current or past illegal drug use.  Patient reports she quit smoking in December 2016.      Past Medical History:         Patient Active Problem List   Diagnosis     Attention Deficit Disorder Without Hyperactivity     Acute Bronchitis     Amenorrhea     Sudden Redness Of The Skin (Flushing)     Drip Or Drainage Down Throat From Above     Nicotine Dependence     Acute Serous Otitis Media     Acute Sinusitis     Acute Pharyngitis     Stye In The Right Eye     Constipation     Hematuria     Cough     Abdominal Pain In Multiple Locations     Snoring (Symptom)     Midback Pain     Obstructive Sleep Apnea     Restless Legs  Syndrome     Chronic Rhinitis     Cervical Radiculopathy     Disease Of The Nails     Mood disorder          Past Medical History:   Diagnosis Date     Anxiety      Depression        Past Surgical History:   Procedure Laterality Date     SINUS SURGERY       TUBAL LIGATION       TYMPANOSTOMY TUBE PLACEMENT       WISDOM TOOTH EXTRACTION          Family Psychiatric History:      Mental illness:aunt with schizophrenia ,Bipolar disorder in her brother, father  and mother - depression   Addiction: Denies  Suicide: Denies      Social History:       Marital Status :  with divorce pending  Number of children:  4 girls,   Current living circumstances: lives in a Apartment with her 16-year-old daughter, her 20-year-old daughter, and her 20-year-old daughter's two children.  Current sources of financial support: working Full time       Obstetric History:  Last menstrual period:   Tubes tired per patient      History:  Denied  service.    Access to weapons  Denies access to weapons.           Trauma & Abuse History:  Major accidents and injuries: Denies   Concussion or traumatic brain injury: Denies .  Abuse: Denies      Birth & Development History:  City and state of birth: [place]   Highest education achieved: LPN   Legal History:  Denies       [place] Prescription Monitoring Program:  No worrisome pharmacy activity.  Not indicated for this patient.    Medications:   These were reviewed.    Current Outpatient Prescriptions on File Prior to Visit   Medication Sig Dispense Refill     ibuprofen (ADVIL,MOTRIN) 200 MG tablet Take 800 mg by mouth every 6 (six) hours as needed for pain.       loratadine (CLARITIN) 10 mg tablet Take 10 mg by mouth daily as needed for allergies.       LORazepam (ATIVAN) 1 MG tablet Take 1 tablet (1 mg total) by mouth every 6 (six) hours as needed for anxiety. 10 tablet 0     ranitidine (ZANTAC) 150 MG tablet Take 150 mg by mouth every 12 (twelve) hours.       rOPINIRole (REQUIP)  "0.25 MG tablet TAKE 1-2 TABLETS (0.25-0.5 MG TOTAL) BY MOUTH DAILY. 1-3 HOURS BEFORE BEDTIME 90 tablet 1     lamoTRIgine (LAMICTAL) 100 MG tablet Take 100 mg by mouth daily.       No current facility-administered medications on file prior to visit.      Lab Results:   Personally reviewed and discussed with the patient    Lab Results   Component Value Date    WBC 14.2 (H) 10/30/2014    HGB 15.1 10/30/2014    HCT 44.5 10/30/2014     10/30/2014    ALT 18 03/13/2017    AST 14 03/13/2017     03/13/2017    K 3.4 (L) 03/13/2017     (H) 03/13/2017    CREATININE 0.78 03/13/2017    BUN 7 (L) 03/13/2017    CO2 23 03/13/2017    TSH 0.90 04/26/2011     No results found for: PHENYTOIN, PHENOBARB, VALPROATE, CBMZ    Vital signs:  Vitals:    03/08/18 1250   BP: 117/67   Patient Site: Right Arm   Patient Position: Sitting   Cuff Size: Adult Regular   Pulse: 69   Resp: 16   Temp: 98.4  F (36.9  C)   TempSrc: Oral   Weight: 137 lb (62.1 kg)   Height: 5' 1\" (1.549 m)     Allergies:   Augmentin [amoxicillin-pot clavulanate]  Allergies   Allergen Reactions     Augmentin [Amoxicillin-Pot Clavulanate]          Associated Clinical Documents:       Notes reviewed in EPIC and [facility] including: medication reconciliation, progress notes, recent labs, PMH, and OSH records.    ROS:       10 point ROS was negative except for the items listed in HPI.  No Medication s/e's      MSE:      Alert & oriented x 3.   Appearance: Appears stated age, casually dressed.  Speech: Normal rate, rhythm and tone.  Gait: Normal.  Musculoskeletal: Normal strength, no abnormal movements.  Mood/Affect: Neutral.  Thought Process: Normal rate, logical.  Thought Content: No suicide or homicide ideation.  Associations: Intact, no delusions.  Perceptions: No hallucinations.  Memory: recent and remote memory intact.  Attention span and concentration: normal.  Language: Intact.  Fund of Knowledge: Normal.  Insight and Judgement: Adequate.    Clinical Outcome " Measures:  1. PHQ-9: Total score : 18    Impression:      Persistent Depressive Disorder  Anxiety    Plan:       Patient and I reviewed diagnosis and treatment plan.   Reviewed risks/benefits of medication with patient.  Ongoing education given regarding diagnostic and treatment options with adequate verbalization of understanding  Patient agrees with following recommendations:    1. Continue psychotherapy :  Patito Humphries for psychotherapy at the [facility] in [place] and plans to continue working with her  2.  Discontinue lamotrigine: Patient has not been taking it for more than 3 weeks and does not want to continue taking it she is citing side effects-rashes and mouth sores  3.  Start Cymbalta 30 mg daily and titrate up in the next few weeks per patient's preference  4. Start BuSpar 5 mg 3 times daily as needed -anxiety  5.  Wean off lorazepam then discontinue: Has some pills left from her last prescription by previous provider in November 2017.  Patient tells me she does not take it frequently  6.  Return to the clinic in 6-8 weeks call in between visits with any questions or concerns    Total Time:      60 Minutes spent on this visit with >50% time spent on  discussing and educating patient about diagnosis, treatment options, risks, benefits ,side effects of medications and instructions for follow up.  Time also spent on reviewing  EHR, documentation and entering orders.      This dictation was completed with speech recognition software and there may be unintended word substitutions.

## 2021-06-17 NOTE — PATIENT INSTRUCTIONS - HE
Patient Instructions by Jadiel Parra DO at 8/5/2019  6:00 PM     Author: Jadiel Parra DO Service: -- Author Type: Physician    Filed: 8/5/2019  7:11 PM Encounter Date: 8/5/2019 Status: Addendum    : Jadiel Parra DO (Physician)    Related Notes: Original Note by Jadiel Parra DO (Physician) filed at 8/5/2019  7:10 PM       The cefdinir should treat any strep infection if the follow up test is positive. We will notify you if the pending strep study is positive. Otherwise, you can assume the test was negative if not contacted over the next 48 hours. I think you should use the antibiotic treatment even if the strep test is negative to treat for other bacterial causes.       Patient Education     Local Lymph Node Infection, Antibiotic Treatment    You have a bacterial infection of a lymph node. The lymph nodes are part of your immune system. They are found under the jaw and along the side of the neck, in the armpits, and in the groin. An infection or inflammation in nearby tissues causes the lymph nodes to swell and become tender.  When a bacterial infection occurs in the lymph node, it becomes very painful. The nearby skin gets red and warm. You may also have a fever.  Antibiotics and hot compresses are used to treat this infection. The pain and redness will get better over the next 7 to 10 days. Swelling may take several months to go away.  Sometimes an abscess (with pus) forms inside the lymph node. If this happens, antibiotics may not be enough to cure the infection. Minor surgery may be needed to drain the pus. You may need blood tests or a study of the pus inside the abscess to guide your treatment.  Home care  Follow these guidelines when caring for yourself at home:    Take all of the antibiotic medicine exactly as prescribed until it is gone. Be careful not to miss any doses, especially during the first few days.    Make a hot compress by running hot water over a face cloth. Apply it to the sore  area until the cloth cools off. Repeat this for 20 minutes. Use the hot compress 3 times a day for the first 3 days, or until the pain and redness begin to get better. The heat will increase the blood flow to the area and speed the healing process.    You may use acetaminophen or ibuprofen to control pain and fever, unless another medicine was prescribed for this. Dont use ibuprofen in children under 6 months of age. If you have chronic liver or kidney disease, talk with your healthcare provider before using these medicines. Also talk with your provider if youve had a stomach ulcer or gastrointestinal bleeding. Dont give aspirin to anyone under 18 years of age who is ill with a fever. It may cause severe liver damage.  Follow-up care  Follow up with your healthcare provider, or as advised, after you finish the antibiotics.  When to seek medical advice  Call your healthcare provider right away if any of these occur:    Redness, swelling or pain in the lymph node gets worse    The lymph node gets bigger, becomes soft in the middle, or doesnt seem to be getting better after youve taken the antibiotics for 2 days (48 hours)    Pus or fluid drains from the lymph node    You have trouble breathing or swallowing  Also call your provider right away if you have a fever, or your saul provider if your child has a fever (see Fever and children, below)  Fever and children  Always use a digital thermometer to check your saul temperature. Never use a mercury thermometer.  For infants and toddlers, be sure to use a rectal thermometer correctly. A rectal thermometer may accidentally poke a hole in (perforate) the rectum. It may also pass on germs from the stool. Always follow the product makers directions for proper use. If you dont feel comfortable taking a rectal temperature, use another method. When you talk to your saul healthcare provider, tell him or her which method you used to take your saul temperature.  Here are  guidelines for fever temperature. Ear temperatures arent accurate before 6 months of age. Dont take an oral temperature until your child is at least 4 years old.  Infant under 3 months old:    Ask your saul healthcare provider how you should take the temperature.    Rectal or forehead (temporal artery) temperature of 100.4 F (38 C) or higher, or as directed by the provider    Armpit temperature of 99 F (37.2 C) or higher, or as directed by the provider  Child age 3 to 36 months:    Rectal, forehead (temporal artery), or ear temperature of 102 F (38.9 C) or higher, or as directed by the provider    Armpit temperature of 101 F (38.3 C) or higher, or as directed by the provider  Child of any age:    Repeated temperature of 104 F (40 C) or higher, or as directed by the provider    Fever that lasts more than 24 hours in a child under 2 years old. Or a fever that lasts for 3 days in a child 2 years or older.   Date Last Reviewed: 5/1/2017 2000-2017 [employer]. [address] [place] [address]. All rights reserved. This information is not intended as a substitute for professional medical care. Always follow your healthcare professional's instructions.           Patient Education     Self-Care for Sore Throats    Sore throats happen for many reasons, such as colds, allergies, and infections caused by viruses or bacteria. In any case, your throat becomes red and sore. Your goal for self-care is to reduce your discomfort while giving your throat a chance to heal.  Moisten and soothe your throat  Tips include the following:    Try a sip of water first thing after waking up.    Keep your throat moist by drinking 6 or more glasses of clear liquids every day.    Run a cool-air humidifier in your room overnight.    Avoid cigarette smoke.     Suck on throat lozenges, cough drops, hard candy, ice chips, or frozen fruit-juice bars. Use the sugar-free versions if your diet or medical condition requires  them.  Gargle to ease irritation  Gargling every hour or 2 can ease irritation. Try gargling with 1 of these solutions:    1/4 teaspoon of salt in 1/2 cup of warm water    An over-the-counter anesthetic gargle  Use medicine for more relief  Over-the-counter medicine can reduce sore throat symptoms. Ask your pharmacist if you have questions about which medicine to use:    Ease pain with anesthetic sprays. Aspirin or an aspirin substitute also helps. Remember, never give aspirin to anyone 18 or younger, or if you are already taking blood thinners.     For sore throats caused by allergies, try antihistamines to block the allergic reaction.    Remember: unless a sore throat is caused by a bacterial infection, antibiotics wont help you.  Prevent future sore throats  Prevention tips include the following:    Stop smoking or reduce contact with secondhand smoke. Smoke irritates the tender throat lining.    Limit contact with pets and with allergy-causing substances, such as pollen and mold.    When youre around someone with a sore throat or cold, wash your hands often to keep viruses or bacteria from spreading.    Dont strain your vocal cords.  Call your healthcare provider  Contact your healthcare provider if you have:    A temperature over 101 F (38.3 C)    White spots on the throat    Great difficulty swallowing    Trouble breathing    A skin rash    Recent exposure to someone else with strep bacteria    Severe hoarseness and swollen glands in the neck or jaw   Date Last Reviewed: 8/1/2016 2000-2017 [employer]. [address] [place] [address], [place] [address]. All rights reserved. This information is not intended as a substitute for professional medical care. Always follow your healthcare professional's instructions.

## 2021-06-17 NOTE — PATIENT INSTRUCTIONS - HE
Patient Instructions by Marta Gutiérrez PA-C at 1/20/2020  6:50 PM     Author: Marta Gutiérrez PA-C Service: -- Author Type: Physician Assistant    Filed: 1/20/2020  7:19 PM Encounter Date: 1/20/2020 Status: Signed    : Marta Gutiérrez PA-C (Physician Assistant)         Patient Education     Bronchitis, Antibiotic Treatment (Adult)    Bronchitis is an infection of the air passages (bronchial tubes) in your lungs. It often occurs when you have a cold. This illness is contagious during the first few days and is spread through the air by coughing and sneezing, or by direct contact (touching the sick person and then touching your own eyes, nose, or mouth).  Symptoms of bronchitis include cough with mucus (phlegm) and low-grade fever. Bronchitis usually lasts 7 to 14 days. Mild cases can be treated with simple home remedies. More severe infection is treated with an antibiotic.  Home care  Follow these guidelines when caring for yourself at home:    If your symptoms are severe, rest at home for the first 2 to 3 days. When you go back to your usual activities, don't let yourself get too tired.    Do not smoke. Also avoid being exposed to secondhand smoke.    You may use over-the-counter medicines to control fever or pain, unless another medicine was prescribed. If you have chronic liver or kidney disease or have ever had a stomach ulcer or gastrointestinal bleeding, talk with your healthcare provider before using these medicines. Also talk to your provider if you are taking medicine to prevent blood clots. Aspirin should never be given to anyone younger than 18 years of age who is ill with a viral infection or fever. It may cause severe liver or brain damage.    Your appetite may be poor, so a light diet is fine. Avoid dehydration by drinking 6 to 8 glasses of fluids per day (such as water, soft drinks, sports drinks, juices, tea, or soup). Extra fluids will help loosen secretions in the  nose and lungs.    Over-the-counter cough, cold, and sore-throat medicines will not shorten the length of the illness, but they may be helpful to reduce symptoms. (Note: Do not use decongestants if you have high blood pressure.)    Finish all antibiotic medicine. Do this even if you are feeling better after only a few days.  Follow-up care  Follow up with your healthcare provider, or as advised. If you had an X-ray or ECG (electrocardiogram), a specialist will review it. You will be notified of any new findings that may affect your care.  If you are age 65 or older, or if you have a chronic lung disease or condition that affects your immune system, or you smoke, ask your healthcare provider about getting a pneumococcal vaccine and a yearly flu shot (influenza vaccine).  When to seek medical advice  Call your healthcare provider right away if any of these occur:    Fever of 100.4 F (38 C) or higher, or as directed by your healthcare provider    Coughing up increased amounts of colored sputum    Weakness, drowsiness, headache, facial pain, ear pain, or a stiff neck  Call 911  Call 911 if any of these occur.    Coughing up blood    Worsening weakness, drowsiness, headache, or stiff neck    Trouble breathing, wheezing, or pain with breathing  Date Last Reviewed: 9/13/2015 2000-2017 [employer]. [address] [place] [address], [place] [address]. All rights reserved. This information is not intended as a substitute for professional medical care. Always follow your healthcare professional's instructions.

## 2021-06-17 NOTE — PROGRESS NOTES
Correct pharmacy verified with patient and confirmed in snapshot? [x] yes []no    Medications Phoned  to Pharmacy [] yes [x]no  Name of Pharmacist:  List Medications, including dose, quantity and instructions      Medication Prescriptions given to patient   [] yes  [x] no   List the name of the drug the prescription was written for.      Medications ordered this visit were e-scribed.  Verified by order class [] yes  [x] no      Medication changes or discontinuations were communicated to patient's pharmacy:  [] yes  [x] no  Pharmacist Spoke With:     UA collected [] yes  [x] no    [place] Prescription Monitoring Program Reviewed? [x] yes  [] no    Referrals/Labs were made to:     Completed Charge Capture?  [x] yes  [] no    Future appointment was made: [x] yes  [] no  7/3/18  Dictation completed at time of chart check: [x] yes  [] no    I have checked the documentation for today s encounters and the above information has been reviewed and completed.

## 2021-06-17 NOTE — TELEPHONE ENCOUNTER
Telephone Encounter by Lottie Padilla at 4/9/2021 12:19 PM     Author: Lottie Padilla Service: -- Author Type: --    Filed: 4/9/2021 12:20 PM Encounter Date: 4/5/2021 Status: Signed    : Lottie Padilla       Per insurance PA is not needed.   Pharmacy provided information below:

## 2021-06-17 NOTE — PROGRESS NOTES
[facility]   Shoulder Initial Evaluation    Patient Name: Mendy Roe  Date of evaluation: 5/3/2021  Referral Diagnosis: Acute pain of right shoulder  Referring provider: Rayne Quijano*  Visit Diagnosis:     ICD-10-CM    1. Acute pain of right shoulder  M25.511    2. Decreased right shoulder range of motion  M25.611        Assessment:       Mendy Roe is a 40 y.o. female who presents to therapy today with chief complaints of right shoulder pain. Onset date of sx was 3/2/2021. Pain symptoms are variable and worsened with reaching and overall use of the right upper extremity.  Functional impairments include pain with reaching, pain with sleeping on her right side, and decreased activity tolerance with her right upper extremity.  Pt demo's signs and sx consistent with acute right shoulder pain. Treatment was initiated today targeting rotator cuff strengthening and right shoulder range of motion.    Skilled PT is required to address the impairments listed above.  Pt. is appropriate for skilled PT intervention as outlined in the Plan of Care (POC).  Pt. is a good candidate for skilled PT services to improve pain levels and function.  Plan of care and goals were established in collaboration with patient.     Goals:  Pt. will demonstrate/verbalize independence in self-management of condition in : 4 weeks  Pt. will be independent with home exercise program in : 4 weeks  Patient will reach / maintain arm movement: overhead;for work;for home chores;for dressing;with full ROM;with no pain;in other weeks  in other weeks: 8 weeks    Pt will: be able to sleep on her right side without additional discomfort to allow for more restful sleep and return to prior level of function in 8 weeks      Patient's expectations/goals are realistic.    Barriers to Learning or Achieving Goals:  No Barriers.       Plan / Patient Instructions:        Plan of Care:   Communication with: Referral  Source  Patient Related Instruction: Nature of Condition;Treatment plan and rationale;Self Care instruction;Basis of treatment;Expected outcome;Next steps;Precautions;Posture;Body mechanics  Times per Week: 1  Number of Weeks: 8  Number of Visits: 8  Discharge Planning: Patient will discharge when all physical therapy goals are met or the patient fails to make progress.  Precautions / Restrictions : None  Therapeutic Exercise: ROM;Strengthening;Stretching  Neuromuscular Reeducation: kinesio tape;posture;TNE;core  Manual Therapy: soft tissue mobilization;myofascial release;joint mobilization      POC and pathology of condition were reviewed with patient.  Pt. is in agreement with the Plan of Care  A Home Exercise Program (HEP) was initiated today.  Pt. was instructed in exercises by PT and patient was given a handout with detailed instructions.  Plan for next visit: progress rotator cuff strengthening, assess if patient still feels like there is numbness at the anterior portion of her right shoulder  .   Subjective:        Patient arrives to physical therapy today with right shoulder pain complaints. She reports onset of her right shoulder pain as 2021, she saw a chiropractor that day for an adjustment and a massage. At first she thought she had slept on It wrong, she got to a point where it was difficulty to wash her hair, but reports that now it is about 60% better. She has been using tylenol and allowing hot water to run over her shoulder in the shower to help it feel better. 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    Social information:   Occupation: LPN   Work Status:Working full time   Equipment Available: None    Pain Ratin  Pain rating at best: 0  Pain rating at worst: 10  Pain description: aching    Functional limitations are described as occurring with:   lifting  reaching overhead  sleeping  work patient reports some discomfort throughout  the day with use of her R UE    Patient reports benefit from:  pain medication       Objective:      Note: Items left blank indicates the item was not performed or not indicated at the time of the evaluation.    Patient Outcome Measures :    Shoulder Pain and Disability Index (SPADI) in %: 100     Scores range from 0-100%, where a score of 0% represents minimal pain and maximal function. The minimal clincically important difference is a score reduction of 10%.    Shoulder Examination  1. Acute pain of right shoulder     2. Decreased right shoulder range of motion       Involved side: Right  Posture Observation:      General sitting posture is  normal.  Cervical Clearing: Normal    Shoulder/Elbow ROM    Date: 5/3/2021     Shoulder and Elbow ROM ( )   AROM in degrees AROM in degrees AROM in degrees    Right Left Right Left Right Left   Shoulder Flexion (0-180 ) 136,        Shoulder Abduction (0-180 ) 102,        Shoulder Extension (0-60 ) 30 32       Shoulder ER (0-90 )         Shoulder IR (0-70 )         Shoulder IR/Ext L3, ERP T7       Elbow Flexion (150 )         Elbow Extension (0 )          PROM in degrees PROM in degrees PROM in degrees    Right Left Right Left Right Left   Shoulder Flexion (0-180 ) 165 170       Shoulder Abduction (0-180 ) 165 170       Shoulder Extension (0-60 )         Shoulder ER (0-90 ) 100 100       Shoulder IR (0-70 ) 60 60       Elbow Flexion (150 )         Elbow Extension (0 )           Shoulder/Elbow Strength   Date: 5/3/2021     Shoulder/Elbow Strength (/5)  Manual Muscle Test (MMT) MMT MMT MMT    Right Left Right Left Right Left   Shoulder Flexion 4- 4+       Supraspinatus 4+ 4+       Shoulder Abduction 4- 4+       Shoulder Extension         Shoulder External Rotation 4+ 4+       Shoulder Internal Rotation 4+ 4+       Elbow Flexion 5 5       Elbow Extension 5 5       Other:         Other:           Cervical ROM  Date: 5/3/2021     *Indicate scale AROM AROM AROM    Cervical Flexion 34     Cervical Extension 45      Right Left Right Left Right Left   Cervical Sidebending 33 32       Cervical Rotation 63 63       Cervical Protraction      Cervical Retraction      Thoracic Flexion      Thoracic Extension      Thoracic Sidebending         Thoracic Rotation             Shoulder Special Tests     Impingement Cluster Right (+/-) Left (+/-) Rotator Cuff Tests Right (+/-) Left (+/-)   Linda - - Drop Arm Sign - -   Painful Arc - - Hornblowers     Infraspinatus Test - - ERLS     AC Tests Right (+/-) Left (+/-) IRLS     Active Compression   Labral Tests Right (+/-) Left (+/-)   Crossbody Adduction   Biceps Load Test II - -   AC Resisted Extension   Jerk Test     GH Instability Tests Right (+/-) Left (+/-) Nora Test     Sulcus Sign   Biceps Tests Right (+/-) Left (+/-)   Apprehension - - Speed + -   Relocation   Milad hameed     Other:   Other:     Other:   Other:         Treatment Today    TREATMENT MINUTES COMMENTS   Evaluation 19 -shoulder pain  -educated on diagnosis and POC   Self-care/ Home management     Manual therapy     Neuromuscular Re-education     Therapeutic Activity     Therapeutic Exercises 17 -see exercise flow sheet  Exercises:  Exercise #1: Standing shoulder IR/ER isometrics in doorway x 10 - 10 second holds  Comment #1: Standing flexion wall slides x 20  Exercise #2: Standing wand IR/Ext AAROM x 20  PTRx: [id number]   Gait training     Modality__________________                Total 36 Patient needed to leave for work by 7:45am   Blank areas are intentional and mean the treatment did not include these items.       Patient History/  Comorbidities Examination  (body structures and functions, activity limitations, and/or participation restrictions) Clinical Presentation Clinical Decision Making (Complexity)   No documented Comorbidities or personal factors 1-2 Elements Stable and/or uncomplicated Low   1-2 documented comorbidities or personal factor 3 Elements  Evolving clinical presentation with changing characteristics Moderate   3-4 documented comorbidities or personal factors 4 or more Unstable and unpredictable High     Ronnie Juarez, SPT    I attest that I attended a portion of today s treatment session or was immediately available for today s treatment session to ensure sound judgement of the Physical Therapy student.    Hillary Steve, PT, DPT, CLT-HERNANDEZ  5/3/2021  7:01 AM

## 2021-06-17 NOTE — PROGRESS NOTES
[facility] Daily Progress     Patient Name: Mendy Roe  Date: 5/24/2021  Visit #: 2  PTA visit #:  NA  Referral Diagnosis: Acute pain of right shoulder  Referring provider: Rayne Quijano*  Visit Diagnosis:     ICD-10-CM    1. Acute pain of right shoulder  M25.511    2. Decreased right shoulder range of motion  M25.611        Past Medical History:   Diagnosis Date     Anxiety      Depression      Shortness of breath      Sleep apnea     Has had 2 sleep studies in past         Assessment:     Patient arrives to physical therapy today for follow-up of her right shoulder pain. It had been doing well until yesterday when she tried to mow her lawn. Her HEP was reviewed today and strengthening was progressed to sidelying shoulder ER and standing banded shoulder IR. She required no cueing for completion of her HEP on this date.    HEP/POC compliance is  good .  Patient demonstrates understanding/independence with home program.  Patient is benefitting from skilled physical therapy and is making steady progress toward functional goals.  Patient is appropriate to continue with skilled physical therapy intervention, as indicated by initial plan of care.    Goal Status:  Pt. will demonstrate/verbalize independence in self-management of condition in : 4 weeks  Pt. will be independent with home exercise program in : 4 weeks  Patient will reach / maintain arm movement: overhead;for work;for home chores;for dressing;with full ROM;with no pain;in other weeks  in other weeks: 8 weeks    Pt will: be able to sleep on her right side without additional discomfort to allow for more restful sleep and return to prior level of function in 8 weeks      Plan / Patient Education:     Continue with initial plan of care.  Progress with home program as tolerated.     Plan for next visit: Progress resistance on rotator cuff exercises, consider addition of serratus anterior wall climb.    Subjective:     Pain  Ratin  Patient reports that things were going well until yesterday when she had to mow her lawn and getting the  started with the pull rope was difficult and painful for her. She reports that her right shoulder pain was down to a 2 before mowing the lawn yesterday. She has been completing her exercises nearly everyday.    Objective:     No cueing required for completion of HEP.    Exercises:  Exercise #1: Standing shoulder IR/ER isometrics in doorway x 10 - 10 second holds  Comment #1: Standing flexion wall slides x 20  Exercise #2: Standing wand IR/Ext AAROM x 20  Comment #2: Sidelying shoulder ER x 15  Exercise #3: Standing shoulder IR w/ L2 band x 15  Comment #3: Standing scapular retraction w/ L3 band x 15    Date: 5/3/2021 2021      Shoulder and Elbow ROM ( )    AROM in degrees AROM in degrees AROM in degrees    Right Left Right Left Right Left   Shoulder Flexion (0-180 ) 136,   138, ERP  168       Shoulder Abduction (0-180 ) 102,   105, ERP  165       Shoulder Extension (0-60 ) 30 32           Shoulder ER (0-90 )               Shoulder IR (0-70 )               Shoulder IR/Ext L3, ERP T7  L2  T7       Elbow Flexion (150 )               Elbow Extension (0 )                 PROM in degrees PROM in degrees PROM in degrees     Right Left Right Left Right Left   Shoulder Flexion (0-180 ) 165 170           Shoulder Abduction (0-180 ) 165 170           Shoulder Extension (0-60 )               Shoulder ER (0-90 ) 100 100           Shoulder IR (0-70 ) 60 60           Elbow Flexion (150 )               Elbow Extension (0 )                     Treatment Today     TREATMENT MINUTES COMMENTS   Evaluation     Self-care/ Home management     Manual therapy     Neuromuscular Re-education     Therapeutic Activity     Therapeutic Exercises 25 -see exercise flow sheet for date completed  -UBE WL4 x 5 min, reversing direction at 2.5 min   Gait training     Modality__________________                 Total 25    Blank areas are intentional and mean the treatment did not include these items.     Ronnie Juarez, SPT    I attest that I attended a portion of today s treatment session or was immediately available for today s treatment session to ensure sound judgement of the Physical Therapy student.    Hillary Steve, PT, DPT, CLT-HERNANDEZ  5/24/2021

## 2021-06-17 NOTE — PROGRESS NOTES
Psychiatric  Progress Note  Date of visit:5/3/2018         Discussion of Care and Treatment Recommendations:   This is a 37 y.o. female with  a history of depression anxiety restless leg syndrome who presents to the clinic to establish psychiatric care for medication management for a follow up appointment     Past Medications:  Paxil-no effect, Celexa-mild effect, Wellbutrin-no effect,  Effexor - migraine and had no effect  , trazodone -increased insomnia, Effexor-no effect.  Lamotrigine-per patient gave her rashes and mild sores, gabapentin- no effect, amitriptyline - severe weight gain , slowed reflexes   Duloxetine - Allergic reaction = itching, rashes    Buspar - , nausea    Last visit 3/28/2018  .  Recommendation at last visit   1.Restart Celexa 10 mg daily   2- Start Hydroxyzine - Atarax - 10 mg TID PRN   3- GeneSight testing   7- RTC- May 3rd     Patient and I reviewed diagnosis and treatment plan and patient agrees with following recommendations:  Ongoing education given regarding diagnostic and treatment options with adequate verbalization of understanding.  Plan   1.Continue Celexa 10 mg daily ,  Hydroxyzine - Atarax - 10 mg TID PRN   2- Ordered GeneSight testing   3. Continue with psychotherapy  :  mental Health   4- RTC- 8 weeks            Diagnoses:     MDD  Anxiety unspecified     Patient Active Problem List   Diagnosis     Attention Deficit Disorder Without Hyperactivity     Acute Bronchitis     Amenorrhea     Sudden Redness Of The Skin (Flushing)     Drip Or Drainage Down Throat From Above     Nicotine Dependence     Acute Serous Otitis Media     Acute Sinusitis     Acute Pharyngitis     Stye In The Right Eye     Constipation     Hematuria     Cough     Abdominal Pain In Multiple Locations     Snoring (Symptom)     Midback Pain     Obstructive Sleep Apnea     Restless Legs Syndrome     Chronic Rhinitis     Cervical Radiculopathy     Disease Of The Nails     Mood disorder             Chief Complaint  "/ Subjective:    Chief complaint: \" I have been feeling well  So far \"     History of Present Illness:   Patient reports that she did not start taking Celexa and has not utilized hydroxyzine since the last time we saw her.  She still very concerned about gaining weight as she gained significant amount of weight the last time she was on Celexa.  She reports that her depression is under control although she does get anxious sometimes.  Today we call her insurance company to find out if they will cover GeneSight Test-high insurance will not be covering the test.    Patient was updated.  She wants to try and see with her secondary insurance and may will be able to cover.     I called [facility] and spoke with  Meanwhile she does not want to change any of her medications to start any medication until the testing is completed.  However she wants to stay on the same medication for right now and states she may try and start taking the medication.  She offers no complaints.  She will return in 8 weeks for follow-up appointment meanwhile encourage her to call in between visits with any questions or concerns  Mental Status Examination:   General: Adequate hygiene, cooperative  Speech: Normal in rate and tone  Language: Intact  Thought process: Coherent  Thought content:                           Auditory hallucinations- absent                           Visual Hallucinations - Absent                           Delusions Absent                           Loose Associations:  No                          Suicidal thoughts: Absent                          Homicidal thoughts: Absent                       Affect: Neutral  Mood: Neutral   Intellectual functioning: Within normal limits  Memory: Within normal limits  Fund of knowledge: Average  Attention and concentration: Within normal limits  Gait: Steady  Psychomotor activity: Calm, no agitation  Muscles: No atrophy, no abnormal movements  InSight and judgment: Fair    Medication changes: See " "above   Medication adherence: compliant  Medication side effects: absent  Information about medications: Side effects, benefits and alternative treatments discussed and patient agrees with capacity to do so.    Psychotherapy: Supportive therapy day-to-day living    Education: Diet, exercise, abstinence from drugs and alcohol, patient will not drive if sedated and medications or  under influence of any substance    Lab Results:   Personally reviewed and discussed with the patient    Lab Results   Component Value Date    WBC 14.2 (H) 10/30/2014    HGB 15.1 10/30/2014    HCT 44.5 10/30/2014     10/30/2014    ALT 18 03/13/2017    AST 14 03/13/2017     03/13/2017    K 3.4 (L) 03/13/2017     (H) 03/13/2017    CREATININE 0.78 03/13/2017    BUN 7 (L) 03/13/2017    CO2 23 03/13/2017    TSH 0.90 04/26/2011       Vital signs:  Vitals:    05/03/18 0844   BP: 109/69   Patient Site: Left Arm   Patient Position: Sitting   Cuff Size: Adult Regular   Pulse: 87   Resp: 14   Temp: 99  F (37.2  C)   TempSrc: Oral   Weight: 135 lb (61.2 kg)   Height: 5' 1\" (1.549 m)     Allergies  Allergies   Allergen Reactions     Augmentin [Amoxicillin-Pot Clavulanate]      Buspirone Nausea Only     Duloxetine Rash          Medications:     Current Outpatient Prescriptions on File Prior to Visit   Medication Sig Dispense Refill     citalopram (CELEXA) 10 MG tablet Take 1 tablet (10 mg total) by mouth daily. 30 tablet 0     hydrOXYzine HCl (ATARAX) 10 MG tablet Take 1 tablet (10 mg total) by mouth 3 (three) times a day as needed for anxiety. 90 tablet 0     ibuprofen (ADVIL,MOTRIN) 200 MG tablet Take 800 mg by mouth every 6 (six) hours as needed for pain.       loratadine (CLARITIN) 10 mg tablet Take 10 mg by mouth daily as needed for allergies.       ranitidine (ZANTAC) 150 MG tablet Take 150 mg by mouth every 12 (twelve) hours.       rOPINIRole (REQUIP) 0.25 MG tablet TAKE 1-2 TABLETS BY MOUTH DAILY, 1-3 HOURS BEFORE BEDTIME 180 " tablet 0     No current facility-administered medications on file prior to visit.             Review of Systems:    Otherwise reminder of review of systems is negative    Coordination of Care:   More than 25 minutes spent on this visit  with more than 50% of time spent on coordination of care including: Educating patient about diagnosis, prognosis, side effects and benefits of medications, diet, exercise.  Time also spent on entering orders and preparing documentation for the visit.Time also spent providing supportive therapy regarding above issues.    This note was created using a dictation system. All typing errors or contextual distortion is unintentional and software inherent.

## 2021-06-18 NOTE — PATIENT INSTRUCTIONS - HE
Patient Instructions by Jamir Galan MD at 11/4/2020 12:06 PM     Author: Jamir Galan MD Service: -- Author Type: Physician    Filed: 11/4/2020 12:06 PM Encounter Date: 11/4/2020 Status: Signed    : Jamir Galan MD (Physician)           Sinusitis (Antibiotic Treatment)    The sinuses are air-filled spaces within the bones of the face. They connect to the inside of the nose. Sinusitis is an inflammation of the tissue that lines the sinuses. Sinusitis can occur during a cold. It can also happen due to allergies to pollens and other particles in the air. Sinusitis can cause symptoms of sinus congestion and a feeling of fullness. A sinus infection causes fever, headache, and facial pain. There is often green or yellow fluid draining from the nose or into the back of the throat (post-nasal drip). You have been given antibiotics to treat this condition.  Home care    Take the full course of antibiotics as instructed. Do not stop taking them, even when you feel better.    Drink plenty of water, hot tea, and other liquids. This may help thin nasal mucus. It also may help your sinuses drain fluids.    Heat may help soothe painful areas of your face. Use a towel soaked in hot water. Or,  the shower and direct the warm spray onto your face. Using a vaporizer along with a menthol rub at night may also help soothe symptoms.     An expectorant with guaifenesin may help thin nasal mucus and help your sinuses drain fluids.    You can use an over-the-counter decongestant, unless a similar medicine was prescribed to you. Nasal sprays work the fastest. Use one that contains phenylephrine or oxymetazoline. First blow your nose gently. Then use the spray. Do not use these medicines more often than directed on the label. If you do, your symptoms may get worse. You may also take pills that contain pseudoephedrine. Dont use products that combine multiple medicines. This is because side effects  may be increased. Read labels. You can also ask the pharmacist for help. (People with high blood pressure should not use decongestants. They can raise blood pressure.)    Over-the-counter antihistamines may help if allergies contributed to your sinusitis.      Do not use nasal rinses or irrigation during an acute sinus infection, unless your healthcare provider tells you to. Rinsing may spread the infection to other areas in your sinuses.    Use acetaminophen or ibuprofen to control pain, unless another pain medicine was prescribed to you. If you have chronic liver or kidney disease or ever had a stomach ulcer, talk with your healthcare provider before using these medicines. (Aspirin should never be taken by anyone under age 18 who is ill with a fever. It may cause severe liver damage.)    Don't smoke. This can make symptoms worse.  Follow-up care  Follow up with your healthcare provider or our staff if you are better in 1 week.  When to seek medical advice  Call your healthcare provider if any of these occur:    Facial pain or headache that gets worse    Stiff neck    Unusual drowsiness or confusion    Swelling of your forehead or eyelids    Vision problems, such as blurred or double vision    Fever of 100.4 F (38 C) or higher, or as directed by your healthcare provider    Seizure    Breathing problems    Symptoms don't go away in 10 days  Prevention  Here are steps you can take to help prevent an infection:    Keep good hand washing habits.    Dont have close contact with people who have sore throats, colds, or other upper respiratory infections.    Dont smoke, and stay away from secondhand smoke.    Stay up to date with of your vaccines.  Date Last Reviewed: 11/1/2017 2000-2017 [employer] [address] [place] [address], [place] [address]. All rights reserved. This information is not intended as a substitute for professional medical care. Always follow your healthcare professional's  instructions.

## 2021-06-19 NOTE — ANESTHESIA CARE TRANSFER NOTE
Last vitals:   Vitals:    08/14/18 1750   BP: 133/74   Pulse: 75   Resp: 16   Temp: 36.9  C (98.5  F)   SpO2: 100%     Volatile agents turned off, muscle relaxant reversed, 4/4 twitches with sustained tetany. Pt breathing spontaneously with adequate tidal volumes, following commands, gently suctioned oropharynx, extubated without issue. 10LPM O2 applied via face mask.Transported by CRNA and RN to recovery.      Patient's level of consciousness is awake and drowsy  Spontaneous respirations: yes  Maintains airway independently: yes  Dentition unchanged: yes  Oropharynx: oropharynx clear of all foreign objects    QCDR Measures:  ASA# 20 - Surgical Safety Checklist: WHO surgical safety checklist completed prior to induction  PQRS# 430 - Adult PONV Prevention: 4558F - Pt received => 2 anti-emetic agents (different classes) preop & intraop  ASA# 8 - Peds PONV Prevention: NA - Not pediatric patient, not GA or 2 or more risk factors NOT present  PQRS# 424 - Nilam-op Temp Management: 4559F - At least one body temp DOCUMENTED => 35.5C or 95.9F within required timeframe  PQRS# 426 - PACU Transfer Protocol: - Transfer of care checklist used  ASA# 14 - Acute Post-op Pain: ASA14B - Patient did NOT experience pain >= 7 out of 10

## 2021-06-19 NOTE — LETTER
Letter by Jamir Galan MD at      Author: Jamir Galan MD Service: -- Author Type: --    Filed:  Encounter Date: 5/6/2019 Status: (Other)         Mendy Roe  [address] [facility] [address]  [place] [address]             May 6, 2019         Dear Ms. Roe,    Below are the results from your recent visit:    Resulted Orders   Urinalysis-UC if Indicated   Result Value Ref Range    Color, UA Yellow Colorless, Yellow, Straw, Light Yellow    Clarity, UA Clear Clear    Glucose, UA Negative Negative    Bilirubin, UA Small (!) Negative    Ketones, UA Negative Negative    Specific Gravity, UA 1.025 1.005 - 1.030    Blood, UA Moderate (!) Negative    pH, UA 5.5 5.0 - 8.0    Protein, UA 30 mg/dL (!) Negative mg/dL    Urobilinogen, UA 1.0 E.U./dL 0.2 E.U./dL, 1.0 E.U./dL    Nitrite, UA Negative Negative    Leukocytes, UA Negative Negative    Bacteria, UA Moderate (!) None Seen hpf    RBC, UA 0-2 None Seen, 0-2 hpf    WBC, UA 0-5 None Seen, 0-5 hpf    Squam Epithel, UA 0-5 None Seen, 0-5 lpf    Yeast, UA Moderate (!) None Seen hpf    Mucus, UA Many (!) None Seen lpf    Narrative    Urine Culture ordered based on [facility] criteria   Culture, Urine   Result Value Ref Range    Culture No Growth            Negative         Please call with questions or contact us using [employer].    Sincerely,        Electronically signed by Jamir Galan MD

## 2021-06-19 NOTE — PROGRESS NOTES
HPI: Pt is here for follow up diagnostic laparoscopy with washout and drain placement with Dr. Bishop on 08/16/2018.   she is doing well.  Pain is well controlled, but she continues to have pain around the drain and in her RUQ.  No difficulties with the surgical wound/wounds.  she is eating better and denies fever and chills.         /75 (Patient Site: Right Arm, Patient Position: Sitting, Cuff Size: Adult Regular)  Pulse 98  SpO2 97%  Breastfeeding? No    EXAM:  GENERAL:Appears well  ABDOMEN:  Soft, +BS  SURGICAL WOUNDS:  Incisions healing well, no enduration or drainage. Drain with scant serous fluid in tubing; no signs of biliary fluid; drain removed without incident      Assessment/Plan: Doing well after surgery and should follow up as needed. It is of note, that patient noticed the pain she had been having was gone after the drain was pulled.     Kacey Moncada , [facility]

## 2021-06-19 NOTE — PROGRESS NOTES
HPI:  Patient presents for evaluation of abdominal pain. The pain is located in the RUQ without radiation.  Pain is described as stabbing.  Onset of pain was 2 weeks ago.  She had one particularly bad episode.  She had an ultrasound done that showed multiple stones in the gallbladder.  Since then it has been more of a just dull achy discomfort.  She still eating okay.  No fevers.      Please see Chart Review for PMH, medication list, allergies, FH and social history.  Past Surgical History:   Procedure Laterality Date      SECTION, CLASSIC       SINUS SURGERY       TUBAL LIGATION       TYMPANOSTOMY TUBE PLACEMENT       WISDOM TOOTH EXTRACTION       Past Medical History:   Diagnosis Date     Anxiety      Depression      Sleep apnea     Has had 2 sleep studies in past       Current Outpatient Prescriptions:      clindamycin (CLEOCIN T) 1 % lotion, as needed., Disp: , Rfl: 2     ibuprofen (ADVIL,MOTRIN) 200 MG tablet, Take 800 mg by mouth every 6 (six) hours as needed for pain., Disp: , Rfl:      loratadine (CLARITIN) 10 mg tablet, Take 10 mg by mouth daily as needed for allergies., Disp: , Rfl:      mometasone (ELOCON) 0.1 % cream, as needed., Disp: , Rfl: 2     ranitidine (ZANTAC) 150 MG tablet, Take 150 mg by mouth every 12 (twelve) hours., Disp: , Rfl:      rOPINIRole (REQUIP) 0.25 MG tablet, TAKE 1-2 TABLETS BY MOUTH DAILY, 1-3 HOURS BEFORE BEDTIME, Disp: 180 tablet, Rfl: 0     sulfamethoxazole-trimethoprim (SEPTRA DS) 800-160 mg per tablet, Take 1 tablet by mouth 2 (two) times a day for 7 days., Disp: 14 tablet, Rfl: 0     hydrOXYzine HCl (ATARAX) 10 MG tablet, Take 1 tablet (10 mg total) by mouth 3 (three) times a day as needed for anxiety., Disp: 90 tablet, Rfl: 0    Current Facility-Administered Medications:      cefTRIAXone injection 1 g (ROCEPHIN), 1 g, Intramuscular, Q24H, Marc Wharton MD, 1 g at 18 1426  Allergies   Allergen Reactions     Augmentin [Amoxicillin-Pot Clavulanate]       "Buspirone Nausea Only     Duloxetine Rash     Lamotrigine Rash           A 12 point comprehensive review of systems was negative except as noted.    Physical Exam:  /69 (Patient Site: Right Arm, Patient Position: Sitting, Cuff Size: Adult Regular)  Pulse 98  Ht 5' 2\" (1.575 m)  Wt 136 lb (61.7 kg)  SpO2 98%  Breastfeeding? No  BMI 24.87 kg/m2    General:alert, appears stated age and cooperative  Eyes: negative  Lungs: clear to auscultation bilaterally  CV:regular rate and rhythm, S1, S2 normal, no murmur, click, rub or gallop  Abdomen:soft, non-tender; bowel sounds normal; no masses,  no organomegaly  Neuro: Grossly normal    Studies:  Lab Results   Component Value Date    WBC 14.2 (H) 10/30/2014    HGB 15.1 10/30/2014    HCT 44.5 10/30/2014    MCV 89 10/30/2014     10/30/2014     Lab Results   Component Value Date    ALT 11 07/13/2018    AST 13 07/13/2018    ALKPHOS 71 07/13/2018    BILITOT 0.7 07/13/2018         Impression:    Cholelithiasis with possibly some mild cholecystitis.  I definitely think she would benefit from laparoscopic cholecystectomy.  Question is timing.  I do not think it is emergent but on the other hand it is hard to know when she will have another attack.  Risks of surgery including beeding, infection, bile leak and common bile duct injury were explained along with potential benefits. Appropriate questions were asked and answered and they wish to proceed. Typically and outpatient procedure.    Plan:  Laparoscopic Cholecystectomy.  We are going to try to get this done this week as she has a trip planned next week and would like to get it done before then.      "

## 2021-06-19 NOTE — ANESTHESIA POSTPROCEDURE EVALUATION
Patient: Mendy Roe   DIAGNOSTIC LAPAROSCOPY  Anesthesia type: general    Patient location: PACU  Last vitals:   Vitals:    08/14/18 1850   BP: 111/62   Pulse:    Resp:    Temp:    SpO2:      Post vital signs: stable  Level of consciousness: awake and responds to simple questions  Post-anesthesia pain: pain controlled  Post-anesthesia nausea and vomiting: no  Pulmonary: unassisted, return to baseline  Cardiovascular: stable and blood pressure at baseline  Hydration: adequate  Anesthetic events: no    QCDR Measures:  ASA# 11 - Nilam-op Cardiac Arrest: ASA11B - Patient did NOT experience unanticipated cardiac arrest  ASA# 12 - Nilam-op Mortality Rate: ASA12B - Patient did NOT die  ASA# 13 - PACU Re-Intubation Rate: ASA13B - Patient did NOT require a new airway mgmt  ASA# 10 - Composite Anes Safety: ASA10A - No serious adverse event    Additional Notes:

## 2021-06-19 NOTE — PROGRESS NOTES
[facility] Clinic Note    Patient Name: Mendy Roe  Patient Age: 37 y.o.  YOB: 1980  MRN: [id number]    Date of visit: 7/13/2018    Patient Active Problem List   Diagnosis     Attention Deficit Disorder Without Hyperactivity     Acute Bronchitis     Amenorrhea     Sudden Redness Of The Skin (Flushing)     Drip Or Drainage Down Throat From Above     Nicotine Dependence     Acute Serous Otitis Media     Acute Sinusitis     Acute Pharyngitis     Stye In The Right Eye     Constipation     Hematuria     Cough     Abdominal Pain In Multiple Locations     Snoring (Symptom)     Midback Pain     Obstructive Sleep Apnea     Restless Legs Syndrome     Chronic Rhinitis     Cervical Radiculopathy     Disease Of The Nails     Mood disorder (H)     Social History     Social History Narrative     Outpatient Encounter Prescriptions as of 7/13/2018   Medication Sig Dispense Refill     hydrOXYzine HCl (ATARAX) 10 MG tablet Take 1 tablet (10 mg total) by mouth 3 (three) times a day as needed for anxiety. 90 tablet 0     ibuprofen (ADVIL,MOTRIN) 200 MG tablet Take 800 mg by mouth every 6 (six) hours as needed for pain.       loratadine (CLARITIN) 10 mg tablet Take 10 mg by mouth daily as needed for allergies.       ranitidine (ZANTAC) 150 MG tablet Take 150 mg by mouth every 12 (twelve) hours.       rOPINIRole (REQUIP) 0.25 MG tablet TAKE 1-2 TABLETS BY MOUTH DAILY, 1-3 HOURS BEFORE BEDTIME 180 tablet 0     citalopram (CELEXA) 10 MG tablet Take 10 mg by mouth daily.       [START ON 7/14/2018] sulfamethoxazole-trimethoprim (SEPTRA DS) 800-160 mg per tablet Take 1 tablet by mouth 2 (two) times a day for 7 days. 14 tablet 0     [DISCONTINUED] nitrofurantoin, macrocrystal-monohydrate, (MACROBID) 100 MG capsule Take 1 capsule (100 mg total) by mouth 2 (two) times a day for 7 days. 14 capsule 0     Facility-Administered Encounter Medications as of 7/13/2018   Medication Dose Route Frequency Provider Last Rate Last Dose      "cefTRIAXone injection 1 g (ROCEPHIN)  1 g Intramuscular Q24H Marc Wharton MD           Chief Complaint:   Chief Complaint   Patient presents with     Urinary Tract Infection     frequency and urgency since monday. Last night the burning starting while voiding. No blood. lower abd pressure. Does not think that she has had a fever. Has fatigue.        /70  Pulse 86  Temp 98.6  F (37  C)  Ht 5' 1\" (1.549 m)  Wt 139 lb (63 kg)  BMI 26.26 kg/m2    HPI:   Duration: 5 days  Dysuria: Yes  Urgency: Yes  Frequency: Yes  Hesitancy: no  Gross hematuria: no  Abdominal/suprapubic pain: yes  Flank pain: no  fever: no    History of renal calculus: no  History of uti: y  Change in vaginal discharge or irritation: no  Not pregnant    Had some right upper quadrant pain the night of July 3rd.  Had some back pain right side that night.  No vomiting.  Stools have been more loose than usual.  No fevers.  No hx sg to this are. Has been faint pain off and on. No epigastric pain or left sided pain.        ROS: Pertinent ros findings in hpi, all other systems negative.    Objective/Physical Exam:     /70  Pulse 86  Temp 98.6  F (37  C)  Ht 5' 1\" (1.549 m)  Wt 139 lb (63 kg)  BMI 26.26 kg/m2    Gen: NAD, appears age  Skin: warm, dry  HENT: normocephalic atraumatic, MMM  Eyes: non-icteric, no proptosis  CV: NRRR no m/r/g, no peripheral edema  Resp: CTAB no w/r/r, normal respiratory effort  Abd: non-distended, soft  Hematologic: No petechiae or purpura  MSK: no muscle or joint swelling  Neuro: no dysarthria or gross asymmetry    Uro:   Flank tenderness: slight bilat L>R  Suprapubic tenderness: yes    tender to palpation:slight ruq  soft:Yes  distended:no  pain with percussion:  no  hernia palpated: no  hepato-splenomegaly:no  masses: no    Salcedo's positive: no    ecchymoses:no    >3cm pulsating mass: no    Exam limited by body habitus: no           Assessment/Plan:  Recent Results (from the past 24 hour(s)) "   Urinalysis-UC if Indicated   Result Value Ref Range    Color, UA Yellow Colorless, Yellow, Straw, Light Yellow    Clarity, UA Clear Clear    Glucose, UA Negative Negative    Bilirubin, UA Negative Negative    Ketones, UA Negative Negative    Specific Gravity, UA 1.025 1.005 - 1.030    Blood, UA Trace (!) Negative    pH, UA 6.5 5.0 - 8.0    Protein, UA Negative Negative mg/dL    Urobilinogen, UA 1.0 E.U./dL 0.2 E.U./dL, 1.0 E.U./dL    Nitrite, UA Negative Negative    Leukocytes, UA Trace (!) Negative    Bacteria, UA Moderate (!) None Seen hpf    RBC, UA 0-2 None Seen, 0-2 hpf    WBC, UA 5-10 (!) None Seen, 0-5 hpf    Squam Epithel, UA >100 (!) None Seen, 0-5 lpf   Electrocardiogram Perform - Clinic   Result Value Ref Range    SYSTOLIC BLOOD PRESSURE  mmHg    DIASTOLIC BLOOD PRESSURE  mmHg    VENTRICULAR RATE 70 BPM    ATRIAL RATE 70 BPM    P-R INTERVAL 122 ms    QRS DURATION 82 ms    Q-T INTERVAL 390 ms    QTC CALCULATION (BEZET) 421 ms    P Axis 41 degrees    R AXIS 50 degrees    T AXIS 35 degrees    MUSE DIAGNOSIS       Sinus rhythm with marked sinus arrhythmia  Otherwise normal ECG  When compared with ECG of 13-MAR-2017 07:43,  No significant change was found       Encounter Diagnoses   Name Primary?     Dysuria Yes     Right upper quadrant pain        Orders Placed This Encounter   Procedures     Culture, Urine     US Abdomen Limited     Urinalysis-UC if Indicated     Comprehensive Metabolic Panel     Electrocardiogram Perform - Clinic       I am concerned for urinary tract infection, since she is tender over her kidneys, I am giving her Rocephin followed by Bactrim while we await the culture.  I am concerned for biliary colic, I am ordering a right upper quadrant ultrasound for this scheduled tomorrow morning.  I did give her precautions for going to the ER.  EKG reviewed by myself and looks okay, very low suspicion for it being cardiac in nature given the history.  Checking lab.    Patient Instructions   US  tomorrow at [facility].  Go to ER if any unceasing worsening pain or fever or other concern.      Maintain adequate hydration. RTC if not improving after 2-3 days, develop fever, develop flank  pain or other concerning symptom.        Counseled patient regarding treatments, treatment options, risks and benefits and diagnosis.  The patient was interactive, attentive, verbalized understanding, and we discussed plan.     Marc Wharton MD

## 2021-06-19 NOTE — PROGRESS NOTES
Assessment/Plan:        1. S/P laparoscopic cholecystectomy    follow up with surgery for the return to work note.       2. Night sweats/ RUQ pain / SOB  New symptoms since her last hospitalization    Plan:   - Comprehensive Metabolic Panel  - HM1(CBC and Differential)  - Erythrocyte Sedimentation Rate  - C-Reactive Protein  - Procalcitonin  - Lactate Dehydrogenase (LDH)  - D-dimer, Quantitative  - HM1 (CBC with Diff)     We will follow-up pending the results of the study to manage accordingly             Subjective:    Patient ID:   Mendy Roe is a 37 y.o. female who comes in for post hospital follow-up seen at [facility] and hospitalized from 7/29 through 8/7/18, for bile leak at the cystic duct stump and underwent ERCP,with biliary and pancreatic duct stent placement and EGD with stent removal.    Her stents were then removed due to the fact that it was thought to be occluding her right-sided drainage system contributing to some of her abdominal pain symptoms. The pain had gradually improved over the following several days of hospitalization and her LFTs normalized.     In follow up, today, she is reporting to having some residual pain but overall improving and not as bad as before. However the pain that is is experiencing said to be different than the one having prior to her surgery.  She also reports of not having much of an appetite and feels somewhat shortness of breath.   No N/V or changes to BM/ stools.       Review of Systems  General :  Night sweates   Ophthalmic : negative  ENT : negative  Respiratory : no cough, some times having to taking an extra breath, + shortness of breath,   Cardiovascular : no chest pain or dyspnea on exertion, no palpitations   Gastrointestinal : see HPI   Genito-Urinary :  no dysuria, trouble voiding, or hematuria  Musculoskeletal : negative  Dermatological : negative    Allergy: reviewed    The following patient's history were reviewed and updated as appropriate:   She   has a past medical history of Anxiety; Depression; Shortness of breath; and Sleep apnea.  She  has a past surgical history that includes [place] tooth extraction; Tubal ligation; Sinus surgery; Tympanostomy tube placement;  section, classic; pr lap,cholecystectomy (N/A, 2018); and pr lap,diagnostic abdomen (N/A, 2018).  Her family history includes Bipolar disorder in her brother; Cerebral aneurysm in her father; Depression in her daughter, daughter, father, and mother; Heart attack in her paternal grandmother; Mental illness in her daughter; No Medical Problems in her daughter; Valvular heart disease in her mother.  She  reports that she quit smoking about 20 months ago. Her smoking use included Cigarettes. She has never used smokeless tobacco. She reports that she does not drink alcohol or use illicit drugs..      Outpatient Encounter Prescriptions as of 2018   Medication Sig Dispense Refill     clindamycin (CLEOCIN T) 1 % lotion as needed.  2     HYDROcodone-acetaminophen 5-325 mg per tablet Take 1 tablet by mouth every 4 (four) hours as needed for pain. 25 tablet 0     hydrOXYzine HCl (ATARAX) 10 MG tablet Take 1 tablet (10 mg total) by mouth 3 (three) times a day as needed for anxiety. 90 tablet 0     ibuprofen (ADVIL,MOTRIN) 200 MG tablet Take 800 mg by mouth every 6 (six) hours as needed for pain.       loratadine (CLARITIN) 10 mg tablet Take 10 mg by mouth daily as needed for allergies.       LORazepam (ATIVAN) 0.5 MG tablet Take 0.5 mg by mouth every 6 (six) hours as needed for anxiety.       mometasone (ELOCON) 0.1 % cream as needed.  2     ranitidine (ZANTAC) 150 MG tablet Take 150 mg by mouth every 12 (twelve) hours.       rOPINIRole (REQUIP) 0.25 MG tablet TAKE 1-2 TABLETS BY MOUTH DAILY, 1-3 HOURS BEFORE BEDTIME 180 tablet 0     Facility-Administered Encounter Medications as of 2018   Medication Dose Route Frequency Provider Last Rate Last Dose     cefTRIAXone injection 1 g  (ROCEPHIN)  1 g Intramuscular Q24H Marc Wharton MD   1 g at 07/13/18 1426         Objective:   /60 (Patient Site: Right Arm, Patient Position: Sitting, Cuff Size: Adult Regular)  Pulse 76  Temp 98.9  F (37.2  C) (Oral)   Wt 134 lb 3.2 oz (60.9 kg)  SpO2 99%  BMI 24.55 kg/m2      Physical Exam  General Appearance:    Alert,  no acute distress, well hydrated    Eyes:    PERRL, conjunctiva/corneas clear, non icterus    Throat:   Lips, mucosa, and tongue normal;  gums normal   Neck:   Supple, symmetrical, trachea midline, no adenopathy;        thyroid:  No enlargement/tenderness/nodules; no carotid    bruit or JVD   Lungs:     Clear to auscultation bilaterally, respirations unlabored   Heart:    Regular rate and rhythm, S1 and S2 normal, no murmur, rub   or gallop   Abdomen:      Soft, palpable tenderness to the RUQarea , normal bowel sounds, no rebound or guarding, no masses, no organomegaly   Extremities:   Extremities normal, atraumatic, no cyanosis or edema   Skin:   Skin color, texture, turgor normal, no rashes or lesions

## 2021-06-19 NOTE — ANESTHESIA CARE TRANSFER NOTE
Last vitals:   Vitals:    07/19/18 1325   BP: 126/77   Pulse: (!) 116   Resp: 16   Temp: 36.7  C (98.1  F)   SpO2: 100%     Patient's level of consciousness is drowsy  Spontaneous respirations: yes  Maintains airway independently: yes  Dentition unchanged: yes  Oropharynx: oropharynx clear of all foreign objects    QCDR Measures:  ASA# 20 - Surgical Safety Checklist: WHO surgical safety checklist completed prior to induction  PQRS# 430 - Adult PONV Prevention: 4558F - Pt received => 2 anti-emetic agents (different classes) preop & intraop  ASA# 8 - Peds PONV Prevention: NA - Not pediatric patient, not GA or 2 or more risk factors NOT present  PQRS# 424 - Nilam-op Temp Management: 4559F - At least one body temp DOCUMENTED => 35.5C or 95.9F within required timeframe  PQRS# 426 - PACU Transfer Protocol: - Transfer of care checklist used  ASA# 14 - Acute Post-op Pain: ASA14B - Patient did NOT experience pain >= 7 out of 10

## 2021-06-19 NOTE — ANESTHESIA POSTPROCEDURE EVALUATION
Patient: Mendy Roe  CHOLECYSTECTOMY, LAPAROSCOPIC  Anesthesia type: general    Patient location: Phase II Recovery  Last vitals:   Vitals:    07/19/18 1414   BP: 117/61   Pulse: 78   Resp: 16   Temp:    SpO2: 98%     Post vital signs: stable  Level of consciousness: awake, alert and oriented  Post-anesthesia pain: pain needs to be addressed; Pt just received Vicodin and fentanyl for pain.  No distress but will continue to treat pain.  Post-anesthesia nausea and vomiting: no  Pulmonary: unassisted, return to baseline  Cardiovascular: stable and blood pressure at baseline  Hydration: adequate  Anesthetic events: no    QCDR Measures:  ASA# 11 - Nilam-op Cardiac Arrest: ASA11B - Patient did NOT experience unanticipated cardiac arrest  ASA# 12 - Nilam-op Mortality Rate: ASA12B - Patient did NOT die  ASA# 13 - PACU Re-Intubation Rate: ASA13B - Patient did NOT require a new airway mgmt  ASA# 10 - Composite Anes Safety: ASA10A - No serious adverse event    Additional Notes:

## 2021-06-19 NOTE — PROGRESS NOTES
Assessment/Plan:        1. S/P laparoscopic cholecystectomy  Complicated by Bile leak  S/p hospitalization.   Doing well overall.     3. SOB (shortness of breath)  On going issue for the past several weeks.   CT negative for PE.     Consider due to Anemia   Lab Results   Component Value Date    HGB 10.7 (L) 08/13/2018        - Nebulizer tx with albuterol did no improve condition significantly     4. Anemia  Plan:   start  - ferrous sulfate 325 (65 FE) MG tablet; Take 1 tablet (325 mg total) by mouth 3 (three) times a day with meals.  Dispense: 90 tablet; Refill: 1         follow up in 1-3 months.   May call for work note         Subjective:    Patient ID:   Mendy Roe is a 37 y.o. female with status post laparoscopic cholecystectomy comes in for post hospital follow-up with discharge date on 8/15/2018 for bile leak.  She had presented with right abdominal pain and CT scan showed a small 2.8 cm fluid collection in the gallbladder fossa.  She underwent diagnostic laparoscopy on 8/14/2018 I will put on Cipro and Flagyl.  The body fluid cultures preliminary showed yeast a 7 day of Diflucan were prescribed.      Today, she reports that the abdominal pain is better, and been taking tylenol.  However, she continues to feel short of breath as if needing to take occasional deep breaths.   Her recent CT chest was negative for PE.       Review of Systems  General : negative  Ophthalmic : negative  ENT : negative  Respiratory : see HPI, no cough, or wheezing  Cardiovascular : no chest pain or dyspnea on exertion  Gastrointestinal : see HPI , no change in bowel habits, or black or bloody stools  Genito-Urinary :  no dysuria, trouble voiding, or hematuria  Musculoskeletal : negative  Neurological : negative  Dermatological : negative    Allergy: reviewed      The following patient's history were reviewed and updated as appropriate:   She  has a past medical history of Anxiety; Depression; Shortness of breath; and Sleep  "apnea.  She  has a past surgical history that includes [place] tooth extraction; Tubal ligation; Sinus surgery; Tympanostomy tube placement;  section, classic; pr lap,cholecystectomy (N/A, 2018); and pr lap,diagnostic abdomen (N/A, 2018)..      Outpatient Encounter Prescriptions as of 2018   Medication Sig Dispense Refill     cetirizine (ZYRTEC) 10 MG tablet Take 10 mg by mouth daily as needed for allergies.       clindamycin (CLEOCIN T) 1 % lotion Apply 1 application topically as needed.   2     HYDROcodone-acetaminophen (NORCO) 7.5-325 mg per tablet Take 1 tablet by mouth every 6 (six) hours as needed for pain.       hydrOXYzine HCl (ATARAX) 10 MG tablet Take 1 tablet (10 mg total) by mouth 3 (three) times a day as needed for anxiety. 90 tablet 0     ibuprofen (ADVIL,MOTRIN) 200 MG tablet Take 800 mg by mouth every 6 (six) hours as needed for pain.       levoFLOXacin (LEVAQUIN) 500 MG tablet Take 1 tablet (500 mg total) by mouth daily for 7 days. 7 tablet 0     LORazepam (ATIVAN) 1 MG tablet Take 1 mg by mouth every 6 (six) hours as needed for anxiety.       mometasone (ELOCON) 0.1 % cream Apply 1 application topically as needed.   2     ranitidine (ZANTAC) 150 MG tablet Take 150 mg by mouth every 12 (twelve) hours.       rOPINIRole (REQUIP) 0.25 MG tablet Take 0.25-0.5 mg by mouth at bedtime as needed.       fluconazole (DIFLUCAN) 200 MG tablet Take 1 tablet (200 mg total) by mouth daily for 7 doses. 7 tablet 0     metroNIDAZOLE (FLAGYL) 500 MG tablet Take 1 tablet (500 mg total) by mouth 3 (three) times a day. 21 tablet 0     No facility-administered encounter medications on file as of 2018.          Objective:   /73 (Patient Site: Right Arm, Patient Position: Sitting, Cuff Size: Adult Regular)  Pulse (!) 101  Ht 5' 2\" (1.575 m)  Wt 130 lb (59 kg)  SpO2 100%  BMI 23.78 kg/m2      Physical Exam  General Appearance:    Alert, well hydrated, no distress,    Eyes:    PERRL, " conjunctiva/corneas clear,    Throat:   Lips, mucosa, and tongue normal;  gums normal   Neck:   Supple, symmetrical, trachea midline, no adenopathy;        thyroid:  No enlargement/tenderness/nodules; no carotid    bruit or JVD   Lungs:     Clear to auscultation bilaterally, respirations unlabored   Heart:    Regular rate and rhythm, S1 and S2 normal, no murmur, rub   or gallop   Abdomen:     Surgical wound/ scars noted, clean and dry, wound is intact. Soft, RUQ mildly kia to palpation, normal bowel sounds, no rebound or guarding, no masses, no organomegaly   Extremities:   Extremities normal, atraumatic, no cyanosis or edema   Skin:   Skin color, texture, turgor normal, no rashes or lesions

## 2021-06-19 NOTE — PROGRESS NOTES
TCM DISCHARGE FOLLOW UP CALL    Discharge Date:  8/15/2018  Reason for hospital stay (discharge diagnosis)::  Bile leak  Are you feeling better, the same or worse since your discharge?:  Patient is feeling better (States she's feeling much better since having the drain removed today.)  Do you feel like you have a plan in the event of a health emergency?: Yes (Go to the ER, or call the nurse line.)    As part of your discharge plan, were  home care services ordered for you?: No    Did you receive any new medications, or was there a change to your medications?: Yes    Are you taking those medications, or do you have any established regiment?:  Yes - fluconazole, Levaquin, & metronidazole.  Taking as prescribed  Do you have any follow up visits scheduled with your PCP or Specialist?:  Yes, with PCP (Dr. Galan 8/20/18)  (RN) Is PCP appt scheduled soon enough (within 14 days of discharge date)?: Yes      Ebonie Serrano RN Care Manager, Population Health

## 2021-06-20 NOTE — LETTER
Letter by Jamir Galan MD at      Author: Jamir Galan MD Service: -- Author Type: --    Filed:  Encounter Date: 2/7/2020 Status: (Other)       Mendy Roe  [address] [facility] [address]  [place] [address]             February 7, 2020         Dear Mendy Roe,    Below are the results from Mendy's recent visit:    Resulted Orders   Basic Metabolic Panel   Result Value Ref Range    Sodium 136 136 - 145 mmol/L    Potassium 4.3 3.5 - 5.0 mmol/L    Chloride 107 98 - 107 mmol/L    CO2 21 (L) 22 - 31 mmol/L    Anion Gap, Calculation 8 5 - 18 mmol/L    Glucose 87 70 - 125 mg/dL    Calcium 8.8 8.5 - 10.5 mg/dL    BUN 9 8 - 22 mg/dL    Creatinine 0.71 0.60 - 1.10 mg/dL    GFR MDRD Af Amer >60 >60 mL/min/1.73m2    GFR MDRD Non Af Amer >60 >60 mL/min/1.73m2    Narrative    Fasting Glucose reference range is 70-99 mg/dL per  American Diabetes Association (ADA) guidelines.   Pregnancy (Beta-hCG, Qual), Urine   Result Value Ref Range    Pregnancy Test, Urine Negative Negative    Narrative    This test is for screening purposes. Results should be interpreted along with the clinical picture. Confirmation testing is available if warranted by ordering Test 143, Beta HCG, Quantitative.   Lipid Profile   Result Value Ref Range    Triglycerides 125 <=149 mg/dL    Cholesterol 170 <=199 mg/dL    LDL Calculated 112 <=129 mg/dL    HDL Cholesterol 33 (L) >=50 mg/dL    Patient Fasting > 8hrs? Yes    HM1 (CBC with Diff)   Result Value Ref Range    WBC 5.8 4.0 - 11.0 thou/uL    RBC 4.57 3.80 - 5.40 mill/uL    Hemoglobin 13.7 12.0 - 16.0 g/dL    Hematocrit 40.5 35.0 - 47.0 %    MCV 89 80 - 100 fL    MCH 29.9 27.0 - 34.0 pg    MCHC 33.7 32.0 - 36.0 g/dL    RDW 10.7 (L) 11.0 - 14.5 %    Platelets 222 140 - 440 thou/uL    MPV 8.6 7.0 - 10.0 fL    Neutrophils % 64 50 - 70 %    Lymphocytes % 28 20 - 40 %    Monocytes % 7 2 - 10 %    Eosinophils % 1 0 - 6 %    Basophils % 1 0 - 2 %    Neutrophils Absolute 3.7 2.0 - 7.7  thou/uL    Lymphocytes Absolute 1.6 0.8 - 4.4 thou/uL    Monocytes Absolute 0.4 0.0 - 0.9 thou/uL    Eosinophils Absolute 0.0 0.0 - 0.4 thou/uL    Basophils Absolute 0.0 0.0 - 0.2 thou/uL       Your lab results above are normal.    Please call with questions or contact us using [employer].    Sincerely,        Electronically signed by Jamir Galan MD

## 2021-06-20 NOTE — PROGRESS NOTES
Assessment/Plan:        1. Nausea with vomiting  H/o and  intermittent   Refill :   - ondansetron (ZOFRAN ODT) 4 MG disintegrating tablet; Take 1 tablet (4 mg total) by mouth every 8 (eight) hours as needed for nausea.  Dispense: 15 tablet; Refill: 0    2. Diarrhea  Consider Viral GI, or due to s/p cholecystectomy   Take imodium OTc, monitor sx and keep hydrated.     3. Pain of left lower leg  - evaluated in the ER with neg DVT      4. Superficial thrombophlebitis of arm  Supportive management with ASA.               Subjective:    Patient ID:   Mendy Roe is a 37 y.o. female with s/p laparoscopic cholecystectomy complicated by bile leak and on going intermittent abdominal pains, comes in follow up and was seen repeatedly in the ER for having left lower leg and arm pain, with US showing superficial thrombus in the left arm and the leg neg for DVT.    She presents today with being nauseated intermittently, but not any worse than what she has previously experienced, and having a diarrhea for the past couple of days.   She denies abdominal pain or having bloody stools.       Review of Systems  General : negative  Respiratory : no cough, shortness of breath, or wheezing  Cardiovascular : no chest pain or dyspnea on exertion  Gastrointestinal : no abdominal pain, change in bowel habits, or black or bloody stools  Genito-Urinary :  no dysuria, trouble voiding, or hematuria  Musculoskeletal : see HPI   Dermatological : negative    Allergy: reviewed      The following patient's history were reviewed and updated as appropriate:   She  has a past medical history of Anxiety; Depression; Shortness of breath; and Sleep apnea..      Outpatient Encounter Prescriptions as of 8/31/2018   Medication Sig Dispense Refill     cetirizine (ZYRTEC) 10 MG tablet Take 10 mg by mouth daily as needed for allergies.       clindamycin (CLEOCIN T) 1 % lotion Apply 1 application topically as needed.   2     ferrous sulfate 325 (65 FE) MG  tablet Take 1 tablet (325 mg total) by mouth 3 (three) times a day with meals. 90 tablet 1     HYDROcodone-acetaminophen (NORCO) 7.5-325 mg per tablet Take 1 tablet by mouth every 6 (six) hours as needed for pain.       hydrOXYzine HCl (ATARAX) 10 MG tablet Take 1 tablet (10 mg total) by mouth 3 (three) times a day as needed for anxiety. 90 tablet 0     ibuprofen (ADVIL,MOTRIN) 200 MG tablet Take 800 mg by mouth every 6 (six) hours as needed for pain.       LORazepam (ATIVAN) 1 MG tablet Take 1 mg by mouth every 6 (six) hours as needed for anxiety.       metroNIDAZOLE (FLAGYL) 500 MG tablet Take 1 tablet (500 mg total) by mouth 3 (three) times a day. 21 tablet 0     mometasone (ELOCON) 0.1 % cream Apply 1 application topically as needed.   2     ranitidine (ZANTAC) 150 MG tablet Take 150 mg by mouth every 12 (twelve) hours.       rOPINIRole (REQUIP) 0.25 MG tablet Take 0.25-0.5 mg by mouth at bedtime as needed.       No facility-administered encounter medications on file as of 8/31/2018.          Objective:   /58 (Patient Site: Right Arm, Patient Position: Sitting, Cuff Size: Adult Regular)  Pulse (!) 101  Temp 98.6  F (37  C) (Oral)   Wt 132 lb 3.2 oz (60 kg)  SpO2 96%  BMI 24.18 kg/m2      Physical Exam  General Appearance:    Alert, well hydrated, no distress,    Eyes:    PERRL, conjunctiva/corneas clear,    Throat:   Lips, mucosa, and tongue normal;  gums normal   Neck:   Supple, symmetrical, trachea midline, no adenopathy;        thyroid:  No enlargement/tenderness/nodules; no carotid    bruit or JVD   Lungs:     Clear to auscultation bilaterally, respirations unlabored   Heart:    Regular rate and rhythm, S1 and S2 normal, no murmur, rub   or gallop   Abdomen:     Soft, non-tender, normal bowel sounds, no rebound or guarding, no masses, no organomegaly   Extremities:   Extremities normal, atraumatic, no cyanosis or edema, non tender calves.    Skin:   Skin color, texture, turgor normal, no rashes or  lesions

## 2021-06-21 ENCOUNTER — COMMUNICATION - HEALTHEAST (OUTPATIENT)
Dept: PHYSICAL THERAPY | Facility: REHABILITATION | Age: 41
End: 2021-06-21

## 2021-06-21 NOTE — LETTER
Letter by Dixie Clifford MD at      Author: Dixie Clifford MD Service: -- Author Type: --    Filed:  Encounter Date: 2/10/2021 Status: (Other)         February 10, 2021     Patient: Mendy Roe   YOB: 1980   Date of Visit: 2/10/2021       To Whom It May Concern:    It is my medical opinion that Mendy Roe should be excused from work starting 2/8/21 due to acute illness and may return to work on 2/10/21 as long as no fever for more than 24 hours and overall symptoms are improving since her Covid-19 test was negative.    If you have any questions or concerns, please don't hesitate to call.    Sincerely,        Electronically signed by Dixie Peres MD

## 2021-06-21 NOTE — PROGRESS NOTES
Family Medicine Office Visit  [facility]  Patient Name: Mendy Roe  Patient Age: 38 y.o.  YOB: 1980  MRN: [id number]    Date of Visit: 2018  Reason for Office Visit:   Chief Complaint   Patient presents with     Sinus Problem     Sinus pain. Facial pain.      Nausea     Vomiting and diarrhea. Symptoms since friday. Has fever and chills off and on.      Fatigue     Fatigue and dizziness.            Assessment / Plan / Medical Decision Makin. Fever  Flu negative  - Influenza A/B Rapid Test- Nasal Swab    2. Acute maxillary sinusitis, recurrence not specified  Given z pack which patient states normally works for her due to allergies.  Steroid called in.  If no improvement then return to the office        Health Maintenance Review  Health Maintenance   Topic Date Due     DEPRESSION FOLLOW UP  1978     INFLUENZA VACCINE RULE BASED (1) 2018     TD 18+ HE  2018     PAP SMEAR  2021     ADVANCE DIRECTIVES DISCUSSED WITH PATIENT  2021     TDAP ADULT ONE TIME DOSE  Completed         I am having Ms. Roe start on azithromycin, ondansetron, and methylPREDNISolone. I am also having her maintain her ranitidine, ibuprofen, hydrOXYzine HCl, clindamycin, mometasone, HYDROcodone-acetaminophen, cetirizine, rOPINIRole, LORazepam, metroNIDAZOLE, ferrous sulfate, and ondansetron.      HPI:  Mendy Roe is a 38 y.o. year old who presents to the office today for sinus pressure and pain for the past 3 days.  Coughing but not bringing up anything.  Drainage down the back of the throat.  Myalgia, fevers and chills she feels like, vomiting all weekend with 3x yesterday and then diarrhea as well 5x yesterday.  Received flu shot in early October.  No hx of ill contacts.  Hx of recurrent sinus infections.        Review of Systems- pertinent positive in bold:  Constitutional: Fever, chills, night sweats, fainting, weight change, fatigue,  seizures, dizziness, sleeping difficulties, loud snoring/pauses in breathing  Eyes: change in vision, blurred or double vision, redness/eye pain  Ears, nose, mouth, throat: change in hearing, ear pain, hoarseness, difficulty swallowing, sores in the mouth or throat  Respiratory: shortness of breath, cough, bloody sputum, wheezing  Cardiovascular: chest pain, palpitations   Gastrointestinal: abdominal pain, heartburn/indigestion, nausea/vomiting, change in appetite, change in bowel habits, constipation or diarrhea, rectal bleeding/dark stools, difficulty swallowing  Urinary: painful urination, frequent urination, urinary urgency/incontinence, blood in urine/dark urine, nocturia  Musculoskeletal: backache/back pain (new or increasing), weakness, joint pain/stiffness (new or increasing), muscle cramps, swelling of hands, feet, ankles, leg pain/redness  Skin: change in moles/freckles, rash, nodules  Hematologic/lymphatic: swollen lymph glands, abnormal bruising/bleeding  Endocrine: excessive thirst/urination, cold or heat intolerance  Neurologic/emotional: worrisome memory change, numbness/tingling, anxiety, mood swings      Current Scheduled Meds:  Outpatient Encounter Prescriptions as of 11/5/2018   Medication Sig Dispense Refill     cetirizine (ZYRTEC) 10 MG tablet Take 10 mg by mouth daily as needed for allergies.       clindamycin (CLEOCIN T) 1 % lotion Apply 1 application topically as needed.   2     ferrous sulfate 325 (65 FE) MG tablet Take 1 tablet (325 mg total) by mouth 3 (three) times a day with meals. 90 tablet 1     HYDROcodone-acetaminophen (NORCO) 7.5-325 mg per tablet Take 1 tablet by mouth every 6 (six) hours as needed for pain.       hydrOXYzine HCl (ATARAX) 10 MG tablet Take 1 tablet (10 mg total) by mouth 3 (three) times a day as needed for anxiety. 90 tablet 0     ibuprofen (ADVIL,MOTRIN) 200 MG tablet Take 800 mg by mouth every 6 (six) hours as needed for pain.       LORazepam (ATIVAN) 1 MG tablet  Take 1 mg by mouth every 6 (six) hours as needed for anxiety.       metroNIDAZOLE (FLAGYL) 500 MG tablet Take 1 tablet (500 mg total) by mouth 3 (three) times a day. 21 tablet 0     mometasone (ELOCON) 0.1 % cream Apply 1 application topically as needed.   2     ondansetron (ZOFRAN ODT) 4 MG disintegrating tablet Take 1 tablet (4 mg total) by mouth every 8 (eight) hours as needed for nausea. 15 tablet 0     ranitidine (ZANTAC) 150 MG tablet Take 150 mg by mouth every 12 (twelve) hours.       rOPINIRole (REQUIP) 0.25 MG tablet Take 0.25-0.5 mg by mouth at bedtime as needed.       azithromycin (ZITHROMAX Z-CRISTIANE) 250 MG tablet Take 2 tablets (500 mg) on  Day 1,  followed by 1 tablet (250 mg) once daily on Days 2 through 5. 6 tablet 0     methylPREDNISolone (MEDROL DOSEPACK) 4 mg tablet Follow package directions 21 tablet 0     ondansetron (ZOFRAN) 4 MG tablet Take 1 tablet (4 mg total) by mouth daily as needed for nausea. 30 tablet 1     No facility-administered encounter medications on file as of 2018.      Past Medical History:   Diagnosis Date     Anxiety      Depression      Shortness of breath      Sleep apnea     Has had 2 sleep studies in past     Past Surgical History:   Procedure Laterality Date      SECTION, CLASSIC       ME LAP,CHOLECYSTECTOMY N/A 2018    Procedure: CHOLECYSTECTOMY, LAPAROSCOPIC;  Surgeon: Mrata Bishop MD;  Location: [facility];  Service: General     ME LAP,DIAGNOSTIC ABDOMEN N/A 2018    Procedure:  DIAGNOSTIC LAPAROSCOPY;  Surgeon: Marta Bishop MD;  Location: [facility];  Service: General     SINUS SURGERY       TUBAL LIGATION       TYMPANOSTOMY TUBE PLACEMENT       WISDOM TOOTH EXTRACTION       Social History   Substance Use Topics     Smoking status: Former Smoker     Types: Cigarettes     Quit date: 2016     Smokeless tobacco: Never Used     Alcohol use No       Objective / Physical Examination:  Vitals:    18 0956   BP: 100/70  "  Pulse: 72   Temp: 99  F (37.2  C)   SpO2: 99%   Weight: 134 lb (60.8 kg)   Height: 5' 2\" (1.575 m)     Wt Readings from Last 3 Encounters:   11/05/18 134 lb (60.8 kg)   08/31/18 132 lb 3.2 oz (60 kg)   08/29/18 131 lb (59.4 kg)     BP Readings from Last 3 Encounters:   11/05/18 100/70   08/31/18 100/58   08/29/18 131/72     Body mass index is 24.51 kg/(m^2).     General Appearance: Alert and oriented, cooperative, affect appropriate, speech clear, in no apparent distress  Head: Normocephalic, atraumatic  Ears: Tympanic membrane clear with landmarks well visualized bilaterally  Eyes: PERRL, fundi appear clear bilaterally. EOMI. Conjunctivae clear and sclerae non-icteric  Nose: Septum midline, nares patent, no visible polyps, mucosa moist and without drainage  Throat: Lips and mucosa moist. Teeth in good repair, pharynx without erythema or exudate  Neck: Supple, trachea midline. No cervical adenopathy  Back: Symmetrical and nontender  Lungs: Clear to auscultation bilaterally. Normal inspiratory and expiratory effort  Cardiovascular: Regular rate, normal S1, S2. No murmurs, rubs, or gallops  Abdomen: Bowel sounds active all four quadrants. Soft, non-tender. No hepatomegaly or splenomegaly. No bruits detected.   Extremities: Pulses 2+ and equal throughout. No edema. Strength equal throughout.  Integumentary: Warm and dry. Without suspicious looking lesions  Neuro: Alert and oriented, follows commands appropriately.     Orders Placed This Encounter   Procedures     Influenza A/B Rapid Test- Nasal Swab   Followup: No Follow-up on file. earlier if needed.    Total time spent with patient was 15 min with >50% of time spent in face-to-face counseling regarding the above plan       Hillary Pride MD    "

## 2021-06-22 ENCOUNTER — RECORDS - HEALTHEAST (OUTPATIENT)
Dept: ADMINISTRATIVE | Facility: OTHER | Age: 41
End: 2021-06-22

## 2021-06-22 ENCOUNTER — VIRTUAL VISIT (OUTPATIENT)
Dept: PSYCHOLOGY | Facility: CLINIC | Age: 41
End: 2021-06-22
Payer: COMMERCIAL

## 2021-06-22 DIAGNOSIS — F33.0 MDD (MAJOR DEPRESSIVE DISORDER), RECURRENT EPISODE, MILD (H): ICD-10-CM

## 2021-06-22 DIAGNOSIS — F41.1 GAD (GENERALIZED ANXIETY DISORDER): Primary | ICD-10-CM

## 2021-06-22 PROCEDURE — 90834 PSYTX W PT 45 MINUTES: CPT | Mod: 95 | Performed by: SOCIAL WORKER

## 2021-06-22 NOTE — PROGRESS NOTES
Progress Note    Patient Name: Mendy Roe  Date: 6/22/2021         Service Type: Individual  Video Visit:Yes, the patient's condition can be safely assessed and treated via synchronous audio and visual telemedicine encounter.      Reason for Video Visit: Services only offered telehealth    Location of Originating Site: Patient's home    Distant Site: Provider Remote Setting home office      Consent:  The patient/guardian has verbally consented to: the potential risks and benefits of telemedicine (video visit) versus in person care; bill my insurance or make self-payment for services provided; and responsibility for payment of non-covered services.      Mode of transmission-[employer]      Session Start Time: 1306       Session End Time: 1356     Session Length: 38-52 minutes    Session #: 22    Attendees: Client     Treatment Plan Last Reviewed 3/30/21 due 6/30/21    PHQ-9 / MEG-7 : 16/17 last session    DATA  Interactive Complexity: No  Crisis: No       Progress Since Last Session (Related to Symptoms / Goals / Homework):   Symptoms: Worsening per patient    Homework: Partially completed      Episode of Care Goals: Minimal progress - PREPARATION (Decided to change - considering how); Intervened by negotiating a change plan and determining options / strategies for behavior change, identifying triggers, exploring social supports, and working towards setting a date to begin behavior change     Current / Ongoing Stressors and Concerns:   COVID-19, isolation, moving, ex, kids, grandchildren, partner     Treatment Objective(s) Addressed in This Session:     Patient will demonstrate and report a level of anxiety that can be managed at a lower level of care.  Absence of persistent anxious mood and report of reduced frequency and intensity of worry and absence of persistent anxious mood to acceptable levels, no panic attacks, report subjective comfort with rumination for a  period of 90 days, within 6 months as clinically observed and by patient self-report.  Patient will demonstrate and report a level of depression that can be managed at a lower level of care.  Absence of persistent depression mood and report of reduced frequency and intensity of low mood and absence of persistent low energy and motivation to acceptable levels, report subjective improved motivation and increased energy for a period of 90 days, within 6 months as clinically observed and by patient self-report.     Intervention:     Therapist met with patient to review goals and interventions. Therapist utilized reflected listening as patient gave brief reflection of week.  Patient processed red flags with her current relationship. Therapist supported patient, validated and challenged patient. Therapist reiterated the importance of asking the difficult answers and making her expectations clear. Therapist shared concerns with patient avoidance and educated patient on how this can feed anxiety.  Patient presented with an anxious affect. Patient was engaged in session and open to feedback. Patient reported no safety concerns.     ASSESSMENT: Current Emotional / Mental Status (status of significant symptoms):   Risk status (Self / Other harm or suicidal ideation)   Patient denies current fears or concerns for personal safety.   Patient denies current or recent suicidal ideation or behaviors.   Patient denies current or recent homicidal ideation or behaviors.   Patient denies current or recent self injurious behavior or ideation.   Patient denies other safety concerns.   Patient reports there has been no change in risk factors since their last session.     Patient reports there has been no change in protective factors since their last session.     Recommended that patient call 911 or go to the local ED should there be a change in any of these risk factors.     Appearance:   Appropriate    Eye Contact:   Good    Psychomotor  Behavior: Restless    Attitude:   Cooperative  Friendly Pleasant   Orientation:   All   Speech    Rate / Production: Emotional Talkative    Volume:  Normal    Mood:    Anxious  Depressed  Sad  Grieving   Affect:    Tearful Worrisome    Thought Content:  Clear    Thought Form:  Coherent    Insight:    Fair      Medication Review:   No changes to current psychiatric medication(s)     Medication Compliance:   Yes     Changes in Health Issues:   None reported     Chemical Use Review:   Substance Use: Chemical use reviewed, no active concerns identified      Tobacco Use: No current tobacco use.      Diagnosis:  1. MEG (generalized anxiety disorder)    2. MDD (major depressive disorder), recurrent episode, mild (H)         Collateral Reports Completed:   Not Applicable    PLAN: (Patient Tasks / Therapist Tasks / Other)  Patient to have those difficult discussions and ask the hard questions.       Patito Humphries, [facility] 6/22/2021                                                             ______________________________________________________________________    Treatment Plan    Patient's Name: Mendy Roe  YOB: 1980    Date: 3/30/2021      DSM5 Diagnoses: 296.31 (F33.0) Major Depressive Disorder, Recurrent Episode, Mild _ and With mixed features or 300.02 (F41.1) Generalized Anxiety Disorder  Psychosocial / Contextual Factors: Ex, kids, grandchildren, partner  WHODAS: 43    Referral / Collaboration:  Referral to another professional/service is not indicated at this time..    Anticipated number of session or this episode of care: 20      MeasurableTreatment Goal(s) related to diagnosis / functional impairment(s)  Goal 1: Patient will report absence of persistent anxiety mood and report of reduced frequency and intensity of worry and absence of persistent anxious mood to acceptable levels, no panic attacks, report subjective comfort with rumination or a period of 90 days. Within 6 months as clinically  observed and by patient self-report    I will know I've met my goal when manage my anxiety.      Objective #A (Patient Action)    Patient will demonstrate and report a level of anxiety that can be managed at a lower level of care.  Absence of persistent anxious mood and report of reduced frequency and intensity of worry and absence of persistent anxious mood to acceptable levels, no panic attacks, report subjective comfort with rumination for a period of 90 days, within 6 months as clinically observed and by patient self-report.  Status: Continued - Date(s): 3/30/2021    Intervention(s)  Therapist will provide individual therapy to identify triggers to anxiety, gain feedback on helpful coping tools and thought-reframing techniques, and identify preferred way of being. Tx to include discuss current stressors and interpersonal conflicts and how to cope with these, coaching, diagnostic testing, referral for medication as indicated, use prescribed medication, cognitive restructuring, interpersonal, family therapy, supportive therapy services.        Goal 2: Patient will report absence of persistent depression mood and report of reduced frequency and intensity of low mood and absence of persistent low energy and motivation to acceptable levels, report subjective improved motivation and increased energy for a period of 90 days, within 6 months as clinically observed and by patient self-report    I will know I've met my goal when feeling more balanced.      Objective #A (Patient Action)    Status: Continued - Date(s): 3/30/2021    Patient will demonstrate and report a level of depression that can be managed at a lower level of care.  Absence of persistent depression mood and report of reduced frequency and intensity of low mood and absence of persistent low energy and motivation to acceptable levels, report subjective improved motivation and increased energy for a period of 90 days, within 6 months as clinically observed  and by patient self-report.    Intervention(s)  Therapist will provide individual therapy to identify triggers to depression, gain feedback on helpful coping tools and thought-reframing techniques, and identify preferred way of being.  Tx to include discussion of current stressors and interpersonal conflicts and how to cope with these, coaching, diagnostic testing, referral for medication as indicated, use prescribed medication, cognitive restructuring, interpersonal, family therapy, supportive therapy services.        Patient has reviewed and agreed to the above plan.      Patito Humphries, [facility]  3/30/2021

## 2021-06-25 NOTE — TELEPHONE ENCOUNTER
Called and left message for patient regarding missed appointment on 06/01/21 at 7am. Patient reminded of no show policy and reminded of upcoming appointments.    Jyothi Gould, PT, DPT

## 2021-06-26 NOTE — PROGRESS NOTES
[facility] Daily Progress     Patient Name: Mendy Roe  Date: 6/14/2021  Visit #: 3  PTA visit #:  NA  Referral Diagnosis: Acute pain of right shoulder  Referring provider: Rayne Quijano*  Visit Diagnosis:     ICD-10-CM    1. Acute pain of right shoulder  M25.511    2. Decreased right shoulder range of motion  M25.611        Past Medical History:   Diagnosis Date     Anxiety      Depression      Shortness of breath      Sleep apnea     Has had 2 sleep studies in past         Assessment:     HEP/POC compliance is  good .  Patient demonstrates understanding/independence with home program.  Response to Intervention Pt demonstrates improvement in R shoulder AROM abduction and IR. Instructed on HEP to focus on as pt has not been consistent recently.  Patient is benefitting from skilled physical therapy and is making steady progress toward functional goals.  Patient is appropriate to continue with skilled physical therapy intervention, as indicated by initial plan of care.    Goal Status:  Pt. will demonstrate/verbalize independence in self-management of condition in : 4 weeks;Progressing toward  Pt. will be independent with home exercise program in : 4 weeks;Progressing toward  Patient will reach / maintain arm movement: overhead;for work;with full ROM;with no pain;in 12 weeks  in other weeks: 8 weeks    Pt will: be able to sleep on her right side without additional discomfort to allow for more restful sleep and return to prior level of function in 8 weeks      Plan / Patient Education:     Continue with initial plan of care.  Progress with home program as tolerated.     Plan for next visit: Progress resistance on rotator cuff exercises, consider addition of serratus anterior wall climb.    Subjective:     Pain Rating: 3  Pt reports pain the past few weeks but she has not been able to do her exercises. Prior to that, it had been feeling a little bit better.    Objective:        Exercises:  Exercise #1: Standing shoulder IR/ER isometrics in doorway x 10 - 10 second holds  Comment #1: Standing flexion wall slides x 20  Exercise #2: Standing wand IR/Ext AAROM x 20  Comment #2: Sidelying shoulder ER x 15  Exercise #3: Standing shoulder IR w/ L2 band x 15  Comment #3: Standing scapular retraction w/ L3 band x 15    Date: 5/3/2021 5/24/2021   6/14/21   Shoulder and Elbow ROM ( )    AROM in degrees AROM in degrees AROM in degrees    Right Left Right Left Right Left   Shoulder Flexion (0-180 ) 136,   138, ERP  168 140   WFL    Shoulder Abduction (0-180 ) 102,   105, ERP  165  125, pulling WFL    Shoulder Extension (0-60 ) 30 32           Shoulder ER (0-90 )               Shoulder IR (0-70 )               Shoulder IR/Ext L3, ERP T7  L2  T7 To T12  To T7    Elbow Flexion (150 )          To C7  to C7   Elbow Extension (0 )                 PROM in degrees PROM in degrees PROM in degrees     Right Left Right Left Right Left   Shoulder Flexion (0-180 ) 165 170           Shoulder Abduction (0-180 ) 165 170           Shoulder Extension (0-60 )               Shoulder ER (0-90 ) 100 100           Shoulder IR (0-70 ) 60 60           Elbow Flexion (150 )               Elbow Extension (0 )                     Treatment Today     TREATMENT MINUTES COMMENTS   Evaluation     Self-care/ Home management     Manual therapy     Neuromuscular Re-education     Therapeutic Activity     Therapeutic Exercises 24 -see exercise flow sheet for date completed  -UBE WL4 x 4 min, Forward/backwards for increased UE strength and mobility    PTRx: [id number]   Gait training     Modality__________________                Total 24    Blank areas are intentional and mean the treatment did not include these items.     Hillary Steve, PT, DPT, CLT[facility]  6/14/2021

## 2021-06-27 NOTE — PROGRESS NOTES
Progress Notes by Jadiel Parra DO at 8/5/2019  6:00 PM     Author: Jadiel Parra DO Service: -- Author Type: Physician    Filed: 8/6/2019 12:43 PM Encounter Date: 8/5/2019 Status: Signed    : Jadiel Parra DO (Physician)       Chief Complaint   Patient presents with   ? Rash     in the roof of the mouth and a bump or two at the back of mouth x1wk    ? Sore Throat     possible push out a tonsil stone per pt         History of Present Illness: Rooming staff notes reviewed.  Patient is seen for chief concern of a white area she noted over her right tonsil recently, and some irritation underneath her denture in the area of her soft and hard palate.  Patient has seen a white patch on right tonsil, which she pushed out last night. She has had red bumps on top of mouth for about a week, with these areas initially being more raised and ulcerated of like, and now being flat and red.  No reported recent fever.  She feels like her throat discomfort has been worsening over the past week.    Review of systems: See history of present illness, all others negative.     Current Outpatient Medications   Medication Sig Dispense Refill   ? cetirizine (ZYRTEC) 10 MG tablet Take 10 mg by mouth daily as needed for allergies.     ? ibuprofen (ADVIL,MOTRIN) 200 MG tablet Take 800 mg by mouth every 6 (six) hours as needed for pain.     ? LORazepam (ATIVAN) 1 MG tablet Take 1 mg by mouth every 6 (six) hours as needed for anxiety.     ? ondansetron (ZOFRAN) 4 MG tablet Take 1 tablet (4 mg total) by mouth daily as needed for nausea. 30 tablet 1   ? ranitidine (ZANTAC) 150 MG tablet Take 150 mg by mouth every 12 (twelve) hours.     ? rOPINIRole (REQUIP) 0.25 MG tablet Take 1 tablet (0.25 mg total) by mouth at bedtime as needed. 30 tablet 5   ? cefdinir (OMNICEF) 300 MG capsule Take 1 capsule (300 mg total) by mouth 2 (two) times a day for 10 days. 20 capsule 0     No current facility-administered medications for this visit.       Past Medical History:   Diagnosis Date   ? Anxiety    ? Depression    ? Shortness of breath    ? Sleep apnea     Has had 2 sleep studies in past      Past Surgical History:   Procedure Laterality Date   ?  SECTION, CLASSIC     ? [place] LAP,CHOLECYSTECTOMY N/A 2018    Procedure: CHOLECYSTECTOMY, LAPAROSCOPIC;  Surgeon: Marta Bishop MD;  Location: [facility] OR;  Service: General   ? [place] LAP,DIAGNOSTIC ABDOMEN N/A 2018    Procedure:  DIAGNOSTIC LAPAROSCOPY;  Surgeon: Marta Bishop MD;  Location: [facility] OR;  Service: General   ? SINUS SURGERY     ? TUBAL LIGATION     ? TYMPANOSTOMY TUBE PLACEMENT     ? WISDOM TOOTH EXTRACTION        Social History     Tobacco Use   ? Smoking status: Former Smoker     Types: Cigarettes     Last attempt to quit: 2016     Years since quittin.6   ? Smokeless tobacco: Never Used   Substance Use Topics   ? Alcohol use: No   ? Drug use: No        Family History   Problem Relation Age of Onset   ? Depression Mother    ? Valvular heart disease Mother    ? Depression Father    ? Cerebral aneurysm Father    ? Depression Daughter    ? Heart attack Paternal Grandmother    ? Mental illness Daughter    ? Depression Daughter    ? No Medical Problems Daughter    ? Bipolar disorder Brother        Vitals:    19 1833   BP: 112/77   Patient Site: Right Arm   Patient Position: Sitting   Cuff Size: Adult Regular   Pulse: 69   Resp: 16   Temp: 98.9  F (37.2  C)   TempSrc: Oral   SpO2: 99%   Weight: 144 lb 1 oz (65.3 kg)       EXAM:   General: Vital signs reviewed.  Patient is in no acute appearing distress.  Breathing appears nonlabored.  Patient is alert and oriented ×3.  ENT: Ear exam shows bilateral tympanic membranes to be clear without injection, nasal turbinates show no injection or edema, no pharyngeal injection or exudate.  Her tonsils are about 1+ size bilaterally, with no exudate seen, though she did report having one on the right side which she  dislodged with the tip of her finger.  She reports having tonsillar area throat discomfort.  Across the hard and soft palate area there is a cluster of flat red discolored areas in an area about 1.5 cm wide.  This area is underneath her upper denture plate.  No edema noted in this area.  Neck: supple with tender bilateral adenoapthy.  Heart: Heart rate is regular without murmur.  Lungs: Lungs are clear to auscultation with good airflow bilaterally.  Skin: Skin is warm and dry without any rash noted.    Recent Results (from the past 48 hour(s))   Rapid Strep A Screen-Throat   Result Value Ref Range    Rapid Strep A Antigen No Group A Strep detected, presumptive negative No Group A Strep detected, presumptive negative   Group A Strep, RNA Direct Detection, Throat   Result Value Ref Range    Group A Strep by PCR No Group A Strep rRNA detected No Group A Strep rRNA detected   Results from exam reviewed with patient.  I recommended we still treat her with an antibacterial medication given the chronicity and worsening of her throat discomfort, anterior tender cervical lymphadenopathy.  For treatment of her upper mouth sores, I recommend that refraining from wearing her denture when possible until the condition improves.    Assessment/Plan   1. Throat pain  Rapid Strep A Screen-Throat    Group A Strep, RNA Direct Detection, Throat   2. Exudative tonsillitis  cefdinir (OMNICEF) 300 MG capsule   3. Cervical lymphadenitis  cefdinir (OMNICEF) 300 MG capsule       Patient Instructions     The cefdinir should treat any strep infection if the follow up test is positive. We will notify you if the pending strep study is positive. Otherwise, you can assume the test was negative if not contacted over the next 48 hours. I think you should use the antibiotic treatment even if the strep test is negative to treat for other bacterial causes.       Patient Education     Local Lymph Node Infection, Antibiotic Treatment    You have a  bacterial infection of a lymph node. The lymph nodes are part of your immune system. They are found under the jaw and along the side of the neck, in the armpits, and in the groin. An infection or inflammation in nearby tissues causes the lymph nodes to swell and become tender.  When a bacterial infection occurs in the lymph node, it becomes very painful. The nearby skin gets red and warm. You may also have a fever.  Antibiotics and hot compresses are used to treat this infection. The pain and redness will get better over the next 7 to 10 days. Swelling may take several months to go away.  Sometimes an abscess (with pus) forms inside the lymph node. If this happens, antibiotics may not be enough to cure the infection. Minor surgery may be needed to drain the pus. You may need blood tests or a study of the pus inside the abscess to guide your treatment.  Home care  Follow these guidelines when caring for yourself at home:    Take all of the antibiotic medicine exactly as prescribed until it is gone. Be careful not to miss any doses, especially during the first few days.    Make a hot compress by running hot water over a face cloth. Apply it to the sore area until the cloth cools off. Repeat this for 20 minutes. Use the hot compress 3 times a day for the first 3 days, or until the pain and redness begin to get better. The heat will increase the blood flow to the area and speed the healing process.    You may use acetaminophen or ibuprofen to control pain and fever, unless another medicine was prescribed for this. Dont use ibuprofen in children under 6 months of age. If you have chronic liver or kidney disease, talk with your healthcare provider before using these medicines. Also talk with your provider if youve had a stomach ulcer or gastrointestinal bleeding. Dont give aspirin to anyone under 18 years of age who is ill with a fever. It may cause severe liver damage.  Follow-up care  Follow up with your healthcare  provider, or as advised, after you finish the antibiotics.  When to seek medical advice  Call your healthcare provider right away if any of these occur:    Redness, swelling or pain in the lymph node gets worse    The lymph node gets bigger, becomes soft in the middle, or doesnt seem to be getting better after youve taken the antibiotics for 2 days (48 hours)    Pus or fluid drains from the lymph node    You have trouble breathing or swallowing  Also call your provider right away if you have a fever, or your saul provider if your child has a fever (see Fever and children, below)  Fever and children  Always use a digital thermometer to check your saul temperature. Never use a mercury thermometer.  For infants and toddlers, be sure to use a rectal thermometer correctly. A rectal thermometer may accidentally poke a hole in (perforate) the rectum. It may also pass on germs from the stool. Always follow the product makers directions for proper use. If you dont feel comfortable taking a rectal temperature, use another method. When you talk to your saul healthcare provider, tell him or her which method you used to take your saul temperature.  Here are guidelines for fever temperature. Ear temperatures arent accurate before 6 months of age. Dont take an oral temperature until your child is at least 4 years old.  Infant under 3 months old:    Ask your saul healthcare provider how you should take the temperature.    Rectal or forehead (temporal artery) temperature of 100.4 F (38 C) or higher, or as directed by the provider    Armpit temperature of 99 F (37.2 C) or higher, or as directed by the provider  Child age 3 to 36 months:    Rectal, forehead (temporal artery), or ear temperature of 102 F (38.9 C) or higher, or as directed by the provider    Armpit temperature of 101 F (38.3 C) or higher, or as directed by the provider  Child of any age:    Repeated temperature of 104 F (40 C) or higher, or as directed by the  provider    Fever that lasts more than 24 hours in a child under 2 years old. Or a fever that lasts for 3 days in a child 2 years or older.   Date Last Reviewed: 5/1/2017 2000-2017 [employer]. [address] [place] [address], [place] [address]. All rights reserved. This information is not intended as a substitute for professional medical care. Always follow your healthcare professional's instructions.           Patient Education     Self-Care for Sore Throats    Sore throats happen for many reasons, such as colds, allergies, and infections caused by viruses or bacteria. In any case, your throat becomes red and sore. Your goal for self-care is to reduce your discomfort while giving your throat a chance to heal.  Moisten and soothe your throat  Tips include the following:    Try a sip of water first thing after waking up.    Keep your throat moist by drinking 6 or more glasses of clear liquids every day.    Run a cool-air humidifier in your room overnight.    Avoid cigarette smoke.     Suck on throat lozenges, cough drops, hard candy, ice chips, or frozen fruit-juice bars. Use the sugar-free versions if your diet or medical condition requires them.  Gargle to ease irritation  Gargling every hour or 2 can ease irritation. Try gargling with 1 of these solutions:    1/4 teaspoon of salt in 1/2 cup of warm water    An over-the-counter anesthetic gargle  Use medicine for more relief  Over-the-counter medicine can reduce sore throat symptoms. Ask your pharmacist if you have questions about which medicine to use:    Ease pain with anesthetic sprays. Aspirin or an aspirin substitute also helps. Remember, never give aspirin to anyone 18 or younger, or if you are already taking blood thinners.     For sore throats caused by allergies, try antihistamines to block the allergic reaction.    Remember: unless a sore throat is caused by a bacterial infection, antibiotics wont help you.  Prevent future sore  throats  Prevention tips include the following:    Stop smoking or reduce contact with secondhand smoke. Smoke irritates the tender throat lining.    Limit contact with pets and with allergy-causing substances, such as pollen and mold.    When youre around someone with a sore throat or cold, wash your hands often to keep viruses or bacteria from spreading.    Dont strain your vocal cords.  Call your healthcare provider  Contact your healthcare provider if you have:    A temperature over 101 F (38.3 C)    White spots on the throat    Great difficulty swallowing    Trouble breathing    A skin rash    Recent exposure to someone else with strep bacteria    Severe hoarseness and swollen glands in the neck or jaw   Date Last Reviewed: 8/1/2016 2000-2017 [employer]. [address] [place] [address], [place] [address]. All rights reserved. This information is not intended as a substitute for professional medical care. Always follow your healthcare professional's instructions.              Jadiel Parra, DO

## 2021-07-03 NOTE — ANESTHESIA PREPROCEDURE EVALUATION
Anesthesia Preprocedure Evaluation by Sujatha Huizar MD at 7/19/2018 10:55 AM     Author: Sujatha Huizar MD Service: -- Author Type: Physician    Filed: 7/19/2018 11:09 AM Date of Service: 7/19/2018 10:55 AM Status: Addendum    : Sujatha Huizar MD (Physician)    Related Notes: Original Note by Sujatha Huizar MD (Physician) filed at 7/19/2018 10:56 AM       Anesthesia Evaluation      Patient summary reviewed   No history of anesthetic complications     Airway   Mallampati: I  Neck ROM: full   Pulmonary - normal exam    breath sounds clear to auscultation  (-) sleep apnea (Pt reports two negaive sleep studies), not a smoker (Former)                         Cardiovascular - normal exam  Exercise tolerance: > or = 4 METS  (-) murmur  ECG reviewed  Rhythm: regular  Rate: normal,    no murmur      Neuro/Psych    (+) anxiety/panic attacks,     Comments: ADD  RLS    Endo/Other - negative ROS      GI/Hepatic/Renal    (+) GERD intermittent,       Comments: Cholelithiasis          Dental    (+) upper dentures                           Anesthesia Plan  Planned anesthetic: general endotracheal  Propofol 25 mcg/kg/min for PONV  Decadron 10 mg IV  Zofran  Scopolamine patch  ASA 2   Induction: intravenous   Anesthetic plan and risks discussed with: patient  Anesthesia plan special considerations: antiemetics,   Post-op plan: routine recovery

## 2021-07-03 NOTE — ANESTHESIA PREPROCEDURE EVALUATION
Anesthesia Preprocedure Evaluation by Precious Vega MD at 8/14/2018  4:24 PM     Author: Precious Vega MD Service: -- Author Type: Physician    Filed: 8/14/2018  4:25 PM Date of Service: 8/14/2018  4:24 PM Status: Signed    : Precious Vega MD (Physician)       Anesthesia Evaluation      Patient summary reviewed   No history of anesthetic complications     Airway   Mallampati: I  Neck ROM: full   Pulmonary - normal exam    breath sounds clear to auscultation  (-) sleep apnea (Pt reports two negaive sleep studies), not a smoker (Former)                         Cardiovascular - normal exam  Exercise tolerance: > or = 4 METS  (-) murmur  ECG reviewed  Rhythm: regular  Rate: normal,    no murmur      Neuro/Psych    (+) anxiety/panic attacks,     Comments: ADD  RLS    Endo/Other - negative ROS      GI/Hepatic/Renal    (+) GERD,       Comments: Cholelithiasis     Other findings: Extremely tearful and anxious      Dental    (+) upper dentures                             Anesthesia Plan  Planned anesthetic: general endotracheal  Propofol 25 mcg/kg/min for PONV  Decadron 10 mg IV  Zofran  Scopolamine patch  Versed in preop for anxiolysis as well  ASA 2   Induction: intravenous   Anesthetic plan and risks discussed with: patient  Anesthesia plan special considerations: antiemetics,   Post-op plan: routine recovery

## 2021-07-03 NOTE — ADDENDUM NOTE
Addendum Note by Marissa Wharton MD at 7/14/2018  2:18 PM     Author: Marissa Wharton MD Service: -- Author Type: Physician    Filed: 7/14/2018  2:18 PM Encounter Date: 7/14/2018 Status: Signed    : Marissa Wharton MD (Physician)    Addended by: MARISSA WHARTON on: 7/14/2018 02:18 PM        Modules accepted: Orders

## 2021-07-03 NOTE — ADDENDUM NOTE
Addendum Note by Mendy Limon CMA at 8/7/2017  2:47 PM     Author: Mendy Limon CMA Service: -- Author Type: Certified Medical Assistant    Filed: 8/7/2017  2:47 PM Encounter Date: 8/7/2017 Status: Signed    : Mendy Limon CMA (Certified Medical Assistant)    Addended by: MENDY LIMON on: 8/7/2017 02:47 PM        Modules accepted: Orders

## 2021-07-06 ENCOUNTER — TRANSFERRED RECORDS (OUTPATIENT)
Dept: HEALTH INFORMATION MANAGEMENT | Facility: CLINIC | Age: 41
End: 2021-07-06

## 2021-07-06 ENCOUNTER — VIRTUAL VISIT (OUTPATIENT)
Dept: PSYCHOLOGY | Facility: CLINIC | Age: 41
End: 2021-07-06
Payer: COMMERCIAL

## 2021-07-06 DIAGNOSIS — F33.0 MDD (MAJOR DEPRESSIVE DISORDER), RECURRENT EPISODE, MILD (H): ICD-10-CM

## 2021-07-06 DIAGNOSIS — F41.1 GAD (GENERALIZED ANXIETY DISORDER): Primary | ICD-10-CM

## 2021-07-06 PROCEDURE — 90834 PSYTX W PT 45 MINUTES: CPT | Mod: 95 | Performed by: SOCIAL WORKER

## 2021-07-06 ASSESSMENT — ANXIETY QUESTIONNAIRES
7. FEELING AFRAID AS IF SOMETHING AWFUL MIGHT HAPPEN: SEVERAL DAYS
GAD7 TOTAL SCORE: 17
5. BEING SO RESTLESS THAT IT IS HARD TO SIT STILL: NEARLY EVERY DAY
6. BECOMING EASILY ANNOYED OR IRRITABLE: NEARLY EVERY DAY
4. TROUBLE RELAXING: NEARLY EVERY DAY
1. FEELING NERVOUS, ANXIOUS, OR ON EDGE: NEARLY EVERY DAY
2. NOT BEING ABLE TO STOP OR CONTROL WORRYING: MORE THAN HALF THE DAYS
3. WORRYING TOO MUCH ABOUT DIFFERENT THINGS: MORE THAN HALF THE DAYS

## 2021-07-06 ASSESSMENT — PATIENT HEALTH QUESTIONNAIRE - PHQ9: SUM OF ALL RESPONSES TO PHQ QUESTIONS 1-9: 18

## 2021-07-06 NOTE — PROGRESS NOTES
Progress Note    Patient Name: Mendy Roe  Date: 7/6/2021         Service Type: Individual  Video Visit:Yes, the patient's condition can be safely assessed and treated via synchronous audio and visual telemedicine encounter.      Reason for Video Visit: Services only offered telehealth    Location of Originating Site: Patient's home    Distant Site: Provider Remote Setting home office      Consent:  The patient/guardian has verbally consented to: the potential risks and benefits of telemedicine (video visit) versus in person care; bill my insurance or make self-payment for services provided; and responsibility for payment of non-covered services.      Mode of transmission-[employer]      Session Start Time: 1606       Session End Time: 1648     Session Length: 38-52 minutes    Session #: 23    Attendees: Client     Treatment Plan Last Reviewed 7/6/2021 due 10/6/2021    PHQ-9 / MEG-7 : 18/17    DATA  Interactive Complexity: No  Crisis: No       Progress Since Last Session (Related to Symptoms / Goals / Homework):   Symptoms: Worsening phq-9 meg-7    Homework: Partially completed      Episode of Care Goals: Minimal progress - PREPARATION (Decided to change - considering how); Intervened by negotiating a change plan and determining options / strategies for behavior change, identifying triggers, exploring social supports, and working towards setting a date to begin behavior change     Current / Ongoing Stressors and Concerns:   COVID-19, isolation, moving, ex, kids, grandchildren, partner     Treatment Objective(s) Addressed in This Session:     Patient will demonstrate and report a level of anxiety that can be managed at a lower level of care.  Absence of persistent anxious mood and report of reduced frequency and intensity of worry and absence of persistent anxious mood to acceptable levels, no panic attacks, report subjective comfort with rumination for a period of 90  days, within 6 months as clinically observed and by patient self-report.  Patient will demonstrate and report a level of depression that can be managed at a lower level of care.  Absence of persistent depression mood and report of reduced frequency and intensity of low mood and absence of persistent low energy and motivation to acceptable levels, report subjective improved motivation and increased energy for a period of 90 days, within 6 months as clinically observed and by patient self-report.     Intervention:     Therapist met with patient to review goals and interventions. Therapist utilized reflected listening as patient gave brief reflection of week.  Patient processed her living situation and family conflict. Therapist supported patient and challenged patient. Patient agreed with outlook and concerns. Therapist encouraged patient to speak to her daughters and allow self to be vulnerable and place responsibility where it needs to be.  Patient presented with an anxious affect. Patient was engaged in session and open to feedback. Patient reported no safety concerns.     ASSESSMENT: Current Emotional / Mental Status (status of significant symptoms):   Risk status (Self / Other harm or suicidal ideation)   Patient denies current fears or concerns for personal safety.   Patient denies current or recent suicidal ideation or behaviors.   Patient denies current or recent homicidal ideation or behaviors.   Patient denies current or recent self injurious behavior or ideation.   Patient denies other safety concerns.   Patient reports there has been no change in risk factors since their last session.     Patient reports there has been no change in protective factors since their last session.     Recommended that patient call 911 or go to the local ED should there be a change in any of these risk factors.     Appearance:   Appropriate    Eye Contact:   Good    Psychomotor Behavior: Restless    Attitude:   Cooperative   Friendly Pleasant   Orientation:   All   Speech    Rate / Production: Emotional Talkative    Volume:  Normal    Mood:    Anxious  Depressed    Affect:    Worrisome    Thought Content:  Clear    Thought Form:  Coherent    Insight:    Fair      Medication Review:   No changes to current psychiatric medication(s)     Medication Compliance:   Yes     Changes in Health Issues:   None reported     Chemical Use Review:   Substance Use: Chemical use reviewed, no active concerns identified      Tobacco Use: No current tobacco use.      Diagnosis:  1. MEG (generalized anxiety disorder)    2. MDD (major depressive disorder), recurrent episode, mild (H)         Collateral Reports Completed:   Not Applicable    PLAN: (Patient Tasks / Therapist Tasks / Other)  Patient to have difficult talk with daughters    Patito Humphries, [facility] 7/6/2021                                                             ______________________________________________________________________    Treatment Plan    Patient's Name: Mendy Roe  YOB: 1980    Date: 7/6/2021      DSM5 Diagnoses: 296.31 (F33.0) Major Depressive Disorder, Recurrent Episode, Mild _ and With mixed features or 300.02 (F41.1) Generalized Anxiety Disorder  Psychosocial / Contextual Factors: Ex, kids, grandchildren, partner  WHODAS: 43    Referral / Collaboration:  Referral to another professional/service is not indicated at this time..    Anticipated number of session or this episode of care: 20      MeasurableTreatment Goal(s) related to diagnosis / functional impairment(s)  Goal 1: Patient will report absence of persistent anxiety mood and report of reduced frequency and intensity of worry and absence of persistent anxious mood to acceptable levels, no panic attacks, report subjective comfort with rumination or a period of 90 days. Within 6 months as clinically observed and by patient self-report    I will know I've met my goal when manage my anxiety.       Objective #A (Patient Action)    Patient will demonstrate and report a level of anxiety that can be managed at a lower level of care.  Absence of persistent anxious mood and report of reduced frequency and intensity of worry and absence of persistent anxious mood to acceptable levels, no panic attacks, report subjective comfort with rumination for a period of 90 days, within 6 months as clinically observed and by patient self-report.  Status: Continued - Date(s): 7/6/2021    Intervention(s)  Therapist will provide individual therapy to identify triggers to anxiety, gain feedback on helpful coping tools and thought-reframing techniques, and identify preferred way of being. Tx to include discuss current stressors and interpersonal conflicts and how to cope with these, coaching, diagnostic testing, referral for medication as indicated, use prescribed medication, cognitive restructuring, interpersonal, family therapy, supportive therapy services.        Goal 2: Patient will report absence of persistent depression mood and report of reduced frequency and intensity of low mood and absence of persistent low energy and motivation to acceptable levels, report subjective improved motivation and increased energy for a period of 90 days, within 6 months as clinically observed and by patient self-report    I will know I've met my goal when feeling more balanced.      Objective #A (Patient Action)    Status: Continued - Date(s): 7/6/2021    Patient will demonstrate and report a level of depression that can be managed at a lower level of care.  Absence of persistent depression mood and report of reduced frequency and intensity of low mood and absence of persistent low energy and motivation to acceptable levels, report subjective improved motivation and increased energy for a period of 90 days, within 6 months as clinically observed and by patient self-report.    Intervention(s)  Therapist will provide individual therapy to  identify triggers to depression, gain feedback on helpful coping tools and thought-reframing techniques, and identify preferred way of being.  Tx to include discussion of current stressors and interpersonal conflicts and how to cope with these, coaching, diagnostic testing, referral for medication as indicated, use prescribed medication, cognitive restructuring, interpersonal, family therapy, supportive therapy services.        Patient has reviewed and agreed to the above plan.      Patito Humphries, [facility]  7/6/2021

## 2021-07-07 ASSESSMENT — ANXIETY QUESTIONNAIRES: GAD7 TOTAL SCORE: 17

## 2021-07-20 ENCOUNTER — VIRTUAL VISIT (OUTPATIENT)
Dept: PSYCHOLOGY | Facility: CLINIC | Age: 41
End: 2021-07-20
Payer: COMMERCIAL

## 2021-07-20 DIAGNOSIS — F33.0 MDD (MAJOR DEPRESSIVE DISORDER), RECURRENT EPISODE, MILD (H): ICD-10-CM

## 2021-07-20 DIAGNOSIS — F41.1 GAD (GENERALIZED ANXIETY DISORDER): Primary | ICD-10-CM

## 2021-07-20 PROCEDURE — 90834 PSYTX W PT 45 MINUTES: CPT | Mod: 95 | Performed by: SOCIAL WORKER

## 2021-07-20 ASSESSMENT — ANXIETY QUESTIONNAIRES
6. BECOMING EASILY ANNOYED OR IRRITABLE: MORE THAN HALF THE DAYS
5. BEING SO RESTLESS THAT IT IS HARD TO SIT STILL: NEARLY EVERY DAY
7. FEELING AFRAID AS IF SOMETHING AWFUL MIGHT HAPPEN: SEVERAL DAYS
GAD7 TOTAL SCORE: 13
2. NOT BEING ABLE TO STOP OR CONTROL WORRYING: SEVERAL DAYS
1. FEELING NERVOUS, ANXIOUS, OR ON EDGE: SEVERAL DAYS
3. WORRYING TOO MUCH ABOUT DIFFERENT THINGS: MORE THAN HALF THE DAYS
4. TROUBLE RELAXING: NEARLY EVERY DAY

## 2021-07-20 ASSESSMENT — PATIENT HEALTH QUESTIONNAIRE - PHQ9: SUM OF ALL RESPONSES TO PHQ QUESTIONS 1-9: 14

## 2021-07-20 NOTE — PROGRESS NOTES
Progress Note    Patient Name: Mendy Roe  Date: 7/20/2021         Service Type: Individual  Video Visit:Yes, the patient's condition can be safely assessed and treated via synchronous audio and visual telemedicine encounter.      Reason for Video Visit: Services only offered telehealth    Location of Originating Site: Patient's home    Distant Site: Provider Remote Setting home office      Consent:  The patient/guardian has verbally consented to: the potential risks and benefits of telemedicine (video visit) versus in person care; bill my insurance or make self-payment for services provided; and responsibility for payment of non-covered services.      Mode of transmission-[employer]      Session Start Time: 1606      Session End Time: 1651     Session Length: 38-52 minutes    Session #: 24    Attendees: Client     Treatment Plan Last Reviewed 7/6/2021 due 10/6/2021    PHQ-9 / MEG-7 : 14/13    DATA  Interactive Complexity: No  Crisis: No       Progress Since Last Session (Related to Symptoms / Goals / Homework):   Symptoms: Improving phq-9 meg-7    Homework: Partially completed      Episode of Care Goals: Minimal progress - PREPARATION (Decided to change - considering how); Intervened by negotiating a change plan and determining options / strategies for behavior change, identifying triggers, exploring social supports, and working towards setting a date to begin behavior change     Current / Ongoing Stressors and Concerns:   COVID-19, isolation, moving, ex, kids, grandchildren, partner     Treatment Objective(s) Addressed in This Session:     Patient will demonstrate and report a level of anxiety that can be managed at a lower level of care.  Absence of persistent anxious mood and report of reduced frequency and intensity of worry and absence of persistent anxious mood to acceptable levels, no panic attacks, report subjective comfort with rumination for a period of 90  days, within 6 months as clinically observed and by patient self-report.  Patient will demonstrate and report a level of depression that can be managed at a lower level of care.  Absence of persistent depression mood and report of reduced frequency and intensity of low mood and absence of persistent low energy and motivation to acceptable levels, report subjective improved motivation and increased energy for a period of 90 days, within 6 months as clinically observed and by patient self-report.     Intervention:     Therapist met with patient to review goals and interventions. Therapist utilized reflected listening as patient gave brief reflection of week.  Patient reported decrease in anxiety and depression. Patient processed her relationship and her daughters. Therapist supported patient as she processed. Therapist encouraged patient to keep open communication with her daughters and boundaries with ex. Therapist validated patient and utilized strength based modality. Patient presented with an anxious affect. Patient was engaged in session and open to feedback. Patient reported no safety concerns.     ASSESSMENT: Current Emotional / Mental Status (status of significant symptoms):   Risk status (Self / Other harm or suicidal ideation)   Patient denies current fears or concerns for personal safety.   Patient denies current or recent suicidal ideation or behaviors.   Patient denies current or recent homicidal ideation or behaviors.   Patient denies current or recent self injurious behavior or ideation.   Patient denies other safety concerns.   Patient reports there has been no change in risk factors since their last session.     Patient reports there has been no change in protective factors since their last session.     Recommended that patient call 911 or go to the local ED should there be a change in any of these risk factors.     Appearance:   Appropriate    Eye Contact:   Good    Psychomotor Behavior: Restless     Attitude:   Cooperative  Friendly Pleasant   Orientation:   All   Speech    Rate / Production: Emotional Talkative    Volume:  Normal    Mood:    Anxious  Depressed    Affect:    Worrisome    Thought Content:  Clear    Thought Form:  Coherent    Insight:    Fair      Medication Review:   No changes to current psychiatric medication(s)     Medication Compliance:   Yes     Changes in Health Issues:   None reported     Chemical Use Review:   Substance Use: Chemical use reviewed, no active concerns identified      Tobacco Use: No current tobacco use.      Diagnosis:  1. MEG (generalized anxiety disorder)    2. MDD (major depressive disorder), recurrent episode, mild (H)         Collateral Reports Completed:   Not Applicable    PLAN: (Patient Tasks / Therapist Tasks / Other)  Patient to keep working on communication with daughter        Patito Humphries, [facility] 7/20/2021                                                             ______________________________________________________________________    Treatment Plan    Patient's Name: Mendy Roe  YOB: 1980    Date: 7/6/2021      DSM5 Diagnoses: 296.31 (F33.0) Major Depressive Disorder, Recurrent Episode, Mild _ and With mixed features or 300.02 (F41.1) Generalized Anxiety Disorder  Psychosocial / Contextual Factors: Ex, kids, grandchildren, partner  WHODAS: 43    Referral / Collaboration:  Referral to another professional/service is not indicated at this time..    Anticipated number of session or this episode of care: 20      MeasurableTreatment Goal(s) related to diagnosis / functional impairment(s)  Goal 1: Patient will report absence of persistent anxiety mood and report of reduced frequency and intensity of worry and absence of persistent anxious mood to acceptable levels, no panic attacks, report subjective comfort with rumination or a period of 90 days. Within 6 months as clinically observed and by patient self-report    I will know I've met  my goal when manage my anxiety.      Objective #A (Patient Action)    Patient will demonstrate and report a level of anxiety that can be managed at a lower level of care.  Absence of persistent anxious mood and report of reduced frequency and intensity of worry and absence of persistent anxious mood to acceptable levels, no panic attacks, report subjective comfort with rumination for a period of 90 days, within 6 months as clinically observed and by patient self-report.  Status: Continued - Date(s): 7/6/2021    Intervention(s)  Therapist will provide individual therapy to identify triggers to anxiety, gain feedback on helpful coping tools and thought-reframing techniques, and identify preferred way of being. Tx to include discuss current stressors and interpersonal conflicts and how to cope with these, coaching, diagnostic testing, referral for medication as indicated, use prescribed medication, cognitive restructuring, interpersonal, family therapy, supportive therapy services.        Goal 2: Patient will report absence of persistent depression mood and report of reduced frequency and intensity of low mood and absence of persistent low energy and motivation to acceptable levels, report subjective improved motivation and increased energy for a period of 90 days, within 6 months as clinically observed and by patient self-report    I will know I've met my goal when feeling more balanced.      Objective #A (Patient Action)    Status: Continued - Date(s): 7/6/2021    Patient will demonstrate and report a level of depression that can be managed at a lower level of care.  Absence of persistent depression mood and report of reduced frequency and intensity of low mood and absence of persistent low energy and motivation to acceptable levels, report subjective improved motivation and increased energy for a period of 90 days, within 6 months as clinically observed and by patient self-report.    Intervention(s)  Therapist will  provide individual therapy to identify triggers to depression, gain feedback on helpful coping tools and thought-reframing techniques, and identify preferred way of being.  Tx to include discussion of current stressors and interpersonal conflicts and how to cope with these, coaching, diagnostic testing, referral for medication as indicated, use prescribed medication, cognitive restructuring, interpersonal, family therapy, supportive therapy services.        Patient has reviewed and agreed to the above plan.      Patito Humphries, [facility]  7/6/2021

## 2021-07-21 ASSESSMENT — ANXIETY QUESTIONNAIRES: GAD7 TOTAL SCORE: 13

## 2021-08-02 ENCOUNTER — OFFICE VISIT (OUTPATIENT)
Dept: FAMILY MEDICINE | Facility: CLINIC | Age: 41
End: 2021-08-02
Payer: COMMERCIAL

## 2021-08-02 VITALS
OXYGEN SATURATION: 96 % | TEMPERATURE: 98.8 F | HEART RATE: 76 BPM | BODY MASS INDEX: 26.98 KG/M2 | RESPIRATION RATE: 18 BRPM | SYSTOLIC BLOOD PRESSURE: 100 MMHG | DIASTOLIC BLOOD PRESSURE: 72 MMHG | WEIGHT: 146.6 LBS | HEIGHT: 62 IN

## 2021-08-02 DIAGNOSIS — Z01.818 PRE-OP EVALUATION: Primary | ICD-10-CM

## 2021-08-02 DIAGNOSIS — M95.0 ACQUIRED NASAL DEFORMITY: ICD-10-CM

## 2021-08-02 LAB
ALBUMIN SERPL-MCNC: 3.8 G/DL (ref 3.5–5)
ALP SERPL-CCNC: 66 U/L (ref 45–120)
ALT SERPL W P-5'-P-CCNC: 12 U/L (ref 0–45)
ANION GAP SERPL CALCULATED.3IONS-SCNC: 8 MMOL/L (ref 5–18)
AST SERPL W P-5'-P-CCNC: 15 U/L (ref 0–40)
BILIRUB SERPL-MCNC: 0.9 MG/DL (ref 0–1)
BUN SERPL-MCNC: 10 MG/DL (ref 8–22)
CALCIUM SERPL-MCNC: 9.3 MG/DL (ref 8.5–10.5)
CHLORIDE BLD-SCNC: 106 MMOL/L (ref 98–107)
CO2 SERPL-SCNC: 24 MMOL/L (ref 22–31)
CREAT SERPL-MCNC: 0.81 MG/DL (ref 0.6–1.1)
ERYTHROCYTE [DISTWIDTH] IN BLOOD BY AUTOMATED COUNT: 11.7 % (ref 10–15)
GFR SERPL CREATININE-BSD FRML MDRD: 90 ML/MIN/1.73M2
GLUCOSE BLD-MCNC: 79 MG/DL (ref 70–125)
HCG UR QL: NEGATIVE
HCT VFR BLD AUTO: 38.7 % (ref 35–47)
HGB BLD-MCNC: 13.1 G/DL (ref 11.7–15.7)
MCH RBC QN AUTO: 29.4 PG (ref 26.5–33)
MCHC RBC AUTO-ENTMCNC: 33.9 G/DL (ref 31.5–36.5)
MCV RBC AUTO: 87 FL (ref 78–100)
PLATELET # BLD AUTO: 273 10E3/UL (ref 150–450)
POTASSIUM BLD-SCNC: 4 MMOL/L (ref 3.5–5)
PROT SERPL-MCNC: 7 G/DL (ref 6–8)
RBC # BLD AUTO: 4.45 10E6/UL (ref 3.8–5.2)
SODIUM SERPL-SCNC: 138 MMOL/L (ref 136–145)
WBC # BLD AUTO: 6.8 10E3/UL (ref 4–11)

## 2021-08-02 PROCEDURE — 99214 OFFICE O/P EST MOD 30 MIN: CPT | Performed by: FAMILY MEDICINE

## 2021-08-02 PROCEDURE — 81025 URINE PREGNANCY TEST: CPT | Performed by: FAMILY MEDICINE

## 2021-08-02 PROCEDURE — 80053 COMPREHEN METABOLIC PANEL: CPT | Performed by: FAMILY MEDICINE

## 2021-08-02 PROCEDURE — 36415 COLL VENOUS BLD VENIPUNCTURE: CPT | Performed by: FAMILY MEDICINE

## 2021-08-02 PROCEDURE — 85027 COMPLETE CBC AUTOMATED: CPT | Performed by: FAMILY MEDICINE

## 2021-08-02 RX ORDER — ONDANSETRON 4 MG/1
4 TABLET, FILM COATED ORAL
COMMUNITY
Start: 2020-09-14 | End: 2022-01-17

## 2021-08-02 RX ORDER — GABAPENTIN 300 MG/1
CAPSULE ORAL 3 TIMES DAILY
COMMUNITY
Start: 2021-07-31 | End: 2022-10-10

## 2021-08-02 RX ORDER — CETIRIZINE HYDROCHLORIDE 10 MG/1
10 TABLET ORAL PRN
COMMUNITY

## 2021-08-02 RX ORDER — ROPINIROLE 0.25 MG/1
0.25 TABLET, FILM COATED ORAL 3 TIMES DAILY
COMMUNITY
End: 2023-04-19

## 2021-08-02 RX ORDER — RIZATRIPTAN BENZOATE 5 MG/1
5 TABLET ORAL
COMMUNITY
Start: 2020-09-06

## 2021-08-02 RX ORDER — AZELASTINE 1 MG/ML
SPRAY, METERED NASAL
COMMUNITY
Start: 2020-04-02 | End: 2023-04-19

## 2021-08-02 RX ORDER — LORAZEPAM 1 MG/1
1 TABLET ORAL
COMMUNITY
End: 2022-05-25

## 2021-08-02 RX ORDER — SPIRONOLACTONE 50 MG/1
100 TABLET, FILM COATED ORAL DAILY
COMMUNITY
Start: 2021-03-02

## 2021-08-02 ASSESSMENT — MIFFLIN-ST. JEOR: SCORE: 1278.22

## 2021-08-02 NOTE — LETTER
August 5, 2021      Mendy Roe  [address] [place] [address]        Dear ,    We are writing to inform you of your test results.    Normal    Resulted Orders   CBC with platelets   Result Value Ref Range    WBC Count 6.8 4.0 - 11.0 10e3/uL    RBC Count 4.45 3.80 - 5.20 10e6/uL    Hemoglobin 13.1 11.7 - 15.7 g/dL    Hematocrit 38.7 35.0 - 47.0 %    MCV 87 78 - 100 fL    MCH 29.4 26.5 - 33.0 pg    MCHC 33.9 31.5 - 36.5 g/dL    RDW 11.7 10.0 - 15.0 %    Platelet Count 273 150 - 450 10e3/uL   Comprehensive metabolic panel (BMP + Alb, Alk Phos, ALT, AST, Total. Bili, TP)   Result Value Ref Range    Sodium 138 136 - 145 mmol/L    Potassium 4.0 3.5 - 5.0 mmol/L    Chloride 106 98 - 107 mmol/L    Carbon Dioxide (CO2) 24 22 - 31 mmol/L    Anion Gap 8 5 - 18 mmol/L    Urea Nitrogen 10 8 - 22 mg/dL    Creatinine 0.81 0.60 - 1.10 mg/dL    Calcium 9.3 8.5 - 10.5 mg/dL    Glucose 79 70 - 125 mg/dL    Alkaline Phosphatase 66 45 - 120 U/L    AST 15 0 - 40 U/L    ALT 12 0 - 45 U/L    Protein Total 7.0 6.0 - 8.0 g/dL    Albumin 3.8 3.5 - 5.0 g/dL    Bilirubin Total 0.9 0.0 - 1.0 mg/dL    GFR Estimate 90 >60 mL/min/1.73m2      Comment:      As of July 11, 2021, eGFR is calculated by the CKD-EPI creatinine equation, without race adjustment. eGFR can be influenced by muscle mass, exercise, and diet. The reported eGFR is an estimation only and is only applicable if the renal function is stable.   HCG qualitative urine   Result Value Ref Range    hCG Urine Qualitative Negative Negative      Comment:      This test is for screening purposes.  Results should be interpreted along with the clinical picture.  Confirmation testing is available if warranted by ordering [id number], HCG Quantitative Pregnancy.       If you have any questions or concerns, please call the clinic at the number listed above.       Sincerely,      Susan Galan MD

## 2021-08-02 NOTE — PROGRESS NOTES
[address] [address]  Phone: [phone]  Fax: [phone]  Primary Provider: Susan Galan  Pre-op Performing Provider: SUSAN GALAN      PREOPERATIVE EVALUATION:  Today's date: 8/2/2021      Surgical Information:  Surgery/Procedure: Rhinoplasty  Surgery Location: [facility]  Surgeon: /Ector  Surgery Date:8/11/2021   Time of Surgery: TBD  Where patient plans to recover: At home with family  Fax number for surgical facility: Note does not need to be faxed, will be available electronically in Epic.    Type of Anesthesia Anticipated: to be determined    Assessment & Plan      The proposed surgical procedure is considered LOW risk.    Pre-op evaluation    - REVIEW OF HEALTH MAINTENANCE PROTOCOL ORDERS  - CBC with platelets; Future  - HCG qualitative urine; Future  - Comprehensive metabolic panel (BMP + Alb, Alk Phos, ALT, AST, Total. Bili, TP); Future  - CBC with platelets  - Comprehensive metabolic panel (BMP + Alb, Alk Phos, ALT, AST, Total. Bili, TP)  - HCG qualitative urine    Acquired nasal deformity             Medication Instructions:  take all scheduled meds up to the day of the day of the procedure     RECOMMENDATION:  APPROVAL GIVEN to proceed with proposed procedure, without further diagnostic evaluation.    Review of the result(s) of each unique test - as noted in the chart              Subjective     Mendy Roe is a 40 year old female who presents for a preoperative evaluation, undergoing aforementioned procedure to improve breathing.     Preop Questions 7/27/2021   1. Have you ever had a heart attack or stroke? No   2. Have you ever had surgery on your heart or blood vessels, such as a stent placement, a coronary artery bypass, or surgery on an artery in your head, neck, heart, or legs? No   3. Do you have chest pain with activity? No   4. Do you have a history of  heart failure?  No   5. Do you currently have a cold, bronchitis or symptoms of other infection? No   6. Do you have a cough, shortness of breath, or wheezing? No   7. Do you or anyone in your family have previous history of blood clots? UNKNOWN -    8. Do you or does anyone in your family have a serious bleeding problem such as prolonged bleeding following surgeries or cuts? UNKNOWN -    9. Have you ever had problems with anemia or been told to take iron pills? No   10. Have you had any abnormal blood loss such as black, tarry or bloody stools, or abnormal vaginal bleeding? No   11. Have you ever had a blood transfusion? No   12. Are you willing to have a blood transfusion if it is medically needed before, during, or after your surgery? Yes   13. Have you or any of your relatives ever had problems with anesthesia? YES -    14. Do you have sleep apnea, excessive snoring or daytime drowsiness? YES -    14a. Do you have a CPAP machine? No   15. Do you have any artifical heart valves or other implanted medical devices like a pacemaker, defibrillator, or continuous glucose monitor? No   16. Do you have artificial joints? No   17. Are you allergic to latex? No   18. Is there any chance that you may be pregnant? No       Review of Systems  Constitutional, neuro, ENT, endocrine, pulmonary, cardiac, gastrointestinal, genitourinary, musculoskeletal, integument and psychiatric systems are negative, except as otherwise noted.    Patient Active Problem List    Diagnosis Date Noted     Pelvic pain in female 12/07/2020     Priority: Medium     S/P laparoscopic cholecystectomy      Priority: Medium     Elevated lipase      Priority: Medium     LFT elevation      Priority: Medium     Postoperative intra-abdominal abscess, initial encounter 08/13/2018     Priority: Medium     Bile leak 08/13/2018     Priority: Medium     Added automatically from request for surgery [id number]         Calculus of gallbladder without cholecystitis without obstruction  2018     Priority: Medium     Added automatically from request for surgery [id number]         Mood disorder (H) 2017     Priority: Medium     Attention Deficit Disorder Without Hyperactivity      Priority: Medium     Created by Conversion  Replacement Utility updated for latest IMO load         Constipation      Priority: Medium     Created by Conversion  Replacement Utility updated for latest IMO load         Cervical Radiculopathy      Priority: Medium     Created by Conversion  Replacement Utility updated for latest IMO load         Disease Of The Nails      Priority: Medium     Created by Conversion  Replacement Utility updated for latest IMO load         Amenorrhea      Priority: Medium     Created by Conversion         Sudden Redness Of The Skin (Flushing)      Priority: Medium     Created by Conversion         Nicotine Dependence      Priority: Medium     Created by Conversion         Abdominal Pain In Multiple Locations      Priority: Medium     Created by Conversion         Midback Pain      Priority: Medium     Created by Conversion  [facility] Annotation: 2013  3:00PM - Rachel Rehman: muscle   spasm,   not radicular         Obstructive Sleep Apnea      Priority: Medium     Created by Conversion  [facility] Annotation: Aug 18 2013  7:54PM - Rachel Rehman: mild, no   CPAP         Restless Legs Syndrome      Priority: Medium     Created by Conversion         Chronic Rhinitis      Priority: Medium     Created by Conversion          Past Medical History:   Diagnosis Date     Anxiety      Depression      Shortness of breath      Sleep apnea     Has had 2 sleep studies in past     Past Surgical History:   Procedure Laterality Date     C LAP,CHOLECYSTECTOMY/EXPLORE N/A 2018    Procedure: CHOLECYSTECTOMY, LAPAROSCOPIC;  Surgeon: Marta Bishop MD;  Location: [facility];  Service: General      SECTION       OR LAP,DIAGNOSTIC ABDOMEN N/A 2018    Procedure:  DIAGNOSTIC  "LAPAROSCOPY;  Surgeon: Marta Bishpo MD;  Location: [facility] OR;  Service: General     SINUS SURGERY       TUBAL LIGATION       TYMPANOSTOMY TUBE PLACEMENT       WISDOM TOOTH EXTRACTION       Current Outpatient Medications   Medication Sig Dispense Refill     azelastine (ASTELIN) 0.1 % nasal spray U 1 SPR IEN BID       cetirizine (ZYRTEC) 10 MG tablet Take 10 mg by mouth       gabapentin (NEURONTIN) 300 MG capsule 3 times daily       hyoscyamine SL (LEVSIN/SL) 0.125 MG sublingual tablet PLACE 1 T SUBLINGUAL ROUTE TID PRN       LORazepam (ATIVAN) 1 MG tablet Take 1 mg by mouth       ondansetron (ZOFRAN) 4 MG tablet Take 4 mg by mouth       rizatriptan (MAXALT) 5 MG tablet        rOPINIRole (REQUIP) 0.25 MG tablet Take 0.25 mg by mouth       spironolactone (ALDACTONE) 50 MG tablet TAKE 2 TABLETS BY MOUTH AT BEDTIME. DO NOT TAKE IF PREGNANT. MAY LOWER BLOOD PRESSURE. CALL PHYSICIAN IF DIZZINESS DEVELOPS       vitamin D3 (CHOLECALCIFEROL) 125 MCG (5000 UT) tablet Take 5,000 Units by mouth         Allergies   Allergen Reactions     Augmentin Nausea     Buspirone Nausea     Duloxetine Rash     Lamotrigine Rash        Social History     Tobacco Use     Smoking status: Former Smoker     Quit date: 2017     Years since quittin.5     Smokeless tobacco: Never Used   Substance Use Topics     Alcohol use: Not Currently       History   Drug Use Unknown         Objective     /72   Pulse 76   Temp 98.8  F (37.1  C)   Resp 18   Ht 1.575 m (5' 2\")   Wt 66.5 kg (146 lb 9.6 oz)   SpO2 96%   BMI 26.81 kg/m      Physical Exam    GENERAL APPEARANCE: healthy, alert and no distress     EYES: EOMI, PERRL     HENT: nose and mouth without ulcers or lesions     NECK: no adenopathy, no asymmetry, masses, or scars and thyroid normal to palpation     RESP: lungs clear to auscultation - no rales, rhonchi or wheezes     CV: regular rates and rhythm, normal S1 S2, no S3 or S4 and no murmur, click or rub     ABDOMEN:  " soft, nontender, no HSM or masses and bowel sounds normal     MS: extremities normal- no gross deformities noted, no evidence of inflammation in joints, FROM in all extremities.     SKIN: no suspicious lesions or rashes     NEURO: Normal strength and tone, sensory exam grossly normal, mentation intact and speech normal     PSYCH: mentation appears normal. and affect normal/bright     LYMPHATICS: No cervical adenopathy      Recent Labs   Lab Test 12/07/20  0507 08/18/20  0000 02/06/20  1031   HGB 13.3  --  13.7     --  222     --  136   POTASSIUM 3.8  --  4.3   CR 0.70 0.81 0.71        Diagnostics:  Recent Results (from the past 168 hour(s))   CBC with platelets    Collection Time: 08/02/21  4:28 PM   Result Value Ref Range    WBC Count 6.8 4.0 - 11.0 10e3/uL    RBC Count 4.45 3.80 - 5.20 10e6/uL    Hemoglobin 13.1 11.7 - 15.7 g/dL    Hematocrit 38.7 35.0 - 47.0 %    MCV 87 78 - 100 fL    MCH 29.4 26.5 - 33.0 pg    MCHC 33.9 31.5 - 36.5 g/dL    RDW 11.7 10.0 - 15.0 %    Platelet Count 273 150 - 450 10e3/uL   Comprehensive metabolic panel (BMP + Alb, Alk Phos, ALT, AST, Total. Bili, TP)    Collection Time: 08/02/21  4:28 PM   Result Value Ref Range    Sodium 138 136 - 145 mmol/L    Potassium 4.0 3.5 - 5.0 mmol/L    Chloride 106 98 - 107 mmol/L    Carbon Dioxide (CO2) 24 22 - 31 mmol/L    Anion Gap 8 5 - 18 mmol/L    Urea Nitrogen 10 8 - 22 mg/dL    Creatinine 0.81 0.60 - 1.10 mg/dL    Calcium 9.3 8.5 - 10.5 mg/dL    Glucose 79 70 - 125 mg/dL    Alkaline Phosphatase 66 45 - 120 U/L    AST 15 0 - 40 U/L    ALT 12 0 - 45 U/L    Protein Total 7.0 6.0 - 8.0 g/dL    Albumin 3.8 3.5 - 5.0 g/dL    Bilirubin Total 0.9 0.0 - 1.0 mg/dL    GFR Estimate 90 >60 mL/min/1.73m2   HCG qualitative urine    Collection Time: 08/02/21  4:38 PM   Result Value Ref Range    hCG Urine Qualitative Negative Negative          Revised Cardiac Risk Index (RCRI):  The patient has the following serious cardiovascular risks for  perioperative complications:   - No serious cardiac risks = 0 points     RCRI Interpretation: 0 points: Class I (very low risk - 0.4% complication rate)           Signed Electronically by: Susan Galan MD  Copy of this evaluation report is provided to requesting physician.

## 2021-08-03 ENCOUNTER — VIRTUAL VISIT (OUTPATIENT)
Dept: PSYCHOLOGY | Facility: CLINIC | Age: 41
End: 2021-08-03
Payer: COMMERCIAL

## 2021-08-03 DIAGNOSIS — F33.0 MDD (MAJOR DEPRESSIVE DISORDER), RECURRENT EPISODE, MILD (H): ICD-10-CM

## 2021-08-03 DIAGNOSIS — F41.1 GAD (GENERALIZED ANXIETY DISORDER): Primary | ICD-10-CM

## 2021-08-03 PROCEDURE — 90834 PSYTX W PT 45 MINUTES: CPT | Mod: 95 | Performed by: SOCIAL WORKER

## 2021-08-03 NOTE — PROGRESS NOTES
Progress Note    Patient Name: Mendy Roe  Date: 8/3/2021         Service Type: Individual  Video Visit:Yes, the patient's condition can be safely assessed and treated via synchronous audio and visual telemedicine encounter.      Reason for Video Visit: Services only offered telehealth    Location of Originating Site: Patient's home    Distant Site: Provider Remote Setting home office      Consent:  The patient/guardian has verbally consented to: the potential risks and benefits of telemedicine (video visit) versus in person care; bill my insurance or make self-payment for services provided; and responsibility for payment of non-covered services.      Mode of transmission-e-Zassi      Session Start Time: 1707     Session End Time: 1755     Session Length: 38-52 minutes    Session #: 25    Attendees: Client     Treatment Plan Last Reviewed 7/6/2021 due 10/6/2021    PHQ-9 / MEG-7 : 14/13 last session    DATA  Interactive Complexity: No  Crisis: No       Progress Since Last Session (Related to Symptoms / Goals / Homework):   Symptoms: Worsening per patient    Homework: Partially completed      Episode of Care Goals: Minimal progress - PREPARATION (Decided to change - considering how); Intervened by negotiating a change plan and determining options / strategies for behavior change, identifying triggers, exploring social supports, and working towards setting a date to begin behavior change     Current / Ongoing Stressors and Concerns:   COVID-19, isolation, moving, ex, kids, grandchildren, partner     Treatment Objective(s) Addressed in This Session:     Patient will demonstrate and report a level of anxiety that can be managed at a lower level of care.  Absence of persistent anxious mood and report of reduced frequency and intensity of worry and absence of persistent anxious mood to acceptable levels, no panic attacks, report subjective comfort with rumination for a  period of 90 days, within 6 months as clinically observed and by patient self-report.  Patient will demonstrate and report a level of depression that can be managed at a lower level of care.  Absence of persistent depression mood and report of reduced frequency and intensity of low mood and absence of persistent low energy and motivation to acceptable levels, report subjective improved motivation and increased energy for a period of 90 days, within 6 months as clinically observed and by patient self-report.     Intervention:     Therapist met with patient to review goals and interventions. Therapist utilized reflected listening as patient gave brief reflection of week.  Patient processed work, returning to school, and family. Therapist supported and validated patient. Therapist challenged patient and offered patient alterative perspective. Therapist encouraged patient to sit down with her daughters and have a difficult discussion with no shame or guilt. Therapist reviewed their ages and behavior and reviewed setting expectations and boundaries. Patient presented with an anxious affect. Patient was engaged in session and open to feedback. Patient reported no safety concerns.     ASSESSMENT: Current Emotional / Mental Status (status of significant symptoms):   Risk status (Self / Other harm or suicidal ideation)   Patient denies current fears or concerns for personal safety.   Patient denies current or recent suicidal ideation or behaviors.   Patient denies current or recent homicidal ideation or behaviors.   Patient denies current or recent self injurious behavior or ideation.   Patient denies other safety concerns.   Patient reports there has been no change in risk factors since their last session.     Patient reports there has been no change in protective factors since their last session.     Recommended that patient call 911 or go to the local ED should there be a change in any of these risk  factors.     Appearance:   Appropriate    Eye Contact:   Good    Psychomotor Behavior: Restless    Attitude:   Cooperative  Friendly Pleasant   Orientation:   All   Speech    Rate / Production: Emotional Talkative    Volume:  Normal    Mood:    Anxious  Depressed    Affect:    Worrisome    Thought Content:  Clear    Thought Form:  Coherent    Insight:    Fair      Medication Review:   No changes to current psychiatric medication(s)     Medication Compliance:   Yes     Changes in Health Issues:   None reported     Chemical Use Review:   Substance Use: Chemical use reviewed, no active concerns identified      Tobacco Use: No current tobacco use.      Diagnosis:  1. MEG (generalized anxiety disorder)    2. MDD (major depressive disorder), recurrent episode, mild (H)         Collateral Reports Completed:   Not Applicable    PLAN: (Patient Tasks / Therapist Tasks / Other)  Therapist encouraged patient to sit down with her daughters and have a difficult discussion with no shame or guilt. Therapist reviewed their ages and behavior and reviewed setting expectations and boundaries.       Patito Humphries, [facility] 8/3/2021                                                             ______________________________________________________________________    Treatment Plan    Patient's Name: Mendy Roe  YOB: 1980    Date: 7/6/2021      DSM5 Diagnoses: 296.31 (F33.0) Major Depressive Disorder, Recurrent Episode, Mild _ and With mixed features or 300.02 (F41.1) Generalized Anxiety Disorder  Psychosocial / Contextual Factors: Ex, kids, grandchildren, partner  WHODAS: 41    Referral / Collaboration:  Referral to another professional/service is not indicated at this time..    Anticipated number of session or this episode of care: 20      MeasurableTreatment Goal(s) related to diagnosis / functional impairment(s)  Goal 1: Patient will report absence of persistent anxiety mood and report of reduced frequency and  intensity of worry and absence of persistent anxious mood to acceptable levels, no panic attacks, report subjective comfort with rumination or a period of 90 days. Within 6 months as clinically observed and by patient self-report    I will know I've met my goal when manage my anxiety.      Objective #A (Patient Action)    Patient will demonstrate and report a level of anxiety that can be managed at a lower level of care.  Absence of persistent anxious mood and report of reduced frequency and intensity of worry and absence of persistent anxious mood to acceptable levels, no panic attacks, report subjective comfort with rumination for a period of 90 days, within 6 months as clinically observed and by patient self-report.  Status: Continued - Date(s): 7/6/2021    Intervention(s)  Therapist will provide individual therapy to identify triggers to anxiety, gain feedback on helpful coping tools and thought-reframing techniques, and identify preferred way of being. Tx to include discuss current stressors and interpersonal conflicts and how to cope with these, coaching, diagnostic testing, referral for medication as indicated, use prescribed medication, cognitive restructuring, interpersonal, family therapy, supportive therapy services.        Goal 2: Patient will report absence of persistent depression mood and report of reduced frequency and intensity of low mood and absence of persistent low energy and motivation to acceptable levels, report subjective improved motivation and increased energy for a period of 90 days, within 6 months as clinically observed and by patient self-report    I will know I've met my goal when feeling more balanced.      Objective #A (Patient Action)    Status: Continued - Date(s): 7/6/2021    Patient will demonstrate and report a level of depression that can be managed at a lower level of care.  Absence of persistent depression mood and report of reduced frequency and intensity of low mood and  absence of persistent low energy and motivation to acceptable levels, report subjective improved motivation and increased energy for a period of 90 days, within 6 months as clinically observed and by patient self-report.    Intervention(s)  Therapist will provide individual therapy to identify triggers to depression, gain feedback on helpful coping tools and thought-reframing techniques, and identify preferred way of being.  Tx to include discussion of current stressors and interpersonal conflicts and how to cope with these, coaching, diagnostic testing, referral for medication as indicated, use prescribed medication, cognitive restructuring, interpersonal, family therapy, supportive therapy services.        Patient has reviewed and agreed to the above plan.      Patito Humphries, [facility]  7/6/2021

## 2021-08-24 ENCOUNTER — VIRTUAL VISIT (OUTPATIENT)
Dept: PSYCHOLOGY | Facility: CLINIC | Age: 41
End: 2021-08-24
Payer: COMMERCIAL

## 2021-08-24 DIAGNOSIS — F33.0 MDD (MAJOR DEPRESSIVE DISORDER), RECURRENT EPISODE, MILD (H): ICD-10-CM

## 2021-08-24 DIAGNOSIS — F41.1 GAD (GENERALIZED ANXIETY DISORDER): Primary | ICD-10-CM

## 2021-08-24 PROCEDURE — 90834 PSYTX W PT 45 MINUTES: CPT | Mod: 95 | Performed by: SOCIAL WORKER

## 2021-08-24 NOTE — PROGRESS NOTES
Progress Note    Patient Name: Mendy Roe  Date: 8/24/2021         Service Type: Individual  Video Visit:Yes, the patient's condition can be safely assessed and treated via synchronous audio and visual telemedicine encounter.      Reason for Video Visit: Services only offered telehealth    Location of Originating Site: Patient's home    Distant Site: Provider Remote Setting home office      Consent:  The patient/guardian has verbally consented to: the potential risks and benefits of telemedicine (video visit) versus in person care; bill my insurance or make self-payment for services provided; and responsibility for payment of non-covered services.      Mode of transmission-[place]      Session Start Time: 1700    Session End Time: 1750     Session Length: 38-52 minutes    Session #: 26    Attendees: Client     Treatment Plan Last Reviewed 7/6/2021 due 10/6/2021    PHQ-9 / MEG-7 : 14/13 last session    DATA  Interactive Complexity: No  Crisis: No       Progress Since Last Session (Related to Symptoms / Goals / Homework):   Symptoms: Worsening per patient    Homework: Partially completed      Episode of Care Goals: Minimal progress - PREPARATION (Decided to change - considering how); Intervened by negotiating a change plan and determining options / strategies for behavior change, identifying triggers, exploring social supports, and working towards setting a date to begin behavior change     Current / Ongoing Stressors and Concerns:   COVID-19, isolation, moving, ex, kids, grandchildren, partner     Treatment Objective(s) Addressed in This Session:     Patient will demonstrate and report a level of anxiety that can be managed at a lower level of care.  Absence of persistent anxious mood and report of reduced frequency and intensity of worry and absence of persistent anxious mood to acceptable levels, no panic attacks, report subjective comfort with rumination for a  period of 90 days, within 6 months as clinically observed and by patient self-report.  Patient will demonstrate and report a level of depression that can be managed at a lower level of care.  Absence of persistent depression mood and report of reduced frequency and intensity of low mood and absence of persistent low energy and motivation to acceptable levels, report subjective improved motivation and increased energy for a period of 90 days, within 6 months as clinically observed and by patient self-report.     Intervention:     Therapist met with patient to review goals and interventions. Therapist utilized reflected listening as patient gave brief reflection of week.  Patient reported finding sad news out after looking at partners phone. Therapist supported patient as she processed her partner having 8 different discussion doping with other women. Therapist validated patient as she processed. Therapist encouraged patient to take time to process and have difficult discussions with partner. Therapist continued to reiterate the importance of conversations with her daughters.  Patient presented with an anxious affect. Patient was engaged in session and open to feedback. Patient reported no safety concerns.     ASSESSMENT: Current Emotional / Mental Status (status of significant symptoms):   Risk status (Self / Other harm or suicidal ideation)   Patient denies current fears or concerns for personal safety.   Patient denies current or recent suicidal ideation or behaviors.   Patient denies current or recent homicidal ideation or behaviors.   Patient denies current or recent self injurious behavior or ideation.   Patient denies other safety concerns.   Patient reports there has been no change in risk factors since their last session.     Patient reports there has been no change in protective factors since their last session.     Recommended that patient call 911 or go to the local ED should there be a change in any of  these risk factors.     Appearance:   Appropriate    Eye Contact:   Good    Psychomotor Behavior: Restless    Attitude:   Cooperative  Friendly Pleasant   Orientation:   All   Speech    Rate / Production: Emotional Talkative    Volume:  Normal    Mood:    Anxious  Depressed    Affect:    Worrisome    Thought Content:  Clear    Thought Form:  Coherent    Insight:    Fair      Medication Review:   No changes to current psychiatric medication(s)     Medication Compliance:   Yes     Changes in Health Issues:   None reported     Chemical Use Review:   Substance Use: Chemical use reviewed, no active concerns identified      Tobacco Use: No current tobacco use.      Diagnosis:  1. MEG (generalized anxiety disorder)    2. MDD (major depressive disorder), recurrent episode, mild (H)         Collateral Reports Completed:   Not Applicable    PLAN: (Patient Tasks / Therapist Tasks / Other)  Take time to process and make time to speak to partner and daughters    Patito SARAAndres Humphries, [facility] 8/24/2021                                                             ______________________________________________________________________    Treatment Plan    Patient's Name: Mendy Roe  YOB: 1980    Date: 7/6/2021      DSM5 Diagnoses: 296.31 (F33.0) Major Depressive Disorder, Recurrent Episode, Mild _ and With mixed features or 300.02 (F41.1) Generalized Anxiety Disorder  Psychosocial / Contextual Factors: Ex, kids, grandchildren, partner  WHODAS: 43    Referral / Collaboration:  Referral to another professional/service is not indicated at this time..    Anticipated number of session or this episode of care: 20      MeasurableTreatment Goal(s) related to diagnosis / functional impairment(s)  Goal 1: Patient will report absence of persistent anxiety mood and report of reduced frequency and intensity of worry and absence of persistent anxious mood to acceptable levels, no panic attacks, report subjective comfort with  rumination or a period of 90 days. Within 6 months as clinically observed and by patient self-report    I will know I've met my goal when manage my anxiety.      Objective #A (Patient Action)    Patient will demonstrate and report a level of anxiety that can be managed at a lower level of care.  Absence of persistent anxious mood and report of reduced frequency and intensity of worry and absence of persistent anxious mood to acceptable levels, no panic attacks, report subjective comfort with rumination for a period of 90 days, within 6 months as clinically observed and by patient self-report.  Status: Continued - Date(s): 7/6/2021    Intervention(s)  Therapist will provide individual therapy to identify triggers to anxiety, gain feedback on helpful coping tools and thought-reframing techniques, and identify preferred way of being. Tx to include discuss current stressors and interpersonal conflicts and how to cope with these, coaching, diagnostic testing, referral for medication as indicated, use prescribed medication, cognitive restructuring, interpersonal, family therapy, supportive therapy services.        Goal 2: Patient will report absence of persistent depression mood and report of reduced frequency and intensity of low mood and absence of persistent low energy and motivation to acceptable levels, report subjective improved motivation and increased energy for a period of 90 days, within 6 months as clinically observed and by patient self-report    I will know I've met my goal when feeling more balanced.      Objective #A (Patient Action)    Status: Continued - Date(s): 7/6/2021    Patient will demonstrate and report a level of depression that can be managed at a lower level of care.  Absence of persistent depression mood and report of reduced frequency and intensity of low mood and absence of persistent low energy and motivation to acceptable levels, report subjective improved motivation and increased energy  for a period of 90 days, within 6 months as clinically observed and by patient self-report.    Intervention(s)  Therapist will provide individual therapy to identify triggers to depression, gain feedback on helpful coping tools and thought-reframing techniques, and identify preferred way of being.  Tx to include discussion of current stressors and interpersonal conflicts and how to cope with these, coaching, diagnostic testing, referral for medication as indicated, use prescribed medication, cognitive restructuring, interpersonal, family therapy, supportive therapy services.        Patient has reviewed and agreed to the above plan.      Patito Humphries, [facility]  7/6/2021

## 2021-09-08 ENCOUNTER — TELEPHONE (OUTPATIENT)
Dept: PSYCHOLOGY | Facility: CLINIC | Age: 41
End: 2021-09-08

## 2021-09-09 ENCOUNTER — VIRTUAL VISIT (OUTPATIENT)
Dept: PSYCHOLOGY | Facility: CLINIC | Age: 41
End: 2021-09-09
Payer: COMMERCIAL

## 2021-09-09 DIAGNOSIS — F33.0 MDD (MAJOR DEPRESSIVE DISORDER), RECURRENT EPISODE, MILD (H): ICD-10-CM

## 2021-09-09 DIAGNOSIS — F41.1 GAD (GENERALIZED ANXIETY DISORDER): Primary | ICD-10-CM

## 2021-09-09 PROCEDURE — 90832 PSYTX W PT 30 MINUTES: CPT | Mod: 95 | Performed by: SOCIAL WORKER

## 2021-09-09 NOTE — PROGRESS NOTES
"                                           Progress Note    Patient Name: Mendy Roe  Date: 9/9/2021         Service Type: Individual  Video Visit:no, The patient has been notified of the following:      \"We have found that certain health care needs can be provided without the need for a face to face visit.  This service lets us provide the care you need with a phone conversation.       I will have full access to your [place] medical record during this entire phone call.   I will be taking notes for your medical record.      Since this is like an office visit, we will bill your insurance company for this service.       There are potential benefits and risks of telephone visits (e.g. limits to patient confidentiality) that differ from in-person visits.?  Confidentiality still applies for telephone services, and nobody will record the visit.  It is important to be in a quiet, private space that is free of distractions (including cell phone or other devices) during the visit.??      If during the course of the call I believe a telephone visit is not appropriate, you will not be charged for this service\"     Consent has been obtained for this service by care team member: Yes     Session Start Time: 1002  Session End Time: 1029     Session Length: 16-37 minutes    Session #: 27    Attendees: Client     Treatment Plan Last Reviewed 7/6/2021 due 10/6/2021    PHQ-9 / MEG-7 : 14/13 last session    DATA  Interactive Complexity: No  Crisis: No       Progress Since Last Session (Related to Symptoms / Goals / Homework):   Symptoms: Worsening per patient    Homework: Partially completed      Episode of Care Goals: Minimal progress - PREPARATION (Decided to change - considering how); Intervened by negotiating a change plan and determining options / strategies for behavior change, identifying triggers, exploring social supports, and working towards setting a date to begin behavior change     Current / Ongoing Stressors and " Concerns:   COVID-19, isolation, moving, ex, kids, grandchildren, partner     Treatment Objective(s) Addressed in This Session:     Patient will demonstrate and report a level of anxiety that can be managed at a lower level of care.  Absence of persistent anxious mood and report of reduced frequency and intensity of worry and absence of persistent anxious mood to acceptable levels, no panic attacks, report subjective comfort with rumination for a period of 90 days, within 6 months as clinically observed and by patient self-report.  Patient will demonstrate and report a level of depression that can be managed at a lower level of care.  Absence of persistent depression mood and report of reduced frequency and intensity of low mood and absence of persistent low energy and motivation to acceptable levels, report subjective improved motivation and increased energy for a period of 90 days, within 6 months as clinically observed and by patient self-report.     Intervention:     Therapist met with patient to review goals and interventions. Therapist utilized reflected listening as patient gave brief reflection of week.  Patient reported concern with her relationship and processed. Therapist supported patient and validated patient. Therapist educated patient and offered patient resources. Therapist suggested patient take time away from relationship and see if negative patterns reoccur. Patient presented with an anxious affect. Patient was engaged in session and open to feedback. Patient reported no safety concerns.     ASSESSMENT: Current Emotional / Mental Status (status of significant symptoms):   Risk status (Self / Other harm or suicidal ideation)   Patient denies current fears or concerns for personal safety.   Patient denies current or recent suicidal ideation or behaviors.   Patient denies current or recent homicidal ideation or behaviors.   Patient denies current or recent self injurious behavior or  ideation.   Patient denies other safety concerns.   Patient reports there has been no change in risk factors since their last session.     Patient reports there has been no change in protective factors since their last session.     Recommended that patient call 911 or go to the local ED should there be a change in any of these risk factors.     Appearance:   Appropriate    Eye Contact:   Good    Psychomotor Behavior: Restless    Attitude:   Cooperative  Friendly Pleasant   Orientation:   All   Speech    Rate / Production: Emotional Talkative    Volume:  Normal    Mood:    Anxious  Depressed    Affect:    Worrisome    Thought Content:  Clear    Thought Form:  Coherent    Insight:    Fair      Medication Review:   No changes to current psychiatric medication(s)     Medication Compliance:   Yes     Changes in Health Issues:   None reported     Chemical Use Review:   Substance Use: Chemical use reviewed, no active concerns identified      Tobacco Use: No current tobacco use.      Diagnosis:  1. MEG (generalized anxiety disorder)    2. MDD (major depressive disorder), recurrent episode, mild (H)         Collateral Reports Completed:   Not Applicable    PLAN: (Patient Tasks / Therapist Tasks / Other)  patient take time away from relationship and see if negative patterns reoccur.      Patito Humphries, [facility] 9/9/2021                                                             ______________________________________________________________________    Treatment Plan    Patient's Name: Mendy Roe  YOB: 1980    Date: 7/6/2021      DSM5 Diagnoses: 296.31 (F33.0) Major Depressive Disorder, Recurrent Episode, Mild _ and With mixed features or 300.02 (F41.1) Generalized Anxiety Disorder  Psychosocial / Contextual Factors: Ex, kids, grandchildren, partner  WHODAS: 43    Referral / Collaboration:  Referral to another professional/service is not indicated at this time..    Anticipated number of session or this  episode of care: 20      MeasurableTreatment Goal(s) related to diagnosis / functional impairment(s)  Goal 1: Patient will report absence of persistent anxiety mood and report of reduced frequency and intensity of worry and absence of persistent anxious mood to acceptable levels, no panic attacks, report subjective comfort with rumination or a period of 90 days. Within 6 months as clinically observed and by patient self-report    I will know I've met my goal when manage my anxiety.      Objective #A (Patient Action)    Patient will demonstrate and report a level of anxiety that can be managed at a lower level of care.  Absence of persistent anxious mood and report of reduced frequency and intensity of worry and absence of persistent anxious mood to acceptable levels, no panic attacks, report subjective comfort with rumination for a period of 90 days, within 6 months as clinically observed and by patient self-report.  Status: Continued - Date(s): 7/6/2021    Intervention(s)  Therapist will provide individual therapy to identify triggers to anxiety, gain feedback on helpful coping tools and thought-reframing techniques, and identify preferred way of being. Tx to include discuss current stressors and interpersonal conflicts and how to cope with these, coaching, diagnostic testing, referral for medication as indicated, use prescribed medication, cognitive restructuring, interpersonal, family therapy, supportive therapy services.        Goal 2: Patient will report absence of persistent depression mood and report of reduced frequency and intensity of low mood and absence of persistent low energy and motivation to acceptable levels, report subjective improved motivation and increased energy for a period of 90 days, within 6 months as clinically observed and by patient self-report    I will know I've met my goal when feeling more balanced.      Objective #A (Patient Action)    Status: Continued - Date(s):  7/6/2021    Patient will demonstrate and report a level of depression that can be managed at a lower level of care.  Absence of persistent depression mood and report of reduced frequency and intensity of low mood and absence of persistent low energy and motivation to acceptable levels, report subjective improved motivation and increased energy for a period of 90 days, within 6 months as clinically observed and by patient self-report.    Intervention(s)  Therapist will provide individual therapy to identify triggers to depression, gain feedback on helpful coping tools and thought-reframing techniques, and identify preferred way of being.  Tx to include discussion of current stressors and interpersonal conflicts and how to cope with these, coaching, diagnostic testing, referral for medication as indicated, use prescribed medication, cognitive restructuring, interpersonal, family therapy, supportive therapy services.        Patient has reviewed and agreed to the above plan.      Patito Humphries, [facility]  7/6/2021

## 2021-10-10 ENCOUNTER — HEALTH MAINTENANCE LETTER (OUTPATIENT)
Age: 41
End: 2021-10-10

## 2021-10-11 ENCOUNTER — VIRTUAL VISIT (OUTPATIENT)
Dept: PSYCHOLOGY | Facility: CLINIC | Age: 41
End: 2021-10-11
Payer: COMMERCIAL

## 2021-10-11 DIAGNOSIS — F41.1 GAD (GENERALIZED ANXIETY DISORDER): Primary | ICD-10-CM

## 2021-10-11 DIAGNOSIS — F33.0 MDD (MAJOR DEPRESSIVE DISORDER), RECURRENT EPISODE, MILD (H): ICD-10-CM

## 2021-10-11 PROCEDURE — 90832 PSYTX W PT 30 MINUTES: CPT | Mod: 95 | Performed by: SOCIAL WORKER

## 2021-10-11 NOTE — PROGRESS NOTES
Progress Note    Patient Name: Mendy Roe  Date: 10/11/2021         Service Type: Individual  Video Visit:Yes, the patient's condition can be safely assessed and treated via synchronous audio and visual telemedicine encounter.      Reason for Video Visit: Services only offered telehealth    Location of Originating Site: Patient's home    Distant Site: Provider Remote Setting home office    Provider verified identity through the following two step process.  Patient provided:  Patient  and Patient address    Consent:  The patient/guardian has verbally consented to: the potential risks and benefits of telemedicine (video visit) versus in person care; bill my insurance or make self-payment for services provided; and responsibility for payment of non-covered services.      Mode of transmission-[employer]        Session Start Time: 1309  Session End Time: 1344     Session Length: 16-37 minutes    Session #: 28    Attendees: Client     Treatment Plan Last Reviewed 10/11/2021 due 2021    PHQ-9 / MEG-7 :  last session    DATA  Interactive Complexity: No  Crisis: No       Progress Since Last Session (Related to Symptoms / Goals / Homework):   Symptoms: Worsening per patient    Homework: Partially completed      Episode of Care Goals: Minimal progress - PREPARATION (Decided to change - considering how); Intervened by negotiating a change plan and determining options / strategies for behavior change, identifying triggers, exploring social supports, and working towards setting a date to begin behavior change     Current / Ongoing Stressors and Concerns:   COVID-19, isolation, moving, ex, kids, grandchildren, partner     Treatment Objective(s) Addressed in This Session:     Patient will demonstrate and report a level of anxiety that can be managed at a lower level of care.  Absence of persistent anxious mood and report of reduced frequency and intensity of worry and  absence of persistent anxious mood to acceptable levels, no panic attacks, report subjective comfort with rumination for a period of 90 days, within 6 months as clinically observed and by patient self-report.  Patient will demonstrate and report a level of depression that can be managed at a lower level of care.  Absence of persistent depression mood and report of reduced frequency and intensity of low mood and absence of persistent low energy and motivation to acceptable levels, report subjective improved motivation and increased energy for a period of 90 days, within 6 months as clinically observed and by patient self-report.     Intervention:  Video stopped at 1333 finished via phone   Therapist met with patient to review goals and interventions. Therapist utilized reflected listening as patient gave brief reflection of week.  Patient reported ups and downs with her emotions and processed. [place] supported patient as she processed. Therapist encouraged patient to sit with her emotions and allow self time. Therapist encouraged patient to look what she would need to build the trust back in the relationship. Patient presented with an anxious affect. Patient was engaged in session and open to feedback. Patient reported no safety concerns.     ASSESSMENT: Current Emotional / Mental Status (status of significant symptoms):   Risk status (Self / Other harm or suicidal ideation)   Patient denies current fears or concerns for personal safety.   Patient denies current or recent suicidal ideation or behaviors.   Patient denies current or recent homicidal ideation or behaviors.   Patient denies current or recent self injurious behavior or ideation.   Patient denies other safety concerns.   Patient reports there has been no change in risk factors since their last session.     Patient reports there has been no change in protective factors since their last session.     Recommended that patient call 911 or go to the local ED  should there be a change in any of these risk factors.     Appearance:   Appropriate    Eye Contact:   Good    Psychomotor Behavior: Restless    Attitude:   Cooperative  Friendly Pleasant   Orientation:   All   Speech    Rate / Production: Emotional Talkative    Volume:  Normal    Mood:    Anxious  Depressed    Affect:    Worrisome    Thought Content:  Clear    Thought Form:  Coherent    Insight:    Fair      Medication Review:   No changes to current psychiatric medication(s)     Medication Compliance:   Yes     Changes in Health Issues:   None reported     Chemical Use Review:   Substance Use: Chemical use reviewed, no active concerns identified      Tobacco Use: No current tobacco use.      Diagnosis:  1. MEG (generalized anxiety disorder)    2. MDD (major depressive disorder), recurrent episode, mild (H)         Collateral Reports Completed:   Not Applicable    PLAN: (Patient Tasks / Therapist Tasks / Other)  patient take time away from relationship and see if negative patterns reoccur.  Look at what she needs to rebuild trust    Patito Humphries, [facility]  10/11/2021                                                             ______________________________________________________________________    Treatment Plan    Patient's Name: Mendy Roe  YOB: 1980    Date: 10/11/2021      DSM5 Diagnoses: 296.31 (F33.0) Major Depressive Disorder, Recurrent Episode, Mild _ and With mixed features or 300.02 (F41.1) Generalized Anxiety Disorder  Psychosocial / Contextual Factors: Ex, kids, grandchildren, partner  WHODAS: 43    Referral / Collaboration:  Referral to another professional/service is not indicated at this time..    Anticipated number of session or this episode of care: 20      MeasurableTreatment Goal(s) related to diagnosis / functional impairment(s)  Goal 1: Patient will report absence of persistent anxiety mood and report of reduced frequency and intensity of worry and absence of persistent  anxious mood to acceptable levels, no panic attacks, report subjective comfort with rumination or a period of 90 days. Within 6 months as clinically observed and by patient self-report    I will know I've met my goal when manage my anxiety.      Objective #A (Patient Action)    Patient will demonstrate and report a level of anxiety that can be managed at a lower level of care.  Absence of persistent anxious mood and report of reduced frequency and intensity of worry and absence of persistent anxious mood to acceptable levels, no panic attacks, report subjective comfort with rumination for a period of 90 days, within 6 months as clinically observed and by patient self-report.  Status: Continued - Date(s): 10/11/2021    Intervention(s)  Therapist will provide individual therapy to identify triggers to anxiety, gain feedback on helpful coping tools and thought-reframing techniques, and identify preferred way of being. Tx to include discuss current stressors and interpersonal conflicts and how to cope with these, coaching, diagnostic testing, referral for medication as indicated, use prescribed medication, cognitive restructuring, interpersonal, family therapy, supportive therapy services.        Goal 2: Patient will report absence of persistent depression mood and report of reduced frequency and intensity of low mood and absence of persistent low energy and motivation to acceptable levels, report subjective improved motivation and increased energy for a period of 90 days, within 6 months as clinically observed and by patient self-report    I will know I've met my goal when feeling more balanced.      Objective #A (Patient Action)    Status: Continued - Date(s): 10/11/2021    Patient will demonstrate and report a level of depression that can be managed at a lower level of care.  Absence of persistent depression mood and report of reduced frequency and intensity of low mood and absence of persistent low energy and  motivation to acceptable levels, report subjective improved motivation and increased energy for a period of 90 days, within 6 months as clinically observed and by patient self-report.    Intervention(s)  Therapist will provide individual therapy to identify triggers to depression, gain feedback on helpful coping tools and thought-reframing techniques, and identify preferred way of being.  Tx to include discussion of current stressors and interpersonal conflicts and how to cope with these, coaching, diagnostic testing, referral for medication as indicated, use prescribed medication, cognitive restructuring, interpersonal, family therapy, supportive therapy services.        Patient has reviewed and agreed to the above plan.      Patito Humphries, [facility]  10/11/2021

## 2021-12-05 ENCOUNTER — HEALTH MAINTENANCE LETTER (OUTPATIENT)
Age: 41
End: 2021-12-05

## 2021-12-21 ENCOUNTER — VIRTUAL VISIT (OUTPATIENT)
Dept: PSYCHOLOGY | Facility: CLINIC | Age: 41
End: 2021-12-21
Payer: COMMERCIAL

## 2021-12-21 DIAGNOSIS — F33.0 MDD (MAJOR DEPRESSIVE DISORDER), RECURRENT EPISODE, MILD (H): ICD-10-CM

## 2021-12-21 DIAGNOSIS — F41.1 GAD (GENERALIZED ANXIETY DISORDER): Primary | ICD-10-CM

## 2021-12-21 PROCEDURE — 90834 PSYTX W PT 45 MINUTES: CPT | Mod: 95 | Performed by: SOCIAL WORKER

## 2021-12-21 ASSESSMENT — ANXIETY QUESTIONNAIRES
2. NOT BEING ABLE TO STOP OR CONTROL WORRYING: MORE THAN HALF THE DAYS
3. WORRYING TOO MUCH ABOUT DIFFERENT THINGS: NEARLY EVERY DAY
6. BECOMING EASILY ANNOYED OR IRRITABLE: NEARLY EVERY DAY
GAD7 TOTAL SCORE: 17
1. FEELING NERVOUS, ANXIOUS, OR ON EDGE: MORE THAN HALF THE DAYS
4. TROUBLE RELAXING: NEARLY EVERY DAY
7. FEELING AFRAID AS IF SOMETHING AWFUL MIGHT HAPPEN: SEVERAL DAYS
5. BEING SO RESTLESS THAT IT IS HARD TO SIT STILL: NEARLY EVERY DAY

## 2021-12-21 ASSESSMENT — PATIENT HEALTH QUESTIONNAIRE - PHQ9: SUM OF ALL RESPONSES TO PHQ QUESTIONS 1-9: 19

## 2021-12-21 NOTE — PROGRESS NOTES
Progress Note    Patient Name: Mendy Roe  Date: 2021         Service Type: Individual  Video Visit:Yes, the patient's condition can be safely assessed and treated via synchronous audio and visual telemedicine encounter.      Reason for Video Visit: Services only offered telehealth    Location of Originating Site: Patient's home    Distant Site: Provider Remote Setting home office    Provider verified identity through the following two step process.  Patient provided:  Patient  and Patient address    Consent:  The patient/guardian has verbally consented to: the potential risks and benefits of telemedicine (video visit) versus in person care; bill my insurance or make self-payment for services provided; and responsibility for payment of non-covered services.      Mode of transmission-[employer]        Session Start Time: 1307  Session End Time: 1352     Session Length: 38-52 minutes    Session #: 29    Attendees: Client     Treatment Plan Last Reviewed 2021 due 3/21/2022    PHQ-9 / MEG-7 :     DATA  Interactive Complexity: No  Crisis: No       Progress Since Last Session (Related to Symptoms / Goals / Homework):   Symptoms: Worsening phq-9 meg-7    Homework: Partially completed      Episode of Care Goals: Minimal progress - PREPARATION (Decided to change - considering how); Intervened by negotiating a change plan and determining options / strategies for behavior change, identifying triggers, exploring social supports, and working towards setting a date to begin behavior change     Current / Ongoing Stressors and Concerns:   COVID-19, isolation, moving, ex, kids, grandchildren, partner     Treatment Objective(s) Addressed in This Session:     Patient will demonstrate and report a level of anxiety that can be managed at a lower level of care.  Absence of persistent anxious mood and report of reduced frequency and intensity of worry and absence of  persistent anxious mood to acceptable levels, no panic attacks, report subjective comfort with rumination for a period of 90 days, within 6 months as clinically observed and by patient self-report.  Patient will demonstrate and report a level of depression that can be managed at a lower level of care.  Absence of persistent depression mood and report of reduced frequency and intensity of low mood and absence of persistent low energy and motivation to acceptable levels, report subjective improved motivation and increased energy for a period of 90 days, within 6 months as clinically observed and by patient self-report.     Intervention:     Therapist met with patient to review goals and interventions. Therapist utilized reflected listening as patient gave brief reflection of week.  Patient reported some stress with money and family. Therapist supported patient as she processed and validated patient. Therapist removed shame and encouraged patient to balancing her thoughts with her decisions and for patient to put self first in work and at home. Patient presented with an anxious affect. Patient was engaged in session and open to feedback. Patient reported no safety concerns.     ASSESSMENT: Current Emotional / Mental Status (status of significant symptoms):   Risk status (Self / Other harm or suicidal ideation)   Patient denies current fears or concerns for personal safety.   Patient denies current or recent suicidal ideation or behaviors.   Patient denies current or recent homicidal ideation or behaviors.   Patient denies current or recent self injurious behavior or ideation.   Patient denies other safety concerns.   Patient reports there has been no change in risk factors since their last session.     Patient reports there has been no change in protective factors since their last session.     Recommended that patient call 911 or go to the local ED should there be a change in any of these risk  factors.     Appearance:   Appropriate    Eye Contact:   Good    Psychomotor Behavior: Restless    Attitude:   Cooperative  Friendly Pleasant   Orientation:   All   Speech    Rate / Production: Emotional Talkative    Volume:  Normal    Mood:    Anxious  Depressed    Affect:    Worrisome    Thought Content:  Clear    Thought Form:  Coherent    Insight:    Fair      Medication Review:   No changes to current psychiatric medication(s)     Medication Compliance:   Yes     Changes in Health Issues:   None reported     Chemical Use Review:   Substance Use: Chemical use reviewed, no active concerns identified      Tobacco Use: No current tobacco use.      Diagnosis:  1. MEG (generalized anxiety disorder)    2. MDD (major depressive disorder), recurrent episode, mild (H)         Collateral Reports Completed:   Not Applicable    PLAN: (Patient Tasks / Therapist Tasks / Other)  patient to balancing her thoughts with her decisions and for patient to put self first in work and at home.    Patito Humphries, [facility]  12/21/2021                                                             ______________________________________________________________________    Treatment Plan    Patient's Name: Mendy Roe  YOB: 1980    Date:12/21/2021      DSM5 Diagnoses: 296.31 (F33.0) Major Depressive Disorder, Recurrent Episode, Mild _ and With mixed features or 300.02 (F41.1) Generalized Anxiety Disorder  Psychosocial / Contextual Factors: Ex, kids, grandchildren, partner  WHODAS: 38    Referral / Collaboration:  Referral to another professional/service is not indicated at this time..    Anticipated number of session or this episode of care: 20      MeasurableTreatment Goal(s) related to diagnosis / functional impairment(s)  Goal 1: Patient will report absence of persistent anxiety mood and report of reduced frequency and intensity of worry and absence of persistent anxious mood to acceptable levels, no panic attacks, report  subjective comfort with rumination or a period of 90 days. Within 6 months as clinically observed and by patient self-report    I will know I've met my goal when manage my anxiety.      Objective #A (Patient Action)    Patient will demonstrate and report a level of anxiety that can be managed at a lower level of care.  Absence of persistent anxious mood and report of reduced frequency and intensity of worry and absence of persistent anxious mood to acceptable levels, no panic attacks, report subjective comfort with rumination for a period of 90 days, within 6 months as clinically observed and by patient self-report.  Status: Continued - Date(s): 12/21/2021    Intervention(s)  Therapist will provide individual therapy to identify triggers to anxiety, gain feedback on helpful coping tools and thought-reframing techniques, and identify preferred way of being. Tx to include discuss current stressors and interpersonal conflicts and how to cope with these, coaching, diagnostic testing, referral for medication as indicated, use prescribed medication, cognitive restructuring, interpersonal, family therapy, supportive therapy services.        Goal 2: Patient will report absence of persistent depression mood and report of reduced frequency and intensity of low mood and absence of persistent low energy and motivation to acceptable levels, report subjective improved motivation and increased energy for a period of 90 days, within 6 months as clinically observed and by patient self-report    I will know I've met my goal when feeling more balanced.      Objective #A (Patient Action)    Status: Continued - Date(s): 12/21/2021    Patient will demonstrate and report a level of depression that can be managed at a lower level of care.  Absence of persistent depression mood and report of reduced frequency and intensity of low mood and absence of persistent low energy and motivation to acceptable levels, report subjective improved  motivation and increased energy for a period of 90 days, within 6 months as clinically observed and by patient self-report.    Intervention(s)  Therapist will provide individual therapy to identify triggers to depression, gain feedback on helpful coping tools and thought-reframing techniques, and identify preferred way of being.  Tx to include discussion of current stressors and interpersonal conflicts and how to cope with these, coaching, diagnostic testing, referral for medication as indicated, use prescribed medication, cognitive restructuring, interpersonal, family therapy, supportive therapy services.        Patient has reviewed and agreed to the above plan.      Patito Humphries, [facility]  12/21/2021

## 2021-12-22 ASSESSMENT — ANXIETY QUESTIONNAIRES: GAD7 TOTAL SCORE: 17

## 2021-12-31 ENCOUNTER — E-VISIT (OUTPATIENT)
Dept: PEDIATRICS | Facility: CLINIC | Age: 41
End: 2021-12-31
Payer: COMMERCIAL

## 2021-12-31 DIAGNOSIS — Z20.822 SUSPECTED COVID-19 VIRUS INFECTION: Primary | ICD-10-CM

## 2021-12-31 PROCEDURE — 99421 OL DIG E/M SVC 5-10 MIN: CPT | Performed by: NURSE PRACTITIONER

## 2021-12-31 NOTE — PATIENT INSTRUCTIONS
La,      Based on your responses, you may have coronavirus (COVID-19). This illness can cause fever, cough and trouble breathing. Many people get a mild case and get better on their own. Some people can get very sick.    Will I be tested for COVID-19?  We would like to test you for COVID-19 virus. I have placed orders for this test.     To schedule: go to your [employer] home page and scroll down to the section that says  You have an appointment that needs to be scheduled  and click the large green button that says  Schedule Now  and follow the steps to find the next available openings.    If you are unable to complete these [employer] scheduling steps, please call [phone] to schedule your testing.     Return to work/school/ guidance:  Please let your workplace manager and staffing office know when your isolation ends.       If you receive a positive COVID-19 test result, follow the guidance of the those who are giving you the results. Usually the return to work is 10 days from symptom onset or positive test date, (or in some cases 20 days if you are immunocompromised). If your symptoms started after your positive test, the 10 days should start when your symptoms started.   o If you work at [employer], you must also be cleared by Employee Occupational Health and Safety to return to work.      If you receive a negative COVID-19 test result and did not have a high risk exposure to someone with a known positive COVID-19 test, you can return to work once you're free of fever for 24 hours without fever-reducing medication and your symptoms are improving or resolved.    If you receive a negative COVID-19 test and had a high-risk exposure to someone who has tested positive for COVID-19 then you can return to work 14 days after your last contact with the positive individual. Follow quarantine guidance given by your doctor or public health officials.     Sign up for [employer] Loop:  We know it's scary to hear  that you might have COVID-19. We want to track your symptoms to make sure you're okay over the next 2 weeks. Please look for an email from [employer]--this is a free, online program that we'll use to keep in touch. To sign up, follow the link in the email you will receive. Learn more at [email or web address]    How can I take care of myself?  Over the counter medications may help with your symptoms like congestion, cough, chills, or fever.    There are not many effective prescription treatments for early COVID-19. Hydroxychloroquine, ivermectin, and azithromycin are not effective or recommended for COVID-19.    If your symptoms started in the last 10 days, you may be able to receive a treatment with monoclonal antibodies. This treatment can lower your risk of severe illness and going to the hospital. It is given through an IV or under your skin (subcutaneous) and must be given at an infusion center. You must be 12 or older, weight at least 88 pounds, and have a positive COVID-19 test.     If you would like to sign up to be considered to receive the monoclonal antibody medicine, please complete a participation form through the [facility] of [facility] here: MNRAP ([email or web address].[facility].[place].us/diseases/coronavirus/mnrap.html). You may also call the [facility] COVID-19 Public Hotline at 1-890.698.9509 (open Mon-Fri: 9am-7pm and Sat: 10am-6pm).     Not all people who are eligible will receive the medicine, since supply is limited. You will be contacted in the next 1 to 2 business days only if you are selected. If you do not receive a call, you have not been selected to receive the medicine. If you have any questions about this medication, please contact your primary care provider. For more information, see [email or web address].[facility].[place]/diseases/coronavirus/meds.pdf      Get lots of rest. Drink extra fluids (unless a doctor has told you not to)    Take Tylenol (acetaminophen) or ibuprofen for fever or  pain. If you have liver or kidney problems, ask your family doctor if it's okay to take Tylenol o ibuprofen    Take over the counter medications for your symptoms, as directed by your doctor. You may also talk to your pharmacist.      If you have other health problems (like cancer, heart failure, an organ transplant or severe kidney disease): Call your specialty clinic if you don't feel better in the next 2 days.    Know when to call 911. Emergency warning signs include:  o Trouble breathing or shortness of breath  o Pain or pressure in the chest that doesn't go away  o Feeling confused like you haven't felt before, or not being able to wake up  o Bluish-colored lips or face    Where can I get more information?    [facility] - About COVID-19: [email or web address]     CDC - What to Do If You're Sick:     www.cdc.gov/coronavirus/2019-ncov/about/steps-when-sick.html    CDC - Ending Home Isolation:  https://www.cdc.gov/coronavirus/2019-ncov/your-health/quarantine-isolation.html    CDC - Caring for Someone:  www.cdc.gov/coronavirus/2019-ncov/if-you-are-sick/care-for-someone.html    [facility] clinical trials (COVID-19 research studies): clinicalaffairs.[facility]-clinical-trials    Below are the COVID-19 hotlines at the [place] Department of Health ([facility]). Interpreters are available.  o For health questions: Call 131-620-9828 or 1-467.300.7637 (7 a.m. to 7 p.m.)  o For questions about schools and childcare: Call 781-882-9748 or 1-881.629.6128 (7 a.m. to 7 p.m.)  December 31, 2021  RE:  Mendy Roe                                                                                                                  [address] [place] [address]      To whom it may concern:    I evaluated Mendy LOPEZ Jyothi on December 31, 2021. Mendy Roe should be excused from work/school.     They should let their workplace manager and staffing office know when their quarantine  ends.    We can not give an exact date as it depends on the information below. They can calculate this on their own or work with their manager/staffing office to calculate this. (For example if they were exposed on 10/04, they would have to quarantine for 14 full days. That would be through 10/18. They could return on 10/19.)    Quarantine Guidelines:    If patient receives a positive COVID-19 test result, they should follow the guidance of those who are giving the results. Usually the return to work is 10 (or in some cases 20 days from symptom onset.) If they work at "[employer]", they must be cleared by Employee Occupational Health and Safety to return to work.      If patient receives a negative COVID-19 test result and did not have a high risk exposure to someone with a known positive COVID-19 test, they can return to work once they're free of fever for 24 hours without fever-reducing medication and their symptoms are improving or resolved.    If patient receives a negative COVID-19 test and if they had a high risk exposure to someone who has tested positive for COVID-19 then they can return to work 14 days after their last contact with the positive individual    Note: If there is ongoing exposure to the covid positive person, this quarantine period may be longer than 14 days. (For example, if they are continually exposed to their child, who tested positive and cannot isolate from them, then the quarantine of 7-14 days can't start until their child is no longer contagious. This is typically 10 days from onset to the child's symptoms. So the total duration may be 17-24 days in this case.)     Sincerely,  Kalie Becker CNP          o

## 2022-01-17 ENCOUNTER — VIRTUAL VISIT (OUTPATIENT)
Dept: FAMILY MEDICINE | Facility: CLINIC | Age: 42
End: 2022-01-17
Payer: COMMERCIAL

## 2022-01-17 DIAGNOSIS — J01.90 ACUTE SINUSITIS, RECURRENCE NOT SPECIFIED, UNSPECIFIED LOCATION: Primary | ICD-10-CM

## 2022-01-17 DIAGNOSIS — R11.0 NAUSEA: ICD-10-CM

## 2022-01-17 PROCEDURE — 99214 OFFICE O/P EST MOD 30 MIN: CPT | Mod: 95 | Performed by: FAMILY MEDICINE

## 2022-01-17 RX ORDER — ONDANSETRON 4 MG/1
4 TABLET, FILM COATED ORAL EVERY 8 HOURS PRN
Qty: 30 TABLET | Refills: 0 | Status: SHIPPED | OUTPATIENT
Start: 2022-01-17 | End: 2024-06-21

## 2022-01-17 RX ORDER — PREDNISONE 20 MG/1
20 TABLET ORAL DAILY
Qty: 5 TABLET | Refills: 0 | Status: SHIPPED | OUTPATIENT
Start: 2022-01-17 | End: 2022-01-22

## 2022-01-17 RX ORDER — AZITHROMYCIN 250 MG/1
TABLET, FILM COATED ORAL
Qty: 6 TABLET | Refills: 0 | Status: SHIPPED | OUTPATIENT
Start: 2022-01-17 | End: 2022-06-07

## 2022-01-17 NOTE — PROGRESS NOTES
La is a 41 year old who is being evaluated via a billable video visit.      How would you like to obtain your AVS? MyChart  If the video visit is dropped, the invitation should be resent by: Text to cell phone: [phone]   Will anyone else be joining your video visit? No      Video Start Time: 5:08 PM    Assessment & Plan     Acute sinusitis, recurrence not specified, unspecified location  tx options reviewed   Plan;   - azithromycin (ZITHROMAX) 250 MG tablet; Take 500 mg on the first day, followed by 250 mg daily for 4 days.  - predniSONE (DELTASONE) 20 MG tablet; Take 1 tablet (20 mg) by mouth daily for 5 days    Nausea  Refill   - ondansetron (ZOFRAN) 4 MG tablet; Take 1 tablet (4 mg) by mouth every 8 hours as needed for nausea    As for ADD, advised to touch base with the psychologist for an evaluation and if needing a referral to let me know.                        No follow-ups on file.    Susan Galan MD  [facility]    Subjective   La is a 41 year old who presents for the following health issues     HPI   She was diagnosed with COVID 2 weeks ago and now is being concerned with having a sinus infection with PND, sinus pressure and headaches for the past week.  She denies being febrile but no body aches or coughing.  She also denies any GI or loss of taste or smell symptoms.    History of ADHD which had been medicated for about 15 years ago, will up to see if she can go back on start taking the medication.      Review of Systems         Objective           Vitals:  No vitals were obtained today due to virtual visit.    Physical Exam   GENERAL: Healthy, alert and no distress                Video-Visit Details    Type of service:  Video Visit    Video End Time:5:15 PM    Originating Location (pt. Location): Home    Distant Location (provider location):  [facility]     Platform used for Video Visit: [employer]

## 2022-03-01 ENCOUNTER — TELEPHONE (OUTPATIENT)
Dept: PSYCHOLOGY | Facility: CLINIC | Age: 42
End: 2022-03-01
Payer: COMMERCIAL

## 2022-03-01 ENCOUNTER — VIRTUAL VISIT (OUTPATIENT)
Dept: PSYCHOLOGY | Facility: CLINIC | Age: 42
End: 2022-03-01
Payer: COMMERCIAL

## 2022-03-01 DIAGNOSIS — F33.0 MDD (MAJOR DEPRESSIVE DISORDER), RECURRENT EPISODE, MILD (H): ICD-10-CM

## 2022-03-01 DIAGNOSIS — F41.1 GAD (GENERALIZED ANXIETY DISORDER): Primary | ICD-10-CM

## 2022-03-01 PROCEDURE — 90834 PSYTX W PT 45 MINUTES: CPT | Mod: 95 | Performed by: SOCIAL WORKER

## 2022-03-01 ASSESSMENT — ANXIETY QUESTIONNAIRES
5. BEING SO RESTLESS THAT IT IS HARD TO SIT STILL: NEARLY EVERY DAY
6. BECOMING EASILY ANNOYED OR IRRITABLE: MORE THAN HALF THE DAYS
GAD7 TOTAL SCORE: 17
3. WORRYING TOO MUCH ABOUT DIFFERENT THINGS: NEARLY EVERY DAY
7. FEELING AFRAID AS IF SOMETHING AWFUL MIGHT HAPPEN: SEVERAL DAYS
4. TROUBLE RELAXING: NEARLY EVERY DAY
2. NOT BEING ABLE TO STOP OR CONTROL WORRYING: MORE THAN HALF THE DAYS
1. FEELING NERVOUS, ANXIOUS, OR ON EDGE: NEARLY EVERY DAY

## 2022-03-01 ASSESSMENT — PATIENT HEALTH QUESTIONNAIRE - PHQ9: SUM OF ALL RESPONSES TO PHQ QUESTIONS 1-9: 21

## 2022-03-01 NOTE — PROGRESS NOTES
[facility]                                     Progress Note    Patient Name: Mendy Roe  Date: 3/1/2022         Service Type: Individual      Session Start Time: 130  Session End Time: 1357     Session Length: 38-52    Session #: 30    Attendees: Client    Service Modality:  Video Visit:      Provider verified identity through the following two step process.  Patient provided:  Patient  and Patient is known previously to provider    Telemedicine Visit: The patient's condition can be safely assessed and treated via synchronous audio and visual telemedicine encounter.      Reason for Telemedicine Visit: Services only offered telehealth    Originating Site (Patient Location): Patient's home    Distant Site (Provider Location): Provider Remote Setting- Home Office    Consent:  The patient/guardian has verbally consented to: the potential risks and benefits of telemedicine (video visit) versus in person care; bill my insurance or make self-payment for services provided; and responsibility for payment of non-covered services.     Patient would like the video invitation sent by:  Send to e-mail at: [email or web address]    Mode of Communication:  Video Conference via [employer]    As the provider I attest to compliance with applicable laws and regulations related to telemedicine.    DATA  Interactive Complexity: No  Crisis: No        Progress Since Last Session (Related to Symptoms / Goals / Homework):   Symptoms: Worsening PHQ-9 MEG-7    Homework: Completed in session      Episode of Care Goals: Minimal progress - PREPARATION (Decided to change - considering how); Intervened by negotiating a change plan and determining options / strategies for behavior change, identifying triggers, exploring social supports, and working towards setting a date to begin behavior change     Current / Ongoing Stressors and Concerns:   Blended family, moving, kids, grandchildren, partner     Treatment Objective(s)  Addressed in This Session:     Patient will demonstrate and report a level of anxiety that can be managed at a lower level of care.  Absence of persistent anxious mood and report of reduced frequency and intensity of worry and absence of persistent anxious mood to acceptable levels, no panic attacks, report subjective comfort with rumination for a period of 90 days, within 6 months as clinically observed and by patient self-report.  Patient will demonstrate and report a level of depression that can be managed at a lower level of care.  Absence of persistent depression mood and report of reduced frequency and intensity of low mood and absence of persistent low energy and motivation to acceptable levels, report subjective improved motivation and increased energy for a period of 90 days, within 6 months as clinically observed and by patient self-report.     Intervention:   Motivational Interviewing    MI Intervention: Co-Developed Goal: conflict and communiation, Expressed Empathy/Understanding, Supported Autonomy, Collaboration, Evocation, Permission to raise concern or advise and Open-ended questions     Change Talk Expressed by the Patient: Desire to change Ability to change Reasons to change Need to change    Provider Response to Change Talk: E - Evoked more info from patient about behavior change, A - Affirmed patient's thoughts, decisions, or attempts at behavior change, R - Reflected patient's change talk and S - Summarized patient's change talk statements      Assessments completed prior to visit:  The following assessments were completed by patient for this visit:  PHQ9:   PHQ-9 SCORE 2/2/2021 3/30/2021 4/19/2021 7/6/2021 7/20/2021 12/21/2021 3/1/2022   PHQ-9 Total Score 16 21 16 18 14 19 21     GAD7:   MEG-7 SCORE 2/2/2021 3/30/2021 4/19/2021 7/6/2021 7/20/2021 12/21/2021 3/1/2022   Total Score 16 16 17 17 13 17 17     CAGE-AID:   CAGE-AID Total Score 11/19/2019   Total Score 0     [place] Suicide Severity  Rating Scale (Lifetime/Recent)  [place] Suicide Severity Rating (Lifetime/Recent) 11/19/2019   Wish to be Dead (Lifetime) No   Non-Specific Active Suicidal Thoughts (Lifetime) No   RETIRED: 1. Wish to be Dead (Recent) No   RETIRED: 2. Non-Specific Active Suicidal Thoughts (Recent) No   Actual Attempt (Lifetime) No   Actual Attempt (Past 3 Months) No   Has subject engaged in non-suicidal self-injurious behavior? (Lifetime) No   Has subject engaged in non-suicidal self-injurious behavior? (Past 3 Months) No   Interrupted Attempts (Lifetime) No   Interrupted Attempts (Past 3 Months) No   Aborted or Self-Interrupted Attempt (Lifetime) No   Aborted or Self-Interrupted Attempt (Past 3 Months) No   Preparatory Acts or Behavior (Lifetime) No   Preparatory Acts or Behavior (Past 3 Months) No         ASSESSMENT: Current Emotional / Mental Status (status of significant symptoms):   Risk status (Self / Other harm or suicidal ideation)   Patient denies current fears or concerns for personal safety.   Patient denies current or recent suicidal ideation or behaviors.   Patient denies current or recent homicidal ideation or behaviors.   Patient denies current or recent self injurious behavior or ideation.   Patient denies other safety concerns.   Patient reports there has been no change in risk factors since their last session.     Patient reports there has been no change in protective factors since their last session.     Recommended that patient call 911 or go to the local ED should there be a change in any of these risk factors.     Appearance:   Appropriate    Eye Contact:   Fair    Psychomotor Behavior: Agitated  Restless    Attitude:   Cooperative    Orientation:   All   Speech    Rate / Production: Emotional Talkative    Volume:  Normal    Mood:    Anxious  Irritable  Sad    Affect:    Worrisome    Thought Content:  Clear    Thought Form:  Coherent    Insight:    Fair      Medication Review:   No changes to current  psychiatric medication(s)     Medication Compliance:   Yes     Changes in Health Issues:   None reported     Chemical Use Review:   Substance Use: Chemical use reviewed, no active concerns identified      Tobacco Use: No current tobacco use.      Diagnosis:  1. MEG (generalized anxiety disorder)    2. MDD (major depressive disorder), recurrent episode, mild (H)        Collateral Reports Completed:   Not Applicable    PLAN: (Patient Tasks / Therapist Tasks / Other)  Work on prioritizing fires  Clear communication in conflict with solution focus        Patito Humphries, [facility]  3/1/2022                                                         ______________________________________________________________________    Individual Treatment Plan    Patient's Name: Mendy Roe  YOB: 1980    Date of Creation: 12/21/2021  Date Treatment Plan Last Reviewed/Revised: 12/21/2021 due 3/21/2022    DSM5 Diagnoses: 296.31 (F33.0) Major Depressive Disorder, Recurrent Episode, Mild With mixed features or 300.02 (F41.1) Generalized Anxiety Disorder  Psychosocial / Contextual Factors: Blended family, moving, kids, grandchildren, partner  PROMIS (reviewed every 90 days):     Referral / Collaboration:  Referral to another professional/service is not indicated at this time..    Anticipated number of session for this episode of care: 12  Anticipation frequency of session: Monthly  Anticipated Duration of each session: 38-52 minutes  Treatment plan will be reviewed in 90 days or when goals have been changed.       MeasurableTreatment Goal(s) related to diagnosis / functional impairment(s)  Goal 1: Patient will report absence of persistent anxiety mood and report of reduced frequency and intensity of worry and absence of persistent anxious mood to acceptable levels, no panic attacks, report subjective comfort with rumination or a period of 90 days. Within 6 months as clinically observed and by patient self-report    I will  know I've met my goal when manage my anxiety.      Objective #A (Patient Action)    Patient will demonstrate and report a level of anxiety that can be managed at a lower level of care.  Absence of persistent anxious mood and report of reduced frequency and intensity of worry and absence of persistent anxious mood to acceptable levels, no panic attacks, report subjective comfort with rumination for a period of 90 days, within 6 months as clinically observed and by patient self-report.  Status: Continued - Date(s): 12/21/2021    Intervention(s)  Therapist will provide individual therapy to identify triggers to anxiety, gain feedback on helpful coping tools and thought-reframing techniques, and identify preferred way of being. Tx to include discuss current stressors and interpersonal conflicts and how to cope with these, coaching, diagnostic testing, referral for medication as indicated, use prescribed medication, cognitive restructuring, interpersonal, family therapy, supportive therapy services.        Goal 2: Patient will report absence of persistent depression mood and report of reduced frequency and intensity of low mood and absence of persistent low energy and motivation to acceptable levels, report subjective improved motivation and increased energy for a period of 90 days, within 6 months as clinically observed and by patient self-report    I will know I've met my goal when feeling more balanced.      Objective #A (Patient Action)    Status: Continued - Date(s): 12/21/2021    Patient will demonstrate and report a level of depression that can be managed at a lower level of care.  Absence of persistent depression mood and report of reduced frequency and intensity of low mood and absence of persistent low energy and motivation to acceptable levels, report subjective improved motivation and increased energy for a period of 90 days, within 6 months as clinically observed and by patient  self-report.    Intervention(s)  Therapist will provide individual therapy to identify triggers to depression, gain feedback on helpful coping tools and thought-reframing techniques, and identify preferred way of being.  Tx to include discussion of current stressors and interpersonal conflicts and how to cope with these, coaching, diagnostic testing, referral for medication as indicated, use prescribed medication, cognitive restructuring, interpersonal, family therapy, supportive therapy services.        Patient has reviewed and agreed to the above plan.      Patito Humphries, [facility]  12/21/2021

## 2022-03-02 ASSESSMENT — ANXIETY QUESTIONNAIRES: GAD7 TOTAL SCORE: 17

## 2022-03-02 NOTE — TELEPHONE ENCOUNTER
Therapist received msg from patient and returned phone call. Pt asked about medications. Therapist referred pt for psych testing at [facility] counseling [facility].

## 2022-03-03 ENCOUNTER — TRANSFERRED RECORDS (OUTPATIENT)
Dept: HEALTH INFORMATION MANAGEMENT | Facility: CLINIC | Age: 42
End: 2022-03-03
Payer: COMMERCIAL

## 2022-04-26 ENCOUNTER — VIRTUAL VISIT (OUTPATIENT)
Dept: PSYCHOLOGY | Facility: CLINIC | Age: 42
End: 2022-04-26
Payer: COMMERCIAL

## 2022-04-26 DIAGNOSIS — F33.0 MDD (MAJOR DEPRESSIVE DISORDER), RECURRENT EPISODE, MILD (H): ICD-10-CM

## 2022-04-26 DIAGNOSIS — F41.1 GAD (GENERALIZED ANXIETY DISORDER): Primary | ICD-10-CM

## 2022-04-26 PROCEDURE — 90834 PSYTX W PT 45 MINUTES: CPT | Mod: 95 | Performed by: SOCIAL WORKER

## 2022-04-26 NOTE — PROGRESS NOTES
[facility]                                     Progress Note    Patient Name: Mendy Roe  Date: 2022         Service Type: Individual      Session Start Time: 1503  Session End Time: 155     Session Length: 38-52    Session #: 31    Attendees: Client    Service Modality:  Video Visit:      Provider verified identity through the following two step process.  Patient provided:  Patient  and Patient is known previously to provider    Telemedicine Visit: The patient's condition can be safely assessed and treated via synchronous audio and visual telemedicine encounter.      Reason for Telemedicine Visit: Services only offered telehealth    Originating Site (Patient Location): Patient's home    Distant Site (Provider Location): Provider Remote Setting- Home Office    Consent:  The patient/guardian has verbally consented to: the potential risks and benefits of telemedicine (video visit) versus in person care; bill my insurance or make self-payment for services provided; and responsibility for payment of non-covered services.     Patient would like the video invitation sent by:  Send to e-mail at: [email or web address]    Mode of Communication:  Video Conference via [employer]    As the provider I attest to compliance with applicable laws and regulations related to telemedicine.    DATA  Interactive Complexity: No  Crisis: No        Progress Since Last Session (Related to Symptoms / Goals / Homework):   Symptoms: Worsening per patient    Homework: Partially completed      Episode of Care Goals: Minimal progress - PREPARATION (Decided to change - considering how); Intervened by negotiating a change plan and determining options / strategies for behavior change, identifying triggers, exploring social supports, and working towards setting a date to begin behavior change     Current / Ongoing Stressors and Concerns:   Blended family, moving, kids, grandchildren, partner     Treatment Objective(s)  Addressed in This Session:     Patient will demonstrate and report a level of anxiety that can be managed at a lower level of care.  Absence of persistent anxious mood and report of reduced frequency and intensity of worry and absence of persistent anxious mood to acceptable levels, no panic attacks, report subjective comfort with rumination for a period of 90 days, within 6 months as clinically observed and by patient self-report.  Patient will demonstrate and report a level of depression that can be managed at a lower level of care.  Absence of persistent depression mood and report of reduced frequency and intensity of low mood and absence of persistent low energy and motivation to acceptable levels, report subjective improved motivation and increased energy for a period of 90 days, within 6 months as clinically observed and by patient self-report.     Intervention:  Needed to go to phone at 1520   Therapist met with patient to review goals and interventions. Therapist utilized reflected listening as patient gave brief reflection of week. Patient reported they closed on selling their home today and bitter sweet emotions along with feeling more lonely lately. Therapist supported patient as she processed her emotions and encouraged patient to communicate her emotions with her partner to see what they can work on together. Patient processed work and therapist utilized solution focused modality with patient and encouraged patient to role play sitting down with her boss and asking for what she needs.   Patient presented with an anxious affect. Patient was engaged in session and open to feedback. Patient reported no safety concerns.       There has been demonstrated improvement in functioning while patient has been engaged in psychotherapy/psychological service- if withdrawn the patient would deteriorate and/or relapse.       Assessments completed prior to visit:  The following assessments were completed by patient for  this visit:  PHQ9:   PHQ-9 SCORE 2/2/2021 3/30/2021 4/19/2021 7/6/2021 7/20/2021 12/21/2021 3/1/2022   PHQ-9 Total Score 16 21 16 18 14 19 21     GAD7:   MEG-7 SCORE 2/2/2021 3/30/2021 4/19/2021 7/6/2021 7/20/2021 12/21/2021 3/1/2022   Total Score 16 16 17 17 13 17 17           ASSESSMENT: Current Emotional / Mental Status (status of significant symptoms):   Risk status (Self / Other harm or suicidal ideation)   Patient denies current fears or concerns for personal safety.   Patient denies current or recent suicidal ideation or behaviors.   Patient denies current or recent homicidal ideation or behaviors.   Patient denies current or recent self injurious behavior or ideation.   Patient denies other safety concerns.   Patient reports there has been no change in risk factors since their last session.     Patient reports there has been no change in protective factors since their last session.     Recommended that patient call 911 or go to the local ED should there be a change in any of these risk factors.     Appearance:   Appropriate    Eye Contact:   Fair    Psychomotor Behavior: Agitated  Restless    Attitude:   Cooperative    Orientation:   All   Speech    Rate / Production: Emotional Talkative    Volume:  Normal    Mood:    Anxious  Sad    Affect:    Worrisome    Thought Content:  Clear    Thought Form:  Coherent    Insight:    Fair      Medication Review:   No changes to current psychiatric medication(s)     Medication Compliance:   Yes     Changes in Health Issues:   None reported     Chemical Use Review:   Substance Use: Chemical use reviewed, no active concerns identified      Tobacco Use: No current tobacco use.      Diagnosis:  1. MEG (generalized anxiety disorder)    2. MDD (major depressive disorder), recurrent episode, mild (H)        Collateral Reports Completed:   Not Applicable    PLAN: (Patient Tasks / Therapist Tasks / Other)  therapist utilized solution focused modality with patient and encouraged  patient to role play sitting down with her boss and asking for what she needs.       Patito Humphries, [facility]  4/26/2022                                                         ______________________________________________________________________    Individual Treatment Plan    Patient's Name: Mendy Roe  YOB: 1980    Date of Creation: 12/21/2021  Date Treatment Plan Last Reviewed/Revised: 4/26/2022 due 7/26/2022    DSM5 Diagnoses: 296.31 (F33.0) Major Depressive Disorder, Recurrent Episode, Mild With mixed features or 300.02 (F41.1) Generalized Anxiety Disorder  Psychosocial / Contextual Factors: Blended family, moving, kids, grandchildren, partner  PROMIS (reviewed every 90 days):     Referral / Collaboration:  Referral to another professional/service is not indicated at this time..    Anticipated number of session for this episode of care: 12  Anticipation frequency of session: Monthly  Anticipated Duration of each session: 38-52 minutes  Treatment plan will be reviewed in 90 days or when goals have been changed.       MeasurableTreatment Goal(s) related to diagnosis / functional impairment(s)  Goal 1: Patient will report absence of persistent anxiety mood and report of reduced frequency and intensity of worry and absence of persistent anxious mood to acceptable levels, no panic attacks, report subjective comfort with rumination or a period of 90 days. Within 6 months as clinically observed and by patient self-report    I will know I've met my goal when manage my anxiety.      Objective #A (Patient Action)    Patient will demonstrate and report a level of anxiety that can be managed at a lower level of care.  Absence of persistent anxious mood and report of reduced frequency and intensity of worry and absence of persistent anxious mood to acceptable levels, no panic attacks, report subjective comfort with rumination for a period of 90 days, within 6 months as clinically observed and by  patient self-report.  Status: Continued - Date(s):4/26/2022    Intervention(s)  Therapist will provide individual therapy to identify triggers to anxiety, gain feedback on helpful coping tools and thought-reframing techniques, and identify preferred way of being. Tx to include discuss current stressors and interpersonal conflicts and how to cope with these, coaching, diagnostic testing, referral for medication as indicated, use prescribed medication, cognitive restructuring, interpersonal, family therapy, supportive therapy services.        Goal 2: Patient will report absence of persistent depression mood and report of reduced frequency and intensity of low mood and absence of persistent low energy and motivation to acceptable levels, report subjective improved motivation and increased energy for a period of 90 days, within 6 months as clinically observed and by patient self-report    I will know I've met my goal when feeling more balanced.      Objective #A (Patient Action)    Status: Continued - Date(s): 4/26/2022    Patient will demonstrate and report a level of depression that can be managed at a lower level of care.  Absence of persistent depression mood and report of reduced frequency and intensity of low mood and absence of persistent low energy and motivation to acceptable levels, report subjective improved motivation and increased energy for a period of 90 days, within 6 months as clinically observed and by patient self-report.    Intervention(s)  Therapist will provide individual therapy to identify triggers to depression, gain feedback on helpful coping tools and thought-reframing techniques, and identify preferred way of being.  Tx to include discussion of current stressors and interpersonal conflicts and how to cope with these, coaching, diagnostic testing, referral for medication as indicated, use prescribed medication, cognitive restructuring, interpersonal, family therapy, supportive therapy  services.        Patient has reviewed and agreed to the above plan.      Patito Humphries, [facility]  4/26/2022

## 2022-05-19 ENCOUNTER — APPOINTMENT (OUTPATIENT)
Dept: FAMILY MEDICINE | Facility: CLINIC | Age: 42
End: 2022-05-19
Payer: COMMERCIAL

## 2022-05-19 ASSESSMENT — ANXIETY QUESTIONNAIRES
4. TROUBLE RELAXING: NEARLY EVERY DAY
7. FEELING AFRAID AS IF SOMETHING AWFUL MIGHT HAPPEN: MORE THAN HALF THE DAYS
GAD7 TOTAL SCORE: 20
5. BEING SO RESTLESS THAT IT IS HARD TO SIT STILL: NEARLY EVERY DAY
8. IF YOU CHECKED OFF ANY PROBLEMS, HOW DIFFICULT HAVE THESE MADE IT FOR YOU TO DO YOUR WORK, TAKE CARE OF THINGS AT HOME, OR GET ALONG WITH OTHER PEOPLE?: EXTREMELY DIFFICULT
1. FEELING NERVOUS, ANXIOUS, OR ON EDGE: NEARLY EVERY DAY
6. BECOMING EASILY ANNOYED OR IRRITABLE: NEARLY EVERY DAY
3. WORRYING TOO MUCH ABOUT DIFFERENT THINGS: NEARLY EVERY DAY
2. NOT BEING ABLE TO STOP OR CONTROL WORRYING: NEARLY EVERY DAY
7. FEELING AFRAID AS IF SOMETHING AWFUL MIGHT HAPPEN: MORE THAN HALF THE DAYS

## 2022-05-20 ENCOUNTER — APPOINTMENT (OUTPATIENT)
Dept: FAMILY MEDICINE | Facility: CLINIC | Age: 42
End: 2022-05-20
Payer: COMMERCIAL

## 2022-05-20 ASSESSMENT — ANXIETY QUESTIONNAIRES: GAD7 TOTAL SCORE: 20

## 2022-05-21 ASSESSMENT — PATIENT HEALTH QUESTIONNAIRE - PHQ9
10. IF YOU CHECKED OFF ANY PROBLEMS, HOW DIFFICULT HAVE THESE PROBLEMS MADE IT FOR YOU TO DO YOUR WORK, TAKE CARE OF THINGS AT HOME, OR GET ALONG WITH OTHER PEOPLE: EXTREMELY DIFFICULT
SUM OF ALL RESPONSES TO PHQ QUESTIONS 1-9: 18

## 2022-05-21 ASSESSMENT — ANXIETY QUESTIONNAIRES
1. FEELING NERVOUS, ANXIOUS, OR ON EDGE: NEARLY EVERY DAY
7. FEELING AFRAID AS IF SOMETHING AWFUL MIGHT HAPPEN: NEARLY EVERY DAY
GAD7 TOTAL SCORE: 21
5. BEING SO RESTLESS THAT IT IS HARD TO SIT STILL: NEARLY EVERY DAY
8. IF YOU CHECKED OFF ANY PROBLEMS, HOW DIFFICULT HAVE THESE MADE IT FOR YOU TO DO YOUR WORK, TAKE CARE OF THINGS AT HOME, OR GET ALONG WITH OTHER PEOPLE?: EXTREMELY DIFFICULT
GAD7 TOTAL SCORE: 21
7. FEELING AFRAID AS IF SOMETHING AWFUL MIGHT HAPPEN: NEARLY EVERY DAY
4. TROUBLE RELAXING: NEARLY EVERY DAY
3. WORRYING TOO MUCH ABOUT DIFFERENT THINGS: NEARLY EVERY DAY
GAD7 TOTAL SCORE: 21
6. BECOMING EASILY ANNOYED OR IRRITABLE: NEARLY EVERY DAY
2. NOT BEING ABLE TO STOP OR CONTROL WORRYING: NEARLY EVERY DAY

## 2022-05-23 ENCOUNTER — TELEPHONE (OUTPATIENT)
Dept: FAMILY MEDICINE | Facility: CLINIC | Age: 42
End: 2022-05-23
Payer: COMMERCIAL

## 2022-05-23 NOTE — TELEPHONE ENCOUNTER
Reason for Call:  Other appointment    Detailed comments: La is calling to schedule with Dr. Galan but 1st one not until 6/10/22. Her appointment was on Friday but got cancel by clinic because assistant was unable to reach patient prior to appointment.    Phone Number Patient can be reached at: Home number on file [phone] (home)    Best Time: any    Can we leave a detailed message on this number? YES    Call taken on 5/23/2022 at 7:43 AM by Rylee Andre

## 2022-05-23 NOTE — TELEPHONE ENCOUNTER
"Contacted patient who explained she has a lot of anxiety about upcoming flight on 5/26/22, has not flown in over 25 years.  Previously has not used any medication for flying.  She is seeking something for anxiety for the flights.  Patient mentions \"valium or something\" to help me stay calm during flight.  Advised this will be routed to PCP to review.  Patient is available tomorrow after 1230 for VV if needed.  Care team to update patient once PCP reviews.  "

## 2022-05-23 NOTE — TELEPHONE ENCOUNTER
Please triage patient, and if urgent matter, guide her to the ER. It appears that she might be seeing psychiatry already.

## 2022-05-24 ENCOUNTER — VIRTUAL VISIT (OUTPATIENT)
Dept: PSYCHOLOGY | Facility: CLINIC | Age: 42
End: 2022-05-24
Payer: COMMERCIAL

## 2022-05-24 DIAGNOSIS — F90.2 ATTENTION DEFICIT HYPERACTIVITY DISORDER (ADHD), COMBINED TYPE: ICD-10-CM

## 2022-05-24 DIAGNOSIS — F41.1 GAD (GENERALIZED ANXIETY DISORDER): Primary | ICD-10-CM

## 2022-05-24 DIAGNOSIS — F33.0 MDD (MAJOR DEPRESSIVE DISORDER), RECURRENT EPISODE, MILD (H): ICD-10-CM

## 2022-05-24 PROCEDURE — 90834 PSYTX W PT 45 MINUTES: CPT | Mod: 95 | Performed by: SOCIAL WORKER

## 2022-05-24 NOTE — PROGRESS NOTES
[facility]                                     Progress Note    Patient Name: Mendy Roe  Date: 2022         Service Type: Individual      Session Start Time:   Session End Time:      Session Length: 38-52    Session #: 32    Attendees: Client    Service Modality:  Video Visit:      Provider verified identity through the following two step process.  Patient provided:  Patient  and Patient is known previously to provider    Telemedicine Visit: The patient's condition can be safely assessed and treated via synchronous audio and visual telemedicine encounter.      Reason for Telemedicine Visit: Services only offered telehealth    Originating Site (Patient Location): Patient's home    Distant Site (Provider Location): Provider Remote Setting- Home Office    Consent:  The patient/guardian has verbally consented to: the potential risks and benefits of telemedicine (video visit) versus in person care; bill my insurance or make self-payment for services provided; and responsibility for payment of non-covered services.     Patient would like the video invitation sent by:  Send to e-mail at: [email or web address]    Mode of Communication:  Video Conference via Amwell    As the provider I attest to compliance with applicable laws and regulations related to telemedicine.    DATA  Interactive Complexity: No  Crisis: No        Progress Since Last Session (Related to Symptoms / Goals / Homework):   Symptoms: Improving per patient    Homework: Partially completed      Episode of Care Goals: Minimal progress - PREPARATION (Decided to change - considering how); Intervened by negotiating a change plan and determining options / strategies for behavior change, identifying triggers, exploring social supports, and working towards setting a date to begin behavior change     Current / Ongoing Stressors and Concerns:   Blended family, moving, kids, grandchildren, partner     Treatment Objective(s)  Addressed in This Session:     Patient will demonstrate and report a level of anxiety that can be managed at a lower level of care.  Absence of persistent anxious mood and report of reduced frequency and intensity of worry and absence of persistent anxious mood to acceptable levels, no panic attacks, report subjective comfort with rumination for a period of 90 days, within 6 months as clinically observed and by patient self-report.  Patient will demonstrate and report a level of depression that can be managed at a lower level of care.  Absence of persistent depression mood and report of reduced frequency and intensity of low mood and absence of persistent low energy and motivation to acceptable levels, report subjective improved motivation and increased energy for a period of 90 days, within 6 months as clinically observed and by patient self-report.     Intervention:     Therapist met with patient to review goals and interventions. Therapist utilized reflected listening as patient gave brief reflection of week. Patient reported some anxiety with flying and her daughters situation. Therapist supported patient as she processed, validated patient and offered patient alternative perspective. Therapist suggested patient look at letting her daughter take responsibility for herself and to step away from finically supporting her. Therapist suggested exposure therapy with patient's fear of flying.   Patient presented with an anxious affect. Patient was engaged in session and open to feedback. Patient reported no safety concerns.       There has been demonstrated improvement in functioning while patient has been engaged in psychotherapy/psychological service- if withdrawn the patient would deteriorate and/or relapse.       Assessments completed prior to visit:  The following assessments were completed by patient for this visit:  PHQ9:   PHQ-9 SCORE 3/30/2021 4/19/2021 7/6/2021 7/20/2021 12/21/2021 3/1/2022 5/21/2022   PHQ-9  Total Score [facility] - - - - - - 18 (Moderately severe depression)   PHQ-9 Total Score 21 16 18 14 19 21 17     GAD7:   MEG-7 SCORE 4/19/2021 7/6/2021 7/20/2021 12/21/2021 3/1/2022 5/19/2022 5/21/2022   Total Score - - - - - 20 (severe anxiety) 21 (severe anxiety)   Total Score 17 17 13 17 17 20 21           ASSESSMENT: Current Emotional / Mental Status (status of significant symptoms):   Risk status (Self / Other harm or suicidal ideation)   Patient denies current fears or concerns for personal safety.   Patient denies current or recent suicidal ideation or behaviors.   Patient denies current or recent homicidal ideation or behaviors.   Patient denies current or recent self injurious behavior or ideation.   Patient denies other safety concerns.   Patient reports there has been no change in risk factors since their last session.     Patient reports there has been no change in protective factors since their last session.     Recommended that patient call 911 or go to the local ED should there be a change in any of these risk factors.     Appearance:   Appropriate    Eye Contact:   Fair    Psychomotor Behavior: Agitated  Restless    Attitude:   Cooperative    Orientation:   All   Speech    Rate / Production: Emotional Talkative    Volume:  Normal    Mood:    Anxious    Affect:    Worrisome    Thought Content:  Clear    Thought Form:  Coherent    Insight:    Fair      Medication Review:   No changes to current psychiatric medication(s)     Medication Compliance:   Yes     Changes in Health Issues:   None reported     Chemical Use Review:   Substance Use: Chemical use reviewed, no active concerns identified      Tobacco Use: No current tobacco use.      Diagnosis:  1. MEG (generalized anxiety disorder)    2. MDD (major depressive disorder), recurrent episode, mild (H)    3. Attention deficit hyperactivity disorder (ADHD), combined type        Collateral Reports Completed:   Not Applicable    PLAN: (Patient Tasks /  Therapist Tasks / Other)  Therapist suggested patient look at letting her daughter take responsibility for herself and to step away from finically supporting her. Therapist suggested exposure therapy with patient's fear of flying.     Patito Humphries, [facility]  5/24/2022                                                         ______________________________________________________________________    Individual Treatment Plan    Patient's Name: Mendy Roe  YOB: 1980    Date of Creation: 12/21/2021  Date Treatment Plan Last Reviewed/Revised: 4/26/2022 due 7/26/2022    DSM5 Diagnoses: 296.31 (F33.0) Major Depressive Disorder, Recurrent Episode, Mild With mixed features or 300.02 (F41.1) Generalized Anxiety Disorder  Psychosocial / Contextual Factors: Blended family, moving, kids, grandchildren, partner  PROMIS (reviewed every 90 days):     Referral / Collaboration:  Referral to another professional/service is not indicated at this time..    Anticipated number of session for this episode of care: 12  Anticipation frequency of session: Monthly  Anticipated Duration of each session: 38-52 minutes  Treatment plan will be reviewed in 90 days or when goals have been changed.       MeasurableTreatment Goal(s) related to diagnosis / functional impairment(s)  Goal 1: Patient will report absence of persistent anxiety mood and report of reduced frequency and intensity of worry and absence of persistent anxious mood to acceptable levels, no panic attacks, report subjective comfort with rumination or a period of 90 days. Within 6 months as clinically observed and by patient self-report    I will know I've met my goal when manage my anxiety.      Objective #A (Patient Action)    Patient will demonstrate and report a level of anxiety that can be managed at a lower level of care.  Absence of persistent anxious mood and report of reduced frequency and intensity of worry and absence of persistent anxious mood to  acceptable levels, no panic attacks, report subjective comfort with rumination for a period of 90 days, within 6 months as clinically observed and by patient self-report.  Status: Continued - Date(s):4/26/2022    Intervention(s)  Therapist will provide individual therapy to identify triggers to anxiety, gain feedback on helpful coping tools and thought-reframing techniques, and identify preferred way of being. Tx to include discuss current stressors and interpersonal conflicts and how to cope with these, coaching, diagnostic testing, referral for medication as indicated, use prescribed medication, cognitive restructuring, interpersonal, family therapy, supportive therapy services.        Goal 2: Patient will report absence of persistent depression mood and report of reduced frequency and intensity of low mood and absence of persistent low energy and motivation to acceptable levels, report subjective improved motivation and increased energy for a period of 90 days, within 6 months as clinically observed and by patient self-report    I will know I've met my goal when feeling more balanced.      Objective #A (Patient Action)    Status: Continued - Date(s): 4/26/2022    Patient will demonstrate and report a level of depression that can be managed at a lower level of care.  Absence of persistent depression mood and report of reduced frequency and intensity of low mood and absence of persistent low energy and motivation to acceptable levels, report subjective improved motivation and increased energy for a period of 90 days, within 6 months as clinically observed and by patient self-report.    Intervention(s)  Therapist will provide individual therapy to identify triggers to depression, gain feedback on helpful coping tools and thought-reframing techniques, and identify preferred way of being.  Tx to include discussion of current stressors and interpersonal conflicts and how to cope with these, coaching, diagnostic  testing, referral for medication as indicated, use prescribed medication, cognitive restructuring, interpersonal, family therapy, supportive therapy services.        Patient has reviewed and agreed to the above plan.      Patito Humphries, [facility]  4/26/2022

## 2022-05-25 ENCOUNTER — VIRTUAL VISIT (OUTPATIENT)
Dept: FAMILY MEDICINE | Facility: CLINIC | Age: 42
End: 2022-05-25
Payer: COMMERCIAL

## 2022-05-25 DIAGNOSIS — F40.243 ANXIETY WITH FLYING: Primary | ICD-10-CM

## 2022-05-25 PROCEDURE — 99214 OFFICE O/P EST MOD 30 MIN: CPT | Mod: 95 | Performed by: FAMILY MEDICINE

## 2022-05-25 RX ORDER — LORAZEPAM 1 MG/1
1 TABLET ORAL EVERY 8 HOURS PRN
Qty: 10 TABLET | Refills: 0 | Status: SHIPPED | OUTPATIENT
Start: 2022-05-25

## 2022-05-25 NOTE — PROGRESS NOTES
La is a 41 year old who is being evaluated via a billable video visit.      How would you like to obtain your AVS? MyChart  If the video visit is dropped, the invitation should be resent by: Text to cell phone: [phone]   Will anyone else be joining your video visit? 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   Time spent doing chart review, history and exam, documentation and further activities per the note             No follow-ups on file.    Susan Galan MD  [facility]    Subjective   La is a 41 year old who presents to discuss treatment for anxiety with flying.    She notes that in the past, diazepam has worked well, leaving for a trip tomorrow and having another upcoming trip in a month.  She notes that has been flying for a while and have anxiety and nightmares about it.        Review of Systems         Objective           Vitals:  No vitals were obtained today due to virtual visit.    Physical Exam   GENERAL: Healthy, alert and no distress  NEURO: Cranial nerves grossly intact.  Mentation and speech appropriate for age.  PSYCH: Mentation appears normal, affect normal/bright, judgement and insight intact, normal speech and appearance well-groomed.                Video-Visit Details    Type of service:  Video Visit    Video End Time:12:34 PM    Originating Location (pt. Location): Home    Distant Location (provider location):  [facility]     Platform used for Video Visit: Branch

## 2022-07-07 ENCOUNTER — VIRTUAL VISIT (OUTPATIENT)
Dept: PSYCHOLOGY | Facility: CLINIC | Age: 42
End: 2022-07-07
Payer: COMMERCIAL

## 2022-07-07 DIAGNOSIS — F41.1 GAD (GENERALIZED ANXIETY DISORDER): Primary | ICD-10-CM

## 2022-07-07 DIAGNOSIS — F90.2 ATTENTION DEFICIT HYPERACTIVITY DISORDER (ADHD), COMBINED TYPE: ICD-10-CM

## 2022-07-07 DIAGNOSIS — F33.0 MDD (MAJOR DEPRESSIVE DISORDER), RECURRENT EPISODE, MILD (H): ICD-10-CM

## 2022-07-07 PROCEDURE — 90834 PSYTX W PT 45 MINUTES: CPT | Mod: 95 | Performed by: SOCIAL WORKER

## 2022-07-07 NOTE — PROGRESS NOTES
[facility]                                     Progress Note    Patient Name: Mendy Roe  Date: 2022         Service Type: Individual      Session Start Time: 1303  Session End Time: 1350     Session Length: 38-52    Session #: 33    Attendees: Client    Service Modality:  Video Visit:      Provider verified identity through the following two step process.  Patient provided:  Patient  and Patient is known previously to provider    Telemedicine Visit: The patient's condition can be safely assessed and treated via synchronous audio and visual telemedicine encounter.      Reason for Telemedicine Visit: Services only offered telehealth    Originating Site (Patient Location): Patient's home    Distant Site (Provider Location): Provider Remote Setting- Home Office    Consent:  The patient/guardian has verbally consented to: the potential risks and benefits of telemedicine (video visit) versus in person care; bill my insurance or make self-payment for services provided; and responsibility for payment of non-covered services.     Patient would like the video invitation sent by:  Send to e-mail at: [email or web address]    Mode of Communication:  Video Conference via Amwell    As the provider I attest to compliance with applicable laws and regulations related to telemedicine.    DATA  Interactive Complexity: No  Crisis: No        Progress Since Last Session (Related to Symptoms / Goals / Homework):   Symptoms: Worsening per patient    Homework: Partially completed      Episode of Care Goals: Minimal progress - PREPARATION (Decided to change - considering how); Intervened by negotiating a change plan and determining options / strategies for behavior change, identifying triggers, exploring social supports, and working towards setting a date to begin behavior change     Current / Ongoing Stressors and Concerns:   Blended family, moving, kids, grandchildren, partner     Treatment Objective(s)  Addressed in This Session:     Patient will demonstrate and report a level of anxiety that can be managed at a lower level of care.  Absence of persistent anxious mood and report of reduced frequency and intensity of worry and absence of persistent anxious mood to acceptable levels, no panic attacks, report subjective comfort with rumination for a period of 90 days, within 6 months as clinically observed and by patient self-report.  Patient will demonstrate and report a level of depression that can be managed at a lower level of care.  Absence of persistent depression mood and report of reduced frequency and intensity of low mood and absence of persistent low energy and motivation to acceptable levels, report subjective improved motivation and increased energy for a period of 90 days, within 6 months as clinically observed and by patient self-report.     Intervention:     Therapist met with patient to review goals and interventions. Therapist utilized reflected listening as patient gave brief reflection of week. Patient reported increase in anxiety and depression and processed her relationship. Therapist supported patient as she processed and validated patient. Therapist educated patient on trauma, triggers, trust and repair. Therapist encouraged patient to speak to her partner on their intentions moving forward, expectations and to communicate her emotions.   Patient presented with an anxious affect. Patient was engaged in session and open to feedback. Patient reported no safety concerns.       There has been demonstrated improvement in functioning while patient has been engaged in psychotherapy/psychological service- if withdrawn the patient would deteriorate and/or relapse.       Assessments completed prior to visit:  The following assessments were completed by patient for this visit:  PHQ9:   PHQ-9 SCORE 3/30/2021 4/19/2021 7/6/2021 7/20/2021 12/21/2021 3/1/2022 5/21/2022   PHQ-9 Total Score [facility] - - - - - -  18 (Moderately severe depression)   PHQ-9 Total Score 21 16 18 14 19 21 17     GAD7:   MEG-7 SCORE 4/19/2021 7/6/2021 7/20/2021 12/21/2021 3/1/2022 5/19/2022 5/21/2022   Total Score - - - - - 20 (severe anxiety) 21 (severe anxiety)   Total Score 17 17 13 17 17 20 21           ASSESSMENT: Current Emotional / Mental Status (status of significant symptoms):   Risk status (Self / Other harm or suicidal ideation)   Patient denies current fears or concerns for personal safety.   Patient denies current or recent suicidal ideation or behaviors.   Patient denies current or recent homicidal ideation or behaviors.   Patient denies current or recent self injurious behavior or ideation.   Patient denies other safety concerns.   Patient reports there has been no change in risk factors since their last session.     Patient reports there has been no change in protective factors since their last session.     Recommended that patient call 911 or go to the local ED should there be a change in any of these risk factors.     Appearance:   Appropriate    Eye Contact:   Fair    Psychomotor Behavior: Agitated  Restless    Attitude:   Cooperative    Orientation:   All   Speech    Rate / Production: Emotional Talkative    Volume:  Normal    Mood:    Anxious  Depressed  Sad    Affect:    Tearful Worrisome    Thought Content:  Clear    Thought Form:  Coherent    Insight:    Fair      Medication Review:   Changes to psychiatric medications, see updated Medication List in EPIC.      Medication Compliance:   Yes     Changes in Health Issues:   None reported     Chemical Use Review:   Substance Use: Chemical use reviewed, no active concerns identified      Tobacco Use: No current tobacco use.      Diagnosis:  1. MEG (generalized anxiety disorder)    2. MDD (major depressive disorder), recurrent episode, mild (H)    3. Attention deficit hyperactivity disorder (ADHD), combined type        Collateral Reports Completed:   Not Applicable    PLAN:  (Patient Tasks / Therapist Tasks / Other)  Therapist educated patient on trauma, triggers, trust and repair. Therapist encouraged patient to speak to her partner on their intentions moving forward, expectations and to communicate her emotions.     Patito SARAAndres Humphries, [facility]  7/7/2022                                                         ______________________________________________________________________    Individual Treatment Plan    Patient's Name: Mendy Roe  YOB: 1980    Date of Creation: 12/21/2021  Date Treatment Plan Last Reviewed/Revised: 4/26/2022 due 7/26/2022    DSM5 Diagnoses: 296.31 (F33.0) Major Depressive Disorder, Recurrent Episode, Mild With mixed features or 300.02 (F41.1) Generalized Anxiety Disorder  Psychosocial / Contextual Factors: Blended family, moving, kids, grandchildren, partner  PROMIS (reviewed every 90 days):     Referral / Collaboration:  Referral to another professional/service is not indicated at this time..    Anticipated number of session for this episode of care: 12  Anticipation frequency of session: Monthly  Anticipated Duration of each session: 38-52 minutes  Treatment plan will be reviewed in 90 days or when goals have been changed.       MeasurableTreatment Goal(s) related to diagnosis / functional impairment(s)  Goal 1: Patient will report absence of persistent anxiety mood and report of reduced frequency and intensity of worry and absence of persistent anxious mood to acceptable levels, no panic attacks, report subjective comfort with rumination or a period of 90 days. Within 6 months as clinically observed and by patient self-report    I will know I've met my goal when manage my anxiety.      Objective #A (Patient Action)    Patient will demonstrate and report a level of anxiety that can be managed at a lower level of care.  Absence of persistent anxious mood and report of reduced frequency and intensity of worry and absence of persistent anxious  mood to acceptable levels, no panic attacks, report subjective comfort with rumination for a period of 90 days, within 6 months as clinically observed and by patient self-report.  Status: Continued - Date(s):4/26/2022    Intervention(s)  Therapist will provide individual therapy to identify triggers to anxiety, gain feedback on helpful coping tools and thought-reframing techniques, and identify preferred way of being. Tx to include discuss current stressors and interpersonal conflicts and how to cope with these, coaching, diagnostic testing, referral for medication as indicated, use prescribed medication, cognitive restructuring, interpersonal, family therapy, supportive therapy services.        Goal 2: Patient will report absence of persistent depression mood and report of reduced frequency and intensity of low mood and absence of persistent low energy and motivation to acceptable levels, report subjective improved motivation and increased energy for a period of 90 days, within 6 months as clinically observed and by patient self-report    I will know I've met my goal when feeling more balanced.      Objective #A (Patient Action)    Status: Continued - Date(s): 4/26/2022    Patient will demonstrate and report a level of depression that can be managed at a lower level of care.  Absence of persistent depression mood and report of reduced frequency and intensity of low mood and absence of persistent low energy and motivation to acceptable levels, report subjective improved motivation and increased energy for a period of 90 days, within 6 months as clinically observed and by patient self-report.    Intervention(s)  Therapist will provide individual therapy to identify triggers to depression, gain feedback on helpful coping tools and thought-reframing techniques, and identify preferred way of being.  Tx to include discussion of current stressors and interpersonal conflicts and how to cope with these, coaching, diagnostic  testing, referral for medication as indicated, use prescribed medication, cognitive restructuring, interpersonal, family therapy, supportive therapy services.        Patient has reviewed and agreed to the above plan.      Patito Humphries, [facility]  4/26/2022

## 2022-07-28 ENCOUNTER — VIRTUAL VISIT (OUTPATIENT)
Dept: PSYCHOLOGY | Facility: CLINIC | Age: 42
End: 2022-07-28
Payer: COMMERCIAL

## 2022-07-28 DIAGNOSIS — F41.1 GAD (GENERALIZED ANXIETY DISORDER): Primary | ICD-10-CM

## 2022-07-28 DIAGNOSIS — F90.2 ATTENTION DEFICIT HYPERACTIVITY DISORDER (ADHD), COMBINED TYPE: ICD-10-CM

## 2022-07-28 DIAGNOSIS — F33.0 MDD (MAJOR DEPRESSIVE DISORDER), RECURRENT EPISODE, MILD (H): ICD-10-CM

## 2022-07-28 PROCEDURE — 90834 PSYTX W PT 45 MINUTES: CPT | Mod: 95 | Performed by: SOCIAL WORKER

## 2022-07-28 ASSESSMENT — ANXIETY QUESTIONNAIRES
6. BECOMING EASILY ANNOYED OR IRRITABLE: MORE THAN HALF THE DAYS
1. FEELING NERVOUS, ANXIOUS, OR ON EDGE: MORE THAN HALF THE DAYS
GAD7 TOTAL SCORE: 13
7. FEELING AFRAID AS IF SOMETHING AWFUL MIGHT HAPPEN: SEVERAL DAYS
4. TROUBLE RELAXING: MORE THAN HALF THE DAYS
2. NOT BEING ABLE TO STOP OR CONTROL WORRYING: MORE THAN HALF THE DAYS
3. WORRYING TOO MUCH ABOUT DIFFERENT THINGS: MORE THAN HALF THE DAYS
5. BEING SO RESTLESS THAT IT IS HARD TO SIT STILL: MORE THAN HALF THE DAYS
GAD7 TOTAL SCORE: 13

## 2022-07-28 ASSESSMENT — PATIENT HEALTH QUESTIONNAIRE - PHQ9: SUM OF ALL RESPONSES TO PHQ QUESTIONS 1-9: 16

## 2022-07-28 NOTE — PROGRESS NOTES
[facility]                                     Progress Note    Patient Name: Mendy Roe  Date: 2022         Service Type: Individual      Session Start Time: 1306  Session End Time: 1350     Session Length: 38-52    Session #: 34    Attendees: Client    Service Modality:  Video Visit:      Provider verified identity through the following two step process.  Patient provided:  Patient  and Patient is known previously to provider    Telemedicine Visit: The patient's condition can be safely assessed and treated via synchronous audio and visual telemedicine encounter.      Reason for Telemedicine Visit: Services only offered telehealth    Originating Site (Patient Location): Patient's home    Distant Site (Provider Location): Provider Remote Setting- Home Office    Consent:  The patient/guardian has verbally consented to: the potential risks and benefits of telemedicine (video visit) versus in person care; bill my insurance or make self-payment for services provided; and responsibility for payment of non-covered services.     Patient would like the video invitation sent by:  Send to e-mail at: [email or web address]    Mode of Communication:  Video Conference via Amwell    As the provider I attest to compliance with applicable laws and regulations related to telemedicine.    DATA  Interactive Complexity: No  Crisis: No        Progress Since Last Session (Related to Symptoms / Goals / Homework):   Symptoms: Improving phq-9 prince-7    Homework: Partially completed      Episode of Care Goals: Minimal progress - PREPARATION (Decided to change - considering how); Intervened by negotiating a change plan and determining options / strategies for behavior change, identifying triggers, exploring social supports, and working towards setting a date to begin behavior change     Current / Ongoing Stressors and Concerns:   Blended family, moving, kids, grandchildren, partner     Treatment Objective(s)  Addressed in This Session:     Patient will demonstrate and report a level of anxiety that can be managed at a lower level of care.  Absence of persistent anxious mood and report of reduced frequency and intensity of worry and absence of persistent anxious mood to acceptable levels, no panic attacks, report subjective comfort with rumination for a period of 90 days, within 6 months as clinically observed and by patient self-report.  Patient will demonstrate and report a level of depression that can be managed at a lower level of care.  Absence of persistent depression mood and report of reduced frequency and intensity of low mood and absence of persistent low energy and motivation to acceptable levels, report subjective improved motivation and increased energy for a period of 90 days, within 6 months as clinically observed and by patient self-report.     Intervention:     Therapist met with patient to review goals and interventions. Therapist utilized reflected listening as patient gave brief reflection of week. Patient reported having two new grandchildren, emotions and feeling still not comfortable/supported in there living environment. Therapist supported patient as she processed and validated patient. Therapist utilized strength based and solution focused modality with patient and encouraged patient to advocated for her needs.   Patient presented with an anxious affect. Patient was engaged in session and open to feedback. Patient reported no safety concerns.       There has been demonstrated improvement in functioning while patient has been engaged in psychotherapy/psychological service- if withdrawn the patient would deteriorate and/or relapse.       Assessments completed prior to visit:  The following assessments were completed by patient for this visit:  PHQ9:   PHQ-9 SCORE 4/19/2021 7/6/2021 7/20/2021 12/21/2021 3/1/2022 5/21/2022 7/28/2022   PHQ-9 Total Score MyChart - - - - - 18 (Moderately severe  depression) -   PHQ-9 Total Score 16 18 14 19 21 17 16     GAD7:   MEG-7 SCORE 7/6/2021 7/20/2021 12/21/2021 3/1/2022 5/19/2022 5/21/2022 7/28/2022   Total Score - - - - 20 (severe anxiety) 21 (severe anxiety) -   Total Score 17 13 17 17 20 21 13   PROMIS-10    In general, would you say your health is:: 3    In general, would you say your quality of life is:: 3    In general, how would you rate your physical health?: 3    In general, how would you rate your mental health, including your mood and your ability to think?: 2    In general, how would you rate your satisfaction with your social activities and relationships?: 3    In general, please rate how well you carry out your usual social activities and roles. (This includes activities at home, at work and in your community, and responsibilities as a parent, child, spouse, employee, friend, etc.): 3    To what extent are you able to carry out your everyday physical activities such as walking, climbing stairs, carrying groceries, or moving a chair?: 3    In the past 7 days, how often have you been bothered by emotional problems such as feeling anxious, depressed, or irritable?: 5  In the past 7 days, how would you rate your fatigue on average?: 5  In the past 7 days, how would you rate your pain on average, where 0 means no pain, and 10 means worst imaginable pain?: 7    PROMIS GLOBAL SCORES    Mental health question re-calculation - no clinical value - Mental health question re-calculation - no clinical value: 1  Physical health question re-calculation - no clinical value - Physical health question re-calculation - no clinical value: 1  Pain question re-calculation - no clinical value - Pain question re-calculation - no clinical value: 2  Global Mental Health Score - Global Mental Health Score: 9  Global Physical Health Score - Global Physical Health Score: 9  PROMIS TOTAL - SUBSCORES - PROMIS TOTAL - SUBSCORES: 18      [id number] PROMIS HEALTH ORGANIZATION AND PROMIS  COOPERATIVE GROUP VERSION 1.1          ASSESSMENT: Current Emotional / Mental Status (status of significant symptoms):   Risk status (Self / Other harm or suicidal ideation)   Patient denies current fears or concerns for personal safety.   Patient denies current or recent suicidal ideation or behaviors.   Patient denies current or recent homicidal ideation or behaviors.   Patient denies current or recent self injurious behavior or ideation.   Patient denies other safety concerns.   Patient reports there has been no change in risk factors since their last session.     Patient reports there has been no change in protective factors since their last session.     Recommended that patient call 911 or go to the local ED should there be a change in any of these risk factors.     Appearance:   Appropriate    Eye Contact:   Fair    Psychomotor Behavior: Restless    Attitude:   Cooperative    Orientation:   All   Speech    Rate / Production: Emotional Talkative    Volume:  Normal    Mood:    Anxious    Affect:    Worrisome    Thought Content:  Clear    Thought Form:  Coherent    Insight:    Fair      Medication Review:   Changes to psychiatric medications, see updated Medication List in EPIC.      Medication Compliance:   Yes     Changes in Health Issues:   None reported     Chemical Use Review:   Substance Use: Chemical use reviewed, no active concerns identified      Tobacco Use: No current tobacco use.      Diagnosis:  1. MEG (generalized anxiety disorder)    2. MDD (major depressive disorder), recurrent episode, mild (H)    3. Attention deficit hyperactivity disorder (ADHD), combined type        Collateral Reports Completed:   Not Applicable    PLAN: (Patient Tasks / Therapist Tasks / Other)  Therapist educated patient on trauma, triggers, trust and repair. Therapist encouraged patient to speak to her partner on their intentions moving forward, expectations and to communicate her emotions.   Therapist utilized strength  based and solution focused modality with patient and encouraged patient to advocated for her needs.     Patito Humphries, [facility]  7/28/2022                                                         ______________________________________________________________________    Individual Treatment Plan    Patient's Name: Mendy Roe  YOB: 1980    Date of Creation: 12/21/2021  Date Treatment Plan Last Reviewed/Revised: 7/28/2022 due 10/28/2022    DSM5 Diagnoses: 296.31 (F33.0) Major Depressive Disorder, Recurrent Episode, Mild With mixed features or 300.02 (F41.1) Generalized Anxiety Disorder  Psychosocial / Contextual Factors: Blended family, moving, kids, grandchildren, partner  PROMIS (reviewed every 90 days): 7/28/2022    Referral / Collaboration:  Referral to another professional/service is not indicated at this time..    Anticipated number of session for this episode of care: 12  Anticipation frequency of session: Monthly  Anticipated Duration of each session: 38-52 minutes  Treatment plan will be reviewed in 90 days or when goals have been changed.       MeasurableTreatment Goal(s) related to diagnosis / functional impairment(s)  Goal 1: Patient will report absence of persistent anxiety mood and report of reduced frequency and intensity of worry and absence of persistent anxious mood to acceptable levels, no panic attacks, report subjective comfort with rumination or a period of 90 days. Within 6 months as clinically observed and by patient self-report    I will know I've met my goal when manage my anxiety.      Objective #A (Patient Action)    Patient will demonstrate and report a level of anxiety that can be managed at a lower level of care.  Absence of persistent anxious mood and report of reduced frequency and intensity of worry and absence of persistent anxious mood to acceptable levels, no panic attacks, report subjective comfort with rumination for a period of 90 days, within 6 months as  clinically observed and by patient self-report.  Status: Continued - Date(s):7/28/2022    Intervention(s)  Therapist will provide individual therapy to identify triggers to anxiety, gain feedback on helpful coping tools and thought-reframing techniques, and identify preferred way of being. Tx to include discuss current stressors and interpersonal conflicts and how to cope with these, coaching, diagnostic testing, referral for medication as indicated, use prescribed medication, cognitive restructuring, interpersonal, family therapy, supportive therapy services.        Goal 2: Patient will report absence of persistent depression mood and report of reduced frequency and intensity of low mood and absence of persistent low energy and motivation to acceptable levels, report subjective improved motivation and increased energy for a period of 90 days, within 6 months as clinically observed and by patient self-report    I will know I've met my goal when feeling more balanced.      Objective #A (Patient Action)    Status: Continued - Date(s):7/28/2022    Patient will demonstrate and report a level of depression that can be managed at a lower level of care.  Absence of persistent depression mood and report of reduced frequency and intensity of low mood and absence of persistent low energy and motivation to acceptable levels, report subjective improved motivation and increased energy for a period of 90 days, within 6 months as clinically observed and by patient self-report.    Intervention(s)  Therapist will provide individual therapy to identify triggers to depression, gain feedback on helpful coping tools and thought-reframing techniques, and identify preferred way of being.  Tx to include discussion of current stressors and interpersonal conflicts and how to cope with these, coaching, diagnostic testing, referral for medication as indicated, use prescribed medication, cognitive restructuring, interpersonal, family therapy,  supportive therapy services.        Patient has reviewed and agreed to the above plan.      Patito Humphries, [facility]  7/28/2022

## 2022-09-13 ENCOUNTER — VIRTUAL VISIT (OUTPATIENT)
Dept: PSYCHOLOGY | Facility: CLINIC | Age: 42
End: 2022-09-13
Payer: COMMERCIAL

## 2022-09-13 DIAGNOSIS — F33.0 MDD (MAJOR DEPRESSIVE DISORDER), RECURRENT EPISODE, MILD (H): ICD-10-CM

## 2022-09-13 DIAGNOSIS — F41.1 GAD (GENERALIZED ANXIETY DISORDER): Primary | ICD-10-CM

## 2022-09-13 DIAGNOSIS — F90.2 ATTENTION DEFICIT HYPERACTIVITY DISORDER (ADHD), COMBINED TYPE: ICD-10-CM

## 2022-09-13 PROCEDURE — 90834 PSYTX W PT 45 MINUTES: CPT | Mod: TEL | Performed by: SOCIAL WORKER

## 2022-09-13 NOTE — PROGRESS NOTES
"      [facility]                                     Progress Note    Patient Name: Mendy Roe  Date: 2022         Service Type: Individual      Session Start Time:   Session End Time:      Session Length: 38-52    Session #: 35    Attendees: Client    Service Modality: Phone Visit:      Provider verified identity through the following two step process.  Patient provided:  Patient  and Patient is known previously to provider    The patient has been notified of the following:      \"We have found that certain health care needs can be provided without the need for a face to face visit.  This service lets us provide the care you need with a phone conversation.       I will have full access to your [facility] medical record during this entire phone call.   I will be taking notes for your medical record.      Since this is like an office visit, we will bill your insurance company for this service.       There are potential benefits and risks of telephone visits (e.g. limits to patient confidentiality) that differ from in-person visits.?Confidentiality still applies for telephone services, and nobody will record the visit.  It is important to be in a quiet, private space that is free of distractions (including cell phone or other devices) during the visit.??      If during the course of the call I believe a telephone visit is not appropriate, you will not be charged for this service\"     Consent has been obtained for this service by care team member: Yes      DATA  Interactive Complexity: No  Crisis: No        Progress Since Last Session (Related to Symptoms / Goals / Homework):   Symptoms: Worsening anxiety     Homework: Partially completed      Episode of Care Goals: Minimal progress - PREPARATION (Decided to change - considering how); Intervened by negotiating a change plan and determining options / strategies for behavior change, identifying triggers, exploring social " supports, and working towards setting a date to begin behavior change     Current / Ongoing Stressors and Concerns:   Blended family, moving, kids, grandchildren, partner     Treatment Objective(s) Addressed in This Session:     Patient will demonstrate and report a level of anxiety that can be managed at a lower level of care.  Absence of persistent anxious mood and report of reduced frequency and intensity of worry and absence of persistent anxious mood to acceptable levels, no panic attacks, report subjective comfort with rumination for a period of 90 days, within 6 months as clinically observed and by patient self-report.  Patient will demonstrate and report a level of depression that can be managed at a lower level of care.  Absence of persistent depression mood and report of reduced frequency and intensity of low mood and absence of persistent low energy and motivation to acceptable levels, report subjective improved motivation and increased energy for a period of 90 days, within 6 months as clinically observed and by patient self-report.     Intervention:     Therapist met with patient to review goals and interventions. Therapist utilized reflected listening as patient gave brief reflection of week. Patient reported increase in anxiety and processed her last month. Therapist supported patient as she processed and validated patient. Therapist suggested with things going well patient might be having anxiety over waiting for something to happen due to the past year. Therapist encouraged patient to advocate for herself and name, doc and manage her anxiety in the moment.    Patient presented with an anxious affect. Patient was engaged in session and open to feedback. Patient reported no safety concerns.       There has been demonstrated improvement in functioning while patient has been engaged in psychotherapy/psychological service- if withdrawn the patient would deteriorate and/or relapse.       Assessments  completed prior to visit:  The following assessments were completed by patient for this visit:  PHQ9:   PHQ-9 SCORE 4/19/2021 7/6/2021 7/20/2021 12/21/2021 3/1/2022 5/21/2022 7/28/2022   PHQ-9 Total Score Dandret - - - - - 18 (Moderately severe depression) -   PHQ-9 Total Score 16 18 14 19 21 17 16     GAD7:   MEG-7 SCORE 7/6/2021 7/20/2021 12/21/2021 3/1/2022 5/19/2022 5/21/2022 7/28/2022   Total Score - - - - 20 (severe anxiety) 21 (severe anxiety) -   Total Score 17 13 17 17 20 21 13   PROMIS-10    In general, would you say your health is:: 3    In general, would you say your quality of life is:: 3    In general, how would you rate your physical health?: 3    In general, how would you rate your mental health, including your mood and your ability to think?: 2    In general, how would you rate your satisfaction with your social activities and relationships?: 3    In general, please rate how well you carry out your usual social activities and roles. (This includes activities at home, at work and in your community, and responsibilities as a parent, child, spouse, employee, friend, etc.): 3    To what extent are you able to carry out your everyday physical activities such as walking, climbing stairs, carrying groceries, or moving a chair?: 3    In the past 7 days, how often have you been bothered by emotional problems such as feeling anxious, depressed, or irritable?: 5  In the past 7 days, how would you rate your fatigue on average?: 5  In the past 7 days, how would you rate your pain on average, where 0 means no pain, and 10 means worst imaginable pain?: 7    PROMIS GLOBAL SCORES    Mental health question re-calculation - no clinical value - Mental health question re-calculation - no clinical value: 1  Physical health question re-calculation - no clinical value - Physical health question re-calculation - no clinical value: 1  Pain question re-calculation - no clinical value - Pain question re-calculation - no  clinical value: 2  Global Mental Health Score - Global Mental Health Score: 9  Global Physical Health Score - Global Physical Health Score: 9  PROMIS TOTAL - SUBSCORES - PROMIS TOTAL - SUBSCORES: 18      9600-0619 PROMIS HEALTH ORGANIZATION AND PROMIS COOPERATIVE GROUP VERSION 1.1          ASSESSMENT: Current Emotional / Mental Status (status of significant symptoms):   Risk status (Self / Other harm or suicidal ideation)   Patient denies current fears or concerns for personal safety.   Patient denies current or recent suicidal ideation or behaviors.   Patient denies current or recent homicidal ideation or behaviors.   Patient denies current or recent self injurious behavior or ideation.   Patient denies other safety concerns.   Patient reports there has been no change in risk factors since their last session.     Patient reports there has been no change in protective factors since their last session.     Recommended that patient call 911 or go to the local ED should there be a change in any of these risk factors.     Appearance:   Appropriate    Eye Contact:   Fair    Psychomotor Behavior: Restless    Attitude:   Cooperative    Orientation:   All   Speech    Rate / Production: Emotional Talkative    Volume:  Normal    Mood:    Anxious    Affect:    Worrisome    Thought Content:  Clear    Thought Form:  Coherent    Insight:    Fair      Medication Review:   Changes to psychiatric medications, see updated Medication List in EPIC.      Medication Compliance:   Yes     Changes in Health Issues:   None reported     Chemical Use Review:   Substance Use: Chemical use reviewed, no active concerns identified      Tobacco Use: No current tobacco use.      Diagnosis:  1. MEG (generalized anxiety disorder)    2. MDD (major depressive disorder), recurrent episode, mild (H)    3. Attention deficit hyperactivity disorder (ADHD), combined type        Collateral Reports Completed:   Not Applicable    PLAN: (Patient Tasks /  Therapist Tasks / Other)  Therapist encouraged patient to advocate for herself and name, doc and manage her anxiety in the moment.        Patito Humphries, [facility]  9/13/2022                                                         ______________________________________________________________________    Individual Treatment Plan    Patient's Name: Mendy Roe  YOB: 1980    Date of Creation: 12/21/2021  Date Treatment Plan Last Reviewed/Revised: 7/28/2022 due 10/28/2022    DSM5 Diagnoses: 296.31 (F33.0) Major Depressive Disorder, Recurrent Episode, Mild With mixed features or 300.02 (F41.1) Generalized Anxiety Disorder  Psychosocial / Contextual Factors: Blended family, moving, kids, grandchildren, partner  PROMIS (reviewed every 90 days): 7/28/2022    Referral / Collaboration:  Referral to another professional/service is not indicated at this time..    Anticipated number of session for this episode of care: 12  Anticipation frequency of session: Monthly  Anticipated Duration of each session: 38-52 minutes  Treatment plan will be reviewed in 90 days or when goals have been changed.       MeasurableTreatment Goal(s) related to diagnosis / functional impairment(s)  Goal 1: Patient will report absence of persistent anxiety mood and report of reduced frequency and intensity of worry and absence of persistent anxious mood to acceptable levels, no panic attacks, report subjective comfort with rumination or a period of 90 days. Within 6 months as clinically observed and by patient self-report    I will know I've met my goal when manage my anxiety.      Objective #A (Patient Action)    Patient will demonstrate and report a level of anxiety that can be managed at a lower level of care.  Absence of persistent anxious mood and report of reduced frequency and intensity of worry and absence of persistent anxious mood to acceptable levels, no panic attacks, report subjective comfort with rumination for a period  of 90 days, within 6 months as clinically observed and by patient self-report.  Status: Continued - Date(s):7/28/2022    Intervention(s)  Therapist will provide individual therapy to identify triggers to anxiety, gain feedback on helpful coping tools and thought-reframing techniques, and identify preferred way of being. Tx to include discuss current stressors and interpersonal conflicts and how to cope with these, coaching, diagnostic testing, referral for medication as indicated, use prescribed medication, cognitive restructuring, interpersonal, family therapy, supportive therapy services.        Goal 2: Patient will report absence of persistent depression mood and report of reduced frequency and intensity of low mood and absence of persistent low energy and motivation to acceptable levels, report subjective improved motivation and increased energy for a period of 90 days, within 6 months as clinically observed and by patient self-report    I will know I've met my goal when feeling more balanced.      Objective #A (Patient Action)    Status: Continued - Date(s):7/28/2022    Patient will demonstrate and report a level of depression that can be managed at a lower level of care.  Absence of persistent depression mood and report of reduced frequency and intensity of low mood and absence of persistent low energy and motivation to acceptable levels, report subjective improved motivation and increased energy for a period of 90 days, within 6 months as clinically observed and by patient self-report.    Intervention(s)  Therapist will provide individual therapy to identify triggers to depression, gain feedback on helpful coping tools and thought-reframing techniques, and identify preferred way of being.  Tx to include discussion of current stressors and interpersonal conflicts and how to cope with these, coaching, diagnostic testing, referral for medication as indicated, use prescribed medication, cognitive restructuring,  interpersonal, family therapy, supportive therapy services.        Patient has reviewed and agreed to the above plan.      Patito Humphries, [facility]  7/28/2022

## 2022-09-18 ENCOUNTER — HEALTH MAINTENANCE LETTER (OUTPATIENT)
Age: 42
End: 2022-09-18

## 2022-09-25 ENCOUNTER — TRANSFERRED RECORDS (OUTPATIENT)
Dept: HEALTH INFORMATION MANAGEMENT | Facility: CLINIC | Age: 42
End: 2022-09-25

## 2022-09-27 ENCOUNTER — VIRTUAL VISIT (OUTPATIENT)
Dept: PSYCHOLOGY | Facility: CLINIC | Age: 42
End: 2022-09-27
Payer: COMMERCIAL

## 2022-09-27 ENCOUNTER — TRANSFERRED RECORDS (OUTPATIENT)
Dept: HEALTH INFORMATION MANAGEMENT | Facility: CLINIC | Age: 42
End: 2022-09-27

## 2022-09-27 DIAGNOSIS — F90.2 ATTENTION DEFICIT HYPERACTIVITY DISORDER (ADHD), COMBINED TYPE: ICD-10-CM

## 2022-09-27 DIAGNOSIS — F41.1 GAD (GENERALIZED ANXIETY DISORDER): Primary | ICD-10-CM

## 2022-09-27 DIAGNOSIS — F33.0 MDD (MAJOR DEPRESSIVE DISORDER), RECURRENT EPISODE, MILD (H): ICD-10-CM

## 2022-09-27 PROCEDURE — 90834 PSYTX W PT 45 MINUTES: CPT | Mod: 95 | Performed by: SOCIAL WORKER

## 2022-09-27 NOTE — PROGRESS NOTES
[facility]                                     Progress Note    Patient Name: Mendy Roe  Date: 2022         Service Type: Individual      Session Start Time:   Session End Time:      Session Length: 38-52    Session #: 36    Attendees: Client    Service Modality: Video session  Yes, the patient's condition can be safely assessed and treated via synchronous audio and visual telemedicine encounter.      Reason for Video Visit: Services only offered telehealth    Location of Originating Site: Patient's home    Distant Site: Provider Remote Setting home office    Provider verified identity through the following two step process.  Patient provided:  Patient  and Patient address    Consent:  The patient/guardian has verbally consented to: the potential risks and benefits of telemedicine (video visit) versus in person care; bill my insurance or make self-payment for services provided; and responsibility for payment of non-covered services.      Mode of transmission-Document Agility  Interactive Complexity: No  Crisis: No        Progress Since Last Session (Related to Symptoms / Goals / Homework):   Symptoms: Improving per patient    Homework: Partially completed      Episode of Care Goals: Minimal progress - PREPARATION (Decided to change - considering how); Intervened by negotiating a change plan and determining options / strategies for behavior change, identifying triggers, exploring social supports, and working towards setting a date to begin behavior change     Current / Ongoing Stressors and Concerns:   Blended family, moving, kids, grandchildren, partner     Treatment Objective(s) Addressed in This Session:     Patient will demonstrate and report a level of anxiety that can be managed at a lower level of care.  Absence of persistent anxious mood and report of reduced frequency and intensity of worry and absence of persistent anxious mood to acceptable levels, no panic  attacks, report subjective comfort with rumination for a period of 90 days, within 6 months as clinically observed and by patient self-report.  Patient will demonstrate and report a level of depression that can be managed at a lower level of care.  Absence of persistent depression mood and report of reduced frequency and intensity of low mood and absence of persistent low energy and motivation to acceptable levels, report subjective improved motivation and increased energy for a period of 90 days, within 6 months as clinically observed and by patient self-report.     Intervention:     Therapist met with patient to review goals and interventions. Therapist utilized reflected listening as patient gave brief reflection of week. Patient reported a decrease in anxiety but getting the flu and feeling down. Therapist supported patient as she processed and validated patient. Patient processed emotions over external person and therapist suggested having a direct discussion with her partner about this. Therapist utilized solution focused modality and encouraged patient to advocate for herself.    Patient presented with an anxious affect. Patient was engaged in session and open to feedback. Patient reported no safety concerns.       There has been demonstrated improvement in functioning while patient has been engaged in psychotherapy/psychological service- if withdrawn the patient would deteriorate and/or relapse.       Assessments completed prior to visit:  The following assessments were completed by patient for this visit:  PHQ9:   PHQ-9 SCORE 4/19/2021 7/6/2021 7/20/2021 12/21/2021 3/1/2022 5/21/2022 7/28/2022   PHQ-9 Total Score MyChart - - - - - 18 (Moderately severe depression) -   PHQ-9 Total Score 16 18 14 19 21 17 16     GAD7:   MEG-7 SCORE 7/6/2021 7/20/2021 12/21/2021 3/1/2022 5/19/2022 5/21/2022 7/28/2022   Total Score - - - - 20 (severe anxiety) 21 (severe anxiety) -   Total Score 17 13 17 17 20 21 13    PROMIS-10    In general, would you say your health is:: 3    In general, would you say your quality of life is:: 3    In general, how would you rate your physical health?: 3    In general, how would you rate your mental health, including your mood and your ability to think?: 2    In general, how would you rate your satisfaction with your social activities and relationships?: 3    In general, please rate how well you carry out your usual social activities and roles. (This includes activities at home, at work and in your community, and responsibilities as a parent, child, spouse, employee, friend, etc.): 3    To what extent are you able to carry out your everyday physical activities such as walking, climbing stairs, carrying groceries, or moving a chair?: 3    In the past 7 days, how often have you been bothered by emotional problems such as feeling anxious, depressed, or irritable?: 5  In the past 7 days, how would you rate your fatigue on average?: 5  In the past 7 days, how would you rate your pain on average, where 0 means no pain, and 10 means worst imaginable pain?: 7    PROMIS GLOBAL SCORES    Mental health question re-calculation - no clinical value - Mental health question re-calculation - no clinical value: 1  Physical health question re-calculation - no clinical value - Physical health question re-calculation - no clinical value: 1  Pain question re-calculation - no clinical value - Pain question re-calculation - no clinical value: 2  Global Mental Health Score - Global Mental Health Score: 9  Global Physical Health Score - Global Physical Health Score: 9  PROMIS TOTAL - SUBSCORES - PROMIS TOTAL - SUBSCORES: 18      [id number] PROMIS HEALTH ORGANIZATION AND PROMIS COOPERATIVE GROUP VERSION 1.1          ASSESSMENT: Current Emotional / Mental Status (status of significant symptoms):   Risk status (Self / Other harm or suicidal ideation)   Patient denies current fears or concerns for personal  safety.   Patient denies current or recent suicidal ideation or behaviors.   Patient denies current or recent homicidal ideation or behaviors.   Patient denies current or recent self injurious behavior or ideation.   Patient denies other safety concerns.   Patient reports there has been no change in risk factors since their last session.     Patient reports there has been no change in protective factors since their last session.     Recommended that patient call 911 or go to the local ED should there be a change in any of these risk factors.     Appearance:   Appropriate    Eye Contact:   Fair    Psychomotor Behavior: Restless    Attitude:   Cooperative    Orientation:   All   Speech    Rate / Production: Emotional Talkative    Volume:  Normal    Mood:    Anxious    Affect:    Worrisome    Thought Content:  Clear    Thought Form:  Coherent    Insight:    Fair      Medication Review:   No changes to current psychiatric medication(s)     Medication Compliance:   Yes     Changes in Health Issues:   None reported     Chemical Use Review:   Substance Use: Chemical use reviewed, no active concerns identified      Tobacco Use: No current tobacco use.      Diagnosis:  1. MEG (generalized anxiety disorder)    2. MDD (major depressive disorder), recurrent episode, mild (H)    3. Attention deficit hyperactivity disorder (ADHD), combined type        Collateral Reports Completed:   Not Applicable    PLAN: (Patient Tasks / Therapist Tasks / Other)  Patient processed emotions over external person and therapist suggested having a direct discussion with her partner about this. Therapist utilized solution focused modality and encouraged patient to advocate for herself.      Patito Humphries, [facility]  9/27/2022                                                         ______________________________________________________________________    Individual Treatment Plan    Patient's Name: Mendy Roe  YOB: 1980    Date of  Creation: 12/21/2021  Date Treatment Plan Last Reviewed/Revised: 7/28/2022 due 10/28/2022    DSM5 Diagnoses: 296.31 (F33.0) Major Depressive Disorder, Recurrent Episode, Mild With mixed features or 300.02 (F41.1) Generalized Anxiety Disorder  Psychosocial / Contextual Factors: Blended family, moving, kids, grandchildren, partner  PROMIS (reviewed every 90 days): 7/28/2022    Referral / Collaboration:  Referral to another professional/service is not indicated at this time..    Anticipated number of session for this episode of care: 12  Anticipation frequency of session: Monthly  Anticipated Duration of each session: 38-52 minutes  Treatment plan will be reviewed in 90 days or when goals have been changed.       MeasurableTreatment Goal(s) related to diagnosis / functional impairment(s)  Goal 1: Patient will report absence of persistent anxiety mood and report of reduced frequency and intensity of worry and absence of persistent anxious mood to acceptable levels, no panic attacks, report subjective comfort with rumination or a period of 90 days. Within 6 months as clinically observed and by patient self-report    I will know I've met my goal when manage my anxiety.      Objective #A (Patient Action)    Patient will demonstrate and report a level of anxiety that can be managed at a lower level of care.  Absence of persistent anxious mood and report of reduced frequency and intensity of worry and absence of persistent anxious mood to acceptable levels, no panic attacks, report subjective comfort with rumination for a period of 90 days, within 6 months as clinically observed and by patient self-report.  Status: Continued - Date(s):7/28/2022    Intervention(s)  Therapist will provide individual therapy to identify triggers to anxiety, gain feedback on helpful coping tools and thought-reframing techniques, and identify preferred way of being. Tx to include discuss current stressors and interpersonal conflicts and how to  cope with these, coaching, diagnostic testing, referral for medication as indicated, use prescribed medication, cognitive restructuring, interpersonal, family therapy, supportive therapy services.        Goal 2: Patient will report absence of persistent depression mood and report of reduced frequency and intensity of low mood and absence of persistent low energy and motivation to acceptable levels, report subjective improved motivation and increased energy for a period of 90 days, within 6 months as clinically observed and by patient self-report    I will know I've met my goal when feeling more balanced.      Objective #A (Patient Action)    Status: Continued - Date(s):7/28/2022    Patient will demonstrate and report a level of depression that can be managed at a lower level of care.  Absence of persistent depression mood and report of reduced frequency and intensity of low mood and absence of persistent low energy and motivation to acceptable levels, report subjective improved motivation and increased energy for a period of 90 days, within 6 months as clinically observed and by patient self-report.    Intervention(s)  Therapist will provide individual therapy to identify triggers to depression, gain feedback on helpful coping tools and thought-reframing techniques, and identify preferred way of being.  Tx to include discussion of current stressors and interpersonal conflicts and how to cope with these, coaching, diagnostic testing, referral for medication as indicated, use prescribed medication, cognitive restructuring, interpersonal, family therapy, supportive therapy services.        Patient has reviewed and agreed to the above plan.      Patito Humphries, [facility]  7/28/2022

## 2022-10-10 ENCOUNTER — VIRTUAL VISIT (OUTPATIENT)
Dept: FAMILY MEDICINE | Facility: CLINIC | Age: 42
End: 2022-10-10
Payer: COMMERCIAL

## 2022-10-10 DIAGNOSIS — L03.213 PERIORBITAL CELLULITIS OF RIGHT EYE: Primary | ICD-10-CM

## 2022-10-10 PROBLEM — R20.2 PARESTHESIA OF LOWER EXTREMITY: Status: ACTIVE | Noted: 2019-09-30

## 2022-10-10 PROBLEM — R10.13 EPIGASTRIC PAIN: Status: ACTIVE | Noted: 2020-08-10

## 2022-10-10 PROBLEM — M54.12 CERVICAL RADICULOPATHY: Status: ACTIVE | Noted: 2022-10-10

## 2022-10-10 PROBLEM — J35.01 CHRONIC TONSILLITIS: Status: ACTIVE | Noted: 2020-01-07

## 2022-10-10 PROBLEM — R19.4 CHANGE IN BOWEL HABITS: Status: ACTIVE | Noted: 2022-10-10

## 2022-10-10 PROBLEM — F17.200 NICOTINE DEPENDENCE: Status: ACTIVE | Noted: 2022-10-10

## 2022-10-10 PROBLEM — M95.0 ACQUIRED NASAL DEFORMITY: Status: ACTIVE | Noted: 2021-04-06

## 2022-10-10 PROBLEM — K30 INDIGESTION: Status: ACTIVE | Noted: 2022-10-10

## 2022-10-10 PROBLEM — R20.8 BURNING SENSATION: Status: ACTIVE | Noted: 2020-07-29

## 2022-10-10 PROBLEM — J34.2 DNS (DEVIATED NASAL SEPTUM): Status: ACTIVE | Noted: 2021-04-06

## 2022-10-10 PROBLEM — M79.2 NEUROPATHIC PAIN: Status: ACTIVE | Noted: 2020-12-02

## 2022-10-10 PROBLEM — G43.709 CHRONIC MIGRAINE WITHOUT AURA: Status: ACTIVE | Noted: 2019-09-30

## 2022-10-10 PROBLEM — R19.8 IRREGULAR BOWEL HABITS: Status: ACTIVE | Noted: 2020-08-10

## 2022-10-10 PROBLEM — R51.9 DAILY HEADACHE: Status: ACTIVE | Noted: 2019-09-30

## 2022-10-10 PROBLEM — J35.8 CRYPTIC TONSIL: Status: ACTIVE | Noted: 2020-01-07

## 2022-10-10 PROBLEM — G62.9 PERIPHERAL NERVE DISEASE: Status: ACTIVE | Noted: 2020-08-19

## 2022-10-10 PROBLEM — R20.0 HAND NUMBNESS: Status: ACTIVE | Noted: 2019-09-30

## 2022-10-10 PROBLEM — R51.9 OCCIPITAL HEADACHE: Status: ACTIVE | Noted: 2019-09-30

## 2022-10-10 PROCEDURE — 99213 OFFICE O/P EST LOW 20 MIN: CPT | Mod: 95 | Performed by: FAMILY MEDICINE

## 2022-10-10 RX ORDER — CEPHALEXIN 500 MG/1
500 CAPSULE ORAL 3 TIMES DAILY
Qty: 30 CAPSULE | Refills: 0 | Status: SHIPPED | OUTPATIENT
Start: 2022-10-10 | End: 2023-04-17

## 2022-10-10 RX ORDER — ERENUMAB-AOOE 140 MG/ML
INJECTION, SOLUTION SUBCUTANEOUS
COMMUNITY
Start: 2022-09-23 | End: 2023-04-17

## 2022-10-10 RX ORDER — GABAPENTIN 300 MG/1
600 CAPSULE ORAL 2 TIMES DAILY
COMMUNITY
Start: 2021-09-28

## 2022-10-10 RX ORDER — METHYLPREDNISOLONE 4 MG
TABLET, DOSE PACK ORAL
Qty: 21 TABLET | Refills: 0 | Status: SHIPPED | OUTPATIENT
Start: 2022-10-10 | End: 2023-04-17

## 2022-10-10 RX ORDER — VILAZODONE HYDROCHLORIDE 10 MG/1
TABLET ORAL
COMMUNITY
Start: 2022-09-26 | End: 2023-04-17

## 2022-10-10 RX ORDER — DEXTROAMPHETAMINE SACCHARATE, AMPHETAMINE ASPARTATE, DEXTROAMPHETAMINE SULFATE AND AMPHETAMINE SULFATE 1.25; 1.25; 1.25; 1.25 MG/1; MG/1; MG/1; MG/1
5 TABLET ORAL 2 TIMES DAILY
COMMUNITY
Start: 2022-08-22 | End: 2023-01-09

## 2022-10-10 RX ORDER — ERYTHROMYCIN 5 MG/G
0.5 OINTMENT OPHTHALMIC 3 TIMES DAILY
Qty: 10.5 G | Refills: 0 | Status: SHIPPED | OUTPATIENT
Start: 2022-10-10 | End: 2022-10-17

## 2022-10-10 ASSESSMENT — PATIENT HEALTH QUESTIONNAIRE - PHQ9
SUM OF ALL RESPONSES TO PHQ QUESTIONS 1-9: 18
10. IF YOU CHECKED OFF ANY PROBLEMS, HOW DIFFICULT HAVE THESE PROBLEMS MADE IT FOR YOU TO DO YOUR WORK, TAKE CARE OF THINGS AT HOME, OR GET ALONG WITH OTHER PEOPLE: EXTREMELY DIFFICULT
SUM OF ALL RESPONSES TO PHQ QUESTIONS 1-9: 18

## 2022-10-10 ASSESSMENT — ENCOUNTER SYMPTOMS
FEVER: 0
CHILLS: 0
EYE REDNESS: 1
EYE ITCHING: 1
EYE DISCHARGE: 1
ABDOMINAL PAIN: 0
COUGH: 0
SHORTNESS OF BREATH: 0
EYE PAIN: 1
RESPIRATORY NEGATIVE: 1

## 2022-10-10 NOTE — PROGRESS NOTES
La is a 42 year old who is being evaluated via a billable video visit.      How would you like to obtain your AVS? MyChart  If the video visit is dropped, the invitation should be resent by: Text to cell phone: [phone]  Will anyone else be joining your video visit? No      1. Periorbital cellulitis of right eye  This is a 41 yo female with swollen red upper eyelid x 3 days.  This has worsened since last Friday.  Would continue to warm pack/cold pack.  Will treat with Cephalexin, erythromycin ointment, Medrol dose nel.  If symtpoms worsen, needs to be seen.    - cephALEXin (KEFLEX) 500 MG capsule; Take 1 capsule (500 mg) by mouth 3 times daily  Dispense: 30 capsule; Refill: 0  - methylPREDNISolone (MEDROL DOSEPAK) 4 MG tablet therapy pack; Follow Package Directions  Dispense: 21 tablet; Refill: 0  - erythromycin (ROMYCIN) 5 MG/GM ophthalmic ointment; Place 0.5 inches into the right eye 3 times daily for 7 days  Dispense: 10.5 g; Refill: 0      Subjective   La is a 42 year old, presenting for the following health issues:  Eye Problem (/)      Worked last Friday - no problems  [facility] - tried on a sweater and it seemed like after that she has some itching of her right upper eyelid  Since then, more red, perhaps a slight drainage, no change in vision   No fever    No h/o bug bite or allergy      History of Present Illness       Reason for visit:  Eye swelling/pain/redness, some itching and slight discharge  Symptom onset:  1-3 days ago  Symptoms include:  Eye swelling/pain/redness, some itching and slight discharge  Symptom intensity:  Moderate  Symptom progression:  Staying the same  Had these symptoms before:  No  What makes it better:  Cold pack    She eats 0-1 servings of fruits and vegetables daily.She consumes 1 sweetened beverage(s) daily.She exercises with enough effort to increase her heart rate 9 or less minutes per day.  She exercises with enough effort to increase her  heart rate 3 or less days per week. She is missing 2 dose(s) of medications per week.  She is not taking prescribed medications regularly due to remembering to take.    Today's PHQ-9         PHQ-9 Total Score: 18    PHQ-9 Q9 Thoughts of better off dead/self-harm past 2 weeks :   Not at all    How difficult have these problems made it for you to do your work, take care of things at home, or get along with other people: Extremely difficult       Depression Screening Follow Up    PHQ 10/10/2022   PHQ-9 Total Score 18   Q9: Thoughts of better off dead/self-harm past 2 weeks Not at all   F/U: Thoughts of suicide or self-harm -   F/U: Safety concerns -     Last PHQ-9 10/10/2022   1.  Little interest or pleasure in doing things 2   2.  Feeling down, depressed, or hopeless 2   3.  Trouble falling or staying asleep, or sleeping too much 3   4.  Feeling tired or having little energy 3   5.  Poor appetite or overeating 2   6.  Feeling bad about yourself 2   7.  Trouble concentrating 3   8.  Moving slowly or restless 1   Q9: Thoughts of better off dead/self-harm past 2 weeks 0   PHQ-9 Total Score 18   Difficulty at work, home, or with people -   In the past two weeks have you had thoughts of suicide or self harm? -   Do you have concerns about your personal safety or the safety of others? -       Follow Up Actions Taken  Crisis resource information provided in After Visit Summary  Referred patient back to current mental health provider.     Depression Screening Follow-up    PHQ 10/10/2022   PHQ-9 Total Score 18   Q9: Thoughts of better off dead/self-harm past 2 weeks Not at all   F/U: Thoughts of suicide or self-harm -   F/U: Safety concerns -       Does the patient currently have a mental health provider?  Yes, patient was referred back to current mental health provider.            Review of Systems   Constitutional: Negative for chills and fever.   HENT: Negative.    Eyes: Positive for pain, discharge, redness (right upper  eyelid) and itching.   Respiratory: Negative.  Negative for cough and shortness of breath.    Cardiovascular: Negative for chest pain and peripheral edema.   Gastrointestinal: Negative for abdominal pain.   All other systems reviewed and are negative.           Objective           Vitals:  No vitals were obtained today due to virtual visit.    Physical Exam  Vitals reviewed.   Constitutional:       Appearance: Normal appearance.   HENT:      Head: Normocephalic.      Right Ear: External ear normal.      Left Ear: External ear normal.   Eyes:      General:         Right eye: No discharge.         Left eye: No discharge.      Extraocular Movements: Extraocular movements intact.      Comments: Right upper eyelid swollen - middle aspect of lid is erythematous   Pulmonary:      Effort: Pulmonary effort is normal.   Neurological:      General: No focal deficit present.      Mental Status: She is alert.   Psychiatric:         Mood and Affect: Mood normal.      No results found for this or any previous visit (from the past 24 hour(s)).            Video-Visit Details    Video Start Time: 5:59    Type of service:  Video Visit    Video End Time:6:07    Originating Location (pt. Location): Home    Distant Location (provider location):  [facility]     Platform used for Video Visit: [employer]

## 2022-10-11 ENCOUNTER — VIRTUAL VISIT (OUTPATIENT)
Dept: PSYCHOLOGY | Facility: CLINIC | Age: 42
End: 2022-10-11
Payer: COMMERCIAL

## 2022-10-11 DIAGNOSIS — F33.0 MDD (MAJOR DEPRESSIVE DISORDER), RECURRENT EPISODE, MILD (H): ICD-10-CM

## 2022-10-11 DIAGNOSIS — F41.1 GAD (GENERALIZED ANXIETY DISORDER): Primary | ICD-10-CM

## 2022-10-11 DIAGNOSIS — F90.2 ATTENTION DEFICIT HYPERACTIVITY DISORDER (ADHD), COMBINED TYPE: ICD-10-CM

## 2022-10-11 PROCEDURE — 90834 PSYTX W PT 45 MINUTES: CPT | Mod: 95 | Performed by: SOCIAL WORKER

## 2022-10-11 ASSESSMENT — COLUMBIA-SUICIDE SEVERITY RATING SCALE - C-SSRS
6. HAVE YOU EVER DONE ANYTHING, STARTED TO DO ANYTHING, OR PREPARED TO DO ANYTHING TO END YOUR LIFE?: NO
2. HAVE YOU ACTUALLY HAD ANY THOUGHTS OF KILLING YOURSELF?: NO
1. SINCE LAST CONTACT, HAVE YOU WISHED YOU WERE DEAD OR WISHED YOU COULD GO TO SLEEP AND NOT WAKE UP?: NO
TOTAL  NUMBER OF INTERRUPTED ATTEMPTS SINCE LAST CONTACT: NO
ATTEMPT SINCE LAST CONTACT: NO
SUICIDE, SINCE LAST CONTACT: NO
TOTAL  NUMBER OF ABORTED OR SELF INTERRUPTED ATTEMPTS SINCE LAST CONTACT: NO

## 2022-10-11 NOTE — PROGRESS NOTES
[facility]                                     Progress Note    Patient Name: Mendy Roe  Date: 10/11/2022         Service Type: Individual      Session Start Time:   Session End Time:      Session Length: 38-52    Session #: 37    Attendees: Client    Service Modality: Video session  Yes, the patient's condition can be safely assessed and treated via synchronous audio and visual telemedicine encounter.      Reason for Video Visit: Services only offered telehealth    Location of Originating Site: Patient's home    Distant Site: Provider Remote Setting home office    Provider verified identity through the following two step process.  Patient provided:  Patient  and Patient address    Consent:  The patient/guardian has verbally consented to: the potential risks and benefits of telemedicine (video visit) versus in person care; bill my insurance or make self-payment for services provided; and responsibility for payment of non-covered services.      Mode of transmission-[email or web address]  Interactive Complexity: No  Crisis: No        Progress Since Last Session (Related to Symptoms / Goals / Homework):   Symptoms: Improving depression with new medication    Homework: Partially completed      Episode of Care Goals: Minimal progress - PREPARATION (Decided to change - considering how); Intervened by negotiating a change plan and determining options / strategies for behavior change, identifying triggers, exploring social supports, and working towards setting a date to begin behavior change     Current / Ongoing Stressors and Concerns:   Blended family, moving, kids, grandchildren, partner     Treatment Objective(s) Addressed in This Session:     Patient will demonstrate and report a level of anxiety that can be managed at a lower level of care.  Absence of persistent anxious mood and report of reduced frequency and intensity of worry and absence of persistent anxious mood to  acceptable levels, no panic attacks, report subjective comfort with rumination for a period of 90 days, within 6 months as clinically observed and by patient self-report.  Patient will demonstrate and report a level of depression that can be managed at a lower level of care.  Absence of persistent depression mood and report of reduced frequency and intensity of low mood and absence of persistent low energy and motivation to acceptable levels, report subjective improved motivation and increased energy for a period of 90 days, within 6 months as clinically observed and by patient self-report.     Intervention:     Therapist met with patient to review goals and interventions. Therapist utilized reflected listening as patient gave brief reflection of week. Patient reported feeling better when she went off and back on depression medication. Patient processed work anxiety and relationship. Therapist supported patient as she processed and utilized strength based modality. Therapist encouraged patient to continue self care and manage emotions in them moment.   Patient presented with an anxious affect. Patient was engaged in session and open to feedback. Patient reported no safety concerns.       There has been demonstrated improvement in functioning while patient has been engaged in psychotherapy/psychological service- if withdrawn the patient would deteriorate and/or relapse.       Assessments completed prior to visit:  The following assessments were completed by patient for this visit:  PHQ9:   PHQ-9 SCORE 7/6/2021 7/20/2021 12/21/2021 3/1/2022 5/21/2022 7/28/2022 10/10/2022   PHQ-9 Total Score MyChart - - - - 18 (Moderately severe depression) - 18 (Moderately severe depression)   PHQ-9 Total Score 18 14 19 21 17 16 18     GAD7:   MEG-7 SCORE 7/6/2021 7/20/2021 12/21/2021 3/1/2022 5/19/2022 5/21/2022 7/28/2022   Total Score - - - - 20 (severe anxiety) 21 (severe anxiety) -   Total Score 17 13 17 17 20 21 13            ASSESSMENT: Current Emotional / Mental Status (status of significant symptoms):   Risk status (Self / Other harm or suicidal ideation)   Patient denies current fears or concerns for personal safety.   Patient denies current or recent suicidal ideation or behaviors.   Patient denies current or recent homicidal ideation or behaviors.   Patient denies current or recent self injurious behavior or ideation.   Patient denies other safety concerns.   Patient reports there has been no change in risk factors since their last session.     Patient reports there has been no change in protective factors since their last session.     Recommended that patient call 911 or go to the local ED should there be a change in any of these risk factors.     Appearance:   Appropriate    Eye Contact:   Fair    Psychomotor Behavior: Restless    Attitude:   Cooperative    Orientation:   All   Speech    Rate / Production: Emotional Talkative    Volume:  Normal    Mood:    Anxious    Affect:    Worrisome    Thought Content:  Clear    Thought Form:  Coherent    Insight:    Fair      Medication Review:   Changes to psychiatric medications, see updated Medication List in EPIC.      Medication Compliance:   Yes     Changes in Health Issues:   None reported     Chemical Use Review:   Substance Use: Chemical use reviewed, no active concerns identified      Tobacco Use: No current tobacco use.      Diagnosis:  1. MEG (generalized anxiety disorder)    2. MDD (major depressive disorder), recurrent episode, mild (H)    3. Attention deficit hyperactivity disorder (ADHD), combined type        Collateral Reports Completed:   Not Applicable    PLAN: (Patient Tasks / Therapist Tasks / Other)  Therapist supported patient as she processed and utilized strength based modality. Therapist encouraged patient to continue self care and manage emotions in them moment.     Patito Humphries, [facility]  10/11/2022                                                            ______________________________________________________________________    Individual Treatment Plan    Patient's Name: Mendy Roe  YOB: 1980    Date of Creation: 12/21/2021  Date Treatment Plan Last Reviewed/Revised: 7/28/2022 due 10/28/2022    DSM5 Diagnoses: 296.31 (F33.0) Major Depressive Disorder, Recurrent Episode, Mild With mixed features or 300.02 (F41.1) Generalized Anxiety Disorder  Psychosocial / Contextual Factors: Blended family, moving, kids, grandchildren, partner  PROMIS (reviewed every 90 days): 7/28/2022    Referral / Collaboration:  Referral to another professional/service is not indicated at this time..    Anticipated number of session for this episode of care: 12  Anticipation frequency of session: Monthly  Anticipated Duration of each session: 38-52 minutes  Treatment plan will be reviewed in 90 days or when goals have been changed.       MeasurableTreatment Goal(s) related to diagnosis / functional impairment(s)  Goal 1: Patient will report absence of persistent anxiety mood and report of reduced frequency and intensity of worry and absence of persistent anxious mood to acceptable levels, no panic attacks, report subjective comfort with rumination or a period of 90 days. Within 6 months as clinically observed and by patient self-report    I will know I've met my goal when manage my anxiety.      Objective #A (Patient Action)    Patient will demonstrate and report a level of anxiety that can be managed at a lower level of care.  Absence of persistent anxious mood and report of reduced frequency and intensity of worry and absence of persistent anxious mood to acceptable levels, no panic attacks, report subjective comfort with rumination for a period of 90 days, within 6 months as clinically observed and by patient self-report.  Status: Continued - Date(s):7/28/2022    Intervention(s)  Therapist will provide individual therapy to identify triggers to anxiety, gain feedback  on helpful coping tools and thought-reframing techniques, and identify preferred way of being. Tx to include discuss current stressors and interpersonal conflicts and how to cope with these, coaching, diagnostic testing, referral for medication as indicated, use prescribed medication, cognitive restructuring, interpersonal, family therapy, supportive therapy services.        Goal 2: Patient will report absence of persistent depression mood and report of reduced frequency and intensity of low mood and absence of persistent low energy and motivation to acceptable levels, report subjective improved motivation and increased energy for a period of 90 days, within 6 months as clinically observed and by patient self-report    I will know I've met my goal when feeling more balanced.      Objective #A (Patient Action)    Status: Continued - Date(s):7/28/2022    Patient will demonstrate and report a level of depression that can be managed at a lower level of care.  Absence of persistent depression mood and report of reduced frequency and intensity of low mood and absence of persistent low energy and motivation to acceptable levels, report subjective improved motivation and increased energy for a period of 90 days, within 6 months as clinically observed and by patient self-report.    Intervention(s)  Therapist will provide individual therapy to identify triggers to depression, gain feedback on helpful coping tools and thought-reframing techniques, and identify preferred way of being.  Tx to include discussion of current stressors and interpersonal conflicts and how to cope with these, coaching, diagnostic testing, referral for medication as indicated, use prescribed medication, cognitive restructuring, interpersonal, family therapy, supportive therapy services.        Patient has reviewed and agreed to the above plan.      Patito Humphries, [facility]  7/28/2022

## 2022-10-15 ENCOUNTER — TELEPHONE (OUTPATIENT)
Dept: FAMILY MEDICINE | Facility: CLINIC | Age: 42
End: 2022-10-15

## 2022-10-15 DIAGNOSIS — B37.31 YEAST VAGINITIS: Primary | ICD-10-CM

## 2022-10-15 NOTE — TELEPHONE ENCOUNTER
General Call    Contacts       Type Contact Phone/Fax    10/15/2022 10:43 AM CDT Phone (Incoming) La Roe (Self) [phone] ()        Reason for Call:  New medication    What are your questions or concerns:  Pt states they were prescribed Keflex on Monday, and now has a yeast infection. She is looking to get a new medication prescribed for the yeast infection.     Date of last appointment with provider: 10/11/22    Could we send this information to you in [employer] or would you prefer to receive a phone call?:   Patient would prefer a phone call   Okay to leave a detailed message?: Yes at Home number on file [phone] (home)

## 2022-10-17 RX ORDER — FLUCONAZOLE 150 MG/1
150 TABLET ORAL WEEKLY
Qty: 2 TABLET | Refills: 0 | Status: SHIPPED | OUTPATIENT
Start: 2022-10-17 | End: 2022-10-25

## 2022-10-17 NOTE — TELEPHONE ENCOUNTER
Pt called to follow up with her request for treatment on a possible yeast infection. Pt seen  on 10/10/22 for Periorbital cellulitis of right eye, provider prescribe keflex for pt. Pt notice yeast infection after taking keflex on Thursday 10/13/22 and believe the cause of the yeast infection is from keflex. This is a new symptom which pt try to address with her PCP, but was told she had to follow up with . This should be address to pt's PCP, per advise by 's nurse.  Pt is requesting for treatment for her yeast infection, please look into this and follow back up with pt to advise care or order med if applicable. Thank you.

## 2022-10-17 NOTE — TELEPHONE ENCOUNTER
Patient seen at [facility] on 10/10/2022 for cellulitis of the right eye- prescribed Keflex 3x/day for 10 days  Now patient having symptoms of yeast infection & requesting medication for this   Please advise    [id number] - [place], [place] - [address] AT [facility] & [address]

## 2022-10-25 ENCOUNTER — VIRTUAL VISIT (OUTPATIENT)
Dept: PSYCHOLOGY | Facility: CLINIC | Age: 42
End: 2022-10-25
Payer: COMMERCIAL

## 2022-10-25 DIAGNOSIS — F33.0 MDD (MAJOR DEPRESSIVE DISORDER), RECURRENT EPISODE, MILD (H): ICD-10-CM

## 2022-10-25 DIAGNOSIS — F90.2 ATTENTION DEFICIT HYPERACTIVITY DISORDER (ADHD), COMBINED TYPE: ICD-10-CM

## 2022-10-25 DIAGNOSIS — F41.1 GAD (GENERALIZED ANXIETY DISORDER): Primary | ICD-10-CM

## 2022-10-25 PROCEDURE — 90832 PSYTX W PT 30 MINUTES: CPT | Mod: 95 | Performed by: SOCIAL WORKER

## 2022-10-25 NOTE — PROGRESS NOTES
[facility]                                     Progress Note    Patient Name: Mendy Roe  Date: 10/25/2022         Service Type: Individual      Session Start Time:   Session End Time:      Session Length: 16-37    Session #: 38    Attendees: Client    Service Modality: Video session  Yes, the patient's condition can be safely assessed and treated via synchronous audio and visual telemedicine encounter.      Reason for Video Visit: Services only offered telehealth    Location of Originating Site: Patient's home    Distant Site: Provider Remote Setting home office    Provider verified identity through the following two step process.  Patient provided:  Patient  and Patient address    Consent:  The patient/guardian has verbally consented to: the potential risks and benefits of telemedicine (video visit) versus in person care; bill my insurance or make self-payment for services provided; and responsibility for payment of non-covered services.      Mode of transmission-[employer]  Interactive Complexity: No  Crisis: No        Progress Since Last Session (Related to Symptoms / Goals / Homework):   Symptoms: Improving anxiety and depression    Homework: Achieved / completed to satisfaction      Episode of Care Goals: Minimal progress - PREPARATION (Decided to change - considering how); Intervened by negotiating a change plan and determining options / strategies for behavior change, identifying triggers, exploring social supports, and working towards setting a date to begin behavior change     Current / Ongoing Stressors and Concerns:   Blended family, moving, kids, grandchildren, partner     Treatment Objective(s) Addressed in This Session:     Patient will demonstrate and report a level of anxiety that can be managed at a lower level of care.  Absence of persistent anxious mood and report of reduced frequency and intensity of worry and absence of persistent anxious mood to  acceptable levels, no panic attacks, report subjective comfort with rumination for a period of 90 days, within 6 months as clinically observed and by patient self-report.  Patient will demonstrate and report a level of depression that can be managed at a lower level of care.  Absence of persistent depression mood and report of reduced frequency and intensity of low mood and absence of persistent low energy and motivation to acceptable levels, report subjective improved motivation and increased energy for a period of 90 days, within 6 months as clinically observed and by patient self-report.     Intervention:     Therapist met with patient to review goals and interventions. Therapist utilized reflected listening as patient gave brief reflection of week. Patient reported continued decrease in depression and anxiety. Therapist supported patient as she processed incident with her daughter and external conflict. Therapist validated patient. Therapist utilized strength based modality with patient and encouraged continued self care.   Patient presented with an anxious affect. Patient was engaged in session and open to feedback. Patient reported no safety concerns.       There has been demonstrated improvement in functioning while patient has been engaged in psychotherapy/psychological service- if withdrawn the patient would deteriorate and/or relapse.       Assessments completed prior to visit:  The following assessments were completed by patient for this visit:  PHQ9:   PHQ-9 SCORE 7/6/2021 7/20/2021 12/21/2021 3/1/2022 5/21/2022 7/28/2022 10/10/2022   PHQ-9 Total Score MyChart - - - - 18 (Moderately severe depression) - 18 (Moderately severe depression)   PHQ-9 Total Score 18 14 19 21 17 16 18     GAD7:   MEG-7 SCORE 7/6/2021 7/20/2021 12/21/2021 3/1/2022 5/19/2022 5/21/2022 7/28/2022   Total Score - - - - 20 (severe anxiety) 21 (severe anxiety) -   Total Score 17 13 17 17 20 21 13           ASSESSMENT: Current Emotional  / Mental Status (status of significant symptoms):   Risk status (Self / Other harm or suicidal ideation)   Patient denies current fears or concerns for personal safety.   Patient denies current or recent suicidal ideation or behaviors.   Patient denies current or recent homicidal ideation or behaviors.   Patient denies current or recent self injurious behavior or ideation.   Patient denies other safety concerns.   Patient reports there has been no change in risk factors since their last session.     Patient reports there has been no change in protective factors since their last session.     Recommended that patient call 911 or go to the local ED should there be a change in any of these risk factors.     Appearance:   Appropriate    Eye Contact:   Fair    Psychomotor Behavior: Restless    Attitude:   Cooperative    Orientation:   All   Speech    Rate / Production: Emotional Talkative    Volume:  Normal    Mood:    Anxious    Affect:    Worrisome    Thought Content:  Clear    Thought Form:  Coherent    Insight:    Fair      Medication Review:   No changes to current psychiatric medication(s)     Medication Compliance:   Yes     Changes in Health Issues:   None reported     Chemical Use Review:   Substance Use: Chemical use reviewed, no active concerns identified      Tobacco Use: No current tobacco use.      Diagnosis:  1. MEG (generalized anxiety disorder)    2. MDD (major depressive disorder), recurrent episode, mild (H)    3. Attention deficit hyperactivity disorder (ADHD), combined type        Collateral Reports Completed:   Not Applicable    PLAN: (Patient Tasks / Therapist Tasks / Other)  encouraged continued self care.     Patito Humphries, [facility]  10/25/2022                                                           ______________________________________________________________________    Individual Treatment Plan    Patient's Name: Mendy Roe  YOB: 1980    Date of Creation: 12/21/2021  Date  Treatment Plan Last Reviewed/Revised: 7/28/2022 due 10/28/2022    DSM5 Diagnoses: 296.31 (F33.0) Major Depressive Disorder, Recurrent Episode, Mild With mixed features or 300.02 (F41.1) Generalized Anxiety Disorder  Psychosocial / Contextual Factors: Blended family, moving, kids, grandchildren, partner  PROMIS (reviewed every 90 days): 7/28/2022    Referral / Collaboration:  Referral to another professional/service is not indicated at this time..    Anticipated number of session for this episode of care: 12  Anticipation frequency of session: Monthly  Anticipated Duration of each session: 38-52 minutes  Treatment plan will be reviewed in 90 days or when goals have been changed.       MeasurableTreatment Goal(s) related to diagnosis / functional impairment(s)  Goal 1: Patient will report absence of persistent anxiety mood and report of reduced frequency and intensity of worry and absence of persistent anxious mood to acceptable levels, no panic attacks, report subjective comfort with rumination or a period of 90 days. Within 6 months as clinically observed and by patient self-report    I will know I've met my goal when manage my anxiety.      Objective #A (Patient Action)    Patient will demonstrate and report a level of anxiety that can be managed at a lower level of care.  Absence of persistent anxious mood and report of reduced frequency and intensity of worry and absence of persistent anxious mood to acceptable levels, no panic attacks, report subjective comfort with rumination for a period of 90 days, within 6 months as clinically observed and by patient self-report.  Status: Continued - Date(s):7/28/2022    Intervention(s)  Therapist will provide individual therapy to identify triggers to anxiety, gain feedback on helpful coping tools and thought-reframing techniques, and identify preferred way of being. Tx to include discuss current stressors and interpersonal conflicts and how to cope with these, coaching,  diagnostic testing, referral for medication as indicated, use prescribed medication, cognitive restructuring, interpersonal, family therapy, supportive therapy services.        Goal 2: Patient will report absence of persistent depression mood and report of reduced frequency and intensity of low mood and absence of persistent low energy and motivation to acceptable levels, report subjective improved motivation and increased energy for a period of 90 days, within 6 months as clinically observed and by patient self-report    I will know I've met my goal when feeling more balanced.      Objective #A (Patient Action)    Status: Continued - Date(s):7/28/2022    Patient will demonstrate and report a level of depression that can be managed at a lower level of care.  Absence of persistent depression mood and report of reduced frequency and intensity of low mood and absence of persistent low energy and motivation to acceptable levels, report subjective improved motivation and increased energy for a period of 90 days, within 6 months as clinically observed and by patient self-report.    Intervention(s)  Therapist will provide individual therapy to identify triggers to depression, gain feedback on helpful coping tools and thought-reframing techniques, and identify preferred way of being.  Tx to include discussion of current stressors and interpersonal conflicts and how to cope with these, coaching, diagnostic testing, referral for medication as indicated, use prescribed medication, cognitive restructuring, interpersonal, family therapy, supportive therapy services.        Patient has reviewed and agreed to the above plan.      Patito Humphries, [facility]  7/28/2022

## 2022-11-22 ENCOUNTER — VIRTUAL VISIT (OUTPATIENT)
Dept: PSYCHOLOGY | Facility: CLINIC | Age: 42
End: 2022-11-22
Payer: COMMERCIAL

## 2022-11-22 DIAGNOSIS — F33.0 MDD (MAJOR DEPRESSIVE DISORDER), RECURRENT EPISODE, MILD (H): ICD-10-CM

## 2022-11-22 DIAGNOSIS — F90.2 ATTENTION DEFICIT HYPERACTIVITY DISORDER (ADHD), COMBINED TYPE: ICD-10-CM

## 2022-11-22 DIAGNOSIS — F41.1 GAD (GENERALIZED ANXIETY DISORDER): Primary | ICD-10-CM

## 2022-11-22 PROCEDURE — 90832 PSYTX W PT 30 MINUTES: CPT | Mod: 95 | Performed by: SOCIAL WORKER

## 2022-11-22 ASSESSMENT — ANXIETY QUESTIONNAIRES
3. WORRYING TOO MUCH ABOUT DIFFERENT THINGS: MORE THAN HALF THE DAYS
GAD7 TOTAL SCORE: 13
5. BEING SO RESTLESS THAT IT IS HARD TO SIT STILL: NEARLY EVERY DAY
4. TROUBLE RELAXING: NEARLY EVERY DAY
7. FEELING AFRAID AS IF SOMETHING AWFUL MIGHT HAPPEN: NOT AT ALL
6. BECOMING EASILY ANNOYED OR IRRITABLE: MORE THAN HALF THE DAYS
GAD7 TOTAL SCORE: 13
2. NOT BEING ABLE TO STOP OR CONTROL WORRYING: SEVERAL DAYS
1. FEELING NERVOUS, ANXIOUS, OR ON EDGE: MORE THAN HALF THE DAYS

## 2022-11-22 ASSESSMENT — COLUMBIA-SUICIDE SEVERITY RATING SCALE - C-SSRS
SUICIDE, SINCE LAST CONTACT: NO
ATTEMPT SINCE LAST CONTACT: NO
6. HAVE YOU EVER DONE ANYTHING, STARTED TO DO ANYTHING, OR PREPARED TO DO ANYTHING TO END YOUR LIFE?: NO
TOTAL  NUMBER OF ABORTED OR SELF INTERRUPTED ATTEMPTS SINCE LAST CONTACT: NO
TOTAL  NUMBER OF INTERRUPTED ATTEMPTS SINCE LAST CONTACT: NO
2. HAVE YOU ACTUALLY HAD ANY THOUGHTS OF KILLING YOURSELF?: NO
1. SINCE LAST CONTACT, HAVE YOU WISHED YOU WERE DEAD OR WISHED YOU COULD GO TO SLEEP AND NOT WAKE UP?: NO

## 2022-11-22 ASSESSMENT — PATIENT HEALTH QUESTIONNAIRE - PHQ9: SUM OF ALL RESPONSES TO PHQ QUESTIONS 1-9: 15

## 2022-11-22 NOTE — PROGRESS NOTES
[facility]                                     Progress Note    Patient Name: Mendy Roe  Date: 2022         Service Type: Individual      Session Start Time: 160  Session End Time:      Session Length: 16-37    Session #: 39    Attendees: Client    Service Modality: Video session  Yes, the patient's condition can be safely assessed and treated via synchronous audio and visual telemedicine encounter.      Reason for Video Visit: Services only offered telehealth    Location of Originating Site: Patient's home    Distant Site: Provider Remote Setting home office    Provider verified identity through the following two step process.  Patient provided:  Patient  and Patient address    Consent:  The patient/guardian has verbally consented to: the potential risks and benefits of telemedicine (video visit) versus in person care; bill my insurance or make self-payment for services provided; and responsibility for payment of non-covered services.      Mode of transmission-[employer]  Interactive Complexity: No  Crisis: No        Progress Since Last Session (Related to Symptoms / Goals / Homework):   Symptoms: Worsening PHQ-9 prince-7    Homework: Partially completed      Episode of Care Goals: Minimal progress - PREPARATION (Decided to change - considering how); Intervened by negotiating a change plan and determining options / strategies for behavior change, identifying triggers, exploring social supports, and working towards setting a date to begin behavior change     Current / Ongoing Stressors and Concerns:   Blended family, moving, kids, grandchildren, partner     Treatment Objective(s) Addressed in This Session:     Patient will demonstrate and report a level of anxiety that can be managed at a lower level of care.  Absence of persistent anxious mood and report of reduced frequency and intensity of worry and absence of persistent anxious mood to acceptable levels, no panic  attacks, report subjective comfort with rumination for a period of 90 days, within 6 months as clinically observed and by patient self-report.  Patient will demonstrate and report a level of depression that can be managed at a lower level of care.  Absence of persistent depression mood and report of reduced frequency and intensity of low mood and absence of persistent low energy and motivation to acceptable levels, report subjective improved motivation and increased energy for a period of 90 days, within 6 months as clinically observed and by patient self-report.     Intervention:     Therapist met with patient to review goals and interventions. Therapist utilized reflected listening as patient gave brief reflection of week. Patient reported struggling with depression and anxiety and processed. Therapist supported patient as she processed and patient reported her psychiatrist recommended TMS. Therapist provided education to patient on TMS and depression ans where it lays on the brain and how the tx is effective. Therapist supported this recommendation and offered resources to patient.   Patient presented with an anxious affect. Patient was engaged in session and open to feedback. Patient reported no safety concerns.       There has been demonstrated improvement in functioning while patient has been engaged in psychotherapy/psychological service- if withdrawn the patient would deteriorate and/or relapse.       Assessments completed prior to visit:  The following assessments were completed by patient for this visit:  PHQ9:   PHQ-9 SCORE 7/20/2021 12/21/2021 3/1/2022 5/21/2022 7/28/2022 10/10/2022 11/22/2022   PHQ-9 Total Score MyChart - - - 18 (Moderately severe depression) - 18 (Moderately severe depression) -   PHQ-9 Total Score 14 19 21 17 16 18 15     GAD7:   MEG-7 SCORE 7/20/2021 12/21/2021 3/1/2022 5/19/2022 5/21/2022 7/28/2022 11/22/2022   Total Score - - - 20 (severe anxiety) 21 (severe anxiety) - -   Total  Score 13 17 17 20 21 13 13           ASSESSMENT: Current Emotional / Mental Status (status of significant symptoms):   Risk status (Self / Other harm or suicidal ideation)   Patient denies current fears or concerns for personal safety.   Patient denies current or recent suicidal ideation or behaviors.   Patient denies current or recent homicidal ideation or behaviors.   Patient denies current or recent self injurious behavior or ideation.   Patient denies other safety concerns.   Patient reports there has been no change in risk factors since their last session.     Patient reports there has been no change in protective factors since their last session.     Recommended that patient call 911 or go to the local ED should there be a change in any of these risk factors.     Appearance:   Appropriate    Eye Contact:   Fair    Psychomotor Behavior: Restless    Attitude:   Cooperative    Orientation:   All   Speech    Rate / Production: Emotional Talkative    Volume:  Normal    Mood:    Anxious  Depressed    Affect:    Worrisome    Thought Content:  Clear    Thought Form:  Coherent    Insight:    Fair      Medication Review:   No changes to current psychiatric medication(s)     Medication Compliance:   Yes     Changes in Health Issues:   None reported     Chemical Use Review:   Substance Use: Chemical use reviewed, no active concerns identified      Tobacco Use: No current tobacco use.      Diagnosis:  1. MEG (generalized anxiety disorder)    2. MDD (major depressive disorder), recurrent episode, mild (H)    3. Attention deficit hyperactivity disorder (ADHD), combined type        Collateral Reports Completed:   Not Applicable    PLAN: (Patient Tasks / Therapist Tasks / Other)  encouraged continued self care.   Therapist provided education to patient on TMS and depression ans where it lays on the brain and how the tx is effective. Therapist supported this recommendation and offered resources to patient.     Patito Humphries,  LICSW  11/22/2022                                                             ______________________________________________________________________    Individual Treatment Plan    Patient's Name: Mendy Roe  YOB: 1980    Date of Creation: 12/21/2021  Date Treatment Plan Last Reviewed/Revised: 11/22/2022 due 2/22/2023    DSM5 Diagnoses: 296.31 (F33.0) Major Depressive Disorder, Recurrent Episode, Mild With mixed features or 300.02 (F41.1) Generalized Anxiety Disorder  Psychosocial / Contextual Factors: Blended family, moving, kids, grandchildren, partner  PROMIS (reviewed every 90 days): 11/22/2022    Referral / Collaboration:  Referral to another professional/service is not indicated at this time..    Anticipated number of session for this episode of care: 12  Anticipation frequency of session: Monthly  Anticipated Duration of each session: 38-52 minutes  Treatment plan will be reviewed in 90 days or when goals have been changed.       MeasurableTreatment Goal(s) related to diagnosis / functional impairment(s)  Goal 1: Patient will report absence of persistent anxiety mood and report of reduced frequency and intensity of worry and absence of persistent anxious mood to acceptable levels, no panic attacks, report subjective comfort with rumination or a period of 90 days. Within 6 months as clinically observed and by patient self-report    I will know I've met my goal when manage my anxiety.      Objective #A (Patient Action)    Patient will demonstrate and report a level of anxiety that can be managed at a lower level of care.  Absence of persistent anxious mood and report of reduced frequency and intensity of worry and absence of persistent anxious mood to acceptable levels, no panic attacks, report subjective comfort with rumination for a period of 90 days, within 6 months as clinically observed and by patient self-report.  Status: Continued - Date(s): 11/22/2022    Intervention(s)  Therapist  will provide individual therapy to identify triggers to anxiety, gain feedback on helpful coping tools and thought-reframing techniques, and identify preferred way of being. Tx to include discuss current stressors and interpersonal conflicts and how to cope with these, coaching, diagnostic testing, referral for medication as indicated, use prescribed medication, cognitive restructuring, interpersonal, family therapy, supportive therapy services.        Goal 2: Patient will report absence of persistent depression mood and report of reduced frequency and intensity of low mood and absence of persistent low energy and motivation to acceptable levels, report subjective improved motivation and increased energy for a period of 90 days, within 6 months as clinically observed and by patient self-report    I will know I've met my goal when feeling more balanced.      Objective #A (Patient Action)    Status: Continued - Date(s):11/22/2022  Patient will demonstrate and report a level of depression that can be managed at a lower level of care.  Absence of persistent depression mood and report of reduced frequency and intensity of low mood and absence of persistent low energy and motivation to acceptable levels, report subjective improved motivation and increased energy for a period of 90 days, within 6 months as clinically observed and by patient self-report.    Intervention(s)  Therapist will provide individual therapy to identify triggers to depression, gain feedback on helpful coping tools and thought-reframing techniques, and identify preferred way of being.  Tx to include discussion of current stressors and interpersonal conflicts and how to cope with these, coaching, diagnostic testing, referral for medication as indicated, use prescribed medication, cognitive restructuring, interpersonal, family therapy, supportive therapy services.        Patient has reviewed and agreed to the above plan.      Patito Humphries,  LICSW  11/22/2022

## 2022-12-02 ENCOUNTER — TRANSFERRED RECORDS (OUTPATIENT)
Dept: HEALTH INFORMATION MANAGEMENT | Facility: CLINIC | Age: 42
End: 2022-12-02

## 2022-12-20 ENCOUNTER — VIRTUAL VISIT (OUTPATIENT)
Dept: PSYCHOLOGY | Facility: CLINIC | Age: 42
End: 2022-12-20
Payer: COMMERCIAL

## 2022-12-20 DIAGNOSIS — F90.2 ATTENTION DEFICIT HYPERACTIVITY DISORDER (ADHD), COMBINED TYPE: ICD-10-CM

## 2022-12-20 DIAGNOSIS — F41.1 GAD (GENERALIZED ANXIETY DISORDER): Primary | ICD-10-CM

## 2022-12-20 DIAGNOSIS — F33.0 MDD (MAJOR DEPRESSIVE DISORDER), RECURRENT EPISODE, MILD (H): ICD-10-CM

## 2022-12-20 PROCEDURE — 90832 PSYTX W PT 30 MINUTES: CPT | Mod: 95 | Performed by: SOCIAL WORKER

## 2022-12-20 NOTE — PROGRESS NOTES
[facility]                                     Progress Note    Patient Name: Mendy Roe  Date: 2022         Service Type: Individual      Session Start Time: 1214  Session End Time: 1243     Session Length: 16-37    Session #: 40    Attendees: Client    Service Modality: Video session  Yes, the patient's condition can be safely assessed and treated via synchronous audio and visual telemedicine encounter.      Reason for Video Visit: Services only offered telehealth    Location of Originating Site: Patient's home    Distant Site: Provider Remote Setting home office    Provider verified identity through the following two step process.  Patient provided:  Patient  and Patient address    Consent:  The patient/guardian has verbally consented to: the potential risks and benefits of telemedicine (video visit) versus in person care; bill my insurance or make self-payment for services provided; and responsibility for payment of non-covered services.      Mode of transmission-DVS Intelestream  Interactive Complexity: No  Crisis: No        Progress Since Last Session (Related to Symptoms / Goals / Homework):   Symptoms: Worsening not feeling well stressors with holiday and family    Homework: Partially completed      Episode of Care Goals: Minimal progress - PREPARATION (Decided to change - considering how); Intervened by negotiating a change plan and determining options / strategies for behavior change, identifying triggers, exploring social supports, and working towards setting a date to begin behavior change     Current / Ongoing Stressors and Concerns:   Blended family, moving, kids, grandchildren, partner     Treatment Objective(s) Addressed in This Session:     Patient will demonstrate and report a level of anxiety that can be managed at a lower level of care.  Absence of persistent anxious mood and report of reduced frequency and intensity of worry and absence of persistent  anxious mood to acceptable levels, no panic attacks, report subjective comfort with rumination for a period of 90 days, within 6 months as clinically observed and by patient self-report.  Patient will demonstrate and report a level of depression that can be managed at a lower level of care.  Absence of persistent depression mood and report of reduced frequency and intensity of low mood and absence of persistent low energy and motivation to acceptable levels, report subjective improved motivation and increased energy for a period of 90 days, within 6 months as clinically observed and by patient self-report.     Intervention:     Therapist met with patient to review goals and interventions. Therapist utilized reflected listening as patient gave brief reflection of week. Patient reported stressors with not feeling well and holidays approaching. Therapist supported patient as she processed and validated patient. Therapist utilized strength based modality with patient and encouraged patient to take care of self and focus on what she can control.   Patient presented with an anxious affect. Patient was engaged in session and open to feedback. Patient reported no safety concerns.       There has been demonstrated improvement in functioning while patient has been engaged in psychotherapy/psychological service- if withdrawn the patient would deteriorate and/or relapse.       Assessments completed prior to visit:  The following assessments were completed by patient for this visit:  PHQ9:   PHQ-9 SCORE 7/20/2021 12/21/2021 3/1/2022 5/21/2022 7/28/2022 10/10/2022 11/22/2022   PHQ-9 Total Score MyChart - - - 18 (Moderately severe depression) - 18 (Moderately severe depression) -   PHQ-9 Total Score 14 19 21 17 16 18 15     GAD7:   MEG-7 SCORE 7/20/2021 12/21/2021 3/1/2022 5/19/2022 5/21/2022 7/28/2022 11/22/2022   Total Score - - - 20 (severe anxiety) 21 (severe anxiety) - -   Total Score 13 17 17 20 21 13 13            ASSESSMENT: Current Emotional / Mental Status (status of significant symptoms):   Risk status (Self / Other harm or suicidal ideation)   Patient denies current fears or concerns for personal safety.   Patient denies current or recent suicidal ideation or behaviors.   Patient denies current or recent homicidal ideation or behaviors.   Patient denies current or recent self injurious behavior or ideation.   Patient denies other safety concerns.   Patient reports there has been no change in risk factors since their last session.     Patient reports there has been no change in protective factors since their last session.     Recommended that patient call 911 or go to the local ED should there be a change in any of these risk factors.     Appearance:   Appropriate    Eye Contact:   Fair    Psychomotor Behavior: Restless    Attitude:   Cooperative    Orientation:   All   Speech    Rate / Production: Emotional Talkative    Volume:  Normal    Mood:    Anxious  Depressed    Affect:    Worrisome    Thought Content:  Clear    Thought Form:  Coherent    Insight:    Fair      Medication Review:   No changes to current psychiatric medication(s)     Medication Compliance:   Yes     Changes in Health Issues:   None reported     Chemical Use Review:   Substance Use: Chemical use reviewed, no active concerns identified      Tobacco Use: No current tobacco use.      Diagnosis:  1. MEG (generalized anxiety disorder)    2. MDD (major depressive disorder), recurrent episode, mild (H)    3. Attention deficit hyperactivity disorder (ADHD), combined type        Collateral Reports Completed:   Not Applicable    PLAN: (Patient Tasks / Therapist Tasks / Other)  encouraged continued self care.    TMS  Therapist utilized strength based modality with patient and encouraged patient to take care of self and focus on what she can control.     Patito Humphries, [facility]  12/20/2022                                                            ______________________________________________________________________    Individual Treatment Plan    Patient's Name: Mendy Roe  YOB: 1980    Date of Creation: 12/21/2021  Date Treatment Plan Last Reviewed/Revised: 11/22/2022 due 2/22/2023    DSM5 Diagnoses: 296.31 (F33.0) Major Depressive Disorder, Recurrent Episode, Mild With mixed features or 300.02 (F41.1) Generalized Anxiety Disorder  Psychosocial / Contextual Factors: Blended family, moving, kids, grandchildren, partner  PROMIS (reviewed every 90 days): 11/22/2022    Referral / Collaboration:  Referral to another professional/service is not indicated at this time..    Anticipated number of session for this episode of care: 12  Anticipation frequency of session: Monthly  Anticipated Duration of each session: 38-52 minutes  Treatment plan will be reviewed in 90 days or when goals have been changed.       MeasurableTreatment Goal(s) related to diagnosis / functional impairment(s)  Goal 1: Patient will report absence of persistent anxiety mood and report of reduced frequency and intensity of worry and absence of persistent anxious mood to acceptable levels, no panic attacks, report subjective comfort with rumination or a period of 90 days. Within 6 months as clinically observed and by patient self-report    I will know I've met my goal when manage my anxiety.      Objective #A (Patient Action)    Patient will demonstrate and report a level of anxiety that can be managed at a lower level of care.  Absence of persistent anxious mood and report of reduced frequency and intensity of worry and absence of persistent anxious mood to acceptable levels, no panic attacks, report subjective comfort with rumination for a period of 90 days, within 6 months as clinically observed and by patient self-report.  Status: Continued - Date(s): 11/22/2022    Intervention(s)  Therapist will provide individual therapy to identify triggers to anxiety, gain  feedback on helpful coping tools and thought-reframing techniques, and identify preferred way of being. Tx to include discuss current stressors and interpersonal conflicts and how to cope with these, coaching, diagnostic testing, referral for medication as indicated, use prescribed medication, cognitive restructuring, interpersonal, family therapy, supportive therapy services.        Goal 2: Patient will report absence of persistent depression mood and report of reduced frequency and intensity of low mood and absence of persistent low energy and motivation to acceptable levels, report subjective improved motivation and increased energy for a period of 90 days, within 6 months as clinically observed and by patient self-report    I will know I've met my goal when feeling more balanced.      Objective #A (Patient Action)    Status: Continued - Date(s):11/22/2022  Patient will demonstrate and report a level of depression that can be managed at a lower level of care.  Absence of persistent depression mood and report of reduced frequency and intensity of low mood and absence of persistent low energy and motivation to acceptable levels, report subjective improved motivation and increased energy for a period of 90 days, within 6 months as clinically observed and by patient self-report.    Intervention(s)  Therapist will provide individual therapy to identify triggers to depression, gain feedback on helpful coping tools and thought-reframing techniques, and identify preferred way of being.  Tx to include discussion of current stressors and interpersonal conflicts and how to cope with these, coaching, diagnostic testing, referral for medication as indicated, use prescribed medication, cognitive restructuring, interpersonal, family therapy, supportive therapy services.        Patient has reviewed and agreed to the above plan.      Patito Humphries, [facility]  11/22/2022

## 2023-01-09 ENCOUNTER — VIRTUAL VISIT (OUTPATIENT)
Dept: FAMILY MEDICINE | Facility: CLINIC | Age: 43
End: 2023-01-09
Payer: COMMERCIAL

## 2023-01-09 DIAGNOSIS — K14.6 PAINFUL TONGUE: ICD-10-CM

## 2023-01-09 DIAGNOSIS — K12.30 STOMATITIS AND MUCOSITIS: Primary | ICD-10-CM

## 2023-01-09 DIAGNOSIS — K12.1 STOMATITIS AND MUCOSITIS: Primary | ICD-10-CM

## 2023-01-09 PROCEDURE — 99213 OFFICE O/P EST LOW 20 MIN: CPT

## 2023-01-09 RX ORDER — DEXTROAMPHETAMINE SACCHARATE, AMPHETAMINE ASPARTATE, DEXTROAMPHETAMINE SULFATE AND AMPHETAMINE SULFATE 3.125; 3.125; 3.125; 3.125 MG/1; MG/1; MG/1; MG/1
1 TABLET ORAL
COMMUNITY
Start: 2023-01-05 | End: 2023-04-19

## 2023-01-09 RX ORDER — METHYLPREDNISOLONE 4 MG
TABLET, DOSE PACK ORAL
Qty: 21 TABLET | Refills: 0 | Status: SHIPPED | OUTPATIENT
Start: 2023-01-09 | End: 2023-04-17

## 2023-01-09 RX ORDER — LIDOCAINE HYDROCHLORIDE 20 MG/ML
15 SOLUTION OROPHARYNGEAL
Qty: 100 ML | Refills: 0 | Status: SHIPPED | OUTPATIENT
Start: 2023-01-09 | End: 2023-04-17

## 2023-01-09 RX ORDER — ALBUTEROL SULFATE 90 UG/1
AEROSOL, METERED RESPIRATORY (INHALATION)
COMMUNITY
Start: 2022-12-13 | End: 2023-01-20

## 2023-01-09 NOTE — PROGRESS NOTES
La is a 42 year old who is being evaluated via a billable video visit.      How would you like to obtain your AVS? [email or web address]  If the video visit is dropped, the invitation should be resent by: Text to cell phone: [phone]  Will anyone else be joining your video visit? No    Video-Visit Details    Type of service:  Video Visit   Video Start Time: 10:25am  Video End Time:10:42am    Originating Location (pt. Location): Work  Distant Location (provider location):  On-site  Platform used for Video Visit: AsicAhead    Problem List Items Addressed This Visit    None  Visit Diagnoses     Stomatitis and mucositis    -  Primary    Relevant Medications    methylPREDNISolone (MEDROL DOSEPAK) 4 MG tablet therapy pack    lidocaine, viscous, (XYLOCAINE) 2 % solution    Painful tongue        Relevant Medications    methylPREDNISolone (MEDROL DOSEPAK) 4 MG tablet therapy pack    lidocaine, viscous, (XYLOCAINE) 2 % solution        Differentials considered today include herpes simplex, apthous stomatitis,  xerostomia, nutritional deficiences, geographic tongue and burning mouth. No obvious lesions on exam, although we discussed the limitations behind a virtual visit for this complaint in terms of a comprehensive assessment of the oral mucosa and structures. She did send a picture via [employer], but I am unable to visualize any significant lesions or other diagnostic characteristics.  Because the pain is significant, I will treat her with what has been successful in the past per chart review when she was seen in 2017.  However, I discussed that she could benefit from work-up with her primary care specific to some nutritional deficiencies that have been shown to cause chronic mouth and tongue pain (vitamin B12, iron, folate, zinc, vitamin B6). She does meet some diagnostic criteria for burning mouth, but not all, and should be considered a diagnosis of exclusion.  Patient expressed an understanding of an agreement with the above plan.  "All questions were answered.    Danielle Barnett, STEPHANIE CNP on 1/9/2023 at 11:48 AM      Cailin Tovar is a 42 year old who presents for the following health issues    Chief Complaint   Patient presents with     tongue pain      Ongoing pain for 4-6 weeks      Patient reports that this has been an issue since about mid November.  Initially she believed it was related to medication which she self discontinued and saw some improvement in her symptoms.  However, it returned and has been worsening since then.  She describes a painful, \"thick\" sensation of the tongue.  Notes that more recently she thinks she has developed a sore on the tip of her tongue.  It significantly worsened over the weekend to the point where she was unable to eat due to the tongue pain, but states that today is little bit better.  Scribes the pain as burning, \"like I burned my tongue.\"  It is localized to the middle, dorsal aspect of the tongue.  She has tried some chlorhexidine rinse that did not help.  She has been practicing good oral hygiene.  Patient initially reported that she had not been seen for this symptom in the past, however chart review revealed an urgent care visit in 2017 for very similar symptoms.  We reviewed this encounter and she notes that she believes this was related to Lamictal.  She denies any new exposures to medications or foods. She denies any airway involvement or neck swelling.    HPI     Review of Systems         Objective           Vitals:  No vitals were obtained today due to virtual visit.    Physical Exam  Constitutional:       General: She is not in acute distress.     Appearance: Normal appearance.   HENT:      Mouth/Throat:      Comments: Tongue: Pink with no evidence of thrush or lesions. Does have some cracking, but no red patches with white borders consistent with geographic tongue. See photo in MyChart encounter.  Pulmonary:      Effort: Pulmonary effort is normal. No respiratory distress. "   Neurological:      Mental Status: She is alert.   Psychiatric:         Mood and Affect: Mood normal.         Behavior: Behavior normal.            This note has been dictated using voice recognition software. Any grammatical or context distortions are unintentional and inherent to the software

## 2023-01-17 ENCOUNTER — VIRTUAL VISIT (OUTPATIENT)
Dept: PSYCHOLOGY | Facility: CLINIC | Age: 43
End: 2023-01-17
Payer: COMMERCIAL

## 2023-01-17 DIAGNOSIS — F33.0 MDD (MAJOR DEPRESSIVE DISORDER), RECURRENT EPISODE, MILD (H): ICD-10-CM

## 2023-01-17 DIAGNOSIS — F90.2 ATTENTION DEFICIT HYPERACTIVITY DISORDER (ADHD), COMBINED TYPE: ICD-10-CM

## 2023-01-17 DIAGNOSIS — F41.1 GAD (GENERALIZED ANXIETY DISORDER): Primary | ICD-10-CM

## 2023-01-17 PROCEDURE — 90834 PSYTX W PT 45 MINUTES: CPT | Performed by: SOCIAL WORKER

## 2023-01-17 NOTE — PROGRESS NOTES
[facility]                                     Progress Note    Patient Name: Mendy Roe  Date: 2023         Service Type: Individual      Session Start Time: 150  Session End Time: 154     Session Length: 38-52    Session #: 41    Attendees: Client    Service Modality:Video Visit:      Provider verified identity through the following two step process.  Patient provided: Patient  and Patient previous known to provider     Telemedicine Visit: The patient's condition can be safely assessed and treated via synchronous audio and visual telemedicine encounter.       Reason for Telemedicine Visit: Patient has requested telehealth visit     Originating Site (Patient Location): Patient's home     Distant Site (Provider Location): Provider Remote Setting- Home Office     Consent:  The patient/guardian has verbally consented to: the potential risks and benefits of telemedicine (video visit) versus in person care; bill my insurance or make self-payment for services provided; and responsibility for payment of non-covered services.      Patient would like the video invitation sent by: email/text     Mode of Communication: Video Conference via Amwell     Distant Location (Provider): Off-site     As the provider I attest to compliance with applicable laws and regulations related to telemedicine.    DATA  Interactive Complexity: No  Crisis: No        Progress Since Last Session (Related to Symptoms / Goals / Homework):   Symptoms: Improving per patient with anxiety and stressors    Homework: Partially completed      Episode of Care Goals: Minimal progress - PREPARATION (Decided to change - considering how); Intervened by negotiating a change plan and determining options / strategies for behavior change, identifying triggers, exploring social supports, and working towards setting a date to begin behavior change     Current / Ongoing Stressors and Concerns:   Blended family, moving, kids,  grandchildren, partner     Treatment Objective(s) Addressed in This Session:     Patient will demonstrate and report a level of anxiety that can be managed at a lower level of care.  Absence of persistent anxious mood and report of reduced frequency and intensity of worry and absence of persistent anxious mood to acceptable levels, no panic attacks, report subjective comfort with rumination for a period of 90 days, within 6 months as clinically observed and by patient self-report.  Patient will demonstrate and report a level of depression that can be managed at a lower level of care.  Absence of persistent depression mood and report of reduced frequency and intensity of low mood and absence of persistent low energy and motivation to acceptable levels, report subjective improved motivation and increased energy for a period of 90 days, within 6 months as clinically observed and by patient self-report.     Intervention:     Therapist met with patient to review goals and interventions. Therapist utilized reflected listening as patient gave brief reflection of week. Patient reported completing her TMS evaluation and processed. Therapist supported patient as she processed. Therapist reviewed options with work and patient reported they do not offer FMLA. Patient processed other stressors and therapist encouraged patient to speak to boss about TMS and for patient to speak to partner about living situation and emotions. Therapist utilized strength based modality with patient along with MI.   Patient presented with an anxious affect. Patient was engaged in session and open to feedback. Patient reported no safety concerns.       There has been demonstrated improvement in functioning while patient has been engaged in psychotherapy/psychological service- if withdrawn the patient would deteriorate and/or relapse.       Assessments completed prior to visit:  The following assessments were completed by patient for this  visit:  PHQ9:   PHQ-9 SCORE 7/20/2021 12/21/2021 3/1/2022 5/21/2022 7/28/2022 10/10/2022 11/22/2022   PHQ-9 Total Score MyChart - - - 18 (Moderately severe depression) - 18 (Moderately severe depression) -   PHQ-9 Total Score 14 19 21 17 16 18 15     GAD7:   MEG-7 SCORE 7/20/2021 12/21/2021 3/1/2022 5/19/2022 5/21/2022 7/28/2022 11/22/2022   Total Score - - - 20 (severe anxiety) 21 (severe anxiety) - -   Total Score 13 17 17 20 21 13 13           ASSESSMENT: Current Emotional / Mental Status (status of significant symptoms):   Risk status (Self / Other harm or suicidal ideation)   Patient denies current fears or concerns for personal safety.   Patient denies current or recent suicidal ideation or behaviors.   Patient denies current or recent homicidal ideation or behaviors.   Patient denies current or recent self injurious behavior or ideation.   Patient denies other safety concerns.   Patient reports there has been no change in risk factors since their last session.     Patient reports there has been no change in protective factors since their last session.     Recommended that patient call 911 or go to the local ED should there be a change in any of these risk factors.     Appearance:   Appropriate    Eye Contact:   Fair    Psychomotor Behavior: Restless    Attitude:   Cooperative    Orientation:   All   Speech    Rate / Production: Emotional Talkative    Volume:  Normal    Mood:    Anxious  Depressed    Affect:    Worrisome    Thought Content:  Clear    Thought Form:  Coherent    Insight:    Fair      Medication Review:   No changes to current psychiatric medication(s)     Medication Compliance:   Yes     Changes in Health Issues:   None reported     Chemical Use Review:   Substance Use: Chemical use reviewed, no active concerns identified      Tobacco Use: No current tobacco use.      Diagnosis:  1. MEG (generalized anxiety disorder)    2. MDD (major depressive disorder), recurrent episode, mild (H)    3.  Attention deficit hyperactivity disorder (ADHD), combined type        Collateral Reports Completed:   Not Applicable    PLAN: (Patient Tasks / Therapist Tasks / Other)  encouraged continued self care.    TMS to start soon  Talk to partner about emotions and moving  Talk to boss about time off for TMS      Patito Humphries, LICSW  1/17/2023                                                           ______________________________________________________________________    Individual Treatment Plan    Patient's Name: Mendy Roe  YOB: 1980    Date of Creation: 12/21/2021  Date Treatment Plan Last Reviewed/Revised: 11/22/2022 due 2/22/2023    DSM5 Diagnoses: 296.31 (F33.0) Major Depressive Disorder, Recurrent Episode, Mild With mixed features or 300.02 (F41.1) Generalized Anxiety Disorder  Psychosocial / Contextual Factors: Blended family, moving, kids, grandchildren, partner  PROMIS (reviewed every 90 days): 11/22/2022    Referral / Collaboration:  Referral to another professional/service is not indicated at this time..    Anticipated number of session for this episode of care: 12  Anticipation frequency of session: Monthly  Anticipated Duration of each session: 38-52 minutes  Treatment plan will be reviewed in 90 days or when goals have been changed.       MeasurableTreatment Goal(s) related to diagnosis / functional impairment(s)  Goal 1: Patient will report absence of persistent anxiety mood and report of reduced frequency and intensity of worry and absence of persistent anxious mood to acceptable levels, no panic attacks, report subjective comfort with rumination or a period of 90 days. Within 6 months as clinically observed and by patient self-report    I will know I've met my goal when manage my anxiety.      Objective #A (Patient Action)    Patient will demonstrate and report a level of anxiety that can be managed at a lower level of care.  Absence of persistent anxious mood and report of  reduced frequency and intensity of worry and absence of persistent anxious mood to acceptable levels, no panic attacks, report subjective comfort with rumination for a period of 90 days, within 6 months as clinically observed and by patient self-report.  Status: Continued - Date(s): 11/22/2022    Intervention(s)  Therapist will provide individual therapy to identify triggers to anxiety, gain feedback on helpful coping tools and thought-reframing techniques, and identify preferred way of being. Tx to include discuss current stressors and interpersonal conflicts and how to cope with these, coaching, diagnostic testing, referral for medication as indicated, use prescribed medication, cognitive restructuring, interpersonal, family therapy, supportive therapy services.        Goal 2: Patient will report absence of persistent depression mood and report of reduced frequency and intensity of low mood and absence of persistent low energy and motivation to acceptable levels, report subjective improved motivation and increased energy for a period of 90 days, within 6 months as clinically observed and by patient self-report    I will know I've met my goal when feeling more balanced.      Objective #A (Patient Action)    Status: Continued - Date(s):11/22/2022  Patient will demonstrate and report a level of depression that can be managed at a lower level of care.  Absence of persistent depression mood and report of reduced frequency and intensity of low mood and absence of persistent low energy and motivation to acceptable levels, report subjective improved motivation and increased energy for a period of 90 days, within 6 months as clinically observed and by patient self-report.    Intervention(s)  Therapist will provide individual therapy to identify triggers to depression, gain feedback on helpful coping tools and thought-reframing techniques, and identify preferred way of being.  Tx to include discussion of current stressors  and interpersonal conflicts and how to cope with these, coaching, diagnostic testing, referral for medication as indicated, use prescribed medication, cognitive restructuring, interpersonal, family therapy, supportive therapy services.        Patient has reviewed and agreed to the above plan.      Patito Humphries, [facility]  11/22/2022

## 2023-01-20 ENCOUNTER — OFFICE VISIT (OUTPATIENT)
Dept: FAMILY MEDICINE | Facility: CLINIC | Age: 43
End: 2023-01-20
Payer: COMMERCIAL

## 2023-01-20 VITALS
BODY MASS INDEX: 27.73 KG/M2 | HEIGHT: 62 IN | WEIGHT: 150.7 LBS | TEMPERATURE: 98.3 F | HEART RATE: 87 BPM | SYSTOLIC BLOOD PRESSURE: 121 MMHG | OXYGEN SATURATION: 100 % | DIASTOLIC BLOOD PRESSURE: 82 MMHG

## 2023-01-20 DIAGNOSIS — J40 BRONCHITIS: ICD-10-CM

## 2023-01-20 DIAGNOSIS — K14.0 GLOSSITIS: Primary | ICD-10-CM

## 2023-01-20 LAB
ALBUMIN SERPL BCG-MCNC: 4.5 G/DL (ref 3.5–5.2)
ALP SERPL-CCNC: 88 U/L (ref 35–104)
ALT SERPL W P-5'-P-CCNC: 20 U/L (ref 10–35)
ANION GAP SERPL CALCULATED.3IONS-SCNC: 13 MMOL/L (ref 7–15)
AST SERPL W P-5'-P-CCNC: 19 U/L (ref 10–35)
BILIRUB SERPL-MCNC: 0.4 MG/DL
BUN SERPL-MCNC: 9.4 MG/DL (ref 6–20)
CALCIUM SERPL-MCNC: 9.5 MG/DL (ref 8.6–10)
CHLORIDE SERPL-SCNC: 103 MMOL/L (ref 98–107)
CREAT SERPL-MCNC: 0.77 MG/DL (ref 0.51–0.95)
DEPRECATED HCO3 PLAS-SCNC: 23 MMOL/L (ref 22–29)
ERYTHROCYTE [DISTWIDTH] IN BLOOD BY AUTOMATED COUNT: 12.6 % (ref 10–15)
FOLATE SERPL-MCNC: 5 NG/ML (ref 4.6–34.8)
GFR SERPL CREATININE-BSD FRML MDRD: >90 ML/MIN/1.73M2
GLUCOSE SERPL-MCNC: 106 MG/DL (ref 70–99)
HCT VFR BLD AUTO: 42.5 % (ref 35–47)
HGB BLD-MCNC: 14.1 G/DL (ref 11.7–15.7)
IRON BINDING CAPACITY (ROCHE): 259 UG/DL (ref 240–430)
IRON SATN MFR SERPL: 25 % (ref 15–46)
IRON SERPL-MCNC: 64 UG/DL (ref 37–145)
MCH RBC QN AUTO: 29.5 PG (ref 26.5–33)
MCHC RBC AUTO-ENTMCNC: 33.2 G/DL (ref 31.5–36.5)
MCV RBC AUTO: 89 FL (ref 78–100)
PLATELET # BLD AUTO: 276 10E3/UL (ref 150–450)
POTASSIUM SERPL-SCNC: 4.1 MMOL/L (ref 3.4–5.3)
PROT SERPL-MCNC: 7.2 G/DL (ref 6.4–8.3)
RBC # BLD AUTO: 4.78 10E6/UL (ref 3.8–5.2)
SODIUM SERPL-SCNC: 139 MMOL/L (ref 136–145)
TSH SERPL DL<=0.005 MIU/L-ACNC: 1.42 UIU/ML (ref 0.3–4.2)
VIT B12 SERPL-MCNC: 365 PG/ML (ref 232–1245)
WBC # BLD AUTO: 8.1 10E3/UL (ref 4–11)

## 2023-01-20 PROCEDURE — 82607 VITAMIN B-12: CPT | Performed by: FAMILY MEDICINE

## 2023-01-20 PROCEDURE — 99000 SPECIMEN HANDLING OFFICE-LAB: CPT | Performed by: FAMILY MEDICINE

## 2023-01-20 PROCEDURE — 83550 IRON BINDING TEST: CPT | Performed by: FAMILY MEDICINE

## 2023-01-20 PROCEDURE — 84425 ASSAY OF VITAMIN B-1: CPT | Mod: 90 | Performed by: FAMILY MEDICINE

## 2023-01-20 PROCEDURE — 84252 ASSAY OF VITAMIN B-2: CPT | Mod: 90 | Performed by: FAMILY MEDICINE

## 2023-01-20 PROCEDURE — 83540 ASSAY OF IRON: CPT | Performed by: FAMILY MEDICINE

## 2023-01-20 PROCEDURE — 82746 ASSAY OF FOLIC ACID SERUM: CPT | Performed by: FAMILY MEDICINE

## 2023-01-20 PROCEDURE — 84207 ASSAY OF VITAMIN B-6: CPT | Mod: 90 | Performed by: FAMILY MEDICINE

## 2023-01-20 PROCEDURE — 84630 ASSAY OF ZINC: CPT | Mod: 90 | Performed by: FAMILY MEDICINE

## 2023-01-20 PROCEDURE — 84591 ASSAY OF NOS VITAMIN: CPT | Mod: 90 | Performed by: FAMILY MEDICINE

## 2023-01-20 PROCEDURE — 85027 COMPLETE CBC AUTOMATED: CPT | Performed by: FAMILY MEDICINE

## 2023-01-20 PROCEDURE — 99214 OFFICE O/P EST MOD 30 MIN: CPT | Performed by: FAMILY MEDICINE

## 2023-01-20 PROCEDURE — 84443 ASSAY THYROID STIM HORMONE: CPT | Performed by: FAMILY MEDICINE

## 2023-01-20 PROCEDURE — 80053 COMPREHEN METABOLIC PANEL: CPT | Performed by: FAMILY MEDICINE

## 2023-01-20 PROCEDURE — 36415 COLL VENOUS BLD VENIPUNCTURE: CPT | Performed by: FAMILY MEDICINE

## 2023-01-20 PROCEDURE — 82261 ASSAY OF BIOTINIDASE: CPT | Mod: 90 | Performed by: FAMILY MEDICINE

## 2023-01-20 RX ORDER — AZITHROMYCIN 250 MG/1
TABLET, FILM COATED ORAL
Qty: 6 TABLET | Refills: 0 | Status: SHIPPED | OUTPATIENT
Start: 2023-01-20 | End: 2023-01-25

## 2023-01-20 RX ORDER — ALBUTEROL SULFATE 90 UG/1
1-2 AEROSOL, METERED RESPIRATORY (INHALATION) EVERY 4 HOURS PRN
Qty: 18 G | Refills: 3 | Status: SHIPPED | OUTPATIENT
Start: 2023-01-20 | End: 2023-10-25

## 2023-01-20 ASSESSMENT — PATIENT HEALTH QUESTIONNAIRE - PHQ9
SUM OF ALL RESPONSES TO PHQ QUESTIONS 1-9: 19
10. IF YOU CHECKED OFF ANY PROBLEMS, HOW DIFFICULT HAVE THESE PROBLEMS MADE IT FOR YOU TO DO YOUR WORK, TAKE CARE OF THINGS AT HOME, OR GET ALONG WITH OTHER PEOPLE: EXTREMELY DIFFICULT
SUM OF ALL RESPONSES TO PHQ QUESTIONS 1-9: 19

## 2023-01-20 ASSESSMENT — PAIN SCALES - GENERAL: PAINLEVEL: EXTREME PAIN (8)

## 2023-01-20 NOTE — PROGRESS NOTES
Assessment & Plan     Glossitis  Cracking tongue /geographic    Plan:   - CBC with platelets; Future  - Comprehensive metabolic panel (BMP + Alb, Alk Phos, ALT, AST, Total. Bili, TP); Future  - TSH with free T4 reflex; Future  - Vitamin B1 whole blood; Future  - Vitamin B2; Future  - Vitamin B3; Future  - Vitamin B6; Future  - Biotinidase level; Future  - Folate; Future  - Vitamin B12; Future  - Iron and iron binding capacity; Future  - Zinc; Future    normal with no nutritional deficiencies.   Cracked tongue is sometimes also seen as a normal variant ( providing underlying conditions are ruled out).  At this point, recommending tongue self care with taking Maalox and apply to the tongue for soothing. The cracking may eventually resolve on own.   Consider an ENT consultation if not better in a few weeks as anticipated.     Bronchitis    - albuterol (PROAIR HFA/PROVENTIL HFA/VENTOLIN HFA) 108 (90 Base) MCG/ACT inhaler; Inhale 1-2 puffs into the lungs every 4 hours as needed for shortness of breath, wheezing or cough  - azithromycin (ZITHROMAX) 250 MG tablet; Take 2 tablets (500 mg) by mouth daily for 1 day, THEN 1 tablet (250 mg) daily for 4 days.                Depression Screening Follow Up    PHQ 1/20/2023   PHQ-9 Total Score 19   Q9: Thoughts of better off dead/self-harm past 2 weeks Not at all   F/U: Thoughts of suicide or self-harm -   F/U: Safety concerns -             No follow-ups on file.    Susan Galan MD  [facility]    Cailin Tovar is a 42 year old, presenting with having intermittent and recurring cracking of her tongue, associated with mild pain, with the last episode occurring 2 weeks ago.     She had an evaluation of this earlier in the month and diagnosed with stomatitis/mucositis prescribed a course of Medrol Dosepak and topical solution of lidocaine.  She is advised to follow-up with primary care to discuss further work-up specific to nutritional  "deficiencies.  She notes that was started on Auvelity back in September, and symptoms begin in November, and was told by her physician there is no relationship between the medication and the tongue condition.    There has been no change in diet, and denies having a thrush     She notes to starting with having a cough earlier last month, but seem to improve but then get back heart since yesterday.   She was on a course of Medrol Dosepak last week       She does not wear a mouth guard or a retainer.     History of Present Illness       Reason for visit:  Tongue pain/feeling thick/cracks  Symptom onset:  More than a month  Symptoms include:  Tongue pain/feeling thick/cracks  Symptom intensity:  Moderate  Symptom progression:  Staying the same  Had these symptoms before:  No  What makes it worse:  Hurts to eat and drink at times  What makes it better:  Prednisone/magic mouthwash    She eats 0-1 servings of fruits and vegetables daily.She consumes 2 sweetened beverage(s) daily.She exercises with enough effort to increase her heart rate 9 or less minutes per day.  She exercises with enough effort to increase her heart rate 3 or less days per week. She is missing 7 dose(s) of medications per week.    Today's PHQ-9         PHQ-9 Total Score: 19    PHQ-9 Q9 Thoughts of better off dead/self-harm past 2 weeks :   Not at all    How difficult have these problems made it for you to do your work, take care of things at home, or get along with other people: Extremely difficult         Review of Systems         Objective    /82 (BP Location: Right arm, Patient Position: Sitting, Cuff Size: Adult Regular)   Pulse 87   Temp 98.3  F (36.8  C) (Oral)   Ht 1.575 m (5' 2\")   Wt 68.4 kg (150 lb 11.2 oz)   LMP  (LMP Unknown)   SpO2 100%   BMI 27.56 kg/m    Body mass index is 27.56 kg/m .     Physical Exam   GENERAL: healthy, alert and no distress  HENT: normal cephalic/atraumatic, oropharynx clear, oral mucous membranes moist , " tongue: cracking, geographic    NECK: no adenopathy, no asymmetry, masses, or scars and thyroid normal to palpation  RESP: lungs clear to auscultation - no rales, rhonchi or wheezes  CV: regular rate and rhythm, normal S1 S2, no S3 or S4, no murmur, click or rub, no peripheral edema and peripheral pulses strong  MS: no gross musculoskeletal defects noted, no edema                      Answers for HPI/ROS submitted by the patient on 1/20/2023  If you checked off any problems, how difficult have these problems made it for you to do your work, take care of things at home, or get along with other people?: Extremely difficult  PHQ9 TOTAL SCORE: 19

## 2023-01-22 LAB — ZINC SERPL-MCNC: 95.4 UG/DL

## 2023-01-23 LAB — PYRIDOXAL PHOS SERPL-SCNC: 30 NMOL/L

## 2023-01-24 LAB — VIT B2 SERPL-MCNC: 4 MCG/L (ref 1–19)

## 2023-01-26 LAB — VIT B1 PYROPHOSHATE BLD-SCNC: 126 NMOL/L

## 2023-01-29 ENCOUNTER — HEALTH MAINTENANCE LETTER (OUTPATIENT)
Age: 43
End: 2023-01-29

## 2023-01-30 LAB — NIACIN SERPL-MCNC: 1.96 UG/ML

## 2023-02-01 LAB — SCANNED LAB RESULT: NORMAL

## 2023-03-20 ENCOUNTER — TRANSFERRED RECORDS (OUTPATIENT)
Dept: HEALTH INFORMATION MANAGEMENT | Facility: CLINIC | Age: 43
End: 2023-03-20

## 2023-03-21 ENCOUNTER — VIRTUAL VISIT (OUTPATIENT)
Dept: PSYCHOLOGY | Facility: CLINIC | Age: 43
End: 2023-03-21
Payer: COMMERCIAL

## 2023-03-21 DIAGNOSIS — F33.0 MDD (MAJOR DEPRESSIVE DISORDER), RECURRENT EPISODE, MILD (H): ICD-10-CM

## 2023-03-21 DIAGNOSIS — F41.1 GAD (GENERALIZED ANXIETY DISORDER): Primary | ICD-10-CM

## 2023-03-21 DIAGNOSIS — F90.2 ATTENTION DEFICIT HYPERACTIVITY DISORDER (ADHD), COMBINED TYPE: ICD-10-CM

## 2023-03-21 PROCEDURE — 90834 PSYTX W PT 45 MINUTES: CPT | Mod: 93 | Performed by: SOCIAL WORKER

## 2023-03-21 ASSESSMENT — ANXIETY QUESTIONNAIRES
1. FEELING NERVOUS, ANXIOUS, OR ON EDGE: NEARLY EVERY DAY
2. NOT BEING ABLE TO STOP OR CONTROL WORRYING: NEARLY EVERY DAY
4. TROUBLE RELAXING: NEARLY EVERY DAY
GAD7 TOTAL SCORE: 20
GAD7 TOTAL SCORE: 20
6. BECOMING EASILY ANNOYED OR IRRITABLE: NEARLY EVERY DAY
3. WORRYING TOO MUCH ABOUT DIFFERENT THINGS: NEARLY EVERY DAY
7. FEELING AFRAID AS IF SOMETHING AWFUL MIGHT HAPPEN: MORE THAN HALF THE DAYS
5. BEING SO RESTLESS THAT IT IS HARD TO SIT STILL: NEARLY EVERY DAY

## 2023-03-21 ASSESSMENT — COLUMBIA-SUICIDE SEVERITY RATING SCALE - C-SSRS
1. SINCE LAST CONTACT, HAVE YOU WISHED YOU WERE DEAD OR WISHED YOU COULD GO TO SLEEP AND NOT WAKE UP?: NO
TOTAL  NUMBER OF ABORTED OR SELF INTERRUPTED ATTEMPTS SINCE LAST CONTACT: NO
6. HAVE YOU EVER DONE ANYTHING, STARTED TO DO ANYTHING, OR PREPARED TO DO ANYTHING TO END YOUR LIFE?: NO
TOTAL  NUMBER OF INTERRUPTED ATTEMPTS SINCE LAST CONTACT: NO
2. HAVE YOU ACTUALLY HAD ANY THOUGHTS OF KILLING YOURSELF?: NO
ATTEMPT SINCE LAST CONTACT: NO
SUICIDE, SINCE LAST CONTACT: NO

## 2023-03-21 ASSESSMENT — PATIENT HEALTH QUESTIONNAIRE - PHQ9: SUM OF ALL RESPONSES TO PHQ QUESTIONS 1-9: 19

## 2023-03-21 NOTE — PROGRESS NOTES
"      [facility]                                     Progress Note    Patient Name: Mendy Roe  Date: 3/21/2023         Service Type: Individual      Session Start Time: 1503 Session End Time: 1546     Session Length: 38-52    Session #: 42    Attendees: Client    Service Modality:   Phone Visit:      Provider verified identity through the following two step process.  Patient provided:  Patient is known previously to provider     Telephone Visit: The patient's condition can be safely assessed and treated via synchronous audio telemedicine encounter.       Reason for Audio Telemedicine Visit: Services only offered telehealth     Originating Site (Patient Location): Patient's home     Distant Site (Provider Location): off site      Consent:  The patient/guardian has verbally consented to:      1. The potential risks and benefits of telemedicine (telephone visit) versus in person care;     The patient has been notified of the following:      \"We have found that certain health care needs can be provided without the need for a face to face visit.  This service lets us provide the care you need with a phone conversation.       I will have full access to your [facility] medical record during this entire phone call.   I will be taking notes for your medical record.      Since this is like an office visit, we will bill your insurance company for this service.       There are potential benefits and risks of telephone visits (e.g. limits to patient confidentiality) that differ from in-person visits.?Confidentiality still applies for telephone services, and nobody will record the visit.  It is important to be in a quiet, private space that is free of distractions (including cell phone or other devices) during the visit.??      If during the course of the call I believe a telephone visit is not appropriate, you will not be charged for this service\"     Consent has been obtained for this service by " care team member: Yes         DATA  Interactive Complexity: No  Crisis: No        Progress Since Last Session (Related to Symptoms / Goals / Homework):   Symptoms: Worsening increase in MEG-7    Homework: Partially completed      Episode of Care Goals: Minimal progress - PREPARATION (Decided to change - considering how); Intervened by negotiating a change plan and determining options / strategies for behavior change, identifying triggers, exploring social supports, and working towards setting a date to begin behavior change     Current / Ongoing Stressors and Concerns:   Blended family, moving, kids, grandchildren, partner     Treatment Objective(s) Addressed in This Session:     Patient will demonstrate and report a level of anxiety that can be managed at a lower level of care.  Absence of persistent anxious mood and report of reduced frequency and intensity of worry and absence of persistent anxious mood to acceptable levels, no panic attacks, report subjective comfort with rumination for a period of 90 days, within 6 months as clinically observed and by patient self-report.  Patient will demonstrate and report a level of depression that can be managed at a lower level of care.  Absence of persistent depression mood and report of reduced frequency and intensity of low mood and absence of persistent low energy and motivation to acceptable levels, report subjective improved motivation and increased energy for a period of 90 days, within 6 months as clinically observed and by patient self-report.     Intervention:     Therapist met with patient to review goals and interventions. Therapist utilized reflected listening as patient gave brief reflection of week. Patient reported starting TMS and continued struggles with work and feeling happy. Therapist supported patient as she processed and validated patient. Therapist utilized solution focused modality along with CBT. Therapist suggested 10 minutes walks to start and  for her to look at what she is struggling with and bring to next session.   Patient presented with an anxious affect. Patient was engaged in session and open to feedback. Patient reported no safety concerns.       There has been demonstrated improvement in functioning while patient has been engaged in psychotherapy/psychological service- if withdrawn the patient would deteriorate and/or relapse.       Assessments completed prior to visit:  The following assessments were completed by patient for this visit:  PHQ9:   PHQ-9 SCORE 3/1/2022 5/21/2022 7/28/2022 10/10/2022 11/22/2022 1/20/2023 3/21/2023   PHQ-9 Total Score MyChart - 18 (Moderately severe depression) - 18 (Moderately severe depression) - 19 (Moderately severe depression) -   PHQ-9 Total Score 21 17 16 18 15 19 19     GAD7:   MEG-7 SCORE 12/21/2021 3/1/2022 5/19/2022 5/21/2022 7/28/2022 11/22/2022 3/21/2023   Total Score - - 20 (severe anxiety) 21 (severe anxiety) - - -   Total Score 17 17 20 21 13 13 20           ASSESSMENT: Current Emotional / Mental Status (status of significant symptoms):   Risk status (Self / Other harm or suicidal ideation)   Patient denies current fears or concerns for personal safety.   Patient denies current or recent suicidal ideation or behaviors.   Patient denies current or recent homicidal ideation or behaviors.   Patient denies current or recent self injurious behavior or ideation.   Patient denies other safety concerns.   Patient reports there has been no change in risk factors since their last session.     Patient reports there has been no change in protective factors since their last session.     Recommended that patient call 911 or go to the local ED should there be a change in any of these risk factors.     Appearance:   Appropriate    Eye Contact:   Fair    Psychomotor Behavior: Restless    Attitude:   Cooperative    Orientation:   All   Speech    Rate / Production: Emotional Talkative    Volume:  Normal     Mood:    Anxious  Depressed    Affect:    Worrisome    Thought Content:  Clear    Thought Form:  Coherent    Insight:    Fair      Medication Review:   No changes to current psychiatric medication(s)     Medication Compliance:   Yes     Changes in Health Issues:   None reported     Chemical Use Review:   Substance Use: Chemical use reviewed, no active concerns identified      Tobacco Use: No current tobacco use.      Diagnosis:  1. MEG (generalized anxiety disorder)    2. MDD (major depressive disorder), recurrent episode, mild (H)    3. Attention deficit hyperactivity disorder (ADHD), combined type        Collateral Reports Completed:   Not Applicable    PLAN: (Patient Tasks / Therapist Tasks / Other)  encouraged continued self care.   continue TMS  Therapist suggested 10 minutes walks to start and for her to look at what she is struggling with and bring to next session.     Patito Humphries, [facility]  3/21/2023                                                           ______________________________________________________________________    Individual Treatment Plan    Patient's Name: Mendy Roe  YOB: 1980    Date of Creation: 12/21/2021  Date Treatment Plan Last Reviewed/Revised: 3/21/2023 due 6/21/2023    DSM5 Diagnoses: 296.31 (F33.0) Major Depressive Disorder, Recurrent Episode, Mild With mixed features or 300.02 (F41.1) Generalized Anxiety Disorder  Psychosocial / Contextual Factors: Blended family, moving, kids, grandchildren, partner  PROMIS (reviewed every 90 days): 3/21/2023    Referral / Collaboration:  Referral to another professional/service is not indicated at this time..    Anticipated number of session for this episode of care: 12  Anticipation frequency of session: Monthly  Anticipated Duration of each session: 38-52 minutes  Treatment plan will be reviewed in 90 days or when goals have been changed.       MeasurableTreatment Goal(s) related to diagnosis / functional  impairment(s)  Goal 1: Patient will report absence of persistent anxiety mood and report of reduced frequency and intensity of worry and absence of persistent anxious mood to acceptable levels, no panic attacks, report subjective comfort with rumination or a period of 90 days. Within 6 months as clinically observed and by patient self-report    I will know I've met my goal when manage my anxiety.      Objective #A (Patient Action)    Patient will demonstrate and report a level of anxiety that can be managed at a lower level of care.  Absence of persistent anxious mood and report of reduced frequency and intensity of worry and absence of persistent anxious mood to acceptable levels, no panic attacks, report subjective comfort with rumination for a period of 90 days, within 6 months as clinically observed and by patient self-report.  Status: Continued - Date(s): 3/21/2023    Intervention(s)  Therapist will provide individual therapy to identify triggers to anxiety, gain feedback on helpful coping tools and thought-reframing techniques, and identify preferred way of being. Tx to include discuss current stressors and interpersonal conflicts and how to cope with these, coaching, diagnostic testing, referral for medication as indicated, use prescribed medication, cognitive restructuring, interpersonal, family therapy, supportive therapy services.        Goal 2: Patient will report absence of persistent depression mood and report of reduced frequency and intensity of low mood and absence of persistent low energy and motivation to acceptable levels, report subjective improved motivation and increased energy for a period of 90 days, within 6 months as clinically observed and by patient self-report    I will know I've met my goal when feeling more balanced.      Objective #A (Patient Action)    Status: Continued - Date(s):3/21/2023  Patient will demonstrate and report a level of depression that can be managed at a lower level  of care.  Absence of persistent depression mood and report of reduced frequency and intensity of low mood and absence of persistent low energy and motivation to acceptable levels, report subjective improved motivation and increased energy for a period of 90 days, within 6 months as clinically observed and by patient self-report.    Intervention(s)  Therapist will provide individual therapy to identify triggers to depression, gain feedback on helpful coping tools and thought-reframing techniques, and identify preferred way of being.  Tx to include discussion of current stressors and interpersonal conflicts and how to cope with these, coaching, diagnostic testing, referral for medication as indicated, use prescribed medication, cognitive restructuring, interpersonal, family therapy, supportive therapy services.        Patient has reviewed and agreed to the above plan.      Patito Humphries, [facility]  3/21/2023

## 2023-04-01 LAB — PAP SMEAR - HIM PATIENT REPORTED: NORMAL

## 2023-04-17 ENCOUNTER — MYC REFILL (OUTPATIENT)
Dept: FAMILY MEDICINE | Facility: CLINIC | Age: 43
End: 2023-04-17

## 2023-04-17 ENCOUNTER — OFFICE VISIT (OUTPATIENT)
Dept: FAMILY MEDICINE | Facility: CLINIC | Age: 43
End: 2023-04-17
Payer: COMMERCIAL

## 2023-04-17 VITALS
SYSTOLIC BLOOD PRESSURE: 104 MMHG | OXYGEN SATURATION: 97 % | RESPIRATION RATE: 16 BRPM | HEIGHT: 62 IN | DIASTOLIC BLOOD PRESSURE: 90 MMHG | HEART RATE: 85 BPM | WEIGHT: 147.6 LBS | BODY MASS INDEX: 27.16 KG/M2

## 2023-04-17 DIAGNOSIS — Z00.00 ROUTINE GENERAL MEDICAL EXAMINATION AT A HEALTH CARE FACILITY: Primary | ICD-10-CM

## 2023-04-17 DIAGNOSIS — Z02.83 ENCOUNTER FOR DRUG SCREENING: ICD-10-CM

## 2023-04-17 LAB
ALBUMIN SERPL BCG-MCNC: 4.6 G/DL (ref 3.5–5.2)
ALP SERPL-CCNC: 69 U/L (ref 35–104)
ALT SERPL W P-5'-P-CCNC: 11 U/L (ref 10–35)
AMPHETAMINES UR QL SCN: NORMAL
ANION GAP SERPL CALCULATED.3IONS-SCNC: 13 MMOL/L (ref 7–15)
AST SERPL W P-5'-P-CCNC: 16 U/L (ref 10–35)
ATRIAL RATE - MUSE: 74 BPM
BARBITURATES UR QL SCN: NORMAL
BENZODIAZ UR QL SCN: NORMAL
BILIRUB SERPL-MCNC: 0.6 MG/DL
BUN SERPL-MCNC: 7.1 MG/DL (ref 6–20)
BZE UR QL SCN: NORMAL
CALCIUM SERPL-MCNC: 9.7 MG/DL (ref 8.6–10)
CANNABINOIDS UR QL SCN: NORMAL
CHLORIDE SERPL-SCNC: 102 MMOL/L (ref 98–107)
CHOLEST SERPL-MCNC: 193 MG/DL
CREAT SERPL-MCNC: 0.8 MG/DL (ref 0.51–0.95)
CREAT UR-MCNC: 44.9 MG/DL
DEPRECATED HCO3 PLAS-SCNC: 24 MMOL/L (ref 22–29)
DIASTOLIC BLOOD PRESSURE - MUSE: NORMAL MMHG
GFR SERPL CREATININE-BSD FRML MDRD: >90 ML/MIN/1.73M2
GLUCOSE SERPL-MCNC: 90 MG/DL (ref 70–99)
HDLC SERPL-MCNC: 35 MG/DL
INTERPRETATION ECG - MUSE: NORMAL
LDLC SERPL CALC-MCNC: 116 MG/DL
NONHDLC SERPL-MCNC: 158 MG/DL
OPIATES UR QL SCN: NORMAL
OXYCODONE UR QL: NORMAL
P AXIS - MUSE: 33 DEGREES
PCP QUAL URINE (ROCHE): NORMAL
POTASSIUM SERPL-SCNC: 4.1 MMOL/L (ref 3.4–5.3)
PR INTERVAL - MUSE: 130 MS
PROT SERPL-MCNC: 7.5 G/DL (ref 6.4–8.3)
QRS DURATION - MUSE: 78 MS
QT - MUSE: 386 MS
QTC - MUSE: 428 MS
R AXIS - MUSE: 21 DEGREES
SODIUM SERPL-SCNC: 139 MMOL/L (ref 136–145)
SYSTOLIC BLOOD PRESSURE - MUSE: NORMAL MMHG
T AXIS - MUSE: 18 DEGREES
TRIGL SERPL-MCNC: 209 MG/DL
VENTRICULAR RATE- MUSE: 74 BPM

## 2023-04-17 PROCEDURE — 93005 ELECTROCARDIOGRAM TRACING: CPT | Performed by: FAMILY MEDICINE

## 2023-04-17 PROCEDURE — 93010 ELECTROCARDIOGRAM REPORT: CPT | Performed by: INTERNAL MEDICINE

## 2023-04-17 PROCEDURE — 80307 DRUG TEST PRSMV CHEM ANLYZR: CPT | Performed by: FAMILY MEDICINE

## 2023-04-17 PROCEDURE — 99396 PREV VISIT EST AGE 40-64: CPT | Performed by: FAMILY MEDICINE

## 2023-04-17 PROCEDURE — 99213 OFFICE O/P EST LOW 20 MIN: CPT | Mod: 25 | Performed by: FAMILY MEDICINE

## 2023-04-17 PROCEDURE — 36415 COLL VENOUS BLD VENIPUNCTURE: CPT | Performed by: FAMILY MEDICINE

## 2023-04-17 PROCEDURE — 80061 LIPID PANEL: CPT | Performed by: FAMILY MEDICINE

## 2023-04-17 PROCEDURE — 80053 COMPREHEN METABOLIC PANEL: CPT | Performed by: FAMILY MEDICINE

## 2023-04-17 RX ORDER — METHYLPHENIDATE HYDROCHLORIDE 36 MG/1
1 TABLET, EXTENDED RELEASE ORAL
COMMUNITY
Start: 2023-04-02 | End: 2023-10-25 | Stop reason: ALTCHOICE

## 2023-04-17 ASSESSMENT — ENCOUNTER SYMPTOMS
DYSURIA: 0
BREAST MASS: 0
NAUSEA: 0
ABDOMINAL PAIN: 0
DIZZINESS: 1
SORE THROAT: 0
WEAKNESS: 0
PARESTHESIAS: 0
COUGH: 0
MYALGIAS: 1
DIARRHEA: 0
FEVER: 0
EYE PAIN: 0
JOINT SWELLING: 0
HEADACHES: 1
HEARTBURN: 0
NERVOUS/ANXIOUS: 1
ARTHRALGIAS: 1
PALPITATIONS: 0
CHILLS: 0
CONSTIPATION: 0
SHORTNESS OF BREATH: 0
FREQUENCY: 0
HEMATURIA: 0
HEMATOCHEZIA: 0

## 2023-04-17 ASSESSMENT — PATIENT HEALTH QUESTIONNAIRE - PHQ9
SUM OF ALL RESPONSES TO PHQ QUESTIONS 1-9: 20
SUM OF ALL RESPONSES TO PHQ QUESTIONS 1-9: 20
10. IF YOU CHECKED OFF ANY PROBLEMS, HOW DIFFICULT HAVE THESE PROBLEMS MADE IT FOR YOU TO DO YOUR WORK, TAKE CARE OF THINGS AT HOME, OR GET ALONG WITH OTHER PEOPLE: EXTREMELY DIFFICULT

## 2023-04-17 NOTE — LETTER
My Depression Action Plan  Name: Mendy Roe   Date of Birth 1980  Date: 4/17/2023    My doctor: Susan Galan   My clinic: [address] [address]  [phone]            GREEN    ZONE   Good Control    What it looks like:   Things are going generally well. You have normal ups and downs. You may even feel depressed from time to time, but bad moods usually last less than a day.   What you need to do:  Continue to care for yourself (see self care plan)  Check your depression survival kit and update it as needed  Follow your physician s recommendations including any medication.  Do not stop taking medication unless you consult with your physician first.             YELLOW         ZONE Getting Worse    What it looks like:   Depression is starting to interfere with your life.   It may be hard to get out of bed; you may be starting to isolate yourself from others.  Symptoms of depression are starting to last most all day and this has happened for several days.   You may have suicidal thoughts but they are not constant.   What you need to do:     Call your care team. Your response to treatment will improve if you keep your care team informed of your progress. Yellow periods are signs an adjustment may need to be made.     Continue your self-care.  Just get dressed and ready for the day.  Don't give yourself time to talk yourself out of it.    Talk to someone in your support network.    Open up your Depression Self-Care Plan/Wellness Kit.             RED    ZONE Medical Alert - Get Help    What it looks like:   Depression is seriously interfering with your life.   You may experience these or other symptoms: You can t get out of bed most days, can t work or engage in other necessary activities, you have trouble taking care of basic hygiene, or basic responsibilities, thoughts of suicide or death that will not go away,  self-injurious behavior.     What you need to do:  Call your care team and request a same-day appointment. If they are not available (weekends or after hours) call your local crisis line, emergency room or 911.          Depression Self-Care Plan / Wellness Kit    Many people find that medication and therapy are helpful treatments for managing depression. In addition, making small changes to your everyday life can help to boost your mood and improve your wellbeing. Below are some tips for you to consider. Be sure to talk with your medical provider and/or behavioral health consultant if your symptoms are worsening or not improving.     Sleep   Sleep hygiene  means all of the habits that support good, restful sleep. It includes maintaining a consistent bedtime and wake time, using your bedroom only for sleeping or sex, and keeping the bedroom dark and free of distractions like a computer, smartphone, or television.     Develop a Healthy Routine  Maintain good hygiene. Get out of bed in the morning, make your bed, brush your teeth, take a shower, and get dressed. Don t spend too much time viewing media that makes you feel stressed. Find time to relax each day.    Exercise  Get some form of exercise every day. This will help reduce pain and release endorphins, the  feel good  chemicals in your brain. It can be as simple as just going for a walk or doing some gardening, anything that will get you moving.      Diet  Strive to eat healthy foods, including fruits and vegetables. Drink plenty of water. Avoid excessive sugar, caffeine, alcohol, and other mood-altering substances.     Stay Connected with Others  Stay in touch with friends and family members.    Manage Your Mood  Try deep breathing, massage therapy, biofeedback, or meditation. Take part in fun activities when you can. Try to find something to smile about each day.     Psychotherapy  Be open to working with a therapist if your provider recommends it.      Medication  Be sure to take your medication as prescribed. Most anti-depressants need to be taken every day. It usually takes several weeks for medications to work. Not all medicines work for all people. It is important to follow-up with your provider to make sure you have a treatment plan that is working for you. Do not stop your medication abruptly without first discussing it with your provider.    Crisis Resources   These hotlines are for both adults and children. They and are open 24 hours a day, 7 days a week unless noted otherwise.    National Suicide Prevention Lifeline   988 or 4-469-871-SGZU (3424)    Crisis Text Line    www.crisistextline.org  Text HOME to 391263 from anywhere in the [place], anytime, about any type of crisis. A live, trained crisis counselor will receive the text and respond quickly.    [place] Lifeline for LGBTQ Youth  A national crisis intervention and suicide lifeline for LGBTQ youth under 25. Provides a safe place to talk without judgement. Call 1-277.136.2923; text START to 633053 or visit www.thetrevorproject.org to talk to a trained counselor.    For [facility] crisis numbers, visit the [facility] website at:  https://[place].gov/dhs/people-we-serve/adults/health-care/mental-health/resources/crisis-contacts.jsp

## 2023-04-17 NOTE — PROGRESS NOTES
SUBJECTIVE:   CC: La is an 42 year old who presents for preventive health visit.       2023     3:17 PM   Additional Questions   Roomed by Toya RODRIGUEZ CMA        Patient has been advised of split billing requirements and indicates understanding: Yes  Healthy Habits:     Getting at least 3 servings of Calcium per day:  NO    Bi-annual eye exam:  Yes    Dental care twice a year:  NO    Sleep apnea or symptoms of sleep apnea:  Daytime drowsiness    Diet:  Regular (no restrictions)    Frequency of exercise:  1 day/week    Duration of exercise:  Less than 15 minutes    Taking medications regularly:  No    Barriers to taking medications:  Problems remembering to take them    Medication side effects:  Lightheadedness and Other    PHQ-2 Total Score: 4    Additional concerns today:  No    Answers for HPI/ROS submitted by the patient on 2023  If you checked off any problems, how difficult have these problems made it for you to do your work, take care of things at home, or get along with other people?: Extremely difficult  PHQ9 TOTAL SCORE: 20        PROBLEMS TO ADD ON...  She needs an EKG and drug screening per psychiatrist since on concerta.       Today's PHQ-2 Score:       2023     2:29 PM   PHQ-2 (  [employer])   Q1: Little interest or pleasure in doing things 2   Q2: Feeling down, depressed or hopeless 2   PHQ-2 Score 4   Q1: Little interest or pleasure in doing things More than half the days   Q2: Feeling down, depressed or hopeless More than half the days   PHQ-2 Score 4         Social History     Tobacco Use     Smoking status: Former     Types: Cigarettes     Quit date: 2017     Years since quittin.2     Smokeless tobacco: Never   Vaping Use     Vaping status: Never Used     Passive vaping exposure: Yes   Substance Use Topics     Alcohol use: Not Currently             2023     2:29 PM   Alcohol Use   Prescreen: >3 drinks/day or >7 drinks/week? Not Applicable     Reviewed orders with  patient.  Reviewed health maintenance and updated orders accordingly - Yes  Lab work is in process    Breast Cancer Screening:    FHS-7:       4/17/2023     2:30 PM   Breast CA Risk Assessment (FHS-7)   Did any of your first-degree relatives have breast or ovarian cancer? Yes   Did any of your relatives have bilateral breast cancer? Unknown   Did any man in your family have breast cancer? Unknown   Did any woman in your family have breast and ovarian cancer? Unknown   Did any woman in your family have breast cancer before age 50 y? Unknown   Do you have 2 or more relatives with breast and/or ovarian cancer? Unknown   Do you have 2 or more relatives with breast and/or bowel cancer? Unknown         Pertinent mammograms are reviewed under the imaging tab.  Mammogram was recently done per patient through external clinic    Pap smear done by her GYN       Reviewed and updated as needed this visit by clinical staff   Tobacco  Allergies  Meds              Reviewed and updated as needed this visit by Provider                     Review of Systems   Constitutional: Negative for chills and fever.   HENT: Positive for ear pain. Negative for congestion, hearing loss and sore throat.    Eyes: Negative for pain and visual disturbance.   Respiratory: Negative for cough and shortness of breath.    Cardiovascular: Negative for chest pain, palpitations and peripheral edema.   Gastrointestinal: Negative for abdominal pain, constipation, diarrhea, heartburn, hematochezia and nausea.   Breasts:  Negative for tenderness, breast mass and discharge.   Genitourinary: Negative for dysuria, frequency, genital sores, hematuria, pelvic pain, urgency, vaginal bleeding and vaginal discharge.   Musculoskeletal: Positive for arthralgias and myalgias. Negative for joint swelling.   Skin: Negative for rash.   Neurological: Positive for dizziness and headaches. Negative for weakness and paresthesias.   Psychiatric/Behavioral: Positive for mood  "changes. The patient is nervous/anxious.           OBJECTIVE:   BP (!) 104/90 (BP Location: Right arm, Patient Position: Sitting, Cuff Size: Adult Regular)   Pulse 85   Resp 16   Ht 1.575 m (5' 2\")   Wt 67 kg (147 lb 9.6 oz)   LMP  (LMP Unknown)   SpO2 97%   BMI 27.00 kg/m       Physical Exam  GENERAL: healthy, alert and no distress  EYES: Eyes grossly normal to inspection, PERRL and conjunctivae and sclerae normal  HENT: normal cephalic/atraumatic, nose and mouth without ulcers or lesions, oropharynx clear and oral mucous membranes moist  NECK: no adenopathy, no asymmetry, masses, or scars and thyroid normal to palpation  RESP: lungs clear to auscultation - no rales, rhonchi or wheezes  CV: regular rate and rhythm, normal S1 S2, no S3 or S4, no murmur, click or rub, no peripheral edema and peripheral pulses strong  ABDOMEN: soft, nontender, no hepatosplenomegaly, no masses and bowel sounds normal  MS: no gross musculoskeletal defects noted, no edema  SKIN: no suspicious lesions or rashes  NEURO: Normal strength and tone, mentation intact and speech normal  PSYCH: mentation appears normal, affect normal/bright        ASSESSMENT/PLAN:   (Z00.00) Routine general medical examination at a health care facility  (primary encounter diagnosis)  Comment:   Plan: EKG 12-lead, tracing only, Comprehensive         metabolic panel (BMP + Alb, Alk Phos, ALT, AST,        Total. Bili, TP), Lipid panel reflex to direct         LDL Fasting            (Z02.83) Encounter for drug screening  Comment:   Plan: Urine Drugs of Abuse Screen Panel 1 - Drug         Screen (Full)            Patient has been advised of split billing requirements and indicates understanding: Yes      COUNSELING:  Reviewed preventive health counseling, as reflected in patient instructions       Regular exercise       Healthy diet/nutrition      BMI:   Estimated body mass index is 27 kg/m  as calculated from the following:    Height as of this encounter: " "1.575 m (5' 2\").    Weight as of this encounter: 67 kg (147 lb 9.6 oz).         She reports that she quit smoking about 6 years ago. She has never used smokeless tobacco.          Susan Galan MD  [facility]  "

## 2023-04-17 NOTE — LETTER
April 19, 2023      Layanique Roe  [address] [address]        Dear ,    We are writing to inform you of your test results.    You can discuss and review these with your psychiatrist.    Resulted Orders   Comprehensive metabolic panel (BMP + Alb, Alk Phos, ALT, AST, Total. Bili, TP)   Result Value Ref Range    Sodium 139 136 - 145 mmol/L    Potassium 4.1 3.4 - 5.3 mmol/L    Chloride 102 98 - 107 mmol/L    Carbon Dioxide (CO2) 24 22 - 29 mmol/L    Anion Gap 13 7 - 15 mmol/L    Urea Nitrogen 7.1 6.0 - 20.0 mg/dL    Creatinine 0.80 0.51 - 0.95 mg/dL    Calcium 9.7 8.6 - 10.0 mg/dL    Glucose 90 70 - 99 mg/dL    Alkaline Phosphatase 69 35 - 104 U/L    AST 16 10 - 35 U/L    ALT 11 10 - 35 U/L    Protein Total 7.5 6.4 - 8.3 g/dL    Albumin 4.6 3.5 - 5.2 g/dL    Bilirubin Total 0.6 <=1.2 mg/dL    GFR Estimate >90 >60 mL/min/1.73m2      Comment:      eGFR calculated using 2021 CKD-EPI equation.   Lipid panel reflex to direct LDL Fasting   Result Value Ref Range    Cholesterol 193 <200 mg/dL    Triglycerides 209 (H) <150 mg/dL    Direct Measure HDL 35 (L) >=50 mg/dL    LDL Cholesterol Calculated 116 (H) <=100 mg/dL    Non HDL Cholesterol 158 (H) <130 mg/dL    Narrative    Cholesterol  Desirable:  <200 mg/dL    Triglycerides  Normal:  Less than 150 mg/dL  Borderline High:  150-199 mg/dL  High:  200-499 mg/dL  Very High:  Greater than or equal to 500 mg/dL    Direct Measure HDL  Female:  Greater than or equal to 50 mg/dL   Male:  Greater than or equal to 40 mg/dL    LDL Cholesterol  Desirable:  <100mg/dL  Above Desirable:  100-129 mg/dL   Borderline High:  130-159 mg/dL   High:  160-189 mg/dL   Very High:  >= 190 mg/dL    Non HDL Cholesterol  Desirable:  130 mg/dL  Above Desirable:  130-159 mg/dL  Borderline High:  160-189 mg/dL  High:  190-219 mg/dL  Very High:  Greater than or equal to 220 mg/dL   Drugs of Abuse 1 Panel, Urine ([facility] Only)   Result Value Ref Range    Amphetamines Urine Screen  Negative Screen Negative      Comment:      Cutoff for a negative amphetamine is less than 500 ng/mL.    Benzodiazepine Urine Screen Negative Screen Negative      Comment:      Cutoff for a negative benzodiazepine is less than 100 ng/mL.    Opiates Urine Screen Negative Screen Negative      Comment:      Cutoff for a negative opiate is less than 300 ng/mL.    PCP Urine Screen Negative Screen Negative      Comment:      Cutoff for a negative PCP is less than 25 ng/mL.    Cannabinoids Urine Screen Negative Screen Negative      Comment:      Cutoff for a negative cannabinoid is less than 50 ng/mL.    Barbituates Urine Screen Negative Screen Negative      Comment:      Cutoff for a negative barbiturate is less than 200 ng/mL.    Cocaine Urine Screen Negative Screen Negative      Comment:      Cutoff for a negative cocaine is less than 300 ng/mL.    Oxycodone Urine Screen Negative Screen Negative      Comment:      Cutoff for a negative oxycodone is less than 100 ng/mL.    Creatinine Urine mg/dL 44.9 mg/dL       If you have any questions or concerns, please call the clinic at the number listed above.       Sincerely,      Susan Galan MD

## 2023-04-18 NOTE — TELEPHONE ENCOUNTER
"Routing refill request to provider for review/approval because:  Medication is reported/historical  Blood pressure failed.       Last office visit provider:  4/17/2023     Requested Prescriptions   Pending Prescriptions Disp Refills     rOPINIRole (REQUIP) 0.25 MG tablet       Sig: Take 1 tablet (0.25 mg) by mouth 3 times daily PRN       Antiparkinson's Agents Protocol Failed - 4/18/2023 12:09 PM        Failed - Blood pressure under 140/90 in past 12 months     BP Readings from Last 3 Encounters:   04/17/23 (!) 104/90   01/20/23 121/82   08/02/21 100/72                 Passed - CBC on record in past 12 months     Recent Labs   Lab Test 01/20/23  1308   WBC 8.1   RBC 4.78   HGB 14.1   HCT 42.5                    Passed - ALT on record in past 12 months         Recent Labs   Lab Test 04/17/23  1610   ALT 11             Passed - Serum Creatinine on file in past 12 months     Recent Labs   Lab Test 04/17/23  1610   CR 0.80       Ok to refill medication if creatinine is low          Passed - Medication is active on med list        Passed - Patient is age 18 or older        Passed - No active pregnancy on record        Passed - No positive pregnancy test in the past 12 months        Passed - Recent (6 mo) or future (30 days) visit within the authorizing provider's specialty     Patient had office visit in the last 6 months or has a visit in the next 30 days with authorizing provider or within the authorizing provider's specialty.  See \"Patient Info\" tab in inbasket, or \"Choose Columns\" in Meds & Orders section of the refill encounter.                 Stacy Seaman RN 04/18/23 12:10 PM  "

## 2023-04-19 RX ORDER — ROPINIROLE 0.25 MG/1
0.25 TABLET, FILM COATED ORAL 3 TIMES DAILY
OUTPATIENT
Start: 2023-04-19

## 2023-04-19 RX ORDER — ROPINIROLE 0.25 MG/1
0.25 TABLET, FILM COATED ORAL 3 TIMES DAILY
Status: CANCELLED | OUTPATIENT
Start: 2023-04-19

## 2023-04-19 NOTE — CONFIDENTIAL NOTE
Contacted patient who confirms she does not use ropinirole any longer.  Writer removed from med list and declined refill with medication has been discontinued as reason.

## 2023-05-08 ENCOUNTER — VIRTUAL VISIT (OUTPATIENT)
Dept: PSYCHOLOGY | Facility: CLINIC | Age: 43
End: 2023-05-08
Payer: COMMERCIAL

## 2023-05-08 DIAGNOSIS — F90.2 ATTENTION DEFICIT HYPERACTIVITY DISORDER (ADHD), COMBINED TYPE: ICD-10-CM

## 2023-05-08 DIAGNOSIS — F41.1 GAD (GENERALIZED ANXIETY DISORDER): Primary | ICD-10-CM

## 2023-05-08 DIAGNOSIS — F33.0 MDD (MAJOR DEPRESSIVE DISORDER), RECURRENT EPISODE, MILD (H): ICD-10-CM

## 2023-05-08 PROCEDURE — 90832 PSYTX W PT 30 MINUTES: CPT | Mod: 93 | Performed by: SOCIAL WORKER

## 2023-05-08 NOTE — PROGRESS NOTES
"      [facility]                                     Progress Note    Patient Name: Mendy Roe  Date: 5/8/2023         Service Type: Individual      Session Start Time: 1507 Session End Time: 1534     Session Length: 16-37    Session #: 43    Attendees: Client    Service Modality:   Phone Visit:      Provider verified identity through the following two step process.  Patient provided:  Patient is known previously to provider     Telephone Visit: The patient's condition can be safely assessed and treated via synchronous audio telemedicine encounter.       Reason for Audio Telemedicine Visit: Services only offered telehealth     Originating Site (Patient Location): Patient's home     Distant Site (Provider Location): off site      Consent:  The patient/guardian has verbally consented to:      1. The potential risks and benefits of telemedicine (telephone visit) versus in person care;     The patient has been notified of the following:      \"We have found that certain health care needs can be provided without the need for a face to face visit.  This service lets us provide the care you need with a phone conversation.       I will have full access to your [facility] medical record during this entire phone call.   I will be taking notes for your medical record.      Since this is like an office visit, we will bill your insurance company for this service.       There are potential benefits and risks of telephone visits (e.g. limits to patient confidentiality) that differ from in-person visits.?Confidentiality still applies for telephone services, and nobody will record the visit.  It is important to be in a quiet, private space that is free of distractions (including cell phone or other devices) during the visit.??      If during the course of the call I believe a telephone visit is not appropriate, you will not be charged for this service\"     Consent has been obtained for this service by " care team member: Yes         DATA  Interactive Complexity: No  Crisis: No        Progress Since Last Session (Related to Symptoms / Goals / Homework):   Symptoms: Worsening per patient    Homework: Partially completed      Episode of Care Goals: Minimal progress - PREPARATION (Decided to change - considering how); Intervened by negotiating a change plan and determining options / strategies for behavior change, identifying triggers, exploring social supports, and working towards setting a date to begin behavior change     Current / Ongoing Stressors and Concerns:   Blended family, moving, kids, grandchildren, partner     Treatment Objective(s) Addressed in This Session:     Patient will demonstrate and report a level of anxiety that can be managed at a lower level of care.  Absence of persistent anxious mood and report of reduced frequency and intensity of worry and absence of persistent anxious mood to acceptable levels, no panic attacks, report subjective comfort with rumination for a period of 90 days, within 6 months as clinically observed and by patient self-report.  Patient will demonstrate and report a level of depression that can be managed at a lower level of care.  Absence of persistent depression mood and report of reduced frequency and intensity of low mood and absence of persistent low energy and motivation to acceptable levels, report subjective improved motivation and increased energy for a period of 90 days, within 6 months as clinically observed and by patient self-report.     Intervention:     Therapist met with patient to review goals and interventions. Therapist utilized reflected listening as patient gave brief reflection of week. Patient reported having a bad a day and processed. Patient reported feeling so sad and frustrated wanting to curl up and die but not really just due to where she is in her relationship and living situation, not feeling loved anymore by partner. Therapist supported  patient as she processed and named patients options to assist with safety and offering hope. Therapist suggested patient go stay with her daughters to allow self a break, speak to partner about where they are and where they are going and if patient continues to feel unsafe to go to the ED and patient agreed. Therapist coached patient in effective communication to speak to her partner about her concerns. Patient has TMS today at 1615 and will ask them about side effects and is future focused and scheduled with provider next Tues.   Patient presented with an anxious affect. Patient was engaged in session and open to feedback. Patient contracted for safety.       There has been demonstrated improvement in functioning while patient has been engaged in psychotherapy/psychological service- if withdrawn the patient would deteriorate and/or relapse.       Assessments completed prior to visit:  The following assessments were completed by patient for this visit:  PHQ9:       5/21/2022     7:59 AM 7/28/2022     1:00 PM 10/10/2022     5:22 PM 11/22/2022     4:00 PM 1/20/2023    11:56 AM 3/21/2023     3:00 PM 4/17/2023     2:27 PM   PHQ-9 SCORE   PHQ-9 Total Score MyChart 18 (Moderately severe depression)  18 (Moderately severe depression)  19 (Moderately severe depression)  20 (Severe depression)   PHQ-9 Total Score 17 16 18 15 19 19 20     GAD7:       12/21/2021     1:00 PM 3/1/2022     1:00 PM 5/19/2022     4:52 PM 5/21/2022     8:00 AM 7/28/2022     1:00 PM 11/22/2022     4:00 PM 3/21/2023     3:00 PM   MEG-7 SCORE   Total Score   20 (severe anxiety) 21 (severe anxiety)      Total Score 17 17 20 21 13 13 20           ASSESSMENT: Current Emotional / Mental Status (status of significant symptoms):   Risk status (Self / Other harm or suicidal ideation)   Patient denies current fears or concerns for personal safety.   Patient reports the following current or recent suicidal ideation or behaviors: reported wanting to die but not  really. .   Patient denies current or recent homicidal ideation or behaviors.   Patient denies current or recent self injurious behavior or ideation.   Patient denies other safety concerns.   Patient reports there has been no change in risk factors since their last session.     Patient reports there has been no change in protective factors since their last session.     A safety and risk management plan has been developed including: Patient consented to co-developed safety plan on 5/8/2023.  Safety and risk management plan was reviewed.   Patient agreed to use safety plan should any safety concerns arise.  A copy was made available to the patient.     Appearance:   phone session    Eye Contact:   phone session    Psychomotor Behavior: phone session    Attitude:   Cooperative    Orientation:   All   Speech    Rate / Production: Emotional Talkative    Volume:  Normal    Mood:    Angry  Anxious  Depressed  Sad    Affect:    Tearful Worrisome    Thought Content:  Clear    Thought Form:  Coherent    Insight:    Fair      Medication Review:   No changes to current psychiatric medication(s)     Medication Compliance:   Yes     Changes in Health Issues:   None reported     Chemical Use Review:   Substance Use: Chemical use reviewed, no active concerns identified      Tobacco Use: No current tobacco use.      Diagnosis:  1. MEG (generalized anxiety disorder)    2. MDD (major depressive disorder), recurrent episode, mild (H)    3. Attention deficit hyperactivity disorder (ADHD), combined type        Collateral Reports Completed:   Not Applicable    PLAN: (Patient Tasks / Therapist Tasks / Other)  encouraged continued self care.   continue TMS  Therapist coached patient in effective communication to speak to her partner about her concerns. Patient has TMS today at 1615 and will ask them about side effects and is future focused and scheduled with provider next Tues.   Therapist suggested patient go stay with her daughters to  allow self a break, speak to partner about where they are and where they are going and if patient continues to feel unsafe to go to the ED and patient agreed.    Alternate safety plan will be made next session       Patito Humphries, [facility]  5/8/2023                                                           ______________________________________________________________________    Individual Treatment Plan    Patient's Name: Mendy Roe  YOB: 1980    Date of Creation: 12/21/2021  Date Treatment Plan Last Reviewed/Revised: 3/21/2023 due 6/21/2023    DSM5 Diagnoses: 296.31 (F33.0) Major Depressive Disorder, Recurrent Episode, Mild With mixed features or 300.02 (F41.1) Generalized Anxiety Disorder  Psychosocial / Contextual Factors: Blended family, moving, kids, grandchildren, partner  PROMIS (reviewed every 90 days): 3/21/2023    Referral / Collaboration:  Referral to another professional/service is not indicated at this time..    Anticipated number of session for this episode of care: 12  Anticipation frequency of session: Monthly  Anticipated Duration of each session: 38-52 minutes  Treatment plan will be reviewed in 90 days or when goals have been changed.       MeasurableTreatment Goal(s) related to diagnosis / functional impairment(s)  Goal 1: Patient will report absence of persistent anxiety mood and report of reduced frequency and intensity of worry and absence of persistent anxious mood to acceptable levels, no panic attacks, report subjective comfort with rumination or a period of 90 days. Within 6 months as clinically observed and by patient self-report    I will know I've met my goal when manage my anxiety.      Objective #A (Patient Action)    Patient will demonstrate and report a level of anxiety that can be managed at a lower level of care.  Absence of persistent anxious mood and report of reduced frequency and intensity of worry and absence of persistent anxious mood to acceptable  levels, no panic attacks, report subjective comfort with rumination for a period of 90 days, within 6 months as clinically observed and by patient self-report.  Status: Continued - Date(s): 3/21/2023    Intervention(s)  Therapist will provide individual therapy to identify triggers to anxiety, gain feedback on helpful coping tools and thought-reframing techniques, and identify preferred way of being. Tx to include discuss current stressors and interpersonal conflicts and how to cope with these, coaching, diagnostic testing, referral for medication as indicated, use prescribed medication, cognitive restructuring, interpersonal, family therapy, supportive therapy services.        Goal 2: Patient will report absence of persistent depression mood and report of reduced frequency and intensity of low mood and absence of persistent low energy and motivation to acceptable levels, report subjective improved motivation and increased energy for a period of 90 days, within 6 months as clinically observed and by patient self-report    I will know I've met my goal when feeling more balanced.      Objective #A (Patient Action)    Status: Continued - Date(s):3/21/2023  Patient will demonstrate and report a level of depression that can be managed at a lower level of care.  Absence of persistent depression mood and report of reduced frequency and intensity of low mood and absence of persistent low energy and motivation to acceptable levels, report subjective improved motivation and increased energy for a period of 90 days, within 6 months as clinically observed and by patient self-report.    Intervention(s)  Therapist will provide individual therapy to identify triggers to depression, gain feedback on helpful coping tools and thought-reframing techniques, and identify preferred way of being.  Tx to include discussion of current stressors and interpersonal conflicts and how to cope with these, coaching, diagnostic testing, referral  for medication as indicated, use prescribed medication, cognitive restructuring, interpersonal, family therapy, supportive therapy services.        Patient has reviewed and agreed to the above plan.      RADHA Neves  3/21/2023          [facility]               Name:   Mendy Roe     Therapist Name: RADHA Neves    SAFETY PLAN: 5/8/2023    Therapist suggested patient go stay with her daughters to allow self a break, speak to partner about where they are and where they are going and if patient continues to feel unsafe to go to the ED and patient agreed.    Alternate safety plan will be made next session

## 2023-05-16 ENCOUNTER — VIRTUAL VISIT (OUTPATIENT)
Dept: PSYCHOLOGY | Facility: CLINIC | Age: 43
End: 2023-05-16
Payer: COMMERCIAL

## 2023-05-16 DIAGNOSIS — F33.0 MDD (MAJOR DEPRESSIVE DISORDER), RECURRENT EPISODE, MILD (H): ICD-10-CM

## 2023-05-16 DIAGNOSIS — F90.2 ATTENTION DEFICIT HYPERACTIVITY DISORDER (ADHD), COMBINED TYPE: ICD-10-CM

## 2023-05-16 DIAGNOSIS — F41.1 GAD (GENERALIZED ANXIETY DISORDER): Primary | ICD-10-CM

## 2023-05-16 PROCEDURE — 90832 PSYTX W PT 30 MINUTES: CPT | Mod: VID | Performed by: SOCIAL WORKER

## 2023-05-16 NOTE — PROGRESS NOTES
[facility]                                     Progress Note    Patient Name: Mendy Roe  Date: 2023         Service Type: Individual      Session Start Time: 1105 Session End Time: 1140     Session Length: 16-37    Session #: 44    Attendees: Client    Service Modality:   Video Visit:      Provider verified identity through the following two step process.  Patient provided: Patient  and Patient previous known to provider     Telemedicine Visit: The patient's condition can be safely assessed and treated via synchronous audio and visual telemedicine encounter.       Reason for Telemedicine Visit: Patient has requested telehealth visit     Originating Site (Patient Location): Patient's home     Distant Site (Provider Location): Provider Remote Setting- Home Office     Consent:  The patient/guardian has verbally consented to: the potential risks and benefits of telemedicine (video visit) versus in person care; bill my insurance or make self-payment for services provided; and responsibility for payment of non-covered services.      Patient would like the video invitation sent by: email/text     Mode of Communication: Video Conference via [employer]     Distant Location (Provider): Off-site     As the provider I attest to compliance with applicable laws and regulations related to telemedicine.        DATA  Interactive Complexity: No  Crisis: No        Progress Since Last Session (Related to Symptoms / Goals / Homework):   Symptoms: Improving per patient    Homework: Partially completed      Episode of Care Goals: Minimal progress - PREPARATION (Decided to change - considering how); Intervened by negotiating a change plan and determining options / strategies for behavior change, identifying triggers, exploring social supports, and working towards setting a date to begin behavior change     Current / Ongoing Stressors and Concerns:   Blended family, moving, kids, grandchildren,  partner     Treatment Objective(s) Addressed in This Session:     Patient will demonstrate and report a level of anxiety that can be managed at a lower level of care.  Absence of persistent anxious mood and report of reduced frequency and intensity of worry and absence of persistent anxious mood to acceptable levels, no panic attacks, report subjective comfort with rumination for a period of 90 days, within 6 months as clinically observed and by patient self-report.  Patient will demonstrate and report a level of depression that can be managed at a lower level of care.  Absence of persistent depression mood and report of reduced frequency and intensity of low mood and absence of persistent low energy and motivation to acceptable levels, report subjective improved motivation and increased energy for a period of 90 days, within 6 months as clinically observed and by patient self-report.     Intervention:     Therapist met with patient to review goals and interventions. Therapist utilized reflected listening as patient gave brief reflection of week. Patient reported having a better week and processed. Therapist supported patient as she processed and validated patient. Therapist initiated discussion with patient on anxiety and planing rather than avoiding as that feeds anxiety. Therapist coached patient in effective communication and encouraged patient to continue talk with partner after she has decided what she wants to do.   Patient presented with an anxious affect. Patient was engaged in session and open to feedback. Patient reported no safety concerns    There has been demonstrated improvement in functioning while patient has been engaged in psychotherapy/psychological service- if withdrawn the patient would deteriorate and/or relapse.       Assessments completed prior to visit:  The following assessments were completed by patient for this visit:  PHQ9:       5/21/2022     7:59 AM 7/28/2022     1:00 PM 10/10/2022      5:22 PM 11/22/2022     4:00 PM 1/20/2023    11:56 AM 3/21/2023     3:00 PM 4/17/2023     2:27 PM   PHQ-9 SCORE   PHQ-9 Total Score MyChart 18 (Moderately severe depression)  18 (Moderately severe depression)  19 (Moderately severe depression)  20 (Severe depression)   PHQ-9 Total Score 17 16 18 15 19 19 20     GAD7:       12/21/2021     1:00 PM 3/1/2022     1:00 PM 5/19/2022     4:52 PM 5/21/2022     8:00 AM 7/28/2022     1:00 PM 11/22/2022     4:00 PM 3/21/2023     3:00 PM   MEG-7 SCORE   Total Score   20 (severe anxiety) 21 (severe anxiety)      Total Score 17 17 20 21 13 13 20           ASSESSMENT: Current Emotional / Mental Status (status of significant symptoms):   Risk status (Self / Other harm or suicidal ideation)   Patient denies current fears or concerns for personal safety.   Patient denies current or recent suicidal ideation or behaviors.   Patient denies current or recent homicidal ideation or behaviors.   Patient denies current or recent self injurious behavior or ideation.   Patient denies other safety concerns.   Patient reports there has been no change in risk factors since their last session.     Patient reports there has been no change in protective factors since their last session.     A safety and risk management plan has been developed including: Patient consented to co-developed safety plan on 5/8/2023.  Safety and risk management plan was reviewed.   Patient agreed to use safety plan should any safety concerns arise.  A copy was made available to the patient.     Appearance:   Appropriate    Eye Contact:   Fair    Psychomotor Behavior: Restless    Attitude:   Cooperative    Orientation:   All   Speech    Rate / Production: Emotional Talkative    Volume:  Normal    Mood:    Anxious  Depressed  Sad    Affect:    Worrisome    Thought Content:  Clear    Thought Form:  Coherent    Insight:    Fair      Medication Review:   No changes to current psychiatric medication(s)     Medication  Compliance:   Yes     Changes in Health Issues:   None reported     Chemical Use Review:   Substance Use: Chemical use reviewed, no active concerns identified      Tobacco Use: No current tobacco use.      Diagnosis:  1. MEG (generalized anxiety disorder)    2. MDD (major depressive disorder), recurrent episode, mild (H)    3. Attention deficit hyperactivity disorder (ADHD), combined type        Collateral Reports Completed:   Not Applicable    PLAN: (Patient Tasks / Therapist Tasks / Other)  encouraged continued self care.   continue TMS  encouraged patient to continue talk with partner after she has decided what she wants to do.       Patito Humphries, [facility]  5/16/2023                                                           ______________________________________________________________________    Individual Treatment Plan    Patient's Name: Mendy Roe  YOB: 1980    Date of Creation: 12/21/2021  Date Treatment Plan Last Reviewed/Revised: 3/21/2023 due 6/21/2023    DSM5 Diagnoses: 296.31 (F33.0) Major Depressive Disorder, Recurrent Episode, Mild With mixed features or 300.02 (F41.1) Generalized Anxiety Disorder  Psychosocial / Contextual Factors: Blended family, moving, kids, grandchildren, partner  PROMIS (reviewed every 90 days): 3/21/2023    Referral / Collaboration:  Referral to another professional/service is not indicated at this time..    Anticipated number of session for this episode of care: 12  Anticipation frequency of session: Monthly  Anticipated Duration of each session: 38-52 minutes  Treatment plan will be reviewed in 90 days or when goals have been changed.       MeasurableTreatment Goal(s) related to diagnosis / functional impairment(s)  Goal 1: Patient will report absence of persistent anxiety mood and report of reduced frequency and intensity of worry and absence of persistent anxious mood to acceptable levels, no panic attacks, report subjective comfort with rumination or  a period of 90 days. Within 6 months as clinically observed and by patient self-report    I will know I've met my goal when manage my anxiety.      Objective #A (Patient Action)    Patient will demonstrate and report a level of anxiety that can be managed at a lower level of care.  Absence of persistent anxious mood and report of reduced frequency and intensity of worry and absence of persistent anxious mood to acceptable levels, no panic attacks, report subjective comfort with rumination for a period of 90 days, within 6 months as clinically observed and by patient self-report.  Status: Continued - Date(s): 3/21/2023    Intervention(s)  Therapist will provide individual therapy to identify triggers to anxiety, gain feedback on helpful coping tools and thought-reframing techniques, and identify preferred way of being. Tx to include discuss current stressors and interpersonal conflicts and how to cope with these, coaching, diagnostic testing, referral for medication as indicated, use prescribed medication, cognitive restructuring, interpersonal, family therapy, supportive therapy services.        Goal 2: Patient will report absence of persistent depression mood and report of reduced frequency and intensity of low mood and absence of persistent low energy and motivation to acceptable levels, report subjective improved motivation and increased energy for a period of 90 days, within 6 months as clinically observed and by patient self-report    I will know I've met my goal when feeling more balanced.      Objective #A (Patient Action)    Status: Continued - Date(s):3/21/2023  Patient will demonstrate and report a level of depression that can be managed at a lower level of care.  Absence of persistent depression mood and report of reduced frequency and intensity of low mood and absence of persistent low energy and motivation to acceptable levels, report subjective improved motivation and increased energy for a period of  90 days, within 6 months as clinically observed and by patient self-report.    Intervention(s)  Therapist will provide individual therapy to identify triggers to depression, gain feedback on helpful coping tools and thought-reframing techniques, and identify preferred way of being.  Tx to include discussion of current stressors and interpersonal conflicts and how to cope with these, coaching, diagnostic testing, referral for medication as indicated, use prescribed medication, cognitive restructuring, interpersonal, family therapy, supportive therapy services.        Patient has reviewed and agreed to the above plan.      Patito Humphries [facility]  3/21/2023          [facility]               Name:   Mendy Roe     Therapist Name: Patito Humphries [facility]    SAFETY PLAN: 5/8/2023    Therapist suggested patient go stay with her daughters to allow self a break, speak to partner about where they are and where they are going and if patient continues to feel unsafe to go to the ED and patient agreed.            [facility]               Name:   Mendy Roe     Therapist Name: Patito Humphries [facility]    SAFETY PLAN:    Step 1: Warning signs / cues (thoughts, feelings, what I do, what others do) that tell me I'm not doing well:     What do I think?  What do I say to myself? I want to die      Pictures in my head: none     How do I feel? really sad, worried, angry, hopeless and annoyed     What do I do? sit in my room and be alone     When do I feel this way? relationship and money          Step 2: Coping strategies - Things I can do to help myself feel better:     Coping skills: use my coping skills and be around others      Games and activities: go outside, go for a walk and deep breathing     Focus on helpful thoughts: this is temporary, I will get through this, ride the wave and people care about me children      Step 3: People  and places that help me feel better:     People: daughters     Places (with permission): work       Step 4: People and things that are special to me that remind me why it's worth getting better:      My family        Step 6: Things that will help me stay safe:     be around others    Step 7: Professionals or agencies I can contact when I need help:     Suicide Prevention Lifeline: Call or Text 988     Local Crisis Services: 988     Call 911 or go to my nearest emergency department.     I helped develop this safety plan and agree to use it when needed.  I have been given a copy of this plan.      Client signature:     _________________________________________________________________  Today's date:  5/16/2023    Adapted from Safety Plan Template 2008 Rocio Neal and Ronnie Juan is reprinted with the express permission of the authors.  No portion of the Safety Plan Template may be reproduced without the express, written permission.  You can contact the authors at [email or web address] or [email or web address].

## 2023-05-23 ENCOUNTER — VIRTUAL VISIT (OUTPATIENT)
Dept: PSYCHOLOGY | Facility: CLINIC | Age: 43
End: 2023-05-23
Payer: COMMERCIAL

## 2023-05-23 DIAGNOSIS — F33.0 MDD (MAJOR DEPRESSIVE DISORDER), RECURRENT EPISODE, MILD (H): ICD-10-CM

## 2023-05-23 DIAGNOSIS — F90.2 ATTENTION DEFICIT HYPERACTIVITY DISORDER (ADHD), COMBINED TYPE: ICD-10-CM

## 2023-05-23 DIAGNOSIS — F41.1 GAD (GENERALIZED ANXIETY DISORDER): Primary | ICD-10-CM

## 2023-05-23 PROCEDURE — 90832 PSYTX W PT 30 MINUTES: CPT | Mod: VID | Performed by: SOCIAL WORKER

## 2023-05-23 NOTE — PROGRESS NOTES
[facility]                                     Progress Note    Patient Name: Mendy Roe  Date: 2023       Service Type: Individual      Session Start Time: 110 Session End Time: 113     Session Length: 16-37    Session #: 45    Attendees: Client    Service Modality:   Video Visit:      Provider verified identity through the following two step process.  Patient provided: Patient  and Patient previous known to provider     Telemedicine Visit: The patient's condition can be safely assessed and treated via synchronous audio and visual telemedicine encounter.       Reason for Telemedicine Visit: Patient has requested telehealth visit     Originating Site (Patient Location): Patient's home     Distant Site (Provider Location): Provider Remote Setting- Home Office     Consent:  The patient/guardian has verbally consented to: the potential risks and benefits of telemedicine (video visit) versus in person care; bill my insurance or make self-payment for services provided; and responsibility for payment of non-covered services.      Patient would like the video invitation sent by: email/text     Mode of Communication: Video Conference via [employer]     Distant Location (Provider): Off-site     As the provider I attest to compliance with applicable laws and regulations related to telemedicine.        DATA  Interactive Complexity: No  Crisis: No        Progress Since Last Session (Related to Symptoms / Goals / Homework):   Symptoms: Improving per patient    Homework: Partially completed      Episode of Care Goals: Minimal progress - PREPARATION (Decided to change - considering how); Intervened by negotiating a change plan and determining options / strategies for behavior change, identifying triggers, exploring social supports, and working towards setting a date to begin behavior change     Current / Ongoing Stressors and Concerns:   Blended family, moving, kids, grandchildren,  partner     Treatment Objective(s) Addressed in This Session:     Patient will demonstrate and report a level of anxiety that can be managed at a lower level of care.  Absence of persistent anxious mood and report of reduced frequency and intensity of worry and absence of persistent anxious mood to acceptable levels, no panic attacks, report subjective comfort with rumination for a period of 90 days, within 6 months as clinically observed and by patient self-report.  Patient will demonstrate and report a level of depression that can be managed at a lower level of care.  Absence of persistent depression mood and report of reduced frequency and intensity of low mood and absence of persistent low energy and motivation to acceptable levels, report subjective improved motivation and increased energy for a period of 90 days, within 6 months as clinically observed and by patient self-report.     Intervention:     Therapist met with patient to review goals and interventions. Therapist utilized reflected listening as patient gave brief reflection of week. Patient reported having a much better week. Therapist encouraged patient to look at what is effecting that. Patient processed some good things with her excepting where she is at and also making plans for her future by going back to school. Therapist supported patient as she processed and validated patient. Therapist reiterated preventative measures and long with solution focused modality.   Patient presented with an anxious affect. Patient was engaged in session and open to feedback. Patient reported no safety concerns    There has been demonstrated improvement in functioning while patient has been engaged in psychotherapy/psychological service- if withdrawn the patient would deteriorate and/or relapse.       Assessments completed prior to visit:  The following assessments were completed by patient for this visit:  PHQ9:       5/21/2022     7:59 AM 7/28/2022     1:00 PM  10/10/2022     5:22 PM 11/22/2022     4:00 PM 1/20/2023    11:56 AM 3/21/2023     3:00 PM 4/17/2023     2:27 PM   PHQ-9 SCORE   PHQ-9 Total Score MyChart 18 (Moderately severe depression)  18 (Moderately severe depression)  19 (Moderately severe depression)  20 (Severe depression)   PHQ-9 Total Score 17 16 18 15 19 19 20     GAD7:       12/21/2021     1:00 PM 3/1/2022     1:00 PM 5/19/2022     4:52 PM 5/21/2022     8:00 AM 7/28/2022     1:00 PM 11/22/2022     4:00 PM 3/21/2023     3:00 PM   MEG-7 SCORE   Total Score   20 (severe anxiety) 21 (severe anxiety)      Total Score 17 17 20 21 13 13 20           ASSESSMENT: Current Emotional / Mental Status (status of significant symptoms):   Risk status (Self / Other harm or suicidal ideation)   Patient denies current fears or concerns for personal safety.   Patient denies current or recent suicidal ideation or behaviors.   Patient denies current or recent homicidal ideation or behaviors.   Patient denies current or recent self injurious behavior or ideation.   Patient denies other safety concerns.   Patient reports there has been no change in risk factors since their last session.     Patient reports there has been no change in protective factors since their last session.     A safety and risk management plan has been developed including: Patient consented to co-developed safety plan on 5/8/2023.  Safety and risk management plan was reviewed.   Patient agreed to use safety plan should any safety concerns arise.  A copy was made available to the patient.     Appearance:   Appropriate    Eye Contact:   Fair    Psychomotor Behavior: Restless    Attitude:   Cooperative    Orientation:   All   Speech    Rate / Production: Emotional Talkative    Volume:  Normal    Mood:    Anxious  Depressed    Affect:    Worrisome    Thought Content:  Clear    Thought Form:  Coherent    Insight:    Fair      Medication Review:   No changes to current psychiatric medication(s)     Medication  Compliance:   Yes     Changes in Health Issues:   None reported     Chemical Use Review:   Substance Use: Chemical use reviewed, no active concerns identified      Tobacco Use: No current tobacco use.      Diagnosis:  1. MEG (generalized anxiety disorder)    2. MDD (major depressive disorder), recurrent episode, mild (H)    3. Attention deficit hyperactivity disorder (ADHD), combined type        Collateral Reports Completed:   Not Applicable    PLAN: (Patient Tasks / Therapist Tasks / Other)  encouraged continued self care.   continue TMS  Update DA  Pt to focus on self    Patito RONNIE Humphries, [facility]  5/23/2023                                                           ______________________________________________________________________    Individual Treatment Plan    Patient's Name: Mendy Roe  YOB: 1980    Date of Creation: 12/21/2021  Date Treatment Plan Last Reviewed/Revised: 3/21/2023 due 6/21/2023    DSM5 Diagnoses: 296.31 (F33.0) Major Depressive Disorder, Recurrent Episode, Mild With mixed features or 300.02 (F41.1) Generalized Anxiety Disorder  Psychosocial / Contextual Factors: Blended family, moving, kids, grandchildren, partner  PROMIS (reviewed every 90 days): 3/21/2023    Referral / Collaboration:  Referral to another professional/service is not indicated at this time..    Anticipated number of session for this episode of care: 12  Anticipation frequency of session: Monthly  Anticipated Duration of each session: 38-52 minutes  Treatment plan will be reviewed in 90 days or when goals have been changed.       MeasurableTreatment Goal(s) related to diagnosis / functional impairment(s)  Goal 1: Patient will report absence of persistent anxiety mood and report of reduced frequency and intensity of worry and absence of persistent anxious mood to acceptable levels, no panic attacks, report subjective comfort with rumination or a period of 90 days. Within 6 months as clinically observed and  by patient self-report    I will know I've met my goal when manage my anxiety.      Objective #A (Patient Action)    Patient will demonstrate and report a level of anxiety that can be managed at a lower level of care.  Absence of persistent anxious mood and report of reduced frequency and intensity of worry and absence of persistent anxious mood to acceptable levels, no panic attacks, report subjective comfort with rumination for a period of 90 days, within 6 months as clinically observed and by patient self-report.  Status: Continued - Date(s): 3/21/2023    Intervention(s)  Therapist will provide individual therapy to identify triggers to anxiety, gain feedback on helpful coping tools and thought-reframing techniques, and identify preferred way of being. Tx to include discuss current stressors and interpersonal conflicts and how to cope with these, coaching, diagnostic testing, referral for medication as indicated, use prescribed medication, cognitive restructuring, interpersonal, family therapy, supportive therapy services.        Goal 2: Patient will report absence of persistent depression mood and report of reduced frequency and intensity of low mood and absence of persistent low energy and motivation to acceptable levels, report subjective improved motivation and increased energy for a period of 90 days, within 6 months as clinically observed and by patient self-report    I will know I've met my goal when feeling more balanced.      Objective #A (Patient Action)    Status: Continued - Date(s):3/21/2023  Patient will demonstrate and report a level of depression that can be managed at a lower level of care.  Absence of persistent depression mood and report of reduced frequency and intensity of low mood and absence of persistent low energy and motivation to acceptable levels, report subjective improved motivation and increased energy for a period of 90 days, within 6 months as clinically observed and by patient  self-report.    Intervention(s)  Therapist will provide individual therapy to identify triggers to depression, gain feedback on helpful coping tools and thought-reframing techniques, and identify preferred way of being.  Tx to include discussion of current stressors and interpersonal conflicts and how to cope with these, coaching, diagnostic testing, referral for medication as indicated, use prescribed medication, cognitive restructuring, interpersonal, family therapy, supportive therapy services.        Patient has reviewed and agreed to the above plan.      RADHA Neves  3/21/2023          [facility]               Name:   Mendy Roe     Therapist Name: Patito Humphries [facility]    SAFETY PLAN: 5/8/2023    Therapist suggested patient go stay with her daughters to allow self a break, speak to partner about where they are and where they are going and if patient continues to feel unsafe to go to the ED and patient agreed.            [facility]               Name:   Mendy Roe     Therapist Name: Patito Humphries [facility]    SAFETY PLAN:    Step 1: Warning signs / cues (thoughts, feelings, what I do, what others do) that tell me I'm not doing well:     What do I think?  What do I say to myself? I want to die      Pictures in my head: none     How do I feel? really sad, worried, angry, hopeless and annoyed     What do I do? sit in my room and be alone     When do I feel this way? relationship and money          Step 2: Coping strategies - Things I can do to help myself feel better:     Coping skills: use my coping skills and be around others      Games and activities: go outside, go for a walk and deep breathing     Focus on helpful thoughts: this is temporary, I will get through this, ride the wave and people care about me children      Step 3: People and places that help me feel better:     People:  daughters     Places (with permission): work       Step 4: People and things that are special to me that remind me why it's worth getting better:      My family        Step 6: Things that will help me stay safe:     be around others    Step 7: Professionals or agencies I can contact when I need help:     Suicide Prevention Lifeline: Call or Text 988     Local Crisis Services: 988     Call 911 or go to my nearest emergency department.     I helped develop this safety plan and agree to use it when needed.  I have been given a copy of this plan.      Client signature:     _________________________________________________________________  Today's date:  5/16/2023    Adapted from Safety Plan Template 2008 Rocio Neal and Ronnie Juan is reprinted with the express permission of the authors.  No portion of the Safety Plan Template may be reproduced without the express, written permission.  You can contact the authors at [email or web address] or [email or web address].

## 2023-06-06 ENCOUNTER — VIRTUAL VISIT (OUTPATIENT)
Dept: PSYCHOLOGY | Facility: CLINIC | Age: 43
End: 2023-06-06
Payer: COMMERCIAL

## 2023-06-06 DIAGNOSIS — F41.1 GAD (GENERALIZED ANXIETY DISORDER): Primary | ICD-10-CM

## 2023-06-06 DIAGNOSIS — F90.2 ATTENTION DEFICIT HYPERACTIVITY DISORDER (ADHD), COMBINED TYPE: ICD-10-CM

## 2023-06-06 DIAGNOSIS — F33.0 MDD (MAJOR DEPRESSIVE DISORDER), RECURRENT EPISODE, MILD (H): ICD-10-CM

## 2023-06-06 PROCEDURE — 90791 PSYCH DIAGNOSTIC EVALUATION: CPT | Mod: VID | Performed by: SOCIAL WORKER

## 2023-06-06 ASSESSMENT — ANXIETY QUESTIONNAIRES
GAD7 TOTAL SCORE: 6
IF YOU CHECKED OFF ANY PROBLEMS ON THIS QUESTIONNAIRE, HOW DIFFICULT HAVE THESE PROBLEMS MADE IT FOR YOU TO DO YOUR WORK, TAKE CARE OF THINGS AT HOME, OR GET ALONG WITH OTHER PEOPLE: SOMEWHAT DIFFICULT
3. WORRYING TOO MUCH ABOUT DIFFERENT THINGS: SEVERAL DAYS
GAD7 TOTAL SCORE: 6
1. FEELING NERVOUS, ANXIOUS, OR ON EDGE: SEVERAL DAYS
4. TROUBLE RELAXING: SEVERAL DAYS
2. NOT BEING ABLE TO STOP OR CONTROL WORRYING: SEVERAL DAYS
8. IF YOU CHECKED OFF ANY PROBLEMS, HOW DIFFICULT HAVE THESE MADE IT FOR YOU TO DO YOUR WORK, TAKE CARE OF THINGS AT HOME, OR GET ALONG WITH OTHER PEOPLE?: SOMEWHAT DIFFICULT
6. BECOMING EASILY ANNOYED OR IRRITABLE: SEVERAL DAYS
GAD7 TOTAL SCORE: 6
7. FEELING AFRAID AS IF SOMETHING AWFUL MIGHT HAPPEN: NOT AT ALL
7. FEELING AFRAID AS IF SOMETHING AWFUL MIGHT HAPPEN: NOT AT ALL
5. BEING SO RESTLESS THAT IT IS HARD TO SIT STILL: SEVERAL DAYS

## 2023-06-06 ASSESSMENT — PATIENT HEALTH QUESTIONNAIRE - PHQ9
SUM OF ALL RESPONSES TO PHQ QUESTIONS 1-9: 10
SUM OF ALL RESPONSES TO PHQ QUESTIONS 1-9: 10
10. IF YOU CHECKED OFF ANY PROBLEMS, HOW DIFFICULT HAVE THESE PROBLEMS MADE IT FOR YOU TO DO YOUR WORK, TAKE CARE OF THINGS AT HOME, OR GET ALONG WITH OTHER PEOPLE: SOMEWHAT DIFFICULT

## 2023-06-06 NOTE — PROGRESS NOTES
"    [facility]      PATIENT'S NAME: Mendy Roe  PREFERRED NAME: La  PRONOUNS:     she/her/hers  MRN: [id number]  : 1980  ADDRESS: [address] [place], [place] [address] [address]  [facility]:  [id number]  DATE OF SERVICE: 23  START TIME: 1106  END TIME: 1134  PREFERRED PHONE: [phone]  May we leave a program related message: Yes  SERVICE MODALITY:  Video Visit:      Provider verified identity through the following two step process.  Patient provided:  Patient is known previously to provider    Telemedicine Visit: The patient's condition can be safely assessed and treated via synchronous audio and visual telemedicine encounter.      Reason for Telemedicine Visit: Patient has requested telehealth visit    Originating Site (Patient Location): Patient's other car    Distant Site (Provider Location): Provider Remote Setting- Home Office    Consent:  The patient/guardian has verbally consented to: the potential risks and benefits of telemedicine (video visit) versus in person care; bill my insurance or make self-payment for services provided; and responsibility for payment of non-covered services.     Patient would like the video invitation sent by:  Send to e-mail at: [email or web address]    Mode of Communication:  Video Conference via Amwell    Distant Location (Provider):  Off-site    As the provider I attest to compliance with applicable laws and regulations related to telemedicine.    UNIVERSAL ADULT Mental Health DIAGNOSTIC ASSESSMENT    Identifying Information:  Patient is a 42 year old,   individual.  Patient was referred for an assessment by self.  Patient attended the session alone.    Chief Complaint:   The reason for seeking services at this time is: \"follow up depression care\".  The problem(s) began  .    Patient has attempted to resolve these concerns in the past through psychiatry and outpatient therapy.    Social/Family History:  Patient reported they grew up in " other different places.  They were raised by biological parents; biological mother  .  Parents  / .  Patient reported that their childhood was good up until mom left dad and then little memory.  Patient described their current relationships with family of origin as good with ups and downs.     The patient describes their cultural background as .  Cultural influences and impact on patient's life structure, values, norms, and healthcare: unknown.  Contextual influences on patient's health include: Contextual Factors: Individual Factors none.    These factors will be addressed in the Preliminary Treatment plan. Patient identified their preferred language to be English. Patient reported they does not need the assistance of an  or other support involved in therapy.     Patient reported had no significant delays in developmental tasks.   Patient's highest education level was associate degree / vocational certificate  .  Patient identified the following learning problems: attention and concentration.  Modifications will not be used to assist communication in therapy.  Patient reports they are  able to understand written materials.    Patient reported the following relationship history.  Patient's current relationship status is has a partner or significant other for 7 years.   Patient identified their sexual orientation as heterosexual.  Patient reported having 4 child(singh). Patient identified partner as part of their support system.  Patient identified the quality of these relationships as good,  .      Patient's current living/housing situation involves staying with someone.  The immediate members of family and household include not applicable, adult,other  and they report that housing is stable.    Patient is currently employed fulltime.  Patient reports their finances are obtained through employment. Patient does identify finances as a current stressor.      Patient reported that  they have not been involved with the legal system.  . Patient does not report being under probation/ parole/ jurisdiction. They are not under any current court jurisdiction. .    Patient's Strengths and Limitations:  Patient identified the following strengths or resources that will help them succeed in treatment: insight, motivation and sense of humor. Things that may interfere with the patient's success in treatment include: none identified.     Assessments:  The following assessments were completed by patient for this visit:  PHQ9:       7/28/2022     1:00 PM 10/10/2022     5:22 PM 11/22/2022     4:00 PM 1/20/2023    11:56 AM 3/21/2023     3:00 PM 4/17/2023     2:27 PM 6/6/2023     9:46 AM   PHQ-9 SCORE   PHQ-9 Total Score MyChart  18 (Moderately severe depression)  19 (Moderately severe depression)  20 (Severe depression) 10 (Moderate depression)   PHQ-9 Total Score 16 18 15 19 19 20 10     GAD7:       3/1/2022     1:00 PM 5/19/2022     4:52 PM 5/21/2022     8:00 AM 7/28/2022     1:00 PM 11/22/2022     4:00 PM 3/21/2023     3:00 PM 6/6/2023     9:46 AM   MEG-7 SCORE   Total Score  20 (severe anxiety) 21 (severe anxiety)    6 (mild anxiety)   Total Score 17 20 21 13 13 20 6     PROMIS 10-Global Health (all questions and answers displayed):       5/21/2022     8:01 AM 7/28/2022     1:00 PM 11/22/2022     4:00 PM 3/21/2023     3:00 PM 6/6/2023     9:59 AM   PROMIS 10   In general, would you say your health is: Fair    Good   In general, would you say your quality of life is: Poor    Good   In general, how would you rate your physical health? Poor    Good   In general, how would you rate your mental health, including your mood and your ability to think? Poor    Good   In general, how would you rate your satisfaction with your social activities and relationships? Poor    Fair   In general, please rate how well you carry out your usual social activities and roles Fair    Good   To what extent are you able to carry out  your everyday physical activities such as walking, climbing stairs, carrying groceries, or moving a chair? A little    Mostly   In the past 7 days, how often have you been bothered by emotional problems such as feeling anxious, depressed, or irritable? Always    Rarely   In the past 7 days, how would you rate your fatigue on average? Very severe    Very severe   In the past 7 days, how would you rate your pain on average, where 0 means no pain, and 10 means worst imaginable pain? 8    5   In general, would you say your health is: 2 3 3 3 3   In general, would you say your quality of life is: 1 3 2 3 3   In general, how would you rate your physical health? 1 3 2 3 3   In general, how would you rate your mental health, including your mood and your ability to think? 1 2 2 1 3   In general, how would you rate your satisfaction with your social activities and relationships? 1 3 2 1 2   In general, please rate how well you carry out your usual social activities and roles. (This includes activities at home, at work and in your community, and responsibilities as a parent, child, spouse, employee, friend, etc.) 2 3 2 3 3   To what extent are you able to carry out your everyday physical activities such as walking, climbing stairs, carrying groceries, or moving a chair? 2 3 2 3 4   In the past 7 days, how often have you been bothered by emotional problems such as feeling anxious, depressed, or irritable? 5 5 5 5 2   In the past 7 days, how would you rate your fatigue on average? 5 5 5 5 5   In the past 7 days, how would you rate your pain on average, where 0 means no pain, and 10 means worst imaginable pain? 8 7 7 6 5   Global Mental Health Score 4 9 7 6 12   Global Physical Health Score 6 9 7 10 11   PROMIS TOTAL - SUBSCORES 10 18 14 16 23     [place] Suicide Severity Rating Scale (Short Version)      7/6/2021     4:00 PM 12/21/2021     1:00 PM 10/11/2022     5:00 PM 11/22/2022     4:00 PM 3/21/2023     3:00 PM   [place]  Suicide Severity Rating (Short Version)   Over the past 2 weeks have you felt down, depressed, or hopeless? yes yes      Over the past 2 weeks have you had thoughts of killing yourself? no no      Have you ever attempted to kill yourself? no no      1. Wish to be Dead (Since Last Contact)   N N N   2. Non-Specific Active Suicidal Thoughts (Since Last Contact)   N N N   Actual Attempt (Since Last Contact)   N N N   Has subject engaged in non-suicidal self-injurious behavior? (Since Last Contact)   N N N   Interrupted Attempts (Since Last Contact)   N N N   Aborted or Self-Interrupted Attempt (Since Last Contact)   N N N   Preparatory Acts or Behavior (Since Last Contact)   N N N   Suicide (Since Last Contact)   N N N   Calculated C-SSRS Risk Score (Since Last Contact)   No Risk Indicated No Risk Indicated No Risk Indicated       Personal and Family Medical History:  Patient does report a family history of mental health concerns.  Patient reports family history includes Bipolar Disorder in her brother; Cerebral aneurysm in her father; Coronary Artery Disease in her paternal grandmother; Depression in her daughter, daughter, father, and mother; Mental Illness in her daughter; No Known Problems in her daughter; Valvular heart disease in her mother..     Patient does report Mental Health Diagnosis and/or Treatment.  Patient Patient reported the following previous diagnoses which include(s): ADHD; an anxiety disorder; depression; an eating disorder; obsessive compulsive disorder; a personality disorder; PTSD .    Patient has received mental health services in the past:  therapy; psychiatry  .  Psychiatric Hospitalizations: none when   Patient denies a history of civil commitment.  Currently, patient none  receiving other mental health services.  These include [facility] and psychiatry and outpatient therapy with writer.         Patient has had a physical exam to rule out medical causes for current symptoms.  Date of last  physical exam was within the past year. Client was encouraged to follow up with PCP if symptoms were to develop. The patient has a [facility] Primary Care Provider, who is named Nikita Galan.  Patient reports no current medical concerns.  Patient denies any issues with pain..   There are not significant appetite / nutritional concerns / weight changes.   Patient does not report a history of head injury / trauma / cognitive impairment.      Patient reports current meds as:   No outpatient medications have been marked as taking for the 6/6/23 encounter (Appointment) with Patito Humphries LICSW.       Medication Adherence:  Patient reports taking.  .    Patient Allergies:    Allergies   Allergen Reactions     Amoxicillin      Amoxicillin-Pot Clavulanate Nausea     Buspirone Nausea     Clavulanic Acid      Duloxetine Rash     Lamotrigine Rash       Medical History:    Past Medical History:   Diagnosis Date     Anxiety      Depression      Shortness of breath      Sleep apnea     Has had 2 sleep studies in past         Current Mental Status Exam:   Appearance:  Appropriate    Eye Contact:  Good   Psychomotor:  Restless       Gait / station:  no problem  Attitude / Demeanor: Cooperative  Interested  Speech      Rate / Production: Emotional Talkative      Volume:  Normal  volume      Language:  intact  Mood:   Anxious   Affect:   Worrisome    Thought Content: Clear   Thought Process: Coherent       Associations: No loosening of associations  Insight:   Fair   Judgment:  Intact   Orientation:  All  Attention/concentration: Fair      Substance Use:  Patient did report a family history of substance use concerns; see medical history section for details.  Patient has not received chemical dependency treatment in the past.  Patient has not ever been to detox.      Patient is not currently receiving any chemical dependency treatment.           Substance History of use Age of first use Date of last use     Pattern and  duration of use (include amounts and frequency)   Alcohol used in the past   14 06/01/23 Occasionally    Cannabis   never used          Amphetamines   currently use   06/06/23 Prescribed    Cocaine/crack    never used          Hallucinogens never used            Inhalants never used            Heroin never used            Other Opiates used in the past 17 - with surgery only 05/28/22    Benzodiazepine   currently use adult 05/16/23 prescribed   Barbiturates never used        Over the counter meds never used        Caffeine currently use infant      Nicotine  used in the past 13 12/02/16    Other substances not listed above:  Identify:  never used          Patient reported the following problems as a result of their substance use: no problems, not applicable.    Substance Use: No symptoms    Based on the negative CAGE score and clinical interview there  are not indications of drug or alcohol abuse.      Significant Losses / Trauma / Abuse / Neglect Issues:   Patient did not  serve in the .  There are indications or report of significant loss, trauma, abuse or neglect issues related to: are indications or report of significant loss, trauma, abuse or neglect issues related to.  Concerns for possible neglect are not present.    Safety Assessment:   Patient denies current homicidal ideation and behaviors.  Patient denies current self-injurious ideation and behaviors.    Patient denied risk behaviors associated with substance use.  Patient denies any high risk behaviors associated with mental health symptoms.  Patient reports the following current concerns for their personal safety: None.  Patient reports there are not firearms in the house.      History of Safety Concerns:  Patient denied a history of homicidal ideation.     Patient has one time in therapy disclosed SI. Safety plan was created and patient was able to contract for safety and remain safe.   Patient denied a history of assaultive behaviors.     Patient denied a history of sexual assault behaviors.     Patient denied a history of risk behaviors associated with substance use.  Patient denies any history of high risk behaviors associated with mental health symptoms.  Patient reports the following protective factors: forward or future oriented thinking; dedication to family or friends; safe and stable environment; help seeking behaviors when distressed; agreement to use safety plan; daily obligations; structured day; access to a variety of clinical interventions and pets    Risk Plan:  See Recommendations for Safety and Risk Management Plan    Review of Symptoms per patient report:   Depression: Change in sleep, Lack of interest, Excessive or inappropriate guilt, Change in energy level, Difficulties concentrating, Change in appetite, Feelings of hopelessness, Feelings of helplessness, Ruminations, Irritability and Feeling sad, down, or depressed  Patricia:  No Symptoms  Psychosis: No Symptoms  Anxiety: Excessive worry, Nervousness, Physical complaints, such as headaches, stomachaches, muscle tension, Social anxiety, Sleep disturbance, Psychomotor agitation, Ruminations, Poor concentration and Irritability  Panic:  Palpitations  Post Traumatic Stress Disorder:  Experienced traumatic event childhood trauma   Eating Disorder: No Symptoms  ADD / ADHD:  Inattentive, Distractibility, Forgetful, Impulsive, Restlessness/fidgety and Hyperverbal  Conduct Disorder: No symptoms  Autism Spectrum Disorder: No symptoms  Obsessive Compulsive Disorder: No Symptoms    Patient reports the following compulsive behaviors and treatment history: none.      Diagnostic Criteria:   Generalized Anxiety Disorder  A. Excessive anxiety and worry about a number of events or activities (such as work or school performance).   B. The person finds it difficult to control the worry.  C. Select 3 or more symptoms (required for diagnosis). Only one item is required in children.   - Restlessness or  feeling keyed up or on edge.    - Being easily fatigued.    - Difficulty concentrating or mind going blank.    - Irritability.    - Muscle tension.    - Sleep disturbance (difficulty falling or staying asleep, or restless unsatisfying sleep).   D. The focus of the anxiety and worry is not confined to features of an Axis I disorder.  E. The anxiety, worry, or physical symptoms cause clinically significant distress or impairment in social, occupational, or other important areas of functioning.   F. The disturbance is not due to the direct physiological effects of a substance (e.g., a drug of abuse, a medication) or a general medical condition (e.g., hyperthyroidism) and does not occur exclusively during a Mood Disorder, a Psychotic Disorder, or a Pervasive Developmental Disorder. Major Depressive Disorder  CRITERIA (A-C) REPRESENT A MAJOR DEPRESSIVE EPISODE - SELECT THESE CRITERIA  A) Recurrent episode(s) - symptoms have been present during the same 2-week period and represent a change from previous functioning 5 or more symptoms (required for diagnosis)   - Depressed mood. Note: In children and adolescents, can be irritable mood.     - Diminished interest or pleasure in all, or almost all, activities.    - Increased sleep.    - Psychomotor activity agitation.    - Fatigue or loss of energy.    - Feelings of worthlessness or excessive guilt.    - Diminished ability to think or concentrate, or indecisiveness.   B) The symptoms cause clinically significant distress or impairment in social, occupational, or other important areas of functioning  C) The episode is not attributable to the physiological effects of a substance or to another medical condition  D) The occurence of major depressive episode is not better explained by other thought / psychotic disorders  E) There has never been a manic episode or hypomanic episode Attention Deficit Hyperactivity Disorder  A) A persistent pattern of inattention and/or  "hyperactivity-impulsivity that interferes with functioning or development, as characterized by (1) Inattention and/or (2) Hyperactivity and Impulsivity  (1) Inattention: 6 or more of the following symptoms have persisted for at least 6 months to a degree that is inconsistent with developmental level and that negatively impacts directly on social and academic/occupational activities:  - Often has difficulty sustaining attention in tasks or play activities  - Often does not seem to listen when spoken to directly  - Often avoids, dislikes, or is reluctant to engage in tasks that require sustained mental effort  - Often loses things necessary for tasks or activities  - Is often easily distractedby extraneous stimuli  - Is often forgetful in daily activities  (2) Hyeractivity and Impulsivity: 6 or more of the following symptoms have persisted for at least 6 months to a degree that is inconsistent with developmental level and that negatively impacts directly on social and academic/occupational activities:  - Is often \"on the go,\" acting as if \"driven by a motor\"  - Often talks excessively  - Often blurts out an answer before a question has been completed  - Often interrupts or intrudes on others  B) Several inattentive or hyperactive-impulsive symptoms were present prior to age 10 years  C) Several inattentive or hyperactive-impulsive symptoms are present in two or more settings  D) There is clear evidence that the symptoms interfere with, or reduce the quality of, social academic, or occupational functioning  E) The Symptoms do not occur exclusively during the course of schizophrenia or another psychotic disorder and are not better explained by another mental disorder    Functional Status:  Patient reports the following functional impairments:  home life with chldren and partner and relationship(s).     Nonprogrammatic care:  Patient is requesting basic services to address current mental health concerns.    Clinical " Summary:  1. Reason for assessment: ongoing depression and anxiety  .  2. Psychosocial, Cultural and Contextual Factors: childhood trauma, past relationships.   3. Principal DSM5 Diagnoses  (Sustained by DSM5 Criteria Listed Above):   Attention-Deficit/Hyperactivity Disorder  314.01 (F90.2) Combined presentation  296.31 (F33.0) Major Depressive Disorder, Recurrent Episode, Mild With mixed features  300.02 (F41.1) Generalized Anxiety Disorder.   .  6. Prognosis: Expect Improvement, Relieve Acute Symptoms and Maintain Current Status / Prevent Deterioration.  7. Likely consequences of symptoms if not treated: increase in symptoms.  8. Client strengths include:  caring, creative, educated, employed and has a previous history of therapy .     Recommendations:     1. Plan for Safety and Risk Management:   Safety and Risk: A safety and risk management plan has been developed including: Patient consented to co-developed safety plan on 5/16/2023.  Safety and risk management plan was reviewed.   Patient agreed to use safety plan should any safety concerns arise.  A copy was made available to the patient..          Report to child / adult protection services was NA.     2. Patient's identified none.     3. Initial Treatment will focus on:    Depressed Mood - motivation for change, Anxiety-managing emotions in the moment.     4. Resources/Service Plan:    services are not indicated.   Modifications to assist communication are not indicated.   Additional disability accommodations are not indicated.      5. Collaboration:   Collaboration / coordination of treatment will be initiated with the following  support professionals: none.      6.  Referrals:   The following referral(s) will be initiated: Outpatient [facility]. Next Scheduled Appointment: 6/2023.      A Release of Information has been obtained for the following: none.     Emergency Contact [place] [phone] was obtained.          7. MARIE:    MARIE:   Discussed the general effects of drugs and alcohol on health and well-being.   8. Records:   These were reviewed at time of assessment.   Information in this assessment was obtained from the medical record and  provided by patient who is a good historian.    Patient will have open access to their mental health medical record.    9.   Interactive Complexity: No      Provider Name/ Credentials:  RADHA Sanz  June 6, 2023        [facility]               Name:   Mendy Roe     Therapist Name: Patito HumphriesRADHA    SAFETY PLAN:    Step 1: Warning signs / cues (thoughts, feelings, what I do, what others do) that tell me I'm not doing well:     What do I think?  What do I say to myself? I want to die      Pictures in my head: none     How do I feel? really sad, worried, angry, hopeless and annoyed     What do I do? sit in my room and be alone     When do I feel this way? relationship and money          Step 2: Coping strategies - Things I can do to help myself feel better:     Coping skills: use my coping skills and be around others      Games and activities: go outside, go for a walk and deep breathing     Focus on helpful thoughts: this is temporary, I will get through this, ride the wave and people care about me children      Step 3: People and places that help me feel better:     People: daughters     Places (with permission): work       Step 4: People and things that are special to me that remind me why it's worth getting better:      My family        Step 6: Things that will help me stay safe:     be around others    Step 7: Professionals or agencies I can contact when I need help:     Suicide Prevention Lifeline: Call or Text 988     Local Crisis Services: 988     Call 911 or go to my nearest emergency department.     I helped develop this safety plan and agree to use it when needed.  I have been given a copy of this plan.      Client signature:      _________________________________________________________________  Today's date:  5/16/2023    Adapted from Safety Plan Template 2008 Rocio Neal and Ronnie Juan is reprinted with the express permission of the authors.  No portion of the Safety Plan Template may be reproduced without the express, written permission.  You can contact the authors at [email or web address] or [email or web address].

## 2023-06-20 ENCOUNTER — VIRTUAL VISIT (OUTPATIENT)
Dept: PSYCHOLOGY | Facility: CLINIC | Age: 43
End: 2023-06-20
Payer: COMMERCIAL

## 2023-06-20 DIAGNOSIS — F33.0 MDD (MAJOR DEPRESSIVE DISORDER), RECURRENT EPISODE, MILD (H): ICD-10-CM

## 2023-06-20 DIAGNOSIS — F41.1 GAD (GENERALIZED ANXIETY DISORDER): Primary | ICD-10-CM

## 2023-06-20 DIAGNOSIS — F90.2 ATTENTION DEFICIT HYPERACTIVITY DISORDER (ADHD), COMBINED TYPE: ICD-10-CM

## 2023-06-20 PROCEDURE — 90832 PSYTX W PT 30 MINUTES: CPT | Mod: VID | Performed by: SOCIAL WORKER

## 2023-06-20 NOTE — PROGRESS NOTES
[facility]                                     Progress Note    Patient Name: Mendy Roe  Date: 2023       Service Type: Individual      Session Start Time: 1205 Session End Time: 1238     Session Length: 16-37    Session #: 46    Attendees: Client    Service Modality:   Video Visit:      Provider verified identity through the following two step process.  Patient provided: Patient  and Patient previous known to provider     Telemedicine Visit: The patient's condition can be safely assessed and treated via synchronous audio and visual telemedicine encounter.       Reason for Telemedicine Visit: Patient has requested telehealth visit     Originating Site (Patient Location): Patient's home     Distant Site (Provider Location): Provider Remote Setting- Home Office     Consent:  The patient/guardian has verbally consented to: the potential risks and benefits of telemedicine (video visit) versus in person care; bill my insurance or make self-payment for services provided; and responsibility for payment of non-covered services.      Patient would like the video invitation sent by: email/text     Mode of Communication: Video Conference via [employer]     Distant Location (Provider): Off-site     As the provider I attest to compliance with applicable laws and regulations related to telemedicine.        DATA  Interactive Complexity: No  Crisis: No        Progress Since Last Session (Related to Symptoms / Goals / Homework):   Symptoms: Worsening new stressors    Homework: Partially completed      Episode of Care Goals: Minimal progress - PREPARATION (Decided to change - considering how); Intervened by negotiating a change plan and determining options / strategies for behavior change, identifying triggers, exploring social supports, and working towards setting a date to begin behavior change     Current / Ongoing Stressors and Concerns:   Blended family, moving, kids, grandchildren,  partner     Treatment Objective(s) Addressed in This Session:     Patient will demonstrate and report a level of anxiety that can be managed at a lower level of care.  Absence of persistent anxious mood and report of reduced frequency and intensity of worry and absence of persistent anxious mood to acceptable levels, no panic attacks, report subjective comfort with rumination for a period of 90 days, within 6 months as clinically observed and by patient self-report.  Patient will demonstrate and report a level of depression that can be managed at a lower level of care.  Absence of persistent depression mood and report of reduced frequency and intensity of low mood and absence of persistent low energy and motivation to acceptable levels, report subjective improved motivation and increased energy for a period of 90 days, within 6 months as clinically observed and by patient self-report.     Intervention:     Therapist met with patient to review goals and interventions. Therapist utilized reflected listening as patient gave brief reflection of week. Patient reported her and her partner found out the paternity of his 27 year old child. Patient processed an array of emotions. Therapist supported patient as she processed and validated patient. Therapist provided education on the situation and anxiety triggers and effective coping skills.   Patient presented with an anxious affect. Patient was engaged in session and open to feedback. Patient reported no safety concerns    There has been demonstrated improvement in functioning while patient has been engaged in psychotherapy/psychological service- if withdrawn the patient would deteriorate and/or relapse.       Assessments completed prior to visit:  The following assessments were completed by patient for this visit:  PHQ9:       7/28/2022     1:00 PM 10/10/2022     5:22 PM 11/22/2022     4:00 PM 1/20/2023    11:56 AM 3/21/2023     3:00 PM 4/17/2023     2:27 PM 6/6/2023      9:46 AM   PHQ-9 SCORE   PHQ-9 Total Score MyChart  18 (Moderately severe depression)  19 (Moderately severe depression)  20 (Severe depression) 10 (Moderate depression)   PHQ-9 Total Score 16 18 15 19 19 20 10     GAD7:       3/1/2022     1:00 PM 5/19/2022     4:52 PM 5/21/2022     8:00 AM 7/28/2022     1:00 PM 11/22/2022     4:00 PM 3/21/2023     3:00 PM 6/6/2023     9:46 AM   MEG-7 SCORE   Total Score  20 (severe anxiety) 21 (severe anxiety)    6 (mild anxiety)   Total Score 17 20 21 13 13 20 6           ASSESSMENT: Current Emotional / Mental Status (status of significant symptoms):   Risk status (Self / Other harm or suicidal ideation)   Patient denies current fears or concerns for personal safety.   Patient denies current or recent suicidal ideation or behaviors.   Patient denies current or recent homicidal ideation or behaviors.   Patient denies current or recent self injurious behavior or ideation.   Patient denies other safety concerns.   Patient reports there has been no change in risk factors since their last session.     Patient reports there has been no change in protective factors since their last session.     A safety and risk management plan has been developed including: Patient consented to co-developed safety plan on 5/8/2023.  Safety and risk management plan was reviewed.   Patient agreed to use safety plan should any safety concerns arise.  A copy was made available to the patient.     Appearance:   Appropriate    Eye Contact:   Fair    Psychomotor Behavior: Restless    Attitude:   Cooperative    Orientation:   All   Speech    Rate / Production: Emotional Talkative    Volume:  Normal    Mood:    Anxious  Depressed    Affect:    Worrisome    Thought Content:  Clear    Thought Form:  Coherent    Insight:    Fair      Medication Review:   No changes to current psychiatric medication(s)     Medication Compliance:   Yes     Changes in Health Issues:   None reported     Chemical Use Review:   Substance  Use: Chemical use reviewed, no active concerns identified      Tobacco Use: No current tobacco use.      Diagnosis:  1. MEG (generalized anxiety disorder)    2. MDD (major depressive disorder), recurrent episode, mild (H)    3. Attention deficit hyperactivity disorder (ADHD), combined type        Collateral Reports Completed:   Not Applicable    PLAN: (Patient Tasks / Therapist Tasks / Other)  encouraged continued self care.   continue TMS  Pt to focus on self  Therapist provided education on the situation and anxiety triggers and effective coping skills.     Patito Humphries, [facility]  6/20/2023                                                           ______________________________________________________________________    Individual Treatment Plan    Patient's Name: Mendy Roe  YOB: 1980    Date of Creation: 12/21/2021  Date Treatment Plan Last Reviewed/Revised: 3/21/2023 due 6/21/2023    DSM5 Diagnoses: 296.31 (F33.0) Major Depressive Disorder, Recurrent Episode, Mild With mixed features or 300.02 (F41.1) Generalized Anxiety Disorder  Psychosocial / Contextual Factors: Blended family, moving, kids, grandchildren, partner  PROMIS (reviewed every 90 days): 6/6/2023    Referral / Collaboration:  Referral to another professional/service is not indicated at this time..    Anticipated number of session for this episode of care: 12  Anticipation frequency of session: Monthly  Anticipated Duration of each session: 38-52 minutes  Treatment plan will be reviewed in 90 days or when goals have been changed.       MeasurableTreatment Goal(s) related to diagnosis / functional impairment(s)  Goal 1: Patient will report absence of persistent anxiety mood and report of reduced frequency and intensity of worry and absence of persistent anxious mood to acceptable levels, no panic attacks, report subjective comfort with rumination or a period of 90 days. Within 6 months as clinically observed and by patient  self-report    I will know I've met my goal when manage my anxiety.      Objective #A (Patient Action)    Patient will demonstrate and report a level of anxiety that can be managed at a lower level of care.  Absence of persistent anxious mood and report of reduced frequency and intensity of worry and absence of persistent anxious mood to acceptable levels, no panic attacks, report subjective comfort with rumination for a period of 90 days, within 6 months as clinically observed and by patient self-report.  Status: Continued - Date(s): 3/21/2023    Intervention(s)  Therapist will provide individual therapy to identify triggers to anxiety, gain feedback on helpful coping tools and thought-reframing techniques, and identify preferred way of being. Tx to include discuss current stressors and interpersonal conflicts and how to cope with these, coaching, diagnostic testing, referral for medication as indicated, use prescribed medication, cognitive restructuring, interpersonal, family therapy, supportive therapy services.        Goal 2: Patient will report absence of persistent depression mood and report of reduced frequency and intensity of low mood and absence of persistent low energy and motivation to acceptable levels, report subjective improved motivation and increased energy for a period of 90 days, within 6 months as clinically observed and by patient self-report    I will know I've met my goal when feeling more balanced.      Objective #A (Patient Action)    Status: Continued - Date(s):3/21/2023  Patient will demonstrate and report a level of depression that can be managed at a lower level of care.  Absence of persistent depression mood and report of reduced frequency and intensity of low mood and absence of persistent low energy and motivation to acceptable levels, report subjective improved motivation and increased energy for a period of 90 days, within 6 months as clinically observed and by patient  self-report.    Intervention(s)  Therapist will provide individual therapy to identify triggers to depression, gain feedback on helpful coping tools and thought-reframing techniques, and identify preferred way of being.  Tx to include discussion of current stressors and interpersonal conflicts and how to cope with these, coaching, diagnostic testing, referral for medication as indicated, use prescribed medication, cognitive restructuring, interpersonal, family therapy, supportive therapy services.        Patient has reviewed and agreed to the above plan.      RADHA Neves  3/21/2023          [facility]               Name:   Mendy Roe     Therapist Name: Patito Humphries [facility]    SAFETY PLAN: 5/8/2023    Therapist suggested patient go stay with her daughters to allow self a break, speak to partner about where they are and where they are going and if patient continues to feel unsafe to go to the ED and patient agreed.            [facility]               Name:   Mendy Roe     Therapist Name: Patito Humphries [facility]    SAFETY PLAN:    Step 1: Warning signs / cues (thoughts, feelings, what I do, what others do) that tell me I'm not doing well:     What do I think?  What do I say to myself? I want to die      Pictures in my head: none     How do I feel? really sad, worried, angry, hopeless and annoyed     What do I do? sit in my room and be alone     When do I feel this way? relationship and money          Step 2: Coping strategies - Things I can do to help myself feel better:     Coping skills: use my coping skills and be around others      Games and activities: go outside, go for a walk and deep breathing     Focus on helpful thoughts: this is temporary, I will get through this, ride the wave and people care about me children      Step 3: People and places that help me feel better:     People:  daughters     Places (with permission): work       Step 4: People and things that are special to me that remind me why it's worth getting better:      My family        Step 6: Things that will help me stay safe:     be around others    Step 7: Professionals or agencies I can contact when I need help:     Suicide Prevention Lifeline: Call or Text 988     Local Crisis Services: 988     Call 911 or go to my nearest emergency department.     I helped develop this safety plan and agree to use it when needed.  I have been given a copy of this plan.      Client signature:     _________________________________________________________________  Today's date:  5/16/2023    Adapted from Safety Plan Template 2008 Rocio Neal and Ronnie Juan is reprinted with the express permission of the authors.  No portion of the Safety Plan Template may be reproduced without the express, written permission.  You can contact the authors at [email or web address] or [email or web address].

## 2023-06-30 ENCOUNTER — TRANSFERRED RECORDS (OUTPATIENT)
Dept: HEALTH INFORMATION MANAGEMENT | Facility: CLINIC | Age: 43
End: 2023-06-30
Payer: COMMERCIAL

## 2023-08-31 ENCOUNTER — VIRTUAL VISIT (OUTPATIENT)
Dept: PSYCHOLOGY | Facility: CLINIC | Age: 43
End: 2023-08-31
Payer: COMMERCIAL

## 2023-08-31 DIAGNOSIS — F41.1 GAD (GENERALIZED ANXIETY DISORDER): Primary | ICD-10-CM

## 2023-08-31 DIAGNOSIS — F33.0 MDD (MAJOR DEPRESSIVE DISORDER), RECURRENT EPISODE, MILD (H): ICD-10-CM

## 2023-08-31 DIAGNOSIS — F90.2 ATTENTION DEFICIT HYPERACTIVITY DISORDER (ADHD), COMBINED TYPE: ICD-10-CM

## 2023-08-31 PROCEDURE — 90834 PSYTX W PT 45 MINUTES: CPT | Mod: TEL | Performed by: SOCIAL WORKER

## 2023-08-31 NOTE — PROGRESS NOTES
"      [facility]                                     Progress Note    Patient Name: Mendy Roe  Date: 8/31/2023       Service Type: Individual      Session Start Time: 1210 Session End Time: 1251     Session Length: 38-52    Session #: 47    Attendees: Client    Service Modality:  Phone Visit:      Provider verified identity through the following two step process.  Patient provided:  Patient is known previously to provider     Telephone Visit: The patient's condition can be safely assessed and treated via synchronous audio telemedicine encounter.       Reason for Audio Telemedicine Visit: Services only offered telehealth     Originating Site (Patient Location): Patient's home     Distant Site (Provider Location): off site      Consent:  The patient/guardian has verbally consented to:      1. The potential risks and benefits of telemedicine (telephone visit) versus in person care;     The patient has been notified of the following:      \"We have found that certain health care needs can be provided without the need for a face to face visit.  This service lets us provide the care you need with a phone conversation.       I will have full access to your [facility] medical record during this entire phone call.   I will be taking notes for your medical record.      Since this is like an office visit, we will bill your insurance company for this service.       There are potential benefits and risks of telephone visits (e.g. limits to patient confidentiality) that differ from in-person visits.?Confidentiality still applies for telephone services, and nobody will record the visit.  It is important to be in a quiet, private space that is free of distractions (including cell phone or other devices) during the visit.??      If during the course of the call I believe a telephone visit is not appropriate, you will not be charged for this service\"     Consent has been obtained for this service by " care team member: Yes         DATA  Interactive Complexity: No  Crisis: No        Progress Since Last Session (Related to Symptoms / Goals / Homework):   Symptoms: Worsening stressors    Homework: Partially completed      Episode of Care Goals: Minimal progress - PREPARATION (Decided to change - considering how); Intervened by negotiating a change plan and determining options / strategies for behavior change, identifying triggers, exploring social supports, and working towards setting a date to begin behavior change     Current / Ongoing Stressors and Concerns:   Blended family, moving, kids, grandchildren, partner     Treatment Objective(s) Addressed in This Session:     Patient will demonstrate and report a level of anxiety that can be managed at a lower level of care.  Absence of persistent anxious mood and report of reduced frequency and intensity of worry and absence of persistent anxious mood to acceptable levels, no panic attacks, report subjective comfort with rumination for a period of 90 days, within 6 months as clinically observed and by patient self-report.  Patient will demonstrate and report a level of depression that can be managed at a lower level of care.  Absence of persistent depression mood and report of reduced frequency and intensity of low mood and absence of persistent low energy and motivation to acceptable levels, report subjective improved motivation and increased energy for a period of 90 days, within 6 months as clinically observed and by patient self-report.     Intervention:     Motivational Interviewing  Target Behavior:  avoidance    Stage of Change: PREPARATION (Decided to change - considering how)    MI Intervention: Co-Developed Goal: talk to partner about concerns and make a plan, Expressed Empathy/Understanding, Supported Autonomy, Collaboration, Evocation, Open-ended questions, and Reflections: simple and complex     Change Talk Expressed by the Patient: Ability to change  Reasons to change Need to change    Provider Response to Change Talk: E - Evoked more info from patient about behavior change, A - Affirmed patient's thoughts, decisions, or attempts at behavior change, and R - Reflected patient's change talk      There has been demonstrated improvement in functioning while patient has been engaged in psychotherapy/psychological service- if withdrawn the patient would deteriorate and/or relapse.       Assessments completed prior to visit:  The following assessments were completed by patient for this visit:  PHQ9:       7/28/2022     1:00 PM 10/10/2022     5:22 PM 11/22/2022     4:00 PM 1/20/2023    11:56 AM 3/21/2023     3:00 PM 4/17/2023     2:27 PM 6/6/2023     9:46 AM   PHQ-9 SCORE   PHQ-9 Total Score MyChart  18 (Moderately severe depression)  19 (Moderately severe depression)  20 (Severe depression) 10 (Moderate depression)   PHQ-9 Total Score 16 18 15 19 19 20 10     GAD7:       3/1/2022     1:00 PM 5/19/2022     4:52 PM 5/21/2022     8:00 AM 7/28/2022     1:00 PM 11/22/2022     4:00 PM 3/21/2023     3:00 PM 6/6/2023     9:46 AM   MEG-7 SCORE   Total Score  20 (severe anxiety) 21 (severe anxiety)    6 (mild anxiety)   Total Score 17 20 21 13 13 20 6           ASSESSMENT: Current Emotional / Mental Status (status of significant symptoms):   Risk status (Self / Other harm or suicidal ideation)   Patient denies current fears or concerns for personal safety.   Patient denies current or recent suicidal ideation or behaviors.   Patient denies current or recent homicidal ideation or behaviors.   Patient denies current or recent self injurious behavior or ideation.   Patient denies other safety concerns.   Patient reports there has been no change in risk factors since their last session.     Patient reports there has been no change in protective factors since their last session.     A safety and risk management plan has been developed including: Patient consented to co-developed  safety plan on 5/8/2023.  Safety and risk management plan was reviewed.   Patient agreed to use safety plan should any safety concerns arise.  A copy was made available to the patient.     Appearance:   Appropriate    Eye Contact:   Fair    Psychomotor Behavior: Restless    Attitude:   Cooperative    Orientation:   All   Speech    Rate / Production: Emotional Talkative    Volume:  Normal    Mood:    Anxious  Depressed    Affect:    Worrisome    Thought Content:  Clear    Thought Form:  Coherent    Insight:    Fair      Medication Review:   No changes to current psychiatric medication(s)     Medication Compliance:   Yes     Changes in Health Issues:   None reported     Chemical Use Review:   Substance Use: Chemical use reviewed, no active concerns identified      Tobacco Use: No current tobacco use.      Diagnosis:  1. MEG (generalized anxiety disorder)    2. MDD (major depressive disorder), recurrent episode, mild (H)    3. Attention deficit hyperactivity disorder (ADHD), combined type        Collateral Reports Completed:   Not Applicable    PLAN: (Patient Tasks / Therapist Tasks / Other)  encouraged continued self care.   continue TMS  Pt to focus on self  Therapist provided education on the situation and anxiety triggers and effective coping skills.   talk to partner about concerns and make a plan      Patito Humphries, [facility]  8/31/2023                                                           ______________________________________________________________________    Individual Treatment Plan    Patient's Name: Mendy Roe  YOB: 1980    Date of Creation: 12/21/2021  Date Treatment Plan Last Reviewed/Revised: 8/31/2023 due 11/31/2023    DSM5 Diagnoses: 296.31 (F33.0) Major Depressive Disorder, Recurrent Episode, Mild With mixed features or 300.02 (F41.1) Generalized Anxiety Disorder  Psychosocial / Contextual Factors: Blended family, moving, kids, grandchildren, partner  PROMIS (reviewed every 90  days): 6/6/2023    Referral / Collaboration:  Referral to another professional/service is not indicated at this time..    Anticipated number of session for this episode of care: 12  Anticipation frequency of session: Monthly  Anticipated Duration of each session: 38-52 minutes  Treatment plan will be reviewed in 90 days or when goals have been changed.       MeasurableTreatment Goal(s) related to diagnosis / functional impairment(s)  Goal 1: Patient will report absence of persistent anxiety mood and report of reduced frequency and intensity of worry and absence of persistent anxious mood to acceptable levels, no panic attacks, report subjective comfort with rumination or a period of 90 days. Within 6 months as clinically observed and by patient self-report    I will know I've met my goal when manage my anxiety.      Objective #A (Patient Action)    Patient will demonstrate and report a level of anxiety that can be managed at a lower level of care.  Absence of persistent anxious mood and report of reduced frequency and intensity of worry and absence of persistent anxious mood to acceptable levels, no panic attacks, report subjective comfort with rumination for a period of 90 days, within 6 months as clinically observed and by patient self-report.  Status: Continued - Date(s): 8/31/2023    Intervention(s)  Therapist will provide individual therapy to identify triggers to anxiety, gain feedback on helpful coping tools and thought-reframing techniques, and identify preferred way of being. Tx to include discuss current stressors and interpersonal conflicts and how to cope with these, coaching, diagnostic testing, referral for medication as indicated, use prescribed medication, cognitive restructuring, interpersonal, family therapy, supportive therapy services.        Goal 2: Patient will report absence of persistent depression mood and report of reduced frequency and intensity of low mood and absence of persistent low  energy and motivation to acceptable levels, report subjective improved motivation and increased energy for a period of 90 days, within 6 months as clinically observed and by patient self-report    I will know I've met my goal when feeling more balanced.      Objective #A (Patient Action)    Status: Continued - Date(s):8/31/2023  Patient will demonstrate and report a level of depression that can be managed at a lower level of care.  Absence of persistent depression mood and report of reduced frequency and intensity of low mood and absence of persistent low energy and motivation to acceptable levels, report subjective improved motivation and increased energy for a period of 90 days, within 6 months as clinically observed and by patient self-report.    Intervention(s)  Therapist will provide individual therapy to identify triggers to depression, gain feedback on helpful coping tools and thought-reframing techniques, and identify preferred way of being.  Tx to include discussion of current stressors and interpersonal conflicts and how to cope with these, coaching, diagnostic testing, referral for medication as indicated, use prescribed medication, cognitive restructuring, interpersonal, family therapy, supportive therapy services.        Patient has reviewed and agreed to the above plan.      RADHA Neves  8/31/2023          [facility]               Name:   Mendy Roe     [facility]    SAFETY PLAN: 5/8/2023    Therapist suggested patient go stay with her daughters to allow self a break, speak to partner about where they are and where they are going and if patient continues to feel unsafe to go to the ED and patient agreed.            [facility]               Name:   Mendy Roe     Therapist Name: Patito Humphries [facility]    SAFETY PLAN:    Step 1: Warning signs / cues (thoughts,  feelings, what I do, what others do) that tell me I'm not doing well:     What do I think?  What do I say to myself? I want to die      Pictures in my head:  none     How do I feel? really sad, worried, angry, hopeless and annoyed     What do I do? sit in my room and be alone     When do I feel this way?  relationship and money          Step 2: Coping strategies - Things I can do to help myself feel better:     Coping skills: use my coping skills and be around others      Games and activities: go outside, go for a walk and deep breathing     Focus on helpful thoughts: this is temporary, I will get through this, ride the wave and people care about me children      Step 3: People and places that help me feel better:     People: daughters     Places (with permission): work       Step 4: People and things that are special to me that remind me why it's worth getting better:      My family        Step 6: Things that will help me stay safe:     be around others    Step 7: Professionals or agencies I can contact when I need help:     Suicide Prevention Lifeline: Call or Text 988     Local Crisis Services: 988     Call 911 or go to my nearest emergency department.     I helped develop this safety plan and agree to use it when needed.  I have been given a copy of this plan.      Client signature:     _________________________________________________________________  Today's date:  5/16/2023    Adapted from Safety Plan Template 2008 Rocio Neal and Ronnie Juan is reprinted with the express permission of the authors.  No portion of the Safety Plan Template may be reproduced without the express, written permission.  You can contact the authors at [email or web address] or [email or web address].

## 2023-09-07 ENCOUNTER — VIRTUAL VISIT (OUTPATIENT)
Dept: PSYCHOLOGY | Facility: CLINIC | Age: 43
End: 2023-09-07
Payer: COMMERCIAL

## 2023-09-07 DIAGNOSIS — F41.1 GAD (GENERALIZED ANXIETY DISORDER): Primary | ICD-10-CM

## 2023-09-07 DIAGNOSIS — F33.0 MDD (MAJOR DEPRESSIVE DISORDER), RECURRENT EPISODE, MILD (H): ICD-10-CM

## 2023-09-07 PROCEDURE — 90834 PSYTX W PT 45 MINUTES: CPT | Mod: TEL | Performed by: SOCIAL WORKER

## 2023-09-07 NOTE — PROGRESS NOTES
"      [facility]                                     Progress Note    Patient Name: Mendy Roe  Date: 9/7/2023       Service Type: Individual      Session Start Time: 1203 Session End Time: 1249     Session Length: 38-52    Session #: 48    Attendees: Client    Service Modality:  Phone Visit:      Provider verified identity through the following two step process.  Patient provided:  Patient is known previously to provider     Telephone Visit: The patient's condition can be safely assessed and treated via synchronous audio telemedicine encounter.       Reason for Audio Telemedicine Visit: Services only offered telehealth     Originating Site (Patient Location): Patient's home     Distant Site (Provider Location): off site      Consent:  The patient/guardian has verbally consented to:      1. The potential risks and benefits of telemedicine (telephone visit) versus in person care;     The patient has been notified of the following:      \"We have found that certain health care needs can be provided without the need for a face to face visit.  This service lets us provide the care you need with a phone conversation.       I will have full access to your [facility] medical record during this entire phone call.   I will be taking notes for your medical record.      Since this is like an office visit, we will bill your insurance company for this service.       There are potential benefits and risks of telephone visits (e.g. limits to patient confidentiality) that differ from in-person visits.?Confidentiality still applies for telephone services, and nobody will record the visit.  It is important to be in a quiet, private space that is free of distractions (including cell phone or other devices) during the visit.??      If during the course of the call I believe a telephone visit is not appropriate, you will not be charged for this service\"     Consent has been obtained for this service by " care team member: Yes         DATA  Interactive Complexity: No  Crisis: No        Progress Since Last Session (Related to Symptoms / Goals / Homework):   Symptoms: Worsening stressors    Homework: Partially completed      Episode of Care Goals: Minimal progress - PREPARATION (Decided to change - considering how); Intervened by negotiating a change plan and determining options / strategies for behavior change, identifying triggers, exploring social supports, and working towards setting a date to begin behavior change     Current / Ongoing Stressors and Concerns:   Blended family, moving, kids, grandchildren, partner     Treatment Objective(s) Addressed in This Session:     Patient will demonstrate and report a level of anxiety that can be managed at a lower level of care.  Absence of persistent anxious mood and report of reduced frequency and intensity of worry and absence of persistent anxious mood to acceptable levels, no panic attacks, report subjective comfort with rumination for a period of 90 days, within 6 months as clinically observed and by patient self-report.  Patient will demonstrate and report a level of depression that can be managed at a lower level of care.  Absence of persistent depression mood and report of reduced frequency and intensity of low mood and absence of persistent low energy and motivation to acceptable levels, report subjective improved motivation and increased energy for a period of 90 days, within 6 months as clinically observed and by patient self-report.     Intervention:     Therapist met with patient to review goals and interventions. Therapist utilized reflected listening as patient gave brief reflection of week. Patient processed her birthday and feeling sad with that her money and relationship. Therapist supported patient as she processed and coached patient through effective communication along with suggesting patient take care of self first. Therapist utilized strength based  modality along with solution focused.   Patient presented with an anxious affect. Patient was engaged in session and open to feedback. Patient reported no safety concerns.       There has been demonstrated improvement in functioning while patient has been engaged in psychotherapy/psychological service- if withdrawn the patient would deteriorate and/or relapse.       Assessments completed prior to visit:  The following assessments were completed by patient for this visit:  PHQ9:       7/28/2022     1:00 PM 10/10/2022     5:22 PM 11/22/2022     4:00 PM 1/20/2023    11:56 AM 3/21/2023     3:00 PM 4/17/2023     2:27 PM 6/6/2023     9:46 AM   PHQ-9 SCORE   PHQ-9 Total Score MyChart  18 (Moderately severe depression)  19 (Moderately severe depression)  20 (Severe depression) 10 (Moderate depression)   PHQ-9 Total Score 16 18 15 19 19 20 10     GAD7:       3/1/2022     1:00 PM 5/19/2022     4:52 PM 5/21/2022     8:00 AM 7/28/2022     1:00 PM 11/22/2022     4:00 PM 3/21/2023     3:00 PM 6/6/2023     9:46 AM   MEG-7 SCORE   Total Score  20 (severe anxiety) 21 (severe anxiety)    6 (mild anxiety)   Total Score 17 20 21 13 13 20 6           ASSESSMENT: Current Emotional / Mental Status (status of significant symptoms):   Risk status (Self / Other harm or suicidal ideation)   Patient denies current fears or concerns for personal safety.   Patient denies current or recent suicidal ideation or behaviors.   Patient denies current or recent homicidal ideation or behaviors.   Patient denies current or recent self injurious behavior or ideation.   Patient denies other safety concerns.   Patient reports there has been no change in risk factors since their last session.     Patient reports there has been no change in protective factors since their last session.     A safety and risk management plan has been developed including: Patient consented to co-developed safety plan on 5/8/2023.  Safety and risk management plan was reviewed.    Patient agreed to use safety plan should any safety concerns arise.  A copy was made available to the patient.     Appearance:   phone    Eye Contact:   phone    Psychomotor Behavior: phone    Attitude:   Cooperative    Orientation:   All   Speech    Rate / Production: Emotional Talkative    Volume:  Normal    Mood:    Anxious  Depressed    Affect:    Worrisome    Thought Content:  Clear    Thought Form:  Coherent    Insight:    Fair      Medication Review:   No changes to current psychiatric medication(s)     Medication Compliance:   Yes     Changes in Health Issues:   None reported     Chemical Use Review:   Substance Use: Chemical use reviewed, no active concerns identified      Tobacco Use: No current tobacco use.      Diagnosis:  1. MEG (generalized anxiety disorder)    2. MDD (major depressive disorder), recurrent episode, mild (H)        Collateral Reports Completed:   Not Applicable    PLAN: (Patient Tasks / Therapist Tasks / Other)  encouraged continued self care.   continue TMS  Pt to focus on self  Therapist provided education on the situation and anxiety triggers and effective coping skills.   talk to partner about concerns and make a plan  coached patient through effective communication along with suggesting patient take care of self first.      Patito Humphries, [facility]  9/7/2023                                                           ______________________________________________________________________    Individual Treatment Plan    Patient's Name: Mendy Roe  YOB: 1980    Date of Creation: 12/21/2021  Date Treatment Plan Last Reviewed/Revised: 8/31/2023 due 11/31/2023    DSM5 Diagnoses: 296.31 (F33.0) Major Depressive Disorder, Recurrent Episode, Mild With mixed features or 300.02 (F41.1) Generalized Anxiety Disorder  Psychosocial / Contextual Factors: Blended family, moving, kids, grandchildren, partner  PROMIS (reviewed every 90 days): 6/6/2023    Referral /  Collaboration:  Referral to another professional/service is not indicated at this time..    Anticipated number of session for this episode of care: 12  Anticipation frequency of session: Monthly  Anticipated Duration of each session: 38-52 minutes  Treatment plan will be reviewed in 90 days or when goals have been changed.       MeasurableTreatment Goal(s) related to diagnosis / functional impairment(s)  Goal 1: Patient will report absence of persistent anxiety mood and report of reduced frequency and intensity of worry and absence of persistent anxious mood to acceptable levels, no panic attacks, report subjective comfort with rumination or a period of 90 days. Within 6 months as clinically observed and by patient self-report    I will know I've met my goal when manage my anxiety.      Objective #A (Patient Action)    Patient will demonstrate and report a level of anxiety that can be managed at a lower level of care.  Absence of persistent anxious mood and report of reduced frequency and intensity of worry and absence of persistent anxious mood to acceptable levels, no panic attacks, report subjective comfort with rumination for a period of 90 days, within 6 months as clinically observed and by patient self-report.  Status: Continued - Date(s): 8/31/2023    Intervention(s)  Therapist will provide individual therapy to identify triggers to anxiety, gain feedback on helpful coping tools and thought-reframing techniques, and identify preferred way of being. Tx to include discuss current stressors and interpersonal conflicts and how to cope with these, coaching, diagnostic testing, referral for medication as indicated, use prescribed medication, cognitive restructuring, interpersonal, family therapy, supportive therapy services.        Goal 2: Patient will report absence of persistent depression mood and report of reduced frequency and intensity of low mood and absence of persistent low energy and motivation to  acceptable levels, report subjective improved motivation and increased energy for a period of 90 days, within 6 months as clinically observed and by patient self-report    I will know I've met my goal when feeling more balanced.      Objective #A (Patient Action)    Status: Continued - Date(s):8/31/2023  Patient will demonstrate and report a level of depression that can be managed at a lower level of care.  Absence of persistent depression mood and report of reduced frequency and intensity of low mood and absence of persistent low energy and motivation to acceptable levels, report subjective improved motivation and increased energy for a period of 90 days, within 6 months as clinically observed and by patient self-report.    Intervention(s)  Therapist will provide individual therapy to identify triggers to depression, gain feedback on helpful coping tools and thought-reframing techniques, and identify preferred way of being.  Tx to include discussion of current stressors and interpersonal conflicts and how to cope with these, coaching, diagnostic testing, referral for medication as indicated, use prescribed medication, cognitive restructuring, interpersonal, family therapy, supportive therapy services.        Patient has reviewed and agreed to the above plan.      RADHA Neves  8/31/2023          [facility]               Name:   Mendy Roe     Therapist Name: RADHA Neves    SAFETY PLAN: 5/8/2023    Therapist suggested patient go stay with her daughters to allow self a break, speak to partner about where they are and where they are going and if patient continues to feel unsafe to go to the ED and patient agreed.            [facility]               Name:   Mendy Roe     Therapist Name: RADHA Neves    SAFETY PLAN:    Step 1: Warning signs / cues (thoughts, feelings, what I do, what  others do) that tell me I'm not doing well:     What do I think?  What do I say to myself? I want to die      Pictures in my head:  none     How do I feel? really sad, worried, angry, hopeless and annoyed     What do I do? sit in my room and be alone     When do I feel this way?  relationship and money          Step 2: Coping strategies - Things I can do to help myself feel better:     Coping skills: use my coping skills and be around others      Games and activities: go outside, go for a walk and deep breathing     Focus on helpful thoughts: this is temporary, I will get through this, ride the wave and people care about me children      Step 3: People and places that help me feel better:     People: daughters     Places (with permission): work       Step 4: People and things that are special to me that remind me why it's worth getting better:      My family        Step 6: Things that will help me stay safe:     be around others    Step 7: Professionals or agencies I can contact when I need help:     Suicide Prevention Lifeline: Call or Text 988     Local Crisis Services: 988     Call 911 or go to my nearest emergency department.     I helped develop this safety plan and agree to use it when needed.  I have been given a copy of this plan.      Client signature:     _________________________________________________________________  Today's date:  5/16/2023    Adapted from Safety Plan Template 2008 Rocio Neal and Ronnie Juan is reprinted with the express permission of the authors.  No portion of the Safety Plan Template may be reproduced without the express, written permission.  You can contact the authors at [email or web address] or [email or web address].

## 2023-09-19 ENCOUNTER — VIRTUAL VISIT (OUTPATIENT)
Dept: PSYCHOLOGY | Facility: CLINIC | Age: 43
End: 2023-09-19
Payer: COMMERCIAL

## 2023-09-19 DIAGNOSIS — F41.1 GAD (GENERALIZED ANXIETY DISORDER): Primary | ICD-10-CM

## 2023-09-19 DIAGNOSIS — F33.0 MDD (MAJOR DEPRESSIVE DISORDER), RECURRENT EPISODE, MILD (H): ICD-10-CM

## 2023-09-19 DIAGNOSIS — F90.2 ATTENTION DEFICIT HYPERACTIVITY DISORDER (ADHD), COMBINED TYPE: ICD-10-CM

## 2023-09-19 PROCEDURE — 90832 PSYTX W PT 30 MINUTES: CPT | Mod: TEL | Performed by: SOCIAL WORKER

## 2023-09-19 NOTE — PROGRESS NOTES
"      [facility]                                     Progress Note    Patient Name: Mendy Roe  Date: 9/19/2023       Service Type: Individual      Session Start Time: 1208 Session End Time: 1245     Session Length: 16-37    Session #: 49    Attendees: Client    Service Modality:  Phone Visit:      Provider verified identity through the following two step process.  Patient provided:  Patient is known previously to provider     Telephone Visit: The patient's condition can be safely assessed and treated via synchronous audio telemedicine encounter.       Reason for Audio Telemedicine Visit: Services only offered telehealth     Originating Site (Patient Location): Patient's home     Distant Site (Provider Location): off site      Consent:  The patient/guardian has verbally consented to:      1. The potential risks and benefits of telemedicine (telephone visit) versus in person care;     The patient has been notified of the following:      \"We have found that certain health care needs can be provided without the need for a face to face visit.  This service lets us provide the care you need with a phone conversation.       I will have full access to your [facility] medical record during this entire phone call.   I will be taking notes for your medical record.      Since this is like an office visit, we will bill your insurance company for this service.       There are potential benefits and risks of telephone visits (e.g. limits to patient confidentiality) that differ from in-person visits.?Confidentiality still applies for telephone services, and nobody will record the visit.  It is important to be in a quiet, private space that is free of distractions (including cell phone or other devices) during the visit.??      If during the course of the call I believe a telephone visit is not appropriate, you will not be charged for this service\"     Consent has been obtained for this service by " care team member: Yes         DATA  Interactive Complexity: No  Crisis: No        Progress Since Last Session (Related to Symptoms / Goals / Homework):   Symptoms: Improving per patient    Homework: Partially completed      Episode of Care Goals: Minimal progress - PREPARATION (Decided to change - considering how); Intervened by negotiating a change plan and determining options / strategies for behavior change, identifying triggers, exploring social supports, and working towards setting a date to begin behavior change     Current / Ongoing Stressors and Concerns:   Blended family, moving, kids, grandchildren, partner     Treatment Objective(s) Addressed in This Session:     Patient will demonstrate and report a level of anxiety that can be managed at a lower level of care.  Absence of persistent anxious mood and report of reduced frequency and intensity of worry and absence of persistent anxious mood to acceptable levels, no panic attacks, report subjective comfort with rumination for a period of 90 days, within 6 months as clinically observed and by patient self-report.  Patient will demonstrate and report a level of depression that can be managed at a lower level of care.  Absence of persistent depression mood and report of reduced frequency and intensity of low mood and absence of persistent low energy and motivation to acceptable levels, report subjective improved motivation and increased energy for a period of 90 days, within 6 months as clinically observed and by patient self-report.     Intervention:     Therapist met with patient to review goals and interventions. Therapist utilized reflected listening as patient gave brief reflection of week. Patient reported having a better couple of weeks and processed. Therapist supported patient as she processed and validated patient. Therapist suggested patient speak to therapist as if she was her partner on getting out her concerns and goals for the future. Patient  was able to and will continue to bring concerns and goals up to partner.   Patient presented with an anxious affect. Patient was engaged in session and open to feedback. Patient reported no safety concerns.       There has been demonstrated improvement in functioning while patient has been engaged in psychotherapy/psychological service- if withdrawn the patient would deteriorate and/or relapse.       Assessments completed prior to visit:  The following assessments were completed by patient for this visit:  PHQ9:       7/28/2022     1:00 PM 10/10/2022     5:22 PM 11/22/2022     4:00 PM 1/20/2023    11:56 AM 3/21/2023     3:00 PM 4/17/2023     2:27 PM 6/6/2023     9:46 AM   PHQ-9 SCORE   PHQ-9 Total Score MyChart  18 (Moderately severe depression)  19 (Moderately severe depression)  20 (Severe depression) 10 (Moderate depression)   PHQ-9 Total Score 16 18 15 19 19 20 10     GAD7:       3/1/2022     1:00 PM 5/19/2022     4:52 PM 5/21/2022     8:00 AM 7/28/2022     1:00 PM 11/22/2022     4:00 PM 3/21/2023     3:00 PM 6/6/2023     9:46 AM   MEG-7 SCORE   Total Score  20 (severe anxiety) 21 (severe anxiety)    6 (mild anxiety)   Total Score 17 20 21 13 13 20 6           ASSESSMENT: Current Emotional / Mental Status (status of significant symptoms):   Risk status (Self / Other harm or suicidal ideation)   Patient denies current fears or concerns for personal safety.   Patient denies current or recent suicidal ideation or behaviors.   Patient denies current or recent homicidal ideation or behaviors.   Patient denies current or recent self injurious behavior or ideation.   Patient denies other safety concerns.   Patient reports there has been no change in risk factors since their last session.     Patient reports there has been no change in protective factors since their last session.     A safety and risk management plan has been developed including: Patient consented to co-developed safety plan on 5/8/2023.  Safety and  risk management plan was reviewed.   Patient agreed to use safety plan should any safety concerns arise.  A copy was made available to the patient.     Appearance:   phone    Eye Contact:   phone    Psychomotor Behavior: phone    Attitude:   Cooperative    Orientation:   All   Speech    Rate / Production: Emotional Talkative    Volume:  Normal    Mood:    Anxious  Depressed    Affect:    Worrisome    Thought Content:  Clear    Thought Form:  Coherent    Insight:    Fair      Medication Review:   No changes to current psychiatric medication(s)     Medication Compliance:   Yes     Changes in Health Issues:   None reported     Chemical Use Review:   Substance Use: Chemical use reviewed, no active concerns identified      Tobacco Use: No current tobacco use.      Diagnosis:  1. MEG (generalized anxiety disorder)    2. MDD (major depressive disorder), recurrent episode, mild (H)    3. Attention deficit hyperactivity disorder (ADHD), combined type        Collateral Reports Completed:   Not Applicable    PLAN: (Patient Tasks / Therapist Tasks / Other)  encouraged continued self care.   continue TMS  Pt to focus on self  Therapist provided education on the situation and anxiety triggers and effective coping skills.   talk to partner about concerns and make a plan  coached patient through effective communication along with suggesting patient take care of self first.  will continue to bring concerns and goals up to partner.     Patito Humphries, [facility]  9/19/2023                                                           ______________________________________________________________________    Individual Treatment Plan    Patient's Name: Mendy Roe  YOB: 1980    Date of Creation: 12/21/2021  Date Treatment Plan Last Reviewed/Revised: 8/31/2023 due 11/31/2023    DSM5 Diagnoses: 296.31 (F33.0) Major Depressive Disorder, Recurrent Episode, Mild With mixed features or 300.02 (F41.1) Generalized Anxiety  Disorder  Psychosocial / Contextual Factors: Blended family, moving, kids, grandchildren, partner  PROMIS (reviewed every 90 days): 6/6/2023    Referral / Collaboration:  Referral to another professional/service is not indicated at this time..    Anticipated number of session for this episode of care: 12  Anticipation frequency of session: Monthly  Anticipated Duration of each session: 38-52 minutes  Treatment plan will be reviewed in 90 days or when goals have been changed.       MeasurableTreatment Goal(s) related to diagnosis / functional impairment(s)  Goal 1: Patient will report absence of persistent anxiety mood and report of reduced frequency and intensity of worry and absence of persistent anxious mood to acceptable levels, no panic attacks, report subjective comfort with rumination or a period of 90 days. Within 6 months as clinically observed and by patient self-report    I will know I've met my goal when manage my anxiety.      Objective #A (Patient Action)    Patient will demonstrate and report a level of anxiety that can be managed at a lower level of care.  Absence of persistent anxious mood and report of reduced frequency and intensity of worry and absence of persistent anxious mood to acceptable levels, no panic attacks, report subjective comfort with rumination for a period of 90 days, within 6 months as clinically observed and by patient self-report.  Status: Continued - Date(s): 8/31/2023    Intervention(s)  Therapist will provide individual therapy to identify triggers to anxiety, gain feedback on helpful coping tools and thought-reframing techniques, and identify preferred way of being. Tx to include discuss current stressors and interpersonal conflicts and how to cope with these, coaching, diagnostic testing, referral for medication as indicated, use prescribed medication, cognitive restructuring, interpersonal, family therapy, supportive therapy services.        Goal 2: Patient will report  absence of persistent depression mood and report of reduced frequency and intensity of low mood and absence of persistent low energy and motivation to acceptable levels, report subjective improved motivation and increased energy for a period of 90 days, within 6 months as clinically observed and by patient self-report    I will know I've met my goal when feeling more balanced.      Objective #A (Patient Action)    Status: Continued - Date(s):8/31/2023  Patient will demonstrate and report a level of depression that can be managed at a lower level of care.  Absence of persistent depression mood and report of reduced frequency and intensity of low mood and absence of persistent low energy and motivation to acceptable levels, report subjective improved motivation and increased energy for a period of 90 days, within 6 months as clinically observed and by patient self-report.    Intervention(s)  Therapist will provide individual therapy to identify triggers to depression, gain feedback on helpful coping tools and thought-reframing techniques, and identify preferred way of being.  Tx to include discussion of current stressors and interpersonal conflicts and how to cope with these, coaching, diagnostic testing, referral for medication as indicated, use prescribed medication, cognitive restructuring, interpersonal, family therapy, supportive therapy services.        Patient has reviewed and agreed to the above plan.      Patito Humphries [facility]  8/31/2023          [facility]               Name:   Mendy Roe     Therapist Name: Patito Humphries [facility]    SAFETY PLAN: 5/8/2023    Therapist suggested patient go stay with her daughters to allow self a break, speak to partner about where they are and where they are going and if patient continues to feel unsafe to go to the ED and patient agreed.            [facility]                Name:   Mendy Roe     Therapist Name: Patito Humphries, [facility]    SAFETY PLAN:    Step 1: Warning signs / cues (thoughts, feelings, what I do, what others do) that tell me I'm not doing well:     What do I think?  What do I say to myself? I want to die      Pictures in my head:  none     How do I feel? really sad, worried, angry, hopeless and annoyed     What do I do? sit in my room and be alone     When do I feel this way?  relationship and money          Step 2: Coping strategies - Things I can do to help myself feel better:     Coping skills: use my coping skills and be around others      Games and activities: go outside, go for a walk and deep breathing     Focus on helpful thoughts: this is temporary, I will get through this, ride the wave and people care about me children      Step 3: People and places that help me feel better:     People: daughters     Places (with permission): work       Step 4: People and things that are special to me that remind me why it's worth getting better:      My family        Step 6: Things that will help me stay safe:     be around others    Step 7: Professionals or agencies I can contact when I need help:     Suicide Prevention Lifeline: Call or Text 988     Local Crisis Services: 988     Call 911 or go to my nearest emergency department.     I helped develop this safety plan and agree to use it when needed.  I have been given a copy of this plan.      Client signature:     _________________________________________________________________  Today's date:  5/16/2023    Adapted from Safety Plan Template 2008 Rocio Neal and Ronnie Juan is reprinted with the express permission of the authors.  No portion of the Safety Plan Template may be reproduced without the express, written permission.  You can contact the authors at [email or web address] or [email or web address].

## 2023-10-03 ENCOUNTER — VIRTUAL VISIT (OUTPATIENT)
Dept: PSYCHOLOGY | Facility: CLINIC | Age: 43
End: 2023-10-03
Payer: COMMERCIAL

## 2023-10-03 DIAGNOSIS — F90.2 ATTENTION DEFICIT HYPERACTIVITY DISORDER (ADHD), COMBINED TYPE: ICD-10-CM

## 2023-10-03 DIAGNOSIS — F41.1 GAD (GENERALIZED ANXIETY DISORDER): Primary | ICD-10-CM

## 2023-10-03 PROCEDURE — 90832 PSYTX W PT 30 MINUTES: CPT | Mod: TEL | Performed by: SOCIAL WORKER

## 2023-10-03 NOTE — PROGRESS NOTES
"      [facility]                                     Progress Note    Patient Name: Mendy Roe  Date: 10/3/2023       Service Type: Individual      Session Start Time: 1205 Session End Time: 1243     Session Length: 16-37    Session #: 50    Attendees: Client    Service Modality:  Phone Visit:      Provider verified identity through the following two step process.  Patient provided:  Patient is known previously to provider     Telephone Visit: The patient's condition can be safely assessed and treated via synchronous audio telemedicine encounter.       Reason for Audio Telemedicine Visit: Services only offered telehealth     Originating Site (Patient Location): Patient's home     Distant Site (Provider Location): off site      Consent:  The patient/guardian has verbally consented to:      1. The potential risks and benefits of telemedicine (telephone visit) versus in person care;     The patient has been notified of the following:      \"We have found that certain health care needs can be provided without the need for a face to face visit.  This service lets us provide the care you need with a phone conversation.       I will have full access to your [facility] medical record during this entire phone call.   I will be taking notes for your medical record.      Since this is like an office visit, we will bill your insurance company for this service.       There are potential benefits and risks of telephone visits (e.g. limits to patient confidentiality) that differ from in-person visits.?Confidentiality still applies for telephone services, and nobody will record the visit.  It is important to be in a quiet, private space that is free of distractions (including cell phone or other devices) during the visit.??      If during the course of the call I believe a telephone visit is not appropriate, you will not be charged for this service\"     Consent has been obtained for this service by " care team member: Yes         DATA  Interactive Complexity: No  Crisis: No        Progress Since Last Session (Related to Symptoms / Goals / Homework):   Symptoms: Improving per patient    Homework: Partially completed      Episode of Care Goals: Minimal progress - PREPARATION (Decided to change - considering how); Intervened by negotiating a change plan and determining options / strategies for behavior change, identifying triggers, exploring social supports, and working towards setting a date to begin behavior change     Current / Ongoing Stressors and Concerns:   Blended family, moving, kids, grandchildren, partner     Treatment Objective(s) Addressed in This Session:     Patient will demonstrate and report a level of anxiety that can be managed at a lower level of care.  Absence of persistent anxious mood and report of reduced frequency and intensity of worry and absence of persistent anxious mood to acceptable levels, no panic attacks, report subjective comfort with rumination for a period of 90 days, within 6 months as clinically observed and by patient self-report.  Patient will demonstrate and report a level of depression that can be managed at a lower level of care.  Absence of persistent depression mood and report of reduced frequency and intensity of low mood and absence of persistent low energy and motivation to acceptable levels, report subjective improved motivation and increased energy for a period of 90 days, within 6 months as clinically observed and by patient self-report.     Intervention:     Therapist met with patient to review goals and interventions. Therapist utilized reflected listening as patient gave brief reflection of week. Patient reported having a  better week with family but work becoming more stressful with losing staff. Therapist supported patient as she processed and validated patient. Therapist suggested taking this weekend away as a couple to talk about the future and what she is  wanting and what she needs.   Patient presented with an anxious affect. Patient was engaged in session and open to feedback. Patient reported no safety concerns.       There has been demonstrated improvement in functioning while patient has been engaged in psychotherapy/psychological service- if withdrawn the patient would deteriorate and/or relapse.       Assessments completed prior to visit:  The following assessments were completed by patient for this visit:  PHQ9:       7/28/2022     1:00 PM 10/10/2022     5:22 PM 11/22/2022     4:00 PM 1/20/2023    11:56 AM 3/21/2023     3:00 PM 4/17/2023     2:27 PM 6/6/2023     9:46 AM   PHQ-9 SCORE   PHQ-9 Total Score MyChart  18 (Moderately severe depression)  19 (Moderately severe depression)  20 (Severe depression) 10 (Moderate depression)   PHQ-9 Total Score 16 18 15 19 19 20 10     GAD7:       3/1/2022     1:00 PM 5/19/2022     4:52 PM 5/21/2022     8:00 AM 7/28/2022     1:00 PM 11/22/2022     4:00 PM 3/21/2023     3:00 PM 6/6/2023     9:46 AM   MEG-7 SCORE   Total Score  20 (severe anxiety) 21 (severe anxiety)    6 (mild anxiety)   Total Score 17 20 21 13 13 20 6           ASSESSMENT: Current Emotional / Mental Status (status of significant symptoms):   Risk status (Self / Other harm or suicidal ideation)   Patient denies current fears or concerns for personal safety.   Patient denies current or recent suicidal ideation or behaviors.   Patient denies current or recent homicidal ideation or behaviors.   Patient denies current or recent self injurious behavior or ideation.   Patient denies other safety concerns.   Patient reports there has been no change in risk factors since their last session.     Patient reports there has been no change in protective factors since their last session.     A safety and risk management plan has been developed including: Patient consented to co-developed safety plan on 5/8/2023.  Safety and risk management plan was reviewed.   Patient  agreed to use safety plan should any safety concerns arise.  A copy was made available to the patient.     Appearance:   phone    Eye Contact:   phone    Psychomotor Behavior: phone    Attitude:   Cooperative    Orientation:   All   Speech    Rate / Production: Emotional Talkative    Volume:  Normal    Mood:    Anxious  Depressed    Affect:    Worrisome    Thought Content:  Clear    Thought Form:  Coherent    Insight:    Fair      Medication Review:   No changes to current psychiatric medication(s)     Medication Compliance:   Yes     Changes in Health Issues:   None reported     Chemical Use Review:   Substance Use: Chemical use reviewed, no active concerns identified      Tobacco Use: No current tobacco use.      Diagnosis:  1. MEG (generalized anxiety disorder)    2. Attention deficit hyperactivity disorder (ADHD), combined type        Collateral Reports Completed:   Not Applicable    PLAN: (Patient Tasks / Therapist Tasks / Other)  encouraged continued self care.   continue TMS  Pt to focus on self  Therapist provided education on the situation and anxiety triggers and effective coping skills.   talk to partner about concerns and make a plan  coached patient through effective communication along with suggesting patient take care of self first.  will continue to bring concerns and goals up to partner.   Therapist suggested taking this weekend away as a couple to talk about the future and what she is wanting and what she needs.     Patito Humphries, [facility]  10/3/2023                                                           ______________________________________________________________________    Individual Treatment Plan    Patient's Name: Mendy Roe  YOB: 1980    Date of Creation: 12/21/2021  Date Treatment Plan Last Reviewed/Revised: 8/31/2023 due 11/31/2023    DSM5 Diagnoses: 296.31 (F33.0) Major Depressive Disorder, Recurrent Episode, Mild With mixed features or 300.02 (F41.1) Generalized  Anxiety Disorder  Psychosocial / Contextual Factors: Blended family, moving, kids, grandchildren, partner  PROMIS (reviewed every 90 days): 6/6/2023    Referral / Collaboration:  Referral to another professional/service is not indicated at this time..    Anticipated number of session for this episode of care: 12  Anticipation frequency of session: Monthly  Anticipated Duration of each session: 38-52 minutes  Treatment plan will be reviewed in 90 days or when goals have been changed.       MeasurableTreatment Goal(s) related to diagnosis / functional impairment(s)  Goal 1: Patient will report absence of persistent anxiety mood and report of reduced frequency and intensity of worry and absence of persistent anxious mood to acceptable levels, no panic attacks, report subjective comfort with rumination or a period of 90 days. Within 6 months as clinically observed and by patient self-report    I will know I've met my goal when manage my anxiety.      Objective #A (Patient Action)    Patient will demonstrate and report a level of anxiety that can be managed at a lower level of care.  Absence of persistent anxious mood and report of reduced frequency and intensity of worry and absence of persistent anxious mood to acceptable levels, no panic attacks, report subjective comfort with rumination for a period of 90 days, within 6 months as clinically observed and by patient self-report.  Status: Continued - Date(s): 8/31/2023    Intervention(s)  Therapist will provide individual therapy to identify triggers to anxiety, gain feedback on helpful coping tools and thought-reframing techniques, and identify preferred way of being. Tx to include discuss current stressors and interpersonal conflicts and how to cope with these, coaching, diagnostic testing, referral for medication as indicated, use prescribed medication, cognitive restructuring, interpersonal, family therapy, supportive therapy services.        Goal 2: Patient will  report absence of persistent depression mood and report of reduced frequency and intensity of low mood and absence of persistent low energy and motivation to acceptable levels, report subjective improved motivation and increased energy for a period of 90 days, within 6 months as clinically observed and by patient self-report    I will know I've met my goal when feeling more balanced.      Objective #A (Patient Action)    Status: Continued - Date(s):8/31/2023  Patient will demonstrate and report a level of depression that can be managed at a lower level of care.  Absence of persistent depression mood and report of reduced frequency and intensity of low mood and absence of persistent low energy and motivation to acceptable levels, report subjective improved motivation and increased energy for a period of 90 days, within 6 months as clinically observed and by patient self-report.    Intervention(s)  Therapist will provide individual therapy to identify triggers to depression, gain feedback on helpful coping tools and thought-reframing techniques, and identify preferred way of being.  Tx to include discussion of current stressors and interpersonal conflicts and how to cope with these, coaching, diagnostic testing, referral for medication as indicated, use prescribed medication, cognitive restructuring, interpersonal, family therapy, supportive therapy services.        Patient has reviewed and agreed to the above plan.      Patito Humphries [facility]  8/31/2023          [facility]               Name:   Mendy Roe     Therapist Name: Patito Humphries [facility]    SAFETY PLAN: 5/8/2023    Therapist suggested patient go stay with her daughters to allow self a break, speak to partner about where they are and where they are going and if patient continues to feel unsafe to go to the ED and patient agreed.            [facility]                Name:   Mendy Roe     Therapist Name: Patito Humphries, [facility]    SAFETY PLAN:    Step 1: Warning signs / cues (thoughts, feelings, what I do, what others do) that tell me I'm not doing well:     What do I think?  What do I say to myself? I want to die      Pictures in my head:  none     How do I feel? really sad, worried, angry, hopeless and annoyed     What do I do? sit in my room and be alone     When do I feel this way?  relationship and money          Step 2: Coping strategies - Things I can do to help myself feel better:     Coping skills: use my coping skills and be around others      Games and activities: go outside, go for a walk and deep breathing     Focus on helpful thoughts: this is temporary, I will get through this, ride the wave and people care about me children      Step 3: People and places that help me feel better:     People: daughters     Places (with permission): work       Step 4: People and things that are special to me that remind me why it's worth getting better:      My family        Step 6: Things that will help me stay safe:     be around others    Step 7: Professionals or agencies I can contact when I need help:     Suicide Prevention Lifeline: Call or Text 988     Local Crisis Services: 988     Call 911 or go to my nearest emergency department.     I helped develop this safety plan and agree to use it when needed.  I have been given a copy of this plan.      Client signature:     _________________________________________________________________  Today's date:  5/16/2023    Adapted from Safety Plan Template 2008 Rocio Neal and Ronnie Juan is reprinted with the express permission of the authors.  No portion of the Safety Plan Template may be reproduced without the express, written permission.  You can contact the authors at [email or web address] or [email or web address]

## 2023-10-08 ENCOUNTER — HEALTH MAINTENANCE LETTER (OUTPATIENT)
Age: 43
End: 2023-10-08

## 2023-10-17 ENCOUNTER — VIRTUAL VISIT (OUTPATIENT)
Dept: PSYCHOLOGY | Facility: CLINIC | Age: 43
End: 2023-10-17
Payer: COMMERCIAL

## 2023-10-17 DIAGNOSIS — F90.2 ATTENTION DEFICIT HYPERACTIVITY DISORDER (ADHD), COMBINED TYPE: ICD-10-CM

## 2023-10-17 DIAGNOSIS — F41.1 GAD (GENERALIZED ANXIETY DISORDER): Primary | ICD-10-CM

## 2023-10-17 DIAGNOSIS — F33.0 MDD (MAJOR DEPRESSIVE DISORDER), RECURRENT EPISODE, MILD (H): ICD-10-CM

## 2023-10-17 PROCEDURE — 90832 PSYTX W PT 30 MINUTES: CPT | Mod: TEL | Performed by: SOCIAL WORKER

## 2023-10-17 NOTE — PROGRESS NOTES
"      [facility]                                     Progress Note    Patient Name: Mendy Roe  Date: 10/17/2023       Service Type: Individual      Session Start Time: 1401        Session End Time: 1430     Session Length: 16-37    Session #: 51    Attendees: Client    Service Modality:  Phone Visit:      Provider verified identity through the following two step process.  Patient provided:  Patient is known previously to provider     Telephone Visit: The patient's condition can be safely assessed and treated via synchronous audio telemedicine encounter.       Reason for Audio Telemedicine Visit: Services only offered telehealth     Originating Site (Patient Location): Patient's home     Distant Site (Provider Location): off site      Consent:  The patient/guardian has verbally consented to:      1. The potential risks and benefits of telemedicine (telephone visit) versus in person care;     The patient has been notified of the following:      \"We have found that certain health care needs can be provided without the need for a face to face visit.  This service lets us provide the care you need with a phone conversation.       I will have full access to your [facility] medical record during this entire phone call.   I will be taking notes for your medical record.      Since this is like an office visit, we will bill your insurance company for this service.       There are potential benefits and risks of telephone visits (e.g. limits to patient confidentiality) that differ from in-person visits.?Confidentiality still applies for telephone services, and nobody will record the visit.  It is important to be in a quiet, private space that is free of distractions (including cell phone or other devices) during the visit.??      If during the course of the call I believe a telephone visit is not appropriate, you will not be charged for this service\"     Consent has been obtained for this " service by care team member: Yes         DATA  Interactive Complexity: No  Crisis: No        Progress Since Last Session (Related to Symptoms / Goals / Homework):   Symptoms: Improving per patient with stressors    Homework: Partially completed      Episode of Care Goals: Minimal progress - PREPARATION (Decided to change - considering how); Intervened by negotiating a change plan and determining options / strategies for behavior change, identifying triggers, exploring social supports, and working towards setting a date to begin behavior change     Current / Ongoing Stressors and Concerns:   Blended family, moving, kids, grandchildren, partner     Treatment Objective(s) Addressed in This Session:     Patient will demonstrate and report a level of anxiety that can be managed at a lower level of care.  Absence of persistent anxious mood and report of reduced frequency and intensity of worry and absence of persistent anxious mood to acceptable levels, no panic attacks, report subjective comfort with rumination for a period of 90 days, within 6 months as clinically observed and by patient self-report.  Patient will demonstrate and report a level of depression that can be managed at a lower level of care.  Absence of persistent depression mood and report of reduced frequency and intensity of low mood and absence of persistent low energy and motivation to acceptable levels, report subjective improved motivation and increased energy for a period of 90 days, within 6 months as clinically observed and by patient self-report.     Intervention:     Therapist met with patient to review goals and interventions. Therapist utilized reflected listening as patient gave brief reflection of week. Patient reported having a better couple of weeks with her own mental health and processed stressors at work and home. Therapist supported patient as she processed and validated patient. Therapist encouraged patient to speak to her partner  about her plan to move regardless of his to maintain expectations and communication in the relationship.   Patient presented with an anxious affect. Patient was engaged in session and open to feedback. Patient reported no safety concerns.       There has been demonstrated improvement in functioning while patient has been engaged in psychotherapy/psychological service- if withdrawn the patient would deteriorate and/or relapse.       Assessments completed prior to visit:  The following assessments were completed by patient for this visit:  PHQ9:       7/28/2022     1:00 PM 10/10/2022     5:22 PM 11/22/2022     4:00 PM 1/20/2023    11:56 AM 3/21/2023     3:00 PM 4/17/2023     2:27 PM 6/6/2023     9:46 AM   PHQ-9 SCORE   PHQ-9 Total Score MyChart  18 (Moderately severe depression)  19 (Moderately severe depression)  20 (Severe depression) 10 (Moderate depression)   PHQ-9 Total Score 16 18 15 19 19 20 10     GAD7:       3/1/2022     1:00 PM 5/19/2022     4:52 PM 5/21/2022     8:00 AM 7/28/2022     1:00 PM 11/22/2022     4:00 PM 3/21/2023     3:00 PM 6/6/2023     9:46 AM   MEG-7 SCORE   Total Score  20 (severe anxiety) 21 (severe anxiety)    6 (mild anxiety)   Total Score 17 20 21 13 13 20 6           ASSESSMENT: Current Emotional / Mental Status (status of significant symptoms):   Risk status (Self / Other harm or suicidal ideation)   Patient denies current fears or concerns for personal safety.   Patient denies current or recent suicidal ideation or behaviors.   Patient denies current or recent homicidal ideation or behaviors.   Patient denies current or recent self injurious behavior or ideation.   Patient denies other safety concerns.   Patient reports there has been no change in risk factors since their last session.     Patient reports there has been no change in protective factors since their last session.     A safety and risk management plan has been developed including: Patient consented to co-developed safety  plan on 5/8/2023.  Safety and risk management plan was reviewed.   Patient agreed to use safety plan should any safety concerns arise.  A copy was made available to the patient.     Appearance:   phone    Eye Contact:   phone    Psychomotor Behavior: phone    Attitude:   Cooperative    Orientation:   All   Speech    Rate / Production: Emotional Talkative    Volume:  Normal    Mood:    Anxious  Depressed    Affect:    Worrisome    Thought Content:  Clear    Thought Form:  Coherent    Insight:    Fair      Medication Review:   No changes to current psychiatric medication(s)     Medication Compliance:   Yes     Changes in Health Issues:   None reported     Chemical Use Review:   Substance Use: Chemical use reviewed, no active concerns identified      Tobacco Use: No current tobacco use.      Diagnosis:  1. MEG (generalized anxiety disorder)    2. MDD (major depressive disorder), recurrent episode, mild (H24)    3. Attention deficit hyperactivity disorder (ADHD), combined type        Collateral Reports Completed:   Not Applicable    PLAN: (Patient Tasks / Therapist Tasks / Other)  encouraged continued self care.   continue TMS  Pt to focus on self  Therapist provided education on the situation and anxiety triggers and effective coping skills.   Start working on self image     Patito Humphries, [facility]  10/17/2023                                                           ______________________________________________________________________    Individual Treatment Plan    Patient's Name: Mendy Roe  YOB: 1980    Date of Creation: 12/21/2021  Date Treatment Plan Last Reviewed/Revised: 8/31/2023 due 11/31/2023    DSM5 Diagnoses: 296.31 (F33.0) Major Depressive Disorder, Recurrent Episode, Mild With mixed features or 300.02 (F41.1) Generalized Anxiety Disorder  Psychosocial / Contextual Factors: Blended family, moving, kids, grandchildren, partner  PROMIS (reviewed every 90 days): 6/6/2023    Referral /  Collaboration:  Referral to another professional/service is not indicated at this time..    Anticipated number of session for this episode of care: 12  Anticipation frequency of session: Monthly  Anticipated Duration of each session: 38-52 minutes  Treatment plan will be reviewed in 90 days or when goals have been changed.       MeasurableTreatment Goal(s) related to diagnosis / functional impairment(s)  Goal 1: Patient will report absence of persistent anxiety mood and report of reduced frequency and intensity of worry and absence of persistent anxious mood to acceptable levels, no panic attacks, report subjective comfort with rumination or a period of 90 days. Within 6 months as clinically observed and by patient self-report    I will know I've met my goal when manage my anxiety.      Objective #A (Patient Action)    Patient will demonstrate and report a level of anxiety that can be managed at a lower level of care.  Absence of persistent anxious mood and report of reduced frequency and intensity of worry and absence of persistent anxious mood to acceptable levels, no panic attacks, report subjective comfort with rumination for a period of 90 days, within 6 months as clinically observed and by patient self-report.  Status: Continued - Date(s): 8/31/2023    Intervention(s)  Therapist will provide individual therapy to identify triggers to anxiety, gain feedback on helpful coping tools and thought-reframing techniques, and identify preferred way of being. Tx to include discuss current stressors and interpersonal conflicts and how to cope with these, coaching, diagnostic testing, referral for medication as indicated, use prescribed medication, cognitive restructuring, interpersonal, family therapy, supportive therapy services.        Goal 2: Patient will report absence of persistent depression mood and report of reduced frequency and intensity of low mood and absence of persistent low energy and motivation to  acceptable levels, report subjective improved motivation and increased energy for a period of 90 days, within 6 months as clinically observed and by patient self-report    I will know I've met my goal when feeling more balanced.      Objective #A (Patient Action)    Status: Continued - Date(s):8/31/2023  Patient will demonstrate and report a level of depression that can be managed at a lower level of care.  Absence of persistent depression mood and report of reduced frequency and intensity of low mood and absence of persistent low energy and motivation to acceptable levels, report subjective improved motivation and increased energy for a period of 90 days, within 6 months as clinically observed and by patient self-report.    Intervention(s)  Therapist will provide individual therapy to identify triggers to depression, gain feedback on helpful coping tools and thought-reframing techniques, and identify preferred way of being.  Tx to include discussion of current stressors and interpersonal conflicts and how to cope with these, coaching, diagnostic testing, referral for medication as indicated, use prescribed medication, cognitive restructuring, interpersonal, family therapy, supportive therapy services.        Patient has reviewed and agreed to the above plan.      RADHA Neves  8/31/2023          [facility]               Name:   Mendy Roe     Therapist Name: RADHA Neves    SAFETY PLAN: 5/8/2023    Therapist suggested patient go stay with her daughters to allow self a break, speak to partner about where they are and where they are going and if patient continues to feel unsafe to go to the ED and patient agreed.            [facility]               Name:   Mendy Roe     Therapist Name: RADHA Neves    SAFETY PLAN:    Step 1: Warning signs / cues (thoughts, feelings, what I do, what  others do) that tell me I'm not doing well:     What do I think?  What do I say to myself? I want to die      Pictures in my head:  none     How do I feel? really sad, worried, angry, hopeless and annoyed     What do I do? sit in my room and be alone     When do I feel this way?  relationship and money          Step 2: Coping strategies - Things I can do to help myself feel better:     Coping skills: use my coping skills and be around others      Games and activities: go outside, go for a walk and deep breathing     Focus on helpful thoughts: this is temporary, I will get through this, ride the wave and people care about me children      Step 3: People and places that help me feel better:     People: daughters     Places (with permission): work       Step 4: People and things that are special to me that remind me why it's worth getting better:      My family        Step 6: Things that will help me stay safe:     be around others    Step 7: Professionals or agencies I can contact when I need help:     Suicide Prevention Lifeline: Call or Text 988     Local Crisis Services: 988     Call 911 or go to my nearest emergency department.     I helped develop this safety plan and agree to use it when needed.  I have been given a copy of this plan.      Client signature:     _________________________________________________________________  Today's date:  5/16/2023    Adapted from Safety Plan Template 2008 Rocio Neal and Ronnie Juan is reprinted with the express permission of the authors.  No portion of the Safety Plan Template may be reproduced without the express, written permission.  You can contact the authors at [email or web address] or [email or web address].

## 2023-10-25 ENCOUNTER — OFFICE VISIT (OUTPATIENT)
Dept: CARDIOLOGY | Facility: CLINIC | Age: 43
End: 2023-10-25
Payer: COMMERCIAL

## 2023-10-25 VITALS
HEART RATE: 97 BPM | OXYGEN SATURATION: 98 % | WEIGHT: 141 LBS | BODY MASS INDEX: 25.79 KG/M2 | RESPIRATION RATE: 16 BRPM | DIASTOLIC BLOOD PRESSURE: 58 MMHG | SYSTOLIC BLOOD PRESSURE: 102 MMHG

## 2023-10-25 DIAGNOSIS — R20.9 BILATERAL COLD FEET: ICD-10-CM

## 2023-10-25 DIAGNOSIS — R00.2 PALPITATIONS: Primary | ICD-10-CM

## 2023-10-25 DIAGNOSIS — Z82.79 FAMILY HISTORY OF BICUSPID AORTIC VALVE: ICD-10-CM

## 2023-10-25 DIAGNOSIS — E78.5 DYSLIPIDEMIA: ICD-10-CM

## 2023-10-25 DIAGNOSIS — R55 SYNCOPE, UNSPECIFIED SYNCOPE TYPE: ICD-10-CM

## 2023-10-25 PROCEDURE — 99204 OFFICE O/P NEW MOD 45 MIN: CPT | Performed by: GENERAL ACUTE CARE HOSPITAL

## 2023-10-25 RX ORDER — SERDEXMETHYLPHENIDATE AND DEXMETHYLPHENIDATE 7.8; 39.2 MG/1; MG/1
1 CAPSULE ORAL DAILY
COMMUNITY
Start: 2023-10-10

## 2023-10-25 NOTE — PROGRESS NOTES
"HEART CARE ENCOUNTER NOTE      Thank you, Susan Scott, for asking the [facility] Heart Care team to see Ms. Mendy Roe to evaluate palpitations.    Assessment/Recommendations   Assessment:    Cold feet. She has good lower extremity pulses so I do not think this is due to poor perfusion or peripheral arterial disease.  Family history of bicuspid aortic valve.  Syncope. Unclear etiology but this does not strike me as a cardiac problem. Resting 12-lead electrocardiogram 4/17/2023 showed no abnormalities.  Dyslipidemia. Her 10-year atherosclerotic cardiovascular disease risk is still reasonably low.  Former smoker.    Plan:  Transthoracic echocardiogram to screen for bicuspid aortic valve, which is recommended for all first degree relatives of her mother.  Follow-up as needed based on transthoracic echocardiogram results.         History of Present Illness   Ms. Mendy Roe is a 43 year old female with a significant past history of anxiety presenting for evaluation of cold feet. This has been an issue for 2 months. It seems worse when she is sitting and improves when walking. No burning or cramping in the legs or back when walking. Her feet do swell at the end of the day. She does feels she gets out of breath easily, but this is not new.     She feels palpitations occasionally, typically only when anxious. She has had several syncopal episodes in her life, the last being over a year ago. She has difficulty describing the episodes but says sometimes she feels \"woozy\" beforehand and is not sure how long she is unconsciousness.     No exertional chest pain/pressure/tightness, shortness of breath at rest, paroxysmal nocturnal dyspnea (PND), or orthopnea.    Her father had some type of heart surgery at age 13 and apparently had abnormally high blood pressure as a child. Her mother had a bicuspid aortic valve.     Cardiac Problems and Cardiac Diagnostics     Most Recent Cardiac testing:  ECG " dated 4/17/2023 (personaly reviewed and interpreted): SR, normal ECG       Medications  Allergies   Current Outpatient Medications   Medication Sig Dispense Refill    AZSTARYS 39.2-7.8 MG CAPS capsule Take 1 capsule by mouth daily      cetirizine (ZYRTEC) 10 MG tablet Take 10 mg by mouth as needed for allergies      gabapentin (NEURONTIN) 300 MG capsule Take 600 mg by mouth 2 times daily 1200 mg in the PM      LORazepam (ATIVAN) 1 MG tablet Take 1 tablet (1 mg) by mouth every 8 hours as needed for anxiety 10 tablet 0    ondansetron (ZOFRAN) 4 MG tablet Take 1 tablet (4 mg) by mouth every 8 hours as needed for nausea 30 tablet 0    rizatriptan (MAXALT) 5 MG tablet Take 5 mg by mouth at onset of headache       spironolactone (ALDACTONE) 50 MG tablet Take 100 mg by mouth daily      vitamin D3 (CHOLECALCIFEROL) 125 MCG (5000 UT) tablet Take 5,000 Units by mouth daily        Allergies   Allergen Reactions    Amoxicillin     Amoxicillin-Pot Clavulanate Nausea    Buspirone Nausea    Clavulanic Acid     Duloxetine Rash    Lamotrigine Rash        Physical Examination Review of Systems   /58 (BP Location: Left arm, Patient Position: Sitting, Cuff Size: Adult Regular)   Pulse 97   Resp 16   Wt 64 kg (141 lb)   SpO2 98%   BMI 25.79 kg/m    Body mass index is 25.79 kg/m .  Wt Readings from Last 3 Encounters:   10/25/23 64 kg (141 lb)   04/17/23 67 kg (147 lb 9.6 oz)   01/20/23 68.4 kg (150 lb 11.2 oz)       General Appearance:   Pleasant  female, appears  stated age. no acute distress, normal body habitus   ENT/Mouth: membranes moist, no apparent gingival bleeding.      EYES:  no scleral icterus, normal conjunctivae   Neck: no carotid bruits. No anterior cervical lymphadenopaty   Respiratory:   lungs are clear to auscultation, no rales or wheezing,  equal chest wall expansion    Cardiovascular:   Regular rhythm, normal rate. Normal first and second heart sounds with no murmurs, rubs, or gallops; the carotid, radial  and posterior tibial pulses are intact, Jugular venous pressure normal, no edema bilaterally    Abdomen/GI:  no organomegaly, masses, bruits, or tenderness; bowel sounds are present   Extremities: no cyanosis or clubbing   Skin: no xanthelasma, warm.    Heme/lymph/ Immunology No apparent bleeding noted.   Neurologic: Alert and oriented. normal gait, no tremors     Psychiatric: Pleasant, calm, appropriate affect.    A complete 10 system review of systems was performed and is negative except as mentioned in the HPI/subjective.         Past History   Past Medical History:   Past Medical History:   Diagnosis Date    Anxiety     Depression     Shortness of breath     Sleep apnea     Has had 2 sleep studies in past       Past Surgical History:   Past Surgical History:   Procedure Laterality Date     SECTION      [place] LAP,DIAGNOSTIC ABDOMEN N/A 2018    Procedure:  DIAGNOSTIC LAPAROSCOPY;  Surgeon: Marta Bishop MD;  Location: [facility];  Service: General    SINUS SURGERY      TUBAL LIGATION      TYMPANOSTOMY TUBE PLACEMENT      WISDOM TOOTH EXTRACTION      ZZC LAP,CHOLECYSTECTOMY/EXPLORE N/A 2018    Procedure: CHOLECYSTECTOMY, LAPAROSCOPIC;  Surgeon: Marta Bishop MD;  [facility]: [facility];  Service: General       Family History:   Family History   Problem Relation Age of Onset    Depression Mother     Valvular heart disease Mother     Depression Father     Cerebral aneurysm Father     Depression Daughter     Coronary Artery Disease Paternal Grandmother     Mental Illness Daughter     Depression Daughter     No Known Problems Daughter     Bipolar Disorder Brother        Social History:   Social History     Socioeconomic History    Marital status: Single     Spouse name: Not on file    Number of children: Not on file    Years of education: Not on file    Highest education level: Not on file   Occupational History    Not on file   Tobacco Use    Smoking status: Former     Types: Cigarettes      Quit date: 2017     Years since quittin.7    Smokeless tobacco: Never   Vaping Use    Vaping Use: Never used   Substance and Sexual Activity    Alcohol use: Not Currently    Drug use: Not Currently    Sexual activity: Yes     Partners: Male     Birth control/protection: Other   Other Topics Concern    Not on file   Social History Narrative    Not on file     Social Determinants of Health     Financial Resource Strain: Not on file   Food Insecurity: Not on file   Transportation Needs: Not on file   Physical Activity: Not on file   Stress: Not on file   Social Connections: Not on file   Interpersonal Safety: Not on file   Housing Stability: Not on file              Lab Results    Chemistry/lipid CBC Cardiac Enzymes/BNP/TSH/INR   Lab Results   Component Value Date    CHOL 193 2023    HDL 35 (L) 2023     (H) 2023    TRIG 209 (H) 2023    CR 0.80 2023    BUN 7.1 2023    POTASSIUM 4.1 2023     2023    CO2 24 2023      Lab Results   Component Value Date    WBC 8.1 2023    HGB 14.1 2023    HCT 42.5 2023    MCV 89 2023     2023      Lab Results   Component Value Date    TROPONINI <0.01 2018    TSH 1.42 2023    INR 1.09 2018          Calderon Steiner MD [facility]  Non-Invasive Cardiologist  [facility]  Pager [phone]

## 2023-10-25 NOTE — LETTER
"10/25/2023    Susan Galan MD  [address]. [address]  [place] [address]    RE: Mendy Roe       Dear Colleague,     I had the pleasure of seeing Mendy Roe in the [facility].  HEART CARE ENCOUNTER NOTE      Thank you, Susan Scott, for asking the [facility] team to see Ms. Mendy Roe to evaluate palpitations.    Assessment/Recommendations   Assessment:    Cold feet. She has good lower extremity pulses so I do not think this is due to poor perfusion or peripheral arterial disease.  Family history of bicuspid aortic valve.  Syncope. Unclear etiology but this does not strike me as a cardiac problem. Resting 12-lead electrocardiogram 4/17/2023 showed no abnormalities.  Dyslipidemia. Her 10-year atherosclerotic cardiovascular disease risk is still reasonably low.  Former smoker.    Plan:  Transthoracic echocardiogram to screen for bicuspid aortic valve, which is recommended for all first degree relatives of her mother.  Follow-up as needed based on transthoracic echocardiogram results.         History of Present Illness   Ms. Mendy Roe is a 43 year old female with a significant past history of anxiety presenting for evaluation of cold feet. This has been an issue for 2 months. It seems worse when she is sitting and improves when walking. No burning or cramping in the legs or back when walking. Her feet do swell at the end of the day. She does feels she gets out of breath easily, but this is not new.     She feels palpitations occasionally, typically only when anxious. She has had several syncopal episodes in her life, the last being over a year ago. She has difficulty describing the episodes but says sometimes she feels \"woozy\" beforehand and is not sure how long she is unconsciousness.     No exertional chest pain/pressure/tightness, shortness of breath at rest, paroxysmal nocturnal dyspnea (PND), or orthopnea.    Her father had some " type of heart surgery at age 13 and apparently had abnormally high blood pressure as a child. Her mother had a bicuspid aortic valve.     Cardiac Problems and Cardiac Diagnostics     Most Recent Cardiac testing:  ECG dated 4/17/2023 (personaly reviewed and interpreted): SR, normal ECG       Medications  Allergies   Current Outpatient Medications   Medication Sig Dispense Refill    AZSTARYS 39.2-7.8 MG CAPS capsule Take 1 capsule by mouth daily      cetirizine (ZYRTEC) 10 MG tablet Take 10 mg by mouth as needed for allergies      gabapentin (NEURONTIN) 300 MG capsule Take 600 mg by mouth 2 times daily 1200 mg in the PM      LORazepam (ATIVAN) 1 MG tablet Take 1 tablet (1 mg) by mouth every 8 hours as needed for anxiety 10 tablet 0    ondansetron (ZOFRAN) 4 MG tablet Take 1 tablet (4 mg) by mouth every 8 hours as needed for nausea 30 tablet 0    rizatriptan (MAXALT) 5 MG tablet Take 5 mg by mouth at onset of headache       spironolactone (ALDACTONE) 50 MG tablet Take 100 mg by mouth daily      vitamin D3 (CHOLECALCIFEROL) 125 MCG (5000 UT) tablet Take 5,000 Units by mouth daily        Allergies   Allergen Reactions    Amoxicillin     Amoxicillin-Pot Clavulanate Nausea    Buspirone Nausea    Clavulanic Acid     Duloxetine Rash    Lamotrigine Rash        Physical Examination Review of Systems   /58 (BP Location: Left arm, Patient Position: Sitting, Cuff Size: Adult Regular)   Pulse 97   Resp 16   Wt 64 kg (141 lb)   SpO2 98%   BMI 25.79 kg/m    Body mass index is 25.79 kg/m .  Wt Readings from Last 3 Encounters:   10/25/23 64 kg (141 lb)   04/17/23 67 kg (147 lb 9.6 oz)   01/20/23 68.4 kg (150 lb 11.2 oz)       General Appearance:   Pleasant  female, appears  stated age. no acute distress, normal body habitus   ENT/Mouth: membranes moist, no apparent gingival bleeding.      EYES:  no scleral icterus, normal conjunctivae   Neck: no carotid bruits. No anterior cervical lymphadenopaty   Respiratory:   lungs are  clear to auscultation, no rales or wheezing,  equal chest wall expansion    Cardiovascular:   Regular rhythm, normal rate. Normal first and second heart sounds with no murmurs, rubs, or gallops; the carotid, radial and posterior tibial pulses are intact, Jugular venous pressure normal, no edema bilaterally    Abdomen/GI:  no organomegaly, masses, bruits, or tenderness; bowel sounds are present   Extremities: no cyanosis or clubbing   Skin: no xanthelasma, warm.    Heme/lymph/ Immunology No apparent bleeding noted.   Neurologic: Alert and oriented. normal gait, no tremors     Psychiatric: Pleasant, calm, appropriate affect.    A complete 10 system review of systems was performed and is negative except as mentioned in the HPI/subjective.         Past History   Past Medical History:   Past Medical History:   Diagnosis Date    Anxiety     Depression     Shortness of breath     Sleep apnea     Has had 2 sleep studies in past       Past Surgical History:   Past Surgical History:   Procedure Laterality Date     SECTION      DC LAP,DIAGNOSTIC ABDOMEN N/A 2018    Procedure:  DIAGNOSTIC LAPAROSCOPY;  Surgeon: Marta Bishop MD;  Location: [facility];  Service: General    SINUS SURGERY      TUBAL LIGATION      TYMPANOSTOMY TUBE PLACEMENT      WISDOM TOOTH EXTRACTION      ZZC LAP,CHOLECYSTECTOMY/EXPLORE N/A 2018    Procedure: CHOLECYSTECTOMY, LAPAROSCOPIC;  Surgeon: Marta Bishop MD;  Location: [facility];  Service: General       Family History:   Family History   Problem Relation Age of Onset    Depression Mother     Valvular heart disease Mother     Depression Father     Cerebral aneurysm Father     Depression Daughter     Coronary Artery Disease Paternal Grandmother     Mental Illness Daughter     Depression Daughter     No Known Problems Daughter     Bipolar Disorder Brother        Social History:   Social History     Socioeconomic History    Marital status: Single     Spouse name: Not on  file    Number of children: Not on file    Years of education: Not on file    Highest education level: Not on file   Occupational History    Not on file   Tobacco Use    Smoking status: Former     Types: Cigarettes     Quit date: 2017     Years since quittin.7    Smokeless tobacco: Never   Vaping Use    Vaping Use: Never used   Substance and Sexual Activity    Alcohol use: Not Currently    Drug use: Not Currently    Sexual activity: Yes     Partners: Male     Birth control/protection: Other   Other Topics Concern    Not on file   Social History Narrative    Not on file     Social Determinants of Health     Financial Resource Strain: Not on file   Food Insecurity: Not on file   Transportation Needs: Not on file   Physical Activity: Not on file   Stress: Not on file   Social Connections: Not on file   Interpersonal Safety: Not on file   Housing Stability: Not on file              Lab Results    Chemistry/lipid CBC Cardiac Enzymes/BNP/TSH/INR   Lab Results   Component Value Date    CHOL 193 2023    HDL 35 (L) 2023     (H) 2023    TRIG 209 (H) 2023    CR 0.80 2023    BUN 7.1 2023    POTASSIUM 4.1 2023     2023    CO2 24 2023      Lab Results   Component Value Date    WBC 8.1 2023    HGB 14.1 2023    HCT 42.5 2023    MCV 89 2023     2023      Lab Results   Component Value Date    TROPONINI <0.01 2018    TSH 1.42 2023    INR 1.09 2018          Calderon Steiner MD [facility]  Non-Invasive Cardiologist  [facility]  Pager [phone]      Thank you for allowing me to participate in the care of your patient.      Sincerely,     Calderon Steiner MD     [facility]  cc:   No referring provider defined for this encounter.

## 2023-10-25 NOTE — PATIENT INSTRUCTIONS
We can check your heart for a bicuspid aortic valve with an echocardiogram, and both your siblings should be checked for this too.

## 2023-11-20 ENCOUNTER — HOSPITAL ENCOUNTER (OUTPATIENT)
Dept: CARDIOLOGY | Facility: HOSPITAL | Age: 43
Discharge: HOME | End: 2023-11-20
Attending: GENERAL ACUTE CARE HOSPITAL | Admitting: GENERAL ACUTE CARE HOSPITAL
Payer: COMMERCIAL

## 2023-11-20 DIAGNOSIS — Z82.79 FAMILY HISTORY OF BICUSPID AORTIC VALVE: ICD-10-CM

## 2023-11-20 LAB — LVEF ECHO: NORMAL

## 2023-11-20 PROCEDURE — 93306 TTE W/DOPPLER COMPLETE: CPT | Mod: 26 | Performed by: INTERNAL MEDICINE

## 2023-11-20 PROCEDURE — 93306 TTE W/DOPPLER COMPLETE: CPT

## 2023-11-21 ENCOUNTER — VIRTUAL VISIT (OUTPATIENT)
Dept: PSYCHOLOGY | Facility: CLINIC | Age: 43
End: 2023-11-21
Payer: COMMERCIAL

## 2023-11-21 DIAGNOSIS — F33.0 MDD (MAJOR DEPRESSIVE DISORDER), RECURRENT EPISODE, MILD (H): ICD-10-CM

## 2023-11-21 DIAGNOSIS — F41.1 GAD (GENERALIZED ANXIETY DISORDER): Primary | ICD-10-CM

## 2023-11-21 DIAGNOSIS — F90.2 ATTENTION DEFICIT HYPERACTIVITY DISORDER (ADHD), COMBINED TYPE: ICD-10-CM

## 2023-11-21 PROCEDURE — 90832 PSYTX W PT 30 MINUTES: CPT | Mod: TEL | Performed by: SOCIAL WORKER

## 2023-11-21 NOTE — PROGRESS NOTES
"      [facility]                                     Progress Note    Patient Name: Mendy Roe  Date: 11/21/2023       Service Type: Individual      Session Start Time: 1206       Session End Time: 1240     Session Length: 16-37    Session #: 52    Attendees: Client    Service Modality:  Phone Visit:      Provider verified identity through the following two step process.  Patient provided:  Patient is known previously to provider     Telephone Visit: The patient's condition can be safely assessed and treated via synchronous audio telemedicine encounter.       Reason for Audio Telemedicine Visit: Services only offered telehealth     Originating Site (Patient Location): Patient's home     Distant Site (Provider Location): off site      Consent:  The patient/guardian has verbally consented to:      1. The potential risks and benefits of telemedicine (telephone visit) versus in person care;     The patient has been notified of the following:      \"We have found that certain health care needs can be provided without the need for a face to face visit.  This service lets us provide the care you need with a phone conversation.       I will have full access to your [facility] medical record during this entire phone call.   I will be taking notes for your medical record.      Since this is like an office visit, we will bill your insurance company for this service.       There are potential benefits and risks of telephone visits (e.g. limits to patient confidentiality) that differ from in-person visits.?Confidentiality still applies for telephone services, and nobody will record the visit.  It is important to be in a quiet, private space that is free of distractions (including cell phone or other devices) during the visit.??      If during the course of the call I believe a telephone visit is not appropriate, you will not be charged for this service\"     Consent has been obtained for this " service by care team member: Yes         DATA  Interactive Complexity: No  Crisis: No        Progress Since Last Session (Related to Symptoms / Goals / Homework):   Symptoms: Worsening stressors    Homework: Partially completed      Episode of Care Goals: Minimal progress - PREPARATION (Decided to change - considering how); Intervened by negotiating a change plan and determining options / strategies for behavior change, identifying triggers, exploring social supports, and working towards setting a date to begin behavior change     Current / Ongoing Stressors and Concerns:   Blended family, moving, kids, grandchildren, partner     Treatment Objective(s) Addressed in This Session:     Patient will demonstrate and report a level of anxiety that can be managed at a lower level of care.  Absence of persistent anxious mood and report of reduced frequency and intensity of worry and absence of persistent anxious mood to acceptable levels, no panic attacks, report subjective comfort with rumination for a period of 90 days, within 6 months as clinically observed and by patient self-report.  Patient will demonstrate and report a level of depression that can be managed at a lower level of care.  Absence of persistent depression mood and report of reduced frequency and intensity of low mood and absence of persistent low energy and motivation to acceptable levels, report subjective improved motivation and increased energy for a period of 90 days, within 6 months as clinically observed and by patient self-report.     Intervention:     Therapist met with patient to review goals and interventions. Therapist utilized reflected listening as patient gave brief reflection of week. Patient reported financial stressors with her children. Therapist supported patient as she processed and validated and challenged patient. Therapist offered alternative perspective to patient on helping vs enabling adult children. Therapist encouraged patient  to set limits and coached patient in effective communication.    Patient presented with an anxious affect. Patient was engaged in session and open to feedback. Patient reported no safety concerns.       There has been demonstrated improvement in functioning while patient has been engaged in psychotherapy/psychological service- if withdrawn the patient would deteriorate and/or relapse.       Assessments completed prior to visit:  The following assessments were completed by patient for this visit:  PHQ9:       7/28/2022     1:00 PM 10/10/2022     5:22 PM 11/22/2022     4:00 PM 1/20/2023    11:56 AM 3/21/2023     3:00 PM 4/17/2023     2:27 PM 6/6/2023     9:46 AM   PHQ-9 SCORE   PHQ-9 Total Score MyChart  18 (Moderately severe depression)  19 (Moderately severe depression)  20 (Severe depression) 10 (Moderate depression)   PHQ-9 Total Score 16 18 15 19 19 20 10     GAD7:       3/1/2022     1:00 PM 5/19/2022     4:52 PM 5/21/2022     8:00 AM 7/28/2022     1:00 PM 11/22/2022     4:00 PM 3/21/2023     3:00 PM 6/6/2023     9:46 AM   MEG-7 SCORE   Total Score  20 (severe anxiety) 21 (severe anxiety)    6 (mild anxiety)   Total Score 17 20 21 13 13 20 6           ASSESSMENT: Current Emotional / Mental Status (status of significant symptoms):   Risk status (Self / Other harm or suicidal ideation)   Patient denies current fears or concerns for personal safety.   Patient denies current or recent suicidal ideation or behaviors.   Patient denies current or recent homicidal ideation or behaviors.   Patient denies current or recent self injurious behavior or ideation.   Patient denies other safety concerns.   Patient reports there has been no change in risk factors since their last session.     Patient reports there has been no change in protective factors since their last session.     A safety and risk management plan has been developed including: Patient consented to co-developed safety plan on 5/8/2023.  Safety and risk  management plan was reviewed.   Patient agreed to use safety plan should any safety concerns arise.  A copy was made available to the patient.     Appearance:   phone    Eye Contact:   phone    Psychomotor Behavior: phone    Attitude:   Cooperative    Orientation:   All   Speech    Rate / Production: Emotional Talkative    Volume:  Normal    Mood:    Anxious  Depressed    Affect:    Worrisome    Thought Content:  Clear    Thought Form:  Coherent    Insight:    Fair      Medication Review:   No changes to current psychiatric medication(s)     Medication Compliance:   Yes     Changes in Health Issues:   None reported     Chemical Use Review:   Substance Use: Chemical use reviewed, no active concerns identified      Tobacco Use: No current tobacco use.      Diagnosis:  1. MEG (generalized anxiety disorder)    2. MDD (major depressive disorder), recurrent episode, mild (H24)    3. Attention deficit hyperactivity disorder (ADHD), combined type        Collateral Reports Completed:   Not Applicable    PLAN: (Patient Tasks / Therapist Tasks / Other)  encouraged continued self care.   continue TMS  Start working on self image    Therapist offered alternative perspective to patient on helping vs enabling adult children. Therapist encouraged patient to set limits and coached patient in effective communication.      Patito Humphries, [facility]  11/21/2023                                                           ______________________________________________________________________    Individual Treatment Plan    Patient's Name: Mendy Roe  YOB: 1980    Date of Creation: 12/21/2021  Date Treatment Plan Last Reviewed/Revised: 8/31/2023 due 11/31/2023    DSM5 Diagnoses: 296.31 (F33.0) Major Depressive Disorder, Recurrent Episode, Mild With mixed features or 300.02 (F41.1) Generalized Anxiety Disorder  Psychosocial / Contextual Factors: Blended family, moving, kids, grandchildren, partner  PROMIS (reviewed every  90 days): 6/6/2023    Referral / Collaboration:  Referral to another professional/service is not indicated at this time..    Anticipated number of session for this episode of care: 12  Anticipation frequency of session: Monthly  Anticipated Duration of each session: 38-52 minutes  Treatment plan will be reviewed in 90 days or when goals have been changed.       MeasurableTreatment Goal(s) related to diagnosis / functional impairment(s)  Goal 1: Patient will report absence of persistent anxiety mood and report of reduced frequency and intensity of worry and absence of persistent anxious mood to acceptable levels, no panic attacks, report subjective comfort with rumination or a period of 90 days. Within 6 months as clinically observed and by patient self-report    I will know I've met my goal when manage my anxiety.      Objective #A (Patient Action)    Patient will demonstrate and report a level of anxiety that can be managed at a lower level of care.  Absence of persistent anxious mood and report of reduced frequency and intensity of worry and absence of persistent anxious mood to acceptable levels, no panic attacks, report subjective comfort with rumination for a period of 90 days, within 6 months as clinically observed and by patient self-report.  Status: Continued - Date(s): 8/31/2023    Intervention(s)  Therapist will provide individual therapy to identify triggers to anxiety, gain feedback on helpful coping tools and thought-reframing techniques, and identify preferred way of being. Tx to include discuss current stressors and interpersonal conflicts and how to cope with these, coaching, diagnostic testing, referral for medication as indicated, use prescribed medication, cognitive restructuring, interpersonal, family therapy, supportive therapy services.        Goal 2: Patient will report absence of persistent depression mood and report of reduced frequency and intensity of low mood and absence of persistent low  energy and motivation to acceptable levels, report subjective improved motivation and increased energy for a period of 90 days, within 6 months as clinically observed and by patient self-report    I will know I've met my goal when feeling more balanced.      Objective #A (Patient Action)    Status: Continued - Date(s):8/31/2023  Patient will demonstrate and report a level of depression that can be managed at a lower level of care.  Absence of persistent depression mood and report of reduced frequency and intensity of low mood and absence of persistent low energy and motivation to acceptable levels, report subjective improved motivation and increased energy for a period of 90 days, within 6 months as clinically observed and by patient self-report.    Intervention(s)  Therapist will provide individual therapy to identify triggers to depression, gain feedback on helpful coping tools and thought-reframing techniques, and identify preferred way of being.  Tx to include discussion of current stressors and interpersonal conflicts and how to cope with these, coaching, diagnostic testing, referral for medication as indicated, use prescribed medication, cognitive restructuring, interpersonal, family therapy, supportive therapy services.        Patient has reviewed and agreed to the above plan.      RADHA Neves  8/31/2023          [facility]               Name:   Mendy Roe     [facility]    SAFETY PLAN: 5/8/2023    Therapist suggested patient go stay with her daughters to allow self a break, speak to partner about where they are and where they are going and if patient continues to feel unsafe to go to the ED and patient agreed.            [facility]               Name:   Mendy Roe     Therapist Name: Patito Humphries [facility]    SAFETY PLAN:    Step 1: Warning signs / cues (thoughts,  feelings, what I do, what others do) that tell me I'm not doing well:     What do I think?  What do I say to myself? I want to die      Pictures in my head:  none     How do I feel? really sad, worried, angry, hopeless and annoyed     What do I do? sit in my room and be alone     When do I feel this way?  relationship and money          Step 2: Coping strategies - Things I can do to help myself feel better:     Coping skills: use my coping skills and be around others      Games and activities: go outside, go for a walk and deep breathing     Focus on helpful thoughts: this is temporary, I will get through this, ride the wave and people care about me children      Step 3: People and places that help me feel better:     People: daughters     Places (with permission): work       Step 4: People and things that are special to me that remind me why it's worth getting better:      My family        Step 6: Things that will help me stay safe:     be around others    Step 7: Professionals or agencies I can contact when I need help:     Suicide Prevention Lifeline: Call or Text 988     Local Crisis Services: 988     Call 911 or go to my nearest emergency department.     I helped develop this safety plan and agree to use it when needed.  I have been given a copy of this plan.      Client signature:     _________________________________________________________________  Today's date:  5/16/2023    Adapted from Safety Plan Template 2008 Rocio Neal and Ronnie Juan is reprinted with the express permission of the authors.  No portion of the Safety Plan Template may be reproduced without the express, written permission.  You can contact the authors at [email or web address] or [email or web address].

## 2023-12-12 ENCOUNTER — VIRTUAL VISIT (OUTPATIENT)
Dept: PSYCHOLOGY | Facility: CLINIC | Age: 43
End: 2023-12-12
Payer: COMMERCIAL

## 2023-12-12 DIAGNOSIS — F90.2 ATTENTION DEFICIT HYPERACTIVITY DISORDER (ADHD), COMBINED TYPE: ICD-10-CM

## 2023-12-12 DIAGNOSIS — F41.1 GAD (GENERALIZED ANXIETY DISORDER): Primary | ICD-10-CM

## 2023-12-12 DIAGNOSIS — F33.0 MDD (MAJOR DEPRESSIVE DISORDER), RECURRENT EPISODE, MILD (H): ICD-10-CM

## 2023-12-12 PROCEDURE — 90832 PSYTX W PT 30 MINUTES: CPT | Mod: TEL | Performed by: SOCIAL WORKER

## 2023-12-12 ASSESSMENT — COLUMBIA-SUICIDE SEVERITY RATING SCALE - C-SSRS
ATTEMPT SINCE LAST CONTACT: NO
2. HAVE YOU ACTUALLY HAD ANY THOUGHTS OF KILLING YOURSELF?: NO
SUICIDE, SINCE LAST CONTACT: NO
6. HAVE YOU EVER DONE ANYTHING, STARTED TO DO ANYTHING, OR PREPARED TO DO ANYTHING TO END YOUR LIFE?: NO
TOTAL  NUMBER OF INTERRUPTED ATTEMPTS SINCE LAST CONTACT: NO
TOTAL  NUMBER OF ABORTED OR SELF INTERRUPTED ATTEMPTS SINCE LAST CONTACT: NO
1. SINCE LAST CONTACT, HAVE YOU WISHED YOU WERE DEAD OR WISHED YOU COULD GO TO SLEEP AND NOT WAKE UP?: NO

## 2023-12-12 ASSESSMENT — ANXIETY QUESTIONNAIRES
5. BEING SO RESTLESS THAT IT IS HARD TO SIT STILL: MORE THAN HALF THE DAYS
1. FEELING NERVOUS, ANXIOUS, OR ON EDGE: SEVERAL DAYS
GAD7 TOTAL SCORE: 9
7. FEELING AFRAID AS IF SOMETHING AWFUL MIGHT HAPPEN: NOT AT ALL
6. BECOMING EASILY ANNOYED OR IRRITABLE: MORE THAN HALF THE DAYS
2. NOT BEING ABLE TO STOP OR CONTROL WORRYING: SEVERAL DAYS
3. WORRYING TOO MUCH ABOUT DIFFERENT THINGS: SEVERAL DAYS
GAD7 TOTAL SCORE: 9
4. TROUBLE RELAXING: MORE THAN HALF THE DAYS

## 2023-12-12 ASSESSMENT — PATIENT HEALTH QUESTIONNAIRE - PHQ9: SUM OF ALL RESPONSES TO PHQ QUESTIONS 1-9: 13

## 2023-12-12 NOTE — PROGRESS NOTES
"      [facility]                                     Progress Note    Patient Name: Mendy Roe  Date: 12/12/2023       Service Type: Individual      Session Start Time: 1304      Session End Time: 1338    Session Length: 16-37    Session #: 53    Attendees: Client    Service Modality:  Phone Visit:      Provider verified identity through the following two step process.  Patient provided:  Patient is known previously to provider     Telephone Visit: The patient's condition can be safely assessed and treated via synchronous audio telemedicine encounter.       Reason for Audio Telemedicine Visit: Services only offered telehealth     Originating Site (Patient Location): Patient's home     Distant Site (Provider Location): off site      Consent:  The patient/guardian has verbally consented to:      1. The potential risks and benefits of telemedicine (telephone visit) versus in person care;     The patient has been notified of the following:      \"We have found that certain health care needs can be provided without the need for a face to face visit.  This service lets us provide the care you need with a phone conversation.       I will have full access to your [facility] medical record during this entire phone call.   I will be taking notes for your medical record.      Since this is like an office visit, we will bill your insurance company for this service.       There are potential benefits and risks of telephone visits (e.g. limits to patient confidentiality) that differ from in-person visits.?Confidentiality still applies for telephone services, and nobody will record the visit.  It is important to be in a quiet, private space that is free of distractions (including cell phone or other devices) during the visit.??      If during the course of the call I believe a telephone visit is not appropriate, you will not be charged for this service\"     Consent has been obtained for this service " by care team member: Yes         DATA  Interactive Complexity: No  Crisis: No        Progress Since Last Session (Related to Symptoms / Goals / Homework):   Symptoms: Worsening stressors    Homework: Partially completed      Episode of Care Goals: Minimal progress - PREPARATION (Decided to change - considering how); Intervened by negotiating a change plan and determining options / strategies for behavior change, identifying triggers, exploring social supports, and working towards setting a date to begin behavior change     Current / Ongoing Stressors and Concerns:   Blended family, moving, kids, grandchildren, partner     Treatment Objective(s) Addressed in This Session:     Patient will demonstrate and report a level of anxiety that can be managed at a lower level of care.  Absence of persistent anxious mood and report of reduced frequency and intensity of worry and absence of persistent anxious mood to acceptable levels, no panic attacks, report subjective comfort with rumination for a period of 90 days, within 6 months as clinically observed and by patient self-report.  Patient will demonstrate and report a level of depression that can be managed at a lower level of care.  Absence of persistent depression mood and report of reduced frequency and intensity of low mood and absence of persistent low energy and motivation to acceptable levels, report subjective improved motivation and increased energy for a period of 90 days, within 6 months as clinically observed and by patient self-report.     Intervention:     Motivational Interviewing  Target Behavior:  self negative talk working towards self confidence    Stage of Change: PREPARATION (Decided to change - considering how)    MI Intervention: Expressed Empathy/Understanding, Supported Autonomy, Collaboration, Evocation, Permission to raise concern or advise, and Open-ended questions     Change Talk Expressed by the Patient: Desire to change Ability to change Need  to change Committment to change    Provider Response to Change Talk: E - Evoked more info from patient about behavior change, A - Affirmed patient's thoughts, decisions, or attempts at behavior change, and R - Reflected patient's change talk        There has been demonstrated improvement in functioning while patient has been engaged in psychotherapy/psychological service- if withdrawn the patient would deteriorate and/or relapse.       Assessments completed prior to visit:  The following assessments were completed by patient for this visit:  PHQ9:       10/10/2022     5:22 PM 11/22/2022     4:00 PM 1/20/2023    11:56 AM 3/21/2023     3:00 PM 4/17/2023     2:27 PM 6/6/2023     9:46 AM 12/12/2023     1:00 PM   PHQ-9 SCORE   PHQ-9 Total Score MyChart 18 (Moderately severe depression)  19 (Moderately severe depression)  20 (Severe depression) 10 (Moderate depression)    PHQ-9 Total Score 18 15 19 19 20 10 13     GAD7:       5/19/2022     4:52 PM 5/21/2022     8:00 AM 7/28/2022     1:00 PM 11/22/2022     4:00 PM 3/21/2023     3:00 PM 6/6/2023     9:46 AM 12/12/2023     1:00 PM   MEG-7 SCORE   Total Score 20 (severe anxiety) 21 (severe anxiety)    6 (mild anxiety)    Total Score 20 21 13 13 20 6 9   PROMIS-10 Scores        3/21/2023     3:00 PM 6/6/2023     9:59 AM 12/12/2023     1:00 PM   PROMIS-10 Total Score w/o Sub Scores   PROMIS TOTAL - SUBSCORES 16 23 21    [place] Suicide Severity Rating Scale (Short Version)      7/6/2021     4:00 PM 12/21/2021     1:00 PM 10/11/2022     5:00 PM 11/22/2022     4:00 PM 3/21/2023     3:00 PM 12/12/2023     1:00 PM   [place] Suicide Severity Rating (Short Version)   Over the past 2 weeks have you felt down, depressed, or hopeless? yes yes       Over the past 2 weeks have you had thoughts of killing yourself? no no       Have you ever attempted to kill yourself? no no       1. Wish to be Dead (Since Last Contact)   N N N N   2. Non-Specific Active Suicidal Thoughts (Since Last  Contact)   N N N N   Actual Attempt (Since Last Contact)   N N N N   Has subject engaged in non-suicidal self-injurious behavior? (Since Last Contact)   N N N N   Interrupted Attempts (Since Last Contact)   N N N N   Aborted or Self-Interrupted Attempt (Since Last Contact)   N N N N   Preparatory Acts or Behavior (Since Last Contact)   N N N N   Suicide (Since Last Contact)   N N N N   Calculated C-SSRS Risk Score (Since Last Contact)   No Risk Indicated No Risk Indicated No Risk Indicated No Risk Indicated            ASSESSMENT: Current Emotional / Mental Status (status of significant symptoms):   Risk status (Self / Other harm or suicidal ideation)   Patient denies current fears or concerns for personal safety.   Patient denies current or recent suicidal ideation or behaviors.   Patient denies current or recent homicidal ideation or behaviors.   Patient denies current or recent self injurious behavior or ideation.   Patient denies other safety concerns.   Patient reports there has been no change in risk factors since their last session.     Patient reports there has been no change in protective factors since their last session.     A safety and risk management plan has been developed including: Patient consented to co-developed safety plan on 5/8/2023.  Safety and risk management plan was reviewed.   Patient agreed to use safety plan should any safety concerns arise.  A copy was made available to the patient.     Appearance:   phone    Eye Contact:   phone    Psychomotor Behavior: phone    Attitude:   Cooperative    Orientation:   All   Speech    Rate / Production: Emotional Talkative    Volume:  Normal    Mood:    Anxious  Depressed    Affect:    Worrisome    Thought Content:  Clear    Thought Form:  Coherent    Insight:    Fair      Medication Review:   No changes to current psychiatric medication(s)     Medication Compliance:   Yes     Changes in Health Issues:   None reported     Chemical Use  Review:   Substance Use: Chemical use reviewed, no active concerns identified      Tobacco Use: No current tobacco use.      Diagnosis:  1. MEG (generalized anxiety disorder)    2. MDD (major depressive disorder), recurrent episode, mild (H24)    3. Attention deficit hyperactivity disorder (ADHD), combined type        Collateral Reports Completed:   Not Applicable    PLAN: (Patient Tasks / Therapist Tasks / Other)  encouraged continued self care.    Therapist offered alternative perspective to patient start working on self confidence    Patito Humphries, [facility]  12/12/2023                                                           ______________________________________________________________________    Individual Treatment Plan    Patient's Name: Mendy Roe  YOB: 1980    Date of Creation: 12/21/2021  Date Treatment Plan Last Reviewed/Revised: 12/12/2023 due 3/11/2024    DSM5 Diagnoses: 296.31 (F33.0) Major Depressive Disorder, Recurrent Episode, Mild With mixed features or 300.02 (F41.1) Generalized Anxiety Disorder  Psychosocial / Contextual Factors: Blended family, moving, kids, grandchildren, partner  PROMIS (reviewed every 90 days): 12/12/2023    Referral / Collaboration:  Referral to another professional/service is not indicated at this time..    Anticipated number of session for this episode of care: 12  Anticipation frequency of session: Monthly  Anticipated Duration of each session: 38-52 minutes  Treatment plan will be reviewed in 90 days or when goals have been changed.       MeasurableTreatment Goal(s) related to diagnosis / functional impairment(s)  Goal 1: Patient will report absence of persistent anxiety mood and report of reduced frequency and intensity of worry and absence of persistent anxious mood to acceptable levels, no panic attacks, report subjective comfort with rumination or a period of 90 days. Within 6 months as clinically observed and by patient self-report    I will  know I've met my goal when manage my anxiety.      Objective #A (Patient Action)    Patient will demonstrate and report a level of anxiety that can be managed at a lower level of care.  Absence of persistent anxious mood and report of reduced frequency and intensity of worry and absence of persistent anxious mood to acceptable levels, no panic attacks, report subjective comfort with rumination for a period of 90 days, within 6 months as clinically observed and by patient self-report.  Status: Continued - Date(s): 12/12/2023    Intervention(s)  Therapist will provide individual therapy to identify triggers to anxiety, gain feedback on helpful coping tools and thought-reframing techniques, and identify preferred way of being. Tx to include discuss current stressors and interpersonal conflicts and how to cope with these, coaching, diagnostic testing, referral for medication as indicated, use prescribed medication, cognitive restructuring, interpersonal, family therapy, supportive therapy services.        Goal 2: Patient will report absence of persistent depression mood and report of reduced frequency and intensity of low mood and absence of persistent low energy and motivation to acceptable levels, report subjective improved motivation and increased energy for a period of 90 days, within 6 months as clinically observed and by patient self-report    I will know I've met my goal when feeling more balanced.      Objective #A (Patient Action)    Status: Continued - Date(s):12/12/2023  Patient will demonstrate and report a level of depression that can be managed at a lower level of care.  Absence of persistent depression mood and report of reduced frequency and intensity of low mood and absence of persistent low energy and motivation to acceptable levels, report subjective improved motivation and increased energy for a period of 90 days, within 6 months as clinically observed and by patient  self-report.    Intervention(s)  Therapist will provide individual therapy to identify triggers to depression, gain feedback on helpful coping tools and thought-reframing techniques, and identify preferred way of being.  Tx to include discussion of current stressors and interpersonal conflicts and how to cope with these, coaching, diagnostic testing, referral for medication as indicated, use prescribed medication, cognitive restructuring, interpersonal, family therapy, supportive therapy services.        Patient has reviewed and agreed to the above plan.      RADHA Neves  12/12/2023          [facility]               Name:   Mendy Roe     Therapist Name: Patito Humphries [facility]    SAFETY PLAN: 5/8/2023    Therapist suggested patient go stay with her daughters to allow self a break, speak to partner about where they are and where they are going and if patient continues to feel unsafe to go to the ED and patient agreed.            [facility]               Name:   Mendy Roe     Therapist Name: Patito Humphries [facility]    SAFETY PLAN:    Step 1: Warning signs / cues (thoughts, feelings, what I do, what others do) that tell me I'm not doing well:     What do I think?  What do I say to myself? I want to die      Pictures in my head:  none     How do I feel? really sad, worried, angry, hopeless and annoyed     What do I do? sit in my room and be alone     When do I feel this way?  relationship and money          Step 2: Coping strategies - Things I can do to help myself feel better:     Coping skills: use my coping skills and be around others      Games and activities: go outside, go for a walk and deep breathing     Focus on helpful thoughts: this is temporary, I will get through this, ride the wave and people care about me children      Step 3: People and places that help me feel better:     People:  daughters     Places (with permission): work       Step 4: People and things that are special to me that remind me why it's worth getting better:      My family        Step 6: Things that will help me stay safe:     be around others    Step 7: Professionals or agencies I can contact when I need help:     Suicide Prevention Lifeline: Call or Text 988     Local Crisis Services: 988     Call 911 or go to my nearest emergency department.     I helped develop this safety plan and agree to use it when needed.  I have been given a copy of this plan.      Client signature:     _________________________________________________________________  Today's date:  5/16/2023    Adapted from Safety Plan Template 2008 Rocio Neal and Ronnie Juan is reprinted with the express permission of the authors.  No portion of the Safety Plan Template may be reproduced without the express, written permission.  You can contact the authors at [email or web address] or [email or web address].                                                                  [facility]                                     Progress Note    Patient Name: Mendy Roe  Date: 11/21/2023       Service Type: Individual      Session Start Time: 1206       Session End Time: 1240     Session Length: 16-37    Session #: 52    Attendees: Client    Service Modality:  Phone Visit:      Provider verified identity through the following two step process.  Patient provided:  Patient is known previously to provider     Telephone Visit: The patient's condition can be safely assessed and treated via synchronous audio telemedicine encounter.       Reason for Audio Telemedicine Visit: Services only offered telehealth     Originating Site (Patient Location): Patient's home     Distant Site (Provider Location): off site      Consent:  The patient/guardian has verbally consented to:      1. The potential risks and benefits of telemedicine (telephone visit)  "versus in person care;     The patient has been notified of the following:      \"We have found that certain health care needs can be provided without the need for a face to face visit.  This service lets us provide the care you need with a phone conversation.       I will have full access to your [facility] medical record during this entire phone call.   I will be taking notes for your medical record.      Since this is like an office visit, we will bill your insurance company for this service.       There are potential benefits and risks of telephone visits (e.g. limits to patient confidentiality) that differ from in-person visits.?Confidentiality still applies for telephone services, and nobody will record the visit.  It is important to be in a quiet, private space that is free of distractions (including cell phone or other devices) during the visit.??      If during the course of the call I believe a telephone visit is not appropriate, you will not be charged for this service\"     Consent has been obtained for this service by care team member: Yes         DATA  Interactive Complexity: No  Crisis: No        Progress Since Last Session (Related to Symptoms / Goals / Homework):   Symptoms: Worsening stressors    Homework: Partially completed      Episode of Care Goals: Minimal progress - PREPARATION (Decided to change - considering how); Intervened by negotiating a change plan and determining options / strategies for behavior change, identifying triggers, exploring social supports, and working towards setting a date to begin behavior change     Current / Ongoing Stressors and Concerns:   Blended family, moving, kids, grandchildren, partner     Treatment Objective(s) Addressed in This Session:     Patient will demonstrate and report a level of anxiety that can be managed at a lower level of care.  Absence of persistent anxious mood and report of reduced frequency and intensity of worry and absence of " persistent anxious mood to acceptable levels, no panic attacks, report subjective comfort with rumination for a period of 90 days, within 6 months as clinically observed and by patient self-report.  Patient will demonstrate and report a level of depression that can be managed at a lower level of care.  Absence of persistent depression mood and report of reduced frequency and intensity of low mood and absence of persistent low energy and motivation to acceptable levels, report subjective improved motivation and increased energy for a period of 90 days, within 6 months as clinically observed and by patient self-report.     Intervention:     Therapist met with patient to review goals and interventions. Therapist utilized reflected listening as patient gave brief reflection of week. Patient reported financial stressors with her children. Therapist supported patient as she processed and validated and challenged patient. Therapist offered alternative perspective to patient on helping vs enabling adult children. Therapist encouraged patient to set limits and coached patient in effective communication.    Patient presented with an anxious affect. Patient was engaged in session and open to feedback. Patient reported no safety concerns.       There has been demonstrated improvement in functioning while patient has been engaged in psychotherapy/psychological service- if withdrawn the patient would deteriorate and/or relapse.       Assessments completed prior to visit:  The following assessments were completed by patient for this visit:  PHQ9:       7/28/2022     1:00 PM 10/10/2022     5:22 PM 11/22/2022     4:00 PM 1/20/2023    11:56 AM 3/21/2023     3:00 PM 4/17/2023     2:27 PM 6/6/2023     9:46 AM   PHQ-9 SCORE   PHQ-9 Total Score MyChart  18 (Moderately severe depression)  19 (Moderately severe depression)  20 (Severe depression) 10 (Moderate depression)   PHQ-9 Total Score 16 18 15 19 19 20 10     GAD7:       3/1/2022      1:00 PM 5/19/2022     4:52 PM 5/21/2022     8:00 AM 7/28/2022     1:00 PM 11/22/2022     4:00 PM 3/21/2023     3:00 PM 6/6/2023     9:46 AM   MEG-7 SCORE   Total Score  20 (severe anxiety) 21 (severe anxiety)    6 (mild anxiety)   Total Score 17 20 21 13 13 20 6           ASSESSMENT: Current Emotional / Mental Status (status of significant symptoms):   Risk status (Self / Other harm or suicidal ideation)   Patient denies current fears or concerns for personal safety.   Patient denies current or recent suicidal ideation or behaviors.   Patient denies current or recent homicidal ideation or behaviors.   Patient denies current or recent self injurious behavior or ideation.   Patient denies other safety concerns.   Patient reports there has been no change in risk factors since their last session.     Patient reports there has been no change in protective factors since their last session.     A safety and risk management plan has been developed including: Patient consented to co-developed safety plan on 5/8/2023.  Safety and risk management plan was reviewed.   Patient agreed to use safety plan should any safety concerns arise.  A copy was made available to the patient.     Appearance:   phone    Eye Contact:   phone    Psychomotor Behavior: phone    Attitude:   Cooperative    Orientation:   All   Speech    Rate / Production: Emotional Talkative    Volume:  Normal    Mood:    Anxious  Depressed    Affect:    Worrisome    Thought Content:  Clear    Thought Form:  Coherent    Insight:    Fair      Medication Review:   No changes to current psychiatric medication(s)     Medication Compliance:   Yes     Changes in Health Issues:   None reported     Chemical Use Review:   Substance Use: Chemical use reviewed, no active concerns identified      Tobacco Use: No current tobacco use.      Diagnosis:  1. MEG (generalized anxiety disorder)    2. MDD (major depressive disorder), recurrent episode, mild (H24)    3. Attention deficit  hyperactivity disorder (ADHD), combined type        Collateral Reports Completed:   Not Applicable    PLAN: (Patient Tasks / Therapist Tasks / Other)  encouraged continued self care.   continue TMS  Start working on self image    Therapist offered alternative perspective to patient on helping vs enabling adult children. Therapist encouraged patient to set limits and coached patient in effective communication.      Patito SARAAndres Humphries, [facility]  11/21/2023                                                           ______________________________________________________________________    Individual Treatment Plan    Patient's Name: Mendy Roe  YOB: 1980    Date of Creation: 12/21/2021  Date Treatment Plan Last Reviewed/Revised: 8/31/2023 due 11/31/2023    DSM5 Diagnoses: 296.31 (F33.0) Major Depressive Disorder, Recurrent Episode, Mild With mixed features or 300.02 (F41.1) Generalized Anxiety Disorder  Psychosocial / Contextual Factors: Blended family, moving, kids, grandchildren, partner  PROMIS (reviewed every 90 days): 6/6/2023    Referral / Collaboration:  Referral to another professional/service is not indicated at this time..    Anticipated number of session for this episode of care: 12  Anticipation frequency of session: Monthly  Anticipated Duration of each session: 38-52 minutes  Treatment plan will be reviewed in 90 days or when goals have been changed.       MeasurableTreatment Goal(s) related to diagnosis / functional impairment(s)  Goal 1: Patient will report absence of persistent anxiety mood and report of reduced frequency and intensity of worry and absence of persistent anxious mood to acceptable levels, no panic attacks, report subjective comfort with rumination or a period of 90 days. Within 6 months as clinically observed and by patient self-report    I will know I've met my goal when manage my anxiety.      Objective #A (Patient Action)    Patient will demonstrate and report a level  of anxiety that can be managed at a lower level of care.  Absence of persistent anxious mood and report of reduced frequency and intensity of worry and absence of persistent anxious mood to acceptable levels, no panic attacks, report subjective comfort with rumination for a period of 90 days, within 6 months as clinically observed and by patient self-report.  Status: Continued - Date(s): 8/31/2023    Intervention(s)  Therapist will provide individual therapy to identify triggers to anxiety, gain feedback on helpful coping tools and thought-reframing techniques, and identify preferred way of being. Tx to include discuss current stressors and interpersonal conflicts and how to cope with these, coaching, diagnostic testing, referral for medication as indicated, use prescribed medication, cognitive restructuring, interpersonal, family therapy, supportive therapy services.        Goal 2: Patient will report absence of persistent depression mood and report of reduced frequency and intensity of low mood and absence of persistent low energy and motivation to acceptable levels, report subjective improved motivation and increased energy for a period of 90 days, within 6 months as clinically observed and by patient self-report    I will know I've met my goal when feeling more balanced.      Objective #A (Patient Action)    Status: Continued - Date(s):8/31/2023  Patient will demonstrate and report a level of depression that can be managed at a lower level of care.  Absence of persistent depression mood and report of reduced frequency and intensity of low mood and absence of persistent low energy and motivation to acceptable levels, report subjective improved motivation and increased energy for a period of 90 days, within 6 months as clinically observed and by patient self-report.    Intervention(s)  Therapist will provide individual therapy to identify triggers to depression, gain feedback on helpful coping tools and  thought-reframing techniques, and identify preferred way of being.  Tx to include discussion of current stressors and interpersonal conflicts and how to cope with these, coaching, diagnostic testing, referral for medication as indicated, use prescribed medication, cognitive restructuring, interpersonal, family therapy, supportive therapy services.        Patient has reviewed and agreed to the above plan.      RADHA Neves  8/31/2023          [facility]               Name:   Mendy Roe     Therapist Name: RADHA Neves    SAFETY PLAN: 5/8/2023    Therapist suggested patient go stay with her daughters to allow self a break, speak to partner about where they are and where they are going and if patient continues to feel unsafe to go to the ED and patient agreed.            [facility]               Name:   Mendy Roe     Therapist Name: [facility]res [facility]    SAFETY PLAN:    Step 1: Warning signs / cues (thoughts, feelings, what I do, what others do) that tell me I'm not doing well:     What do I think?  What do I say to myself? I want to die      Pictures in my head:  none     How do I feel? really sad, worried, angry, hopeless and annoyed     What do I do? sit in my room and be alone     When do I feel this way?  relationship and money          Step 2: Coping strategies - Things I can do to help myself feel better:     Coping skills: use my coping skills and be around others      Games and activities: go outside, go for a walk and deep breathing     Focus on helpful thoughts: this is temporary, I will get through this, ride the wave and people care about me children      Step 3: People and places that help me feel better:     People: daughters     Places (with permission): work       Step 4: People and things that are special to me that remind me why it's worth getting better:      My  family        Step 6: Things that will help me stay safe:     be around others    Step 7: Professionals or agencies I can contact when I need help:     Suicide Prevention Lifeline: Call or Text 988     Local Crisis Services: 988     Call 911 or go to my nearest emergency department.     I helped develop this safety plan and agree to use it when needed.  I have been given a copy of this plan.      Client signature:     _________________________________________________________________  Today's date:  5/16/2023    Adapted from Safety Plan Template 2008 Rocio Neal and Ronnie Juan is reprinted with the express permission of the authors.  No portion of the Safety Plan Template may be reproduced without the express, written permission.  You can contact the authors at [email or web address] or [email or web address].

## 2024-01-02 ENCOUNTER — VIRTUAL VISIT (OUTPATIENT)
Dept: PSYCHOLOGY | Facility: CLINIC | Age: 44
End: 2024-01-02
Payer: COMMERCIAL

## 2024-01-02 DIAGNOSIS — F33.0 MDD (MAJOR DEPRESSIVE DISORDER), RECURRENT EPISODE, MILD (H): ICD-10-CM

## 2024-01-02 DIAGNOSIS — F41.1 GAD (GENERALIZED ANXIETY DISORDER): Primary | ICD-10-CM

## 2024-01-02 DIAGNOSIS — F90.2 ATTENTION DEFICIT HYPERACTIVITY DISORDER (ADHD), COMBINED TYPE: ICD-10-CM

## 2024-01-02 PROCEDURE — 90834 PSYTX W PT 45 MINUTES: CPT | Mod: 93 | Performed by: SOCIAL WORKER

## 2024-01-02 NOTE — PROGRESS NOTES
"      [facility]                                     Progress Note    Patient Name: Mendy Roe  Date: 1/2/2024       Service Type: Individual      Session Start Time: 1206      Session End Time: 1247    Session Length: 38-52    Session #: 54    Attendees: Client    Service Modality:  Phone Visit:      Provider verified identity through the following two step process.  Patient provided:  Patient is known previously to provider     Telephone Visit: The patient's condition can be safely assessed and treated via synchronous audio telemedicine encounter.       Reason for Audio Telemedicine Visit: Services only offered telehealth     Originating Site (Patient Location): Patient's home     Distant Site (Provider Location): off site      Consent:  The patient/guardian has verbally consented to:      1. The potential risks and benefits of telemedicine (telephone visit) versus in person care;     The patient has been notified of the following:      \"We have found that certain health care needs can be provided without the need for a face to face visit.  This service lets us provide the care you need with a phone conversation.       I will have full access to your [facility] medical record during this entire phone call.   I will be taking notes for your medical record.      Since this is like an office visit, we will bill your insurance company for this service.       There are potential benefits and risks of telephone visits (e.g. limits to patient confidentiality) that differ from in-person visits.?Confidentiality still applies for telephone services, and nobody will record the visit.  It is important to be in a quiet, private space that is free of distractions (including cell phone or other devices) during the visit.??      If during the course of the call I believe a telephone visit is not appropriate, you will not be charged for this service\"     Consent has been obtained for this service by " care team member: Yes         DATA  Interactive Complexity: No  Crisis: No        Progress Since Last Session (Related to Symptoms / Goals / Homework):   Symptoms:  per patient    Homework: Partially completed      Episode of Care Goals: Minimal progress - PREPARATION (Decided to change - considering how); Intervened by negotiating a change plan and determining options / strategies for behavior change, identifying triggers, exploring social supports, and working towards setting a date to begin behavior change     Current / Ongoing Stressors and Concerns:   Blended family, moving, kids, grandchildren, partner     Treatment Objective(s) Addressed in This Session:     Patient will demonstrate and report a level of anxiety that can be managed at a lower level of care.  Absence of persistent anxious mood and report of reduced frequency and intensity of worry and absence of persistent anxious mood to acceptable levels, no panic attacks, report subjective comfort with rumination for a period of 90 days, within 6 months as clinically observed and by patient self-report.  Patient will demonstrate and report a level of depression that can be managed at a lower level of care.  Absence of persistent depression mood and report of reduced frequency and intensity of low mood and absence of persistent low energy and motivation to acceptable levels, report subjective improved motivation and increased energy for a period of 90 days, within 6 months as clinically observed and by patient self-report.     Intervention:     Therapist met with patient to review goals and interventions. Therapist utilized reflected listening as patient gave brief reflection of week. Patient processed the holidays and therapist initiated discussion on self. Patient reviewed her developmental hx along with relationships. Therapist supported patient as she processed and validated patient. Therapist asked for patient to keep a daily log of negative thoughts  and write them down for next session. Patient is also to talk to her children about boundaries.   Patient presented with an anxious affect. Patient was engaged in session and open to feedback. Patient reported no safety concerns.       There has been demonstrated improvement in functioning while patient has been engaged in psychotherapy/psychological service- if withdrawn the patient would deteriorate and/or relapse.       Assessments completed prior to visit:  The following assessments were completed by patient for this visit:  PHQ9:       10/10/2022     5:22 PM 11/22/2022     4:00 PM 1/20/2023    11:56 AM 3/21/2023     3:00 PM 4/17/2023     2:27 PM 6/6/2023     9:46 AM 12/12/2023     1:00 PM   PHQ-9 SCORE   PHQ-9 Total Score MyChart 18 (Moderately severe depression)  19 (Moderately severe depression)  20 (Severe depression) 10 (Moderate depression)    PHQ-9 Total Score 18 15 19 19 20 10 13     GAD7:       5/19/2022     4:52 PM 5/21/2022     8:00 AM 7/28/2022     1:00 PM 11/22/2022     4:00 PM 3/21/2023     3:00 PM 6/6/2023     9:46 AM 12/12/2023     1:00 PM   MEG-7 SCORE   Total Score 20 (severe anxiety) 21 (severe anxiety)    6 (mild anxiety)    Total Score 20 21 13 13 20 6 9   PROMIS-10 Scores        3/21/2023     3:00 PM 6/6/2023     9:59 AM 12/12/2023     1:00 PM   PROMIS-10 Total Score w/o Sub Scores   PROMIS TOTAL - SUBSCORES 16 23 21    [place] Suicide Severity Rating Scale (Short Version)      7/6/2021     4:00 PM 12/21/2021     1:00 PM 10/11/2022     5:00 PM 11/22/2022     4:00 PM 3/21/2023     3:00 PM 12/12/2023     1:00 PM   [place] Suicide Severity Rating (Short Version)   Over the past 2 weeks have you felt down, depressed, or hopeless? yes yes       Over the past 2 weeks have you had thoughts of killing yourself? no no       Have you ever attempted to kill yourself? no no       1. Wish to be Dead (Since Last Contact)   N N N N   2. Non-Specific Active Suicidal Thoughts (Since Last Contact)   N N N  N   Actual Attempt (Since Last Contact)   N N N N   Has subject engaged in non-suicidal self-injurious behavior? (Since Last Contact)   N N N N   Interrupted Attempts (Since Last Contact)   N N N N   Aborted or Self-Interrupted Attempt (Since Last Contact)   N N N N   Preparatory Acts or Behavior (Since Last Contact)   N N N N   Suicide (Since Last Contact)   N N N N   Calculated C-SSRS Risk Score (Since Last Contact)   No Risk Indicated No Risk Indicated No Risk Indicated No Risk Indicated            ASSESSMENT: Current Emotional / Mental Status (status of significant symptoms):   Risk status (Self / Other harm or suicidal ideation)   Patient denies current fears or concerns for personal safety.   Patient denies current or recent suicidal ideation or behaviors.   Patient denies current or recent homicidal ideation or behaviors.   Patient denies current or recent self injurious behavior or ideation.   Patient denies other safety concerns.   Patient reports there has been no change in risk factors since their last session.     Patient reports there has been no change in protective factors since their last session.     A safety and risk management plan has been developed including: Patient consented to co-developed safety plan on 5/8/2023.  Safety and risk management plan was reviewed.   Patient agreed to use safety plan should any safety concerns arise.  A copy was made available to the patient.     Appearance:   phone    Eye Contact:   phone    Psychomotor Behavior: phone    Attitude:   Cooperative    Orientation:   All   Speech    Rate / Production: Emotional Talkative    Volume:  Normal    Mood:    Anxious  Depressed    Affect:    Worrisome    Thought Content:  Clear    Thought Form:  Coherent    Insight:    Fair      Medication Review:   No changes to current psychiatric medication(s)     Medication Compliance:   Yes     Changes in Health Issues:   None reported     Chemical Use Review:   Substance Use: Chemical  use reviewed, no active concerns identified      Tobacco Use: No current tobacco use.      Diagnosis:  1. MEG (generalized anxiety disorder)    2. MDD (major depressive disorder), recurrent episode, mild (H24)    3. Attention deficit hyperactivity disorder (ADHD), combined type        Collateral Reports Completed:   Not Applicable    PLAN: (Patient Tasks / Therapist Tasks / Other)      Therapist asked for patient to keep a daily log of negative thoughts and write them down for next session. Patient is also to talk to her children about boundaries.         Patito Humphries, [facility]  1/2/2024                                                           ______________________________________________________________________    Individual Treatment Plan    Patient's Name: Mendy Roe  YOB: 1980    Date of Creation: 12/21/2021  Date Treatment Plan Last Reviewed/Revised: 12/12/2023 due 3/11/2024    DSM5 Diagnoses: 296.31 (F33.0) Major Depressive Disorder, Recurrent Episode, Mild With mixed features or 300.02 (F41.1) Generalized Anxiety Disorder  Psychosocial / Contextual Factors: Blended family, moving, kids, grandchildren, partner  PROMIS (reviewed every 90 days): 12/12/2023    Referral / Collaboration:  Referral to another professional/service is not indicated at this time..    Anticipated number of session for this episode of care: 12  Anticipation frequency of session: Monthly  Anticipated Duration of each session: 38-52 minutes  Treatment plan will be reviewed in 90 days or when goals have been changed.       MeasurableTreatment Goal(s) related to diagnosis / functional impairment(s)  Goal 1: Patient will report absence of persistent anxiety mood and report of reduced frequency and intensity of worry and absence of persistent anxious mood to acceptable levels, no panic attacks, report subjective comfort with rumination or a period of 90 days. Within 6 months as clinically observed and by patient  self-report    I will know I've met my goal when manage my anxiety.      Objective #A (Patient Action)    Patient will demonstrate and report a level of anxiety that can be managed at a lower level of care.  Absence of persistent anxious mood and report of reduced frequency and intensity of worry and absence of persistent anxious mood to acceptable levels, no panic attacks, report subjective comfort with rumination for a period of 90 days, within 6 months as clinically observed and by patient self-report.  Status: Continued - Date(s): 12/12/2023    Intervention(s)  Therapist will provide individual therapy to identify triggers to anxiety, gain feedback on helpful coping tools and thought-reframing techniques, and identify preferred way of being. Tx to include discuss current stressors and interpersonal conflicts and how to cope with these, coaching, diagnostic testing, referral for medication as indicated, use prescribed medication, cognitive restructuring, interpersonal, family therapy, supportive therapy services.        Goal 2: Patient will report absence of persistent depression mood and report of reduced frequency and intensity of low mood and absence of persistent low energy and motivation to acceptable levels, report subjective improved motivation and increased energy for a period of 90 days, within 6 months as clinically observed and by patient self-report    I will know I've met my goal when feeling more balanced.      Objective #A (Patient Action)    Status: Continued - Date(s):12/12/2023  Patient will demonstrate and report a level of depression that can be managed at a lower level of care.  Absence of persistent depression mood and report of reduced frequency and intensity of low mood and absence of persistent low energy and motivation to acceptable levels, report subjective improved motivation and increased energy for a period of 90 days, within 6 months as clinically observed and by patient  self-report.    Intervention(s)  Therapist will provide individual therapy to identify triggers to depression, gain feedback on helpful coping tools and thought-reframing techniques, and identify preferred way of being.  Tx to include discussion of current stressors and interpersonal conflicts and how to cope with these, coaching, diagnostic testing, referral for medication as indicated, use prescribed medication, cognitive restructuring, interpersonal, family therapy, supportive therapy services.        Patient has reviewed and agreed to the above plan.      RADHA Neves  12/12/2023          [facility]               Name:   Mendy Roe     Therapist Name: Patito Humphries [facility]    SAFETY PLAN: 5/8/2023    Therapist suggested patient go stay with her daughters to allow self a break, speak to partner about where they are and where they are going and if patient continues to feel unsafe to go to the ED and patient agreed.            [facility]               Name:   Mendy Roe     Therapist Name: Patito Humphries [facility]    SAFETY PLAN:    Step 1: Warning signs / cues (thoughts, feelings, what I do, what others do) that tell me I'm not doing well:     What do I think?  What do I say to myself? I want to die      Pictures in my head:  none     How do I feel? really sad, worried, angry, hopeless and annoyed     What do I do? sit in my room and be alone     When do I feel this way?  relationship and money          Step 2: Coping strategies - Things I can do to help myself feel better:     Coping skills: use my coping skills and be around others      Games and activities: go outside, go for a walk and deep breathing     Focus on helpful thoughts: this is temporary, I will get through this, ride the wave and people care about me children      Step 3: People and places that help me feel better:     People:  daughters     Places (with permission): work       Step 4: People and things that are special to me that remind me why it's worth getting better:      My family        Step 6: Things that will help me stay safe:     be around others    Step 7: Professionals or agencies I can contact when I need help:     Suicide Prevention Lifeline: Call or Text 988     Local Crisis Services: 988     Call 911 or go to my nearest emergency department.     I helped develop this safety plan and agree to use it when needed.  I have been given a copy of this plan.      Client signature:     _________________________________________________________________  Today's date:  5/16/2023    Adapted from Safety Plan Template 2008 Rocio Neal and Ronnie Juan is reprinted with the express permission of the authors.  No portion of the Safety Plan Template may be reproduced without the express, written permission.  You can contact the authors at [email or web address] or [email or web address].

## 2024-02-13 ENCOUNTER — VIRTUAL VISIT (OUTPATIENT)
Dept: PSYCHOLOGY | Facility: CLINIC | Age: 44
End: 2024-02-13
Payer: COMMERCIAL

## 2024-02-13 DIAGNOSIS — F33.0 MDD (MAJOR DEPRESSIVE DISORDER), RECURRENT EPISODE, MILD (H): ICD-10-CM

## 2024-02-13 DIAGNOSIS — F41.1 GAD (GENERALIZED ANXIETY DISORDER): Primary | ICD-10-CM

## 2024-02-13 DIAGNOSIS — F90.2 ATTENTION DEFICIT HYPERACTIVITY DISORDER (ADHD), COMBINED TYPE: ICD-10-CM

## 2024-02-13 PROCEDURE — 90834 PSYTX W PT 45 MINUTES: CPT | Mod: 93 | Performed by: SOCIAL WORKER

## 2024-02-13 NOTE — PROGRESS NOTES
"      [facility]                                     Progress Note    Patient Name: Mendy Roe  Date: 2/13/2024       Service Type: Individual      Session Start Time: 1207     Session End Time: 1247    Session Length: 38-52    Session #: 55    Attendees: Client    Service Modality:  Phone Visit:      Provider verified identity through the following two step process.  Patient provided:  Patient is known previously to provider     Telephone Visit: The patient's condition can be safely assessed and treated via synchronous audio telemedicine encounter.       Reason for Audio Telemedicine Visit: Services only offered telehealth     Originating Site (Patient Location): Patient's home     Distant Site (Provider Location): off site      Consent:  The patient/guardian has verbally consented to:      1. The potential risks and benefits of telemedicine (telephone visit) versus in person care;     The patient has been notified of the following:      \"We have found that certain health care needs can be provided without the need for a face to face visit.  This service lets us provide the care you need with a phone conversation.       I will have full access to your [facility] medical record during this entire phone call.   I will be taking notes for your medical record.      Since this is like an office visit, we will bill your insurance company for this service.       There are potential benefits and risks of telephone visits (e.g. limits to patient confidentiality) that differ from in-person visits.?Confidentiality still applies for telephone services, and nobody will record the visit.  It is important to be in a quiet, private space that is free of distractions (including cell phone or other devices) during the visit.??      If during the course of the call I believe a telephone visit is not appropriate, you will not be charged for this service\"     Consent has been obtained for this service by " care team member: Yes         DATA  Interactive Complexity: No  Crisis: No        Progress Since Last Session (Related to Symptoms / Goals / Homework):   Symptoms: Improving per patient    Homework: Partially completed      Episode of Care Goals: Minimal progress - PREPARATION (Decided to change - considering how); Intervened by negotiating a change plan and determining options / strategies for behavior change, identifying triggers, exploring social supports, and working towards setting a date to begin behavior change     Current / Ongoing Stressors and Concerns:   Blended family, moving, kids, grandchildren, partner     Treatment Objective(s) Addressed in This Session:     Patient will demonstrate and report a level of anxiety that can be managed at a lower level of care.  Absence of persistent anxious mood and report of reduced frequency and intensity of worry and absence of persistent anxious mood to acceptable levels, no panic attacks, report subjective comfort with rumination for a period of 90 days, within 6 months as clinically observed and by patient self-report.  Patient will demonstrate and report a level of depression that can be managed at a lower level of care.  Absence of persistent depression mood and report of reduced frequency and intensity of low mood and absence of persistent low energy and motivation to acceptable levels, report subjective improved motivation and increased energy for a period of 90 days, within 6 months as clinically observed and by patient self-report.     Intervention:     Therapist met with patient to review goals and interventions. Therapist utilized reflected listening as patient gave brief reflection of week. Patient reported things have been going better and processed stressors with work and family. Therapist supported patient as she processed and validated patient. Therapist encouraged patient to continue to challenge her negative thoughts and replace them with positive  attributes and for patient to continue to set boundaries. Therapist utilized CBT  Patient presented with an anxious affect. Patient was engaged in session and open to feedback. Patient reported no safety concerns.       There has been demonstrated improvement in functioning while patient has been engaged in psychotherapy/psychological service- if withdrawn the patient would deteriorate and/or relapse.       Assessments completed prior to visit:  The following assessments were completed by patient for this visit:  PHQ9:       10/10/2022     5:22 PM 11/22/2022     4:00 PM 1/20/2023    11:56 AM 3/21/2023     3:00 PM 4/17/2023     2:27 PM 6/6/2023     9:46 AM 12/12/2023     1:00 PM   PHQ-9 SCORE   PHQ-9 Total Score MyChart 18 (Moderately severe depression)  19 (Moderately severe depression)  20 (Severe depression) 10 (Moderate depression)    PHQ-9 Total Score 18 15 19 19 20 10 13     GAD7:       5/19/2022     4:52 PM 5/21/2022     8:00 AM 7/28/2022     1:00 PM 11/22/2022     4:00 PM 3/21/2023     3:00 PM 6/6/2023     9:46 AM 12/12/2023     1:00 PM   MEG-7 SCORE   Total Score 20 (severe anxiety) 21 (severe anxiety)    6 (mild anxiety)    Total Score 20 21 13 13 20 6 9   PROMIS-10 Scores        3/21/2023     3:00 PM 6/6/2023     9:59 AM 12/12/2023     1:00 PM   PROMIS-10 Total Score w/o Sub Scores   PROMIS TOTAL - SUBSCORES 16 23 21    [place] Suicide Severity Rating Scale (Short Version)      7/6/2021     4:00 PM 12/21/2021     1:00 PM 10/11/2022     5:00 PM 11/22/2022     4:00 PM 3/21/2023     3:00 PM 12/12/2023     1:00 PM   [place] Suicide Severity Rating (Short Version)   Over the past 2 weeks have you felt down, depressed, or hopeless? yes yes       Over the past 2 weeks have you had thoughts of killing yourself? no no       Have you ever attempted to kill yourself? no no       1. Wish to be Dead (Since Last Contact)   N N N N   2. Non-Specific Active Suicidal Thoughts (Since Last Contact)   N N N N   Actual  Attempt (Since Last Contact)   N N N N   Has subject engaged in non-suicidal self-injurious behavior? (Since Last Contact)   N N N N   Interrupted Attempts (Since Last Contact)   N N N N   Aborted or Self-Interrupted Attempt (Since Last Contact)   N N N N   Preparatory Acts or Behavior (Since Last Contact)   N N N N   Suicide (Since Last Contact)   N N N N   Calculated C-SSRS Risk Score (Since Last Contact)   No Risk Indicated No Risk Indicated No Risk Indicated No Risk Indicated            ASSESSMENT: Current Emotional / Mental Status (status of significant symptoms):   Risk status (Self / Other harm or suicidal ideation)   Patient denies current fears or concerns for personal safety.   Patient denies current or recent suicidal ideation or behaviors.   Patient denies current or recent homicidal ideation or behaviors.   Patient denies current or recent self injurious behavior or ideation.   Patient denies other safety concerns.   Patient reports there has been no change in risk factors since their last session.     Patient reports there has been no change in protective factors since their last session.     A safety and risk management plan has been developed including: Patient consented to co-developed safety plan on 5/8/2023.  Safety and risk management plan was reviewed.   Patient agreed to use safety plan should any safety concerns arise.  A copy was made available to the patient.     Appearance:   phone    Eye Contact:   phone    Psychomotor Behavior: phone    Attitude:   Cooperative    Orientation:   All   Speech    Rate / Production: Emotional Talkative    Volume:  Normal    Mood:    Anxious  Depressed    Affect:    Worrisome    Thought Content:  Clear    Thought Form:  Coherent    Insight:    Fair      Medication Review:   No changes to current psychiatric medication(s)     Medication Compliance:   Yes     Changes in Health Issues:   None reported     Chemical Use Review:   Substance Use: Chemical use  reviewed, no active concerns identified      Tobacco Use: No current tobacco use.      Diagnosis:  1. MEG (generalized anxiety disorder)    2. MDD (major depressive disorder), recurrent episode, mild (H24)    3. Attention deficit hyperactivity disorder (ADHD), combined type        Collateral Reports Completed:   Not Applicable    PLAN: (Patient Tasks / Therapist Tasks / Other)       Therapist encouraged patient to continue to challenge her negative thoughts and replace them with positive attributes and for patient to continue to set boundaries.      Patito Humphries, [facility]  2/13/2024                                                           ______________________________________________________________________    Individual Treatment Plan    Patient's Name: Mendy Roe  YOB: 1980    Date of Creation: 12/21/2021  Date Treatment Plan Last Reviewed/Revised: 12/12/2023 due 3/11/2024    DSM5 Diagnoses: 296.31 (F33.0) Major Depressive Disorder, Recurrent Episode, Mild With mixed features or 300.02 (F41.1) Generalized Anxiety Disorder  Psychosocial / Contextual Factors: Blended family, moving, kids, grandchildren, partner  PROMIS (reviewed every 90 days): 12/12/2023    Referral / Collaboration:  Referral to another professional/service is not indicated at this time..    Anticipated number of session for this episode of care: 12  Anticipation frequency of session: Monthly  Anticipated Duration of each session: 38-52 minutes  Treatment plan will be reviewed in 90 days or when goals have been changed.       MeasurableTreatment Goal(s) related to diagnosis / functional impairment(s)  Goal 1: Patient will report absence of persistent anxiety mood and report of reduced frequency and intensity of worry and absence of persistent anxious mood to acceptable levels, no panic attacks, report subjective comfort with rumination or a period of 90 days. Within 6 months as clinically observed and by patient  self-report    I will know I've met my goal when manage my anxiety.      Objective #A (Patient Action)    Patient will demonstrate and report a level of anxiety that can be managed at a lower level of care.  Absence of persistent anxious mood and report of reduced frequency and intensity of worry and absence of persistent anxious mood to acceptable levels, no panic attacks, report subjective comfort with rumination for a period of 90 days, within 6 months as clinically observed and by patient self-report.  Status: Continued - Date(s): 12/12/2023    Intervention(s)  Therapist will provide individual therapy to identify triggers to anxiety, gain feedback on helpful coping tools and thought-reframing techniques, and identify preferred way of being. Tx to include discuss current stressors and interpersonal conflicts and how to cope with these, coaching, diagnostic testing, referral for medication as indicated, use prescribed medication, cognitive restructuring, interpersonal, family therapy, supportive therapy services.        Goal 2: Patient will report absence of persistent depression mood and report of reduced frequency and intensity of low mood and absence of persistent low energy and motivation to acceptable levels, report subjective improved motivation and increased energy for a period of 90 days, within 6 months as clinically observed and by patient self-report    I will know I've met my goal when feeling more balanced.      Objective #A (Patient Action)    Status: Continued - Date(s):12/12/2023  Patient will demonstrate and report a level of depression that can be managed at a lower level of care.  Absence of persistent depression mood and report of reduced frequency and intensity of low mood and absence of persistent low energy and motivation to acceptable levels, report subjective improved motivation and increased energy for a period of 90 days, within 6 months as clinically observed and by patient  self-report.    Intervention(s)  Therapist will provide individual therapy to identify triggers to depression, gain feedback on helpful coping tools and thought-reframing techniques, and identify preferred way of being.  Tx to include discussion of current stressors and interpersonal conflicts and how to cope with these, coaching, diagnostic testing, referral for medication as indicated, use prescribed medication, cognitive restructuring, interpersonal, family therapy, supportive therapy services.        Patient has reviewed and agreed to the above plan.      RADHA Neves  12/12/2023          [facility]               Name:   Mendy Roe     Therapist Name: Patito Humphries [facility]    SAFETY PLAN: 5/8/2023    Therapist suggested patient go stay with her daughters to allow self a break, speak to partner about where they are and where they are going and if patient continues to feel unsafe to go to the ED and patient agreed.            [facility]               Name:   Mendy Roe     Therapist Name: Patito Humphries [facility]    SAFETY PLAN:    Step 1: Warning signs / cues (thoughts, feelings, what I do, what others do) that tell me I'm not doing well:     What do I think?  What do I say to myself? I want to die      Pictures in my head:  none     How do I feel? really sad, worried, angry, hopeless and annoyed     What do I do? sit in my room and be alone     When do I feel this way?  relationship and money          Step 2: Coping strategies - Things I can do to help myself feel better:     Coping skills: use my coping skills and be around others      Games and activities: go outside, go for a walk and deep breathing     Focus on helpful thoughts: this is temporary, I will get through this, ride the wave and people care about me children      Step 3: People and places that help me feel better:     People:  daughters     Places (with permission): work       Step 4: People and things that are special to me that remind me why it's worth getting better:      My family        Step 6: Things that will help me stay safe:     be around others    Step 7: Professionals or agencies I can contact when I need help:     Suicide Prevention Lifeline: Call or Text 988     Local Crisis Services: 988     Call 911 or go to my nearest emergency department.     I helped develop this safety plan and agree to use it when needed.  I have been given a copy of this plan.      Client signature:     _________________________________________________________________  Today's date:  5/16/2023    Adapted from Safety Plan Template 2008 Rocio Neal and Ronnie Juan is reprinted with the express permission of the authors.  No portion of the Safety Plan Template may be reproduced without the express, written permission.  You can contact the authors at [email or web address] or [email or web address].

## 2024-03-26 ENCOUNTER — VIRTUAL VISIT (OUTPATIENT)
Dept: PSYCHOLOGY | Facility: CLINIC | Age: 44
End: 2024-03-26
Payer: COMMERCIAL

## 2024-03-26 DIAGNOSIS — F33.0 MDD (MAJOR DEPRESSIVE DISORDER), RECURRENT EPISODE, MILD (H): ICD-10-CM

## 2024-03-26 DIAGNOSIS — F41.1 GAD (GENERALIZED ANXIETY DISORDER): Primary | ICD-10-CM

## 2024-03-26 DIAGNOSIS — F90.2 ATTENTION DEFICIT HYPERACTIVITY DISORDER (ADHD), COMBINED TYPE: ICD-10-CM

## 2024-03-26 PROCEDURE — 90832 PSYTX W PT 30 MINUTES: CPT | Mod: 95 | Performed by: SOCIAL WORKER

## 2024-03-26 ASSESSMENT — ANXIETY QUESTIONNAIRES
GAD7 TOTAL SCORE: 8
3. WORRYING TOO MUCH ABOUT DIFFERENT THINGS: SEVERAL DAYS
6. BECOMING EASILY ANNOYED OR IRRITABLE: SEVERAL DAYS
2. NOT BEING ABLE TO STOP OR CONTROL WORRYING: SEVERAL DAYS
7. FEELING AFRAID AS IF SOMETHING AWFUL MIGHT HAPPEN: SEVERAL DAYS
1. FEELING NERVOUS, ANXIOUS, OR ON EDGE: SEVERAL DAYS
4. TROUBLE RELAXING: SEVERAL DAYS
GAD7 TOTAL SCORE: 8
5. BEING SO RESTLESS THAT IT IS HARD TO SIT STILL: MORE THAN HALF THE DAYS

## 2024-03-26 ASSESSMENT — COLUMBIA-SUICIDE SEVERITY RATING SCALE - C-SSRS
2. HAVE YOU ACTUALLY HAD ANY THOUGHTS OF KILLING YOURSELF?: NO
1. SINCE LAST CONTACT, HAVE YOU WISHED YOU WERE DEAD OR WISHED YOU COULD GO TO SLEEP AND NOT WAKE UP?: NO
TOTAL  NUMBER OF INTERRUPTED ATTEMPTS SINCE LAST CONTACT: NO
TOTAL  NUMBER OF ABORTED OR SELF INTERRUPTED ATTEMPTS SINCE LAST CONTACT: NO
ATTEMPT SINCE LAST CONTACT: NO
6. HAVE YOU EVER DONE ANYTHING, STARTED TO DO ANYTHING, OR PREPARED TO DO ANYTHING TO END YOUR LIFE?: NO
SUICIDE, SINCE LAST CONTACT: NO

## 2024-03-26 ASSESSMENT — PATIENT HEALTH QUESTIONNAIRE - PHQ9: SUM OF ALL RESPONSES TO PHQ QUESTIONS 1-9: 12

## 2024-03-26 NOTE — PROGRESS NOTES
"      [facility]                                     Progress Note    Patient Name: Mendy Roe  Date: 3/26/2024       Service Type: Individual      Session Start Time: 1205  Session End Time: 1240    Session Length: 16-37    Session #: 56    Attendees: Client    Service Modality:  Phone Visit:      Provider verified identity through the following two step process.  Patient provided:  Patient is known previously to provider     Telephone Visit: The patient's condition can be safely assessed and treated via synchronous audio telemedicine encounter.       Reason for Audio Telemedicine Visit: Services only offered telehealth     Originating Site (Patient Location): Patient's home     Distant Site (Provider Location): off site      Consent:  The patient/guardian has verbally consented to:      1. The potential risks and benefits of telemedicine (telephone visit) versus in person care;     The patient has been notified of the following:      \"We have found that certain health care needs can be provided without the need for a face to face visit.  This service lets us provide the care you need with a phone conversation.       I will have full access to your [facility] medical record during this entire phone call.   I will be taking notes for your medical record.      Since this is like an office visit, we will bill your insurance company for this service.       There are potential benefits and risks of telephone visits (e.g. limits to patient confidentiality) that differ from in-person visits.?Confidentiality still applies for telephone services, and nobody will record the visit.  It is important to be in a quiet, private space that is free of distractions (including cell phone or other devices) during the visit.??      If during the course of the call I believe a telephone visit is not appropriate, you will not be charged for this service\"     Consent has been obtained for this service by " care team member: Yes         DATA  Interactive Complexity: No  Crisis: No        Progress Since Last Session (Related to Symptoms / Goals / Homework):   Symptoms: Improving per patient    Homework: Achieved / completed to satisfaction  Partially completed      Episode of Care Goals: Minimal progress - PREPARATION (Decided to change - considering how); Intervened by negotiating a change plan and determining options / strategies for behavior change, identifying triggers, exploring social supports, and working towards setting a date to begin behavior change     Current / Ongoing Stressors and Concerns:   Blended family, moving, kids, grandchildren, partner     Treatment Objective(s) Addressed in This Session:     Patient will demonstrate and report a level of anxiety that can be managed at a lower level of care.  Absence of persistent anxious mood and report of reduced frequency and intensity of worry and absence of persistent anxious mood to acceptable levels, no panic attacks, report subjective comfort with rumination for a period of 90 days, within 6 months as clinically observed and by patient self-report.  Patient will demonstrate and report a level of depression that can be managed at a lower level of care.  Absence of persistent depression mood and report of reduced frequency and intensity of low mood and absence of persistent low energy and motivation to acceptable levels, report subjective improved motivation and increased energy for a period of 90 days, within 6 months as clinically observed and by patient self-report.     Intervention:     Therapist met with patient to review goals and interventions. Therapist utilized reflected listening as patient gave brief reflection of week. Patient reported things have been going better and processed communication with her partner with some stressors. Therapist supported patient as she processed and validated patient. Therapist encouraged patient to speak to her  daughter about what went wrong last time and set firm boundaries about her behavior and expectations on behavior and rent.   Patient presented with an anxious affect. Patient was engaged in session and open to feedback. Patient reported no safety concerns.       There has been demonstrated improvement in functioning while patient has been engaged in psychotherapy/psychological service- if withdrawn the patient would deteriorate and/or relapse.       Assessments completed prior to visit:  The following assessments were completed by patient for this visit:  PHQ9:       11/22/2022     4:00 PM 1/20/2023    11:56 AM 3/21/2023     3:00 PM 4/17/2023     2:27 PM 6/6/2023     9:46 AM 12/12/2023     1:00 PM 3/26/2024    12:00 PM   PHQ-9 SCORE   PHQ-9 Total Score MyChart  19 (Moderately severe depression)  20 (Severe depression) 10 (Moderate depression)     PHQ-9 Total Score 15 19 19 20 10 13 12     GAD7:       5/21/2022     8:00 AM 7/28/2022     1:00 PM 11/22/2022     4:00 PM 3/21/2023     3:00 PM 6/6/2023     9:46 AM 12/12/2023     1:00 PM 3/26/2024    12:00 PM   MEG-7 SCORE   Total Score 21 (severe anxiety)    6 (mild anxiety)     Total Score 21 13 13 20 6 9 8   PROMIS-10 Scores        6/6/2023     9:59 AM 12/12/2023     1:00 PM 3/26/2024    12:00 PM   PROMIS-10 Total Score w/o Sub Scores   PROMIS TOTAL - SUBSCORES 23 21 21    [place]ough Suicide Severity Rating Scale (Short Version)      7/6/2021     4:00 PM 12/21/2021     1:00 PM 10/11/2022     5:00 PM 11/22/2022     4:00 PM 3/21/2023     3:00 PM 12/12/2023     1:00 PM 3/26/2024    12:00 PM   Mc[place]ough Suicide Severity Rating (Short Version)   Over the past 2 weeks have you felt down, depressed, or hopeless? yes yes        Over the past 2 weeks have you had thoughts of killing yourself? no no        Have you ever attempted to kill yourself? no no        1. Wish to be Dead (Since Last Contact)   N N N N N   2. Non-Specific Active Suicidal Thoughts (Since Last Contact)   N N N  N N   Actual Attempt (Since Last Contact)   N N N N N   Has subject engaged in non-suicidal self-injurious behavior? (Since Last Contact)   N N N N N   Interrupted Attempts (Since Last Contact)   N N N N N   Aborted or Self-Interrupted Attempt (Since Last Contact)   N N N N N   Preparatory Acts or Behavior (Since Last Contact)   N N N N N   Suicide (Since Last Contact)   N N N N N   Calculated C-SSRS Risk Score (Since Last Contact)   No Risk Indicated No Risk Indicated No Risk Indicated No Risk Indicated No Risk Indicated            ASSESSMENT: Current Emotional / Mental Status (status of significant symptoms):   Risk status (Self / Other harm or suicidal ideation)   Patient denies current fears or concerns for personal safety.   Patient denies current or recent suicidal ideation or behaviors.   Patient denies current or recent homicidal ideation or behaviors.   Patient denies current or recent self injurious behavior or ideation.   Patient denies other safety concerns.   Patient reports there has been no change in risk factors since their last session.     Patient reports there has been no change in protective factors since their last session.     A safety and risk management plan has been developed including: Patient consented to co-developed safety plan on 5/8/2023.  Safety and risk management plan was reviewed.   Patient agreed to use safety plan should any safety concerns arise.  A copy was made available to the patient.     Appearance:   phone    Eye Contact:   phone    Psychomotor Behavior: phone    Attitude:   Cooperative    Orientation:   All   Speech    Rate / Production: Emotional Talkative    Volume:  Normal    Mood:    Anxious  Depressed    Affect:    Worrisome    Thought Content:  Clear    Thought Form:  Coherent    Insight:    Fair      Medication Review:   No changes to current psychiatric medication(s)     Medication Compliance:   Yes     Changes in Health Issues:   None reported     Chemical Use  Review:   Substance Use: Chemical use reviewed, no active concerns identified      Tobacco Use: No current tobacco use.      Diagnosis:  1. MEG (generalized anxiety disorder)    2. MDD (major depressive disorder), recurrent episode, mild (H24)    3. Attention deficit hyperactivity disorder (ADHD), combined type        Collateral Reports Completed:   Not Applicable    PLAN: (Patient Tasks / Therapist Tasks / Other)      Increase self care  Visiting kids and grandchildren more  Gym  Continue house looking  Set expectations and boundaries with daughter about moving in    Autumn RONNIE Whitneywan, [facility]  3/26/2024                                                           ______________________________________________________________________    Individual Treatment Plan    Patient's Name: Mendy Roe  YOB: 1980    Date of Creation: 12/21/2021  Date Treatment Plan Last Reviewed/Revised: 3/26/2024 due 6/26/2024    DSM5 Diagnoses: 296.31 (F33.0) Major Depressive Disorder, Recurrent Episode, Mild With mixed features or 300.02 (F41.1) Generalized Anxiety Disorder  Psychosocial / Contextual Factors: Blended family, moving, kids, grandchildren, partner  PROMIS (reviewed every 90 days): 3/26/2024    Referral / Collaboration:  Referral to another professional/service is not indicated at this time..    Anticipated number of session for this episode of care: 12  Anticipation frequency of session: Monthly  Anticipated Duration of each session: 38-52 minutes  Treatment plan will be reviewed in 90 days or when goals have been changed.       MeasurableTreatment Goal(s) related to diagnosis / functional impairment(s)  Goal 1: Patient will report absence of persistent anxiety mood and report of reduced frequency and intensity of worry and absence of persistent anxious mood to acceptable levels, no panic attacks, report subjective comfort with rumination or a period of 90 days. Within 6 months as clinically observed and by  patient self-report    I will know I've met my goal when manage my anxiety.      Objective #A (Patient Action)    Patient will demonstrate and report a level of anxiety that can be managed at a lower level of care.  Absence of persistent anxious mood and report of reduced frequency and intensity of worry and absence of persistent anxious mood to acceptable levels, no panic attacks, report subjective comfort with rumination for a period of 90 days, within 6 months as clinically observed and by patient self-report.  Status: Continued - Date(s): 3/26/2024    Intervention(s)  Therapist will provide individual therapy to identify triggers to anxiety, gain feedback on helpful coping tools and thought-reframing techniques, and identify preferred way of being. Tx to include discuss current stressors and interpersonal conflicts and how to cope with these, coaching, diagnostic testing, referral for medication as indicated, use prescribed medication, cognitive restructuring, interpersonal, family therapy, supportive therapy services.        Goal 2: Patient will report absence of persistent depression mood and report of reduced frequency and intensity of low mood and absence of persistent low energy and motivation to acceptable levels, report subjective improved motivation and increased energy for a period of 90 days, within 6 months as clinically observed and by patient self-report    I will know I've met my goal when feeling more balanced.      Objective #A (Patient Action)    Status: Continued - Date(s):3/26/2024  Patient will demonstrate and report a level of depression that can be managed at a lower level of care.  Absence of persistent depression mood and report of reduced frequency and intensity of low mood and absence of persistent low energy and motivation to acceptable levels, report subjective improved motivation and increased energy for a period of 90 days, within 6 months as clinically observed and by patient  self-report.    Intervention(s)  Therapist will provide individual therapy to identify triggers to depression, gain feedback on helpful coping tools and thought-reframing techniques, and identify preferred way of being.  Tx to include discussion of current stressors and interpersonal conflicts and how to cope with these, coaching, diagnostic testing, referral for medication as indicated, use prescribed medication, cognitive restructuring, interpersonal, family therapy, supportive therapy services.        Patient has reviewed and agreed to the above plan.      RADHA Neves  3/26/2024          [facility]               Name:   Mendy Roe     Therapist Name: Patito Humphries [facility]    SAFETY PLAN: 5/8/2023    Therapist suggested patient go stay with her daughters to allow self a break, speak to partner about where they are and where they are going and if patient continues to feel unsafe to go to the ED and patient agreed.            [facility]               Name:   Mendy Roe     Therapist Name: Patito Humphries [facility]    SAFETY PLAN:    Step 1: Warning signs / cues (thoughts, feelings, what I do, what others do) that tell me I'm not doing well:     What do I think?  What do I say to myself? I want to die      Pictures in my head:  none     How do I feel? really sad, worried, angry, hopeless and annoyed     What do I do? sit in my room and be alone     When do I feel this way?  relationship and money          Step 2: Coping strategies - Things I can do to help myself feel better:     Coping skills: use my coping skills and be around others      Games and activities: go outside, go for a walk and deep breathing     Focus on helpful thoughts: this is temporary, I will get through this, ride the wave and people care about me children      Step 3: People and places that help me feel better:     People:  daughters     Places (with permission): work       Step 4: People and things that are special to me that remind me why it's worth getting better:      My family        Step 6: Things that will help me stay safe:     be around others    Step 7: Professionals or agencies I can contact when I need help:     Suicide Prevention Lifeline: Call or Text 988     Local Crisis Services: 988     Call 911 or go to my nearest emergency department.     I helped develop this safety plan and agree to use it when needed.  I have been given a copy of this plan.      Client signature:     _________________________________________________________________  Today's date:  5/16/2023    Adapted from Safety Plan Template 2008 Rocio Neal and Ronnie Juan is reprinted with the express permission of the authors.  No portion of the Safety Plan Template may be reproduced without the express, written permission.  You can contact the authors at [email or web address] or [email or web address].

## 2024-04-01 ENCOUNTER — TRANSFERRED RECORDS (OUTPATIENT)
Dept: MULTI SPECIALTY CLINIC | Facility: CLINIC | Age: 44
End: 2024-04-01

## 2024-05-13 ENCOUNTER — VIRTUAL VISIT (OUTPATIENT)
Dept: PSYCHOLOGY | Facility: CLINIC | Age: 44
End: 2024-05-13
Payer: COMMERCIAL

## 2024-05-13 DIAGNOSIS — F41.1 GAD (GENERALIZED ANXIETY DISORDER): Primary | ICD-10-CM

## 2024-05-13 DIAGNOSIS — F90.2 ATTENTION DEFICIT HYPERACTIVITY DISORDER (ADHD), COMBINED TYPE: ICD-10-CM

## 2024-05-13 DIAGNOSIS — F33.0 MDD (MAJOR DEPRESSIVE DISORDER), RECURRENT EPISODE, MILD (H): ICD-10-CM

## 2024-05-13 PROCEDURE — 90834 PSYTX W PT 45 MINUTES: CPT | Mod: 93 | Performed by: SOCIAL WORKER

## 2024-05-13 NOTE — PROGRESS NOTES
"      [facility]                                     Progress Note    Patient Name: Mendy Roe  Date:5/13/2024       Service Type: Individual      Session Start Time: 1103 Session End Time: 1147    Session Length: 38-52    Session #: 57    Attendees: Client    Service Modality:  Phone Visit:      Provider verified identity through the following two step process.  Patient provided:  Patient is known previously to provider     Telephone Visit: The patient's condition can be safely assessed and treated via synchronous audio telemedicine encounter.       Reason for Audio Telemedicine Visit: Services only offered telehealth     Originating Site (Patient Location): Patient's home     Distant Site (Provider Location): off site      Consent:  The patient/guardian has verbally consented to:      1. The potential risks and benefits of telemedicine (telephone visit) versus in person care;     The patient has been notified of the following:      \"We have found that certain health care needs can be provided without the need for a face to face visit.  This service lets us provide the care you need with a phone conversation.       I will have full access to your [facility] medical record during this entire phone call.   I will be taking notes for your medical record.      Since this is like an office visit, we will bill your insurance company for this service.       There are potential benefits and risks of telephone visits (e.g. limits to patient confidentiality) that differ from in-person visits.?Confidentiality still applies for telephone services, and nobody will record the visit.  It is important to be in a quiet, private space that is free of distractions (including cell phone or other devices) during the visit.??      If during the course of the call I believe a telephone visit is not appropriate, you will not be charged for this service\"     Consent has been obtained for this service by care " team member: Yes         DATA  Interactive Complexity: No  Crisis: No        Progress Since Last Session (Related to Symptoms / Goals / Homework):   Symptoms: Worsening lots of stress    Homework: Achieved / completed to satisfaction  Partially completed      Episode of Care Goals: Minimal progress - PREPARATION (Decided to change - considering how); Intervened by negotiating a change plan and determining options / strategies for behavior change, identifying triggers, exploring social supports, and working towards setting a date to begin behavior change     Current / Ongoing Stressors and Concerns:   Blended family, moving, kids, grandchildren, partner     Treatment Objective(s) Addressed in This Session:     Patient will demonstrate and report a level of anxiety that can be managed at a lower level of care.  Absence of persistent anxious mood and report of reduced frequency and intensity of worry and absence of persistent anxious mood to acceptable levels, no panic attacks, report subjective comfort with rumination for a period of 90 days, within 6 months as clinically observed and by patient self-report.  Patient will demonstrate and report a level of depression that can be managed at a lower level of care.  Absence of persistent depression mood and report of reduced frequency and intensity of low mood and absence of persistent low energy and motivation to acceptable levels, report subjective improved motivation and increased energy for a period of 90 days, within 6 months as clinically observed and by patient self-report.     Intervention:     Therapist met with patient to review goals and interventions. Therapist utilized reflected listening as patient gave brief reflection of week. Patient reported lots of stress after she learned her daughter was in a severe car accident that left her with injuries. Therapist supported patient as she processed and validated patient. Therapist utilized strength based modality  and encouraged patient to take care of self and to advocate with there daughters father about the days to come of care for her daughter and the unknown with healing and bills.   Patient presented with an anxious affect. Patient was engaged in session and open to feedback. Patient reported no safety concerns.       There has been demonstrated improvement in functioning while patient has been engaged in psychotherapy/psychological service- if withdrawn the patient would deteriorate and/or relapse.       Assessments completed prior to visit:  The following assessments were completed by patient for this visit:  PHQ9:       11/22/2022     4:00 PM 1/20/2023    11:56 AM 3/21/2023     3:00 PM 4/17/2023     2:27 PM 6/6/2023     9:46 AM 12/12/2023     1:00 PM 3/26/2024    12:00 PM   PHQ-9 SCORE   PHQ-9 Total Score MyChart  19 (Moderately severe depression)  20 (Severe depression) 10 (Moderate depression)     PHQ-9 Total Score 15 19 19 20 10 13 12     GAD7:       5/21/2022     8:00 AM 7/28/2022     1:00 PM 11/22/2022     4:00 PM 3/21/2023     3:00 PM 6/6/2023     9:46 AM 12/12/2023     1:00 PM 3/26/2024    12:00 PM   MEG-7 SCORE   Total Score 21 (severe anxiety)    6 (mild anxiety)     Total Score 21 13 13 20 6 9 8   PROMIS-10 Scores        6/6/2023     9:59 AM 12/12/2023     1:00 PM 3/26/2024    12:00 PM   PROMIS-10 Total Score w/o Sub Scores   PROMIS TOTAL - SUBSCORES 23 21 21    Garrard Suicide Severity Rating Scale (Short Version)      7/6/2021     4:00 PM 12/21/2021     1:00 PM 10/11/2022     5:00 PM 11/22/2022     4:00 PM 3/21/2023     3:00 PM 12/12/2023     1:00 PM 3/26/2024    12:00 PM   Garrard Suicide Severity Rating (Short Version)   Over the past 2 weeks have you felt down, depressed, or hopeless? yes yes        Over the past 2 weeks have you had thoughts of killing yourself? no no        Have you ever attempted to kill yourself? no no        1. Wish to be Dead (Since Last Contact)   N N N N N   2. Non-Specific  Active Suicidal Thoughts (Since Last Contact)   N N N N N   Actual Attempt (Since Last Contact)   N N N N N   Has subject engaged in non-suicidal self-injurious behavior? (Since Last Contact)   N N N N N   Interrupted Attempts (Since Last Contact)   N N N N N   Aborted or Self-Interrupted Attempt (Since Last Contact)   N N N N N   Preparatory Acts or Behavior (Since Last Contact)   N N N N N   Suicide (Since Last Contact)   N N N N N   Calculated C-SSRS Risk Score (Since Last Contact)   No Risk Indicated No Risk Indicated No Risk Indicated No Risk Indicated No Risk Indicated            ASSESSMENT: Current Emotional / Mental Status (status of significant symptoms):   Risk status (Self / Other harm or suicidal ideation)   Patient denies current fears or concerns for personal safety.   Patient denies current or recent suicidal ideation or behaviors.   Patient denies current or recent homicidal ideation or behaviors.   Patient denies current or recent self injurious behavior or ideation.   Patient denies other safety concerns.   Patient reports there has been no change in risk factors since their last session.     Patient reports there has been no change in protective factors since their last session.     A safety and risk management plan has been developed including: Patient consented to co-developed safety plan on 5/8/2023.  Safety and risk management plan was reviewed.   Patient agreed to use safety plan should any safety concerns arise.  A copy was made available to the patient.     Appearance:   phone    Eye Contact:   phone    Psychomotor Behavior: phone    Attitude:   Cooperative    Orientation:   All   Speech    Rate / Production: Emotional Talkative    Volume:  Normal    Mood:    Anxious  Depressed    Affect:    Worrisome    Thought Content:  Clear    Thought Form:  Coherent    Insight:    Fair      Medication Review:   No changes to current psychiatric medication(s)     Medication Compliance:   Yes     Changes  in Health Issues:   None reported     Chemical Use Review:   Substance Use: Chemical use reviewed, no active concerns identified      Tobacco Use: No current tobacco use.      Diagnosis:  1. MEG (generalized anxiety disorder)    2. MDD (major depressive disorder), recurrent episode, mild (H24)    3. Attention deficit hyperactivity disorder (ADHD), combined type        Collateral Reports Completed:   Not Applicable    PLAN: (Patient Tasks / Therapist Tasks / Other)      Increase self care  Visiting kids and grandchildren more  Gym  Continue house looking  Set expectations and boundaries with daughter about moving in  Therapist utilized strength based modality and encouraged patient to take care of self and to advocate with there daughters father about the days to come of care for her daughter and the unknown with healing and bills.     Patito Humphries, [facility]  5/13/2024                                                           ______________________________________________________________________    Individual Treatment Plan    Patient's Name: Mendy Roe  YOB: 1980    Date of Creation: 12/21/2021  Date Treatment Plan Last Reviewed/Revised: 3/26/2024 due 6/26/2024    DSM5 Diagnoses: 296.31 (F33.0) Major Depressive Disorder, Recurrent Episode, Mild With mixed features or 300.02 (F41.1) Generalized Anxiety Disorder  Psychosocial / Contextual Factors: Blended family, moving, kids, grandchildren, partner  PROMIS (reviewed every 90 days): 3/26/2024    Referral / Collaboration:  Referral to another professional/service is not indicated at this time..    Anticipated number of session for this episode of care: 12  Anticipation frequency of session: Monthly  Anticipated Duration of each session: 38-52 minutes  Treatment plan will be reviewed in 90 days or when goals have been changed.       MeasurableTreatment Goal(s) related to diagnosis / functional impairment(s)  Goal 1: Patient will report absence of  persistent anxiety mood and report of reduced frequency and intensity of worry and absence of persistent anxious mood to acceptable levels, no panic attacks, report subjective comfort with rumination or a period of 90 days. Within 6 months as clinically observed and by patient self-report    I will know I've met my goal when manage my anxiety.      Objective #A (Patient Action)    Patient will demonstrate and report a level of anxiety that can be managed at a lower level of care.  Absence of persistent anxious mood and report of reduced frequency and intensity of worry and absence of persistent anxious mood to acceptable levels, no panic attacks, report subjective comfort with rumination for a period of 90 days, within 6 months as clinically observed and by patient self-report.  Status: Continued - Date(s): 3/26/2024    Intervention(s)  Therapist will provide individual therapy to identify triggers to anxiety, gain feedback on helpful coping tools and thought-reframing techniques, and identify preferred way of being. Tx to include discuss current stressors and interpersonal conflicts and how to cope with these, coaching, diagnostic testing, referral for medication as indicated, use prescribed medication, cognitive restructuring, interpersonal, family therapy, supportive therapy services.        Goal 2: Patient will report absence of persistent depression mood and report of reduced frequency and intensity of low mood and absence of persistent low energy and motivation to acceptable levels, report subjective improved motivation and increased energy for a period of 90 days, within 6 months as clinically observed and by patient self-report    I will know I've met my goal when feeling more balanced.      Objective #A (Patient Action)    Status: Continued - Date(s):3/26/2024  Patient will demonstrate and report a level of depression that can be managed at a lower level of care.  Absence of persistent depression mood and  report of reduced frequency and intensity of low mood and absence of persistent low energy and motivation to acceptable levels, report subjective improved motivation and increased energy for a period of 90 days, within 6 months as clinically observed and by patient self-report.    Intervention(s)  Therapist will provide individual therapy to identify triggers to depression, gain feedback on helpful coping tools and thought-reframing techniques, and identify preferred way of being.  Tx to include discussion of current stressors and interpersonal conflicts and how to cope with these, coaching, diagnostic testing, referral for medication as indicated, use prescribed medication, cognitive restructuring, interpersonal, family therapy, supportive therapy services.        Patient has reviewed and agreed to the above plan.      AMA Neves  3/26/2024          [facility]               Name:   Mendy Roe     [facility]    SAFETY PLAN: 5/8/2023    Therapist suggested patient go stay with her daughters to allow self a break, speak to partner about where they are and where they are going and if patient continues to feel unsafe to go to the ED and patient agreed.            [facility]               Name:   Mendy Roe     Therapist Name: Patito Humphries [facility]    SAFETY PLAN:    Step 1: Warning signs / cues (thoughts, feelings, what I do, what others do) that tell me I'm not doing well:     What do I think?  What do I say to myself? I want to die      Pictures in my head:  none     How do I feel? really sad, worried, angry, hopeless and annoyed     What do I do? sit in my room and be alone     When do I feel this way?  relationship and money          Step 2: Coping strategies - Things I can do to help myself feel better:     Coping skills: use my coping skills and be around others      Games  and activities: go outside, go for a walk and deep breathing     Focus on helpful thoughts: this is temporary, I will get through this, ride the wave and people care about me children      Step 3: People and places that help me feel better:     People: daughters     Places (with permission): work       Step 4: People and things that are special to me that remind me why it's worth getting better:      My family        Step 6: Things that will help me stay safe:     be around others    Step 7: Professionals or agencies I can contact when I need help:     Suicide Prevention Lifeline: Call or Text 988     Local Crisis Services: 988     Call 911 or go to my nearest emergency department.     I helped develop this safety plan and agree to use it when needed.  I have been given a copy of this plan.      Client signature:     _________________________________________________________________  Today's date:  5/16/2023    Adapted from Safety Plan Template 2008 Rocio Neal and Ronnie Juan is reprinted with the express permission of the authors.  No portion of the Safety Plan Template may be reproduced without the express, written permission.  You can contact the authors at [email or web address] or [email or web address].

## 2024-06-21 ENCOUNTER — ORDERS ONLY (AUTO-RELEASED) (OUTPATIENT)
Dept: FAMILY MEDICINE | Facility: CLINIC | Age: 44
End: 2024-06-21

## 2024-06-21 ENCOUNTER — OFFICE VISIT (OUTPATIENT)
Dept: FAMILY MEDICINE | Facility: CLINIC | Age: 44
End: 2024-06-21
Payer: COMMERCIAL

## 2024-06-21 VITALS
WEIGHT: 142.5 LBS | DIASTOLIC BLOOD PRESSURE: 78 MMHG | HEIGHT: 63 IN | TEMPERATURE: 98.3 F | OXYGEN SATURATION: 96 % | RESPIRATION RATE: 17 BRPM | HEART RATE: 103 BPM | SYSTOLIC BLOOD PRESSURE: 110 MMHG | BODY MASS INDEX: 25.25 KG/M2

## 2024-06-21 DIAGNOSIS — Z12.11 SPECIAL SCREENING FOR MALIGNANT NEOPLASMS, COLON: ICD-10-CM

## 2024-06-21 DIAGNOSIS — Z11.3 SCREEN FOR STD (SEXUALLY TRANSMITTED DISEASE): ICD-10-CM

## 2024-06-21 DIAGNOSIS — R06.2 WHEEZING: ICD-10-CM

## 2024-06-21 DIAGNOSIS — Z01.84 IMMUNITY STATUS TESTING: ICD-10-CM

## 2024-06-21 DIAGNOSIS — N95.1 MENOPAUSAL SYNDROME (HOT FLASHES): ICD-10-CM

## 2024-06-21 DIAGNOSIS — Z78.0 POSTMENOPAUSAL STATUS: ICD-10-CM

## 2024-06-21 DIAGNOSIS — B00.9 HSV-1 INFECTION: ICD-10-CM

## 2024-06-21 DIAGNOSIS — Z00.00 ROUTINE GENERAL MEDICAL EXAMINATION AT A HEALTH CARE FACILITY: Primary | ICD-10-CM

## 2024-06-21 DIAGNOSIS — Z01.84 IMMUNITY TO HEPATITIS B VIRUS DEMONSTRATED BY SEROLOGIC TEST: ICD-10-CM

## 2024-06-21 DIAGNOSIS — R11.0 NAUSEA: ICD-10-CM

## 2024-06-21 LAB
ALBUMIN SERPL BCG-MCNC: 4.8 G/DL (ref 3.5–5.2)
ALP SERPL-CCNC: 75 U/L (ref 40–150)
ALT SERPL W P-5'-P-CCNC: 20 U/L (ref 0–50)
ANION GAP SERPL CALCULATED.3IONS-SCNC: 9 MMOL/L (ref 7–15)
AST SERPL W P-5'-P-CCNC: 20 U/L (ref 0–45)
BILIRUB SERPL-MCNC: 0.9 MG/DL
BUN SERPL-MCNC: 12.3 MG/DL (ref 6–20)
CALCIUM SERPL-MCNC: 9.5 MG/DL (ref 8.6–10)
CHLORIDE SERPL-SCNC: 103 MMOL/L (ref 98–107)
CHOLEST SERPL-MCNC: 191 MG/DL
CREAT SERPL-MCNC: 0.85 MG/DL (ref 0.51–0.95)
DEPRECATED HCO3 PLAS-SCNC: 25 MMOL/L (ref 22–29)
EGFRCR SERPLBLD CKD-EPI 2021: 86 ML/MIN/1.73M2
ERYTHROCYTE [DISTWIDTH] IN BLOOD BY AUTOMATED COUNT: 12 % (ref 10–15)
FASTING STATUS PATIENT QL REPORTED: ABNORMAL
FASTING STATUS PATIENT QL REPORTED: NORMAL
FSH SERPL IRP2-ACNC: 76.6 MIU/ML
GLUCOSE SERPL-MCNC: 94 MG/DL (ref 70–99)
HBV CORE AB SERPL QL IA: NONREACTIVE
HBV SURFACE AB SERPL IA-ACNC: 369 M[IU]/ML
HBV SURFACE AB SERPL IA-ACNC: REACTIVE M[IU]/ML
HCT VFR BLD AUTO: 39.9 % (ref 35–47)
HCV AB SERPL QL IA: NONREACTIVE
HDLC SERPL-MCNC: 35 MG/DL
HGB BLD-MCNC: 13.7 G/DL (ref 11.7–15.7)
HIV 1+2 AB+HIV1 P24 AG SERPL QL IA: NONREACTIVE
LDLC SERPL CALC-MCNC: 126 MG/DL
LH SERPL-ACNC: 36.3 MIU/ML
MCH RBC QN AUTO: 29.4 PG (ref 26.5–33)
MCHC RBC AUTO-ENTMCNC: 34.3 G/DL (ref 31.5–36.5)
MCV RBC AUTO: 86 FL (ref 78–100)
NONHDLC SERPL-MCNC: 156 MG/DL
PLATELET # BLD AUTO: 277 10E3/UL (ref 150–450)
POTASSIUM SERPL-SCNC: 3.9 MMOL/L (ref 3.4–5.3)
PROT SERPL-MCNC: 7.6 G/DL (ref 6.4–8.3)
RBC # BLD AUTO: 4.66 10E6/UL (ref 3.8–5.2)
SODIUM SERPL-SCNC: 137 MMOL/L (ref 135–145)
TRIGL SERPL-MCNC: 149 MG/DL
TSH SERPL DL<=0.005 MIU/L-ACNC: 1.29 UIU/ML (ref 0.3–4.2)
WBC # BLD AUTO: 7.3 10E3/UL (ref 4–11)

## 2024-06-21 PROCEDURE — 99396 PREV VISIT EST AGE 40-64: CPT | Performed by: FAMILY MEDICINE

## 2024-06-21 PROCEDURE — 84443 ASSAY THYROID STIM HORMONE: CPT | Performed by: FAMILY MEDICINE

## 2024-06-21 PROCEDURE — 86704 HEP B CORE ANTIBODY TOTAL: CPT | Performed by: FAMILY MEDICINE

## 2024-06-21 PROCEDURE — 80061 LIPID PANEL: CPT | Performed by: FAMILY MEDICINE

## 2024-06-21 PROCEDURE — 86706 HEP B SURFACE ANTIBODY: CPT | Performed by: FAMILY MEDICINE

## 2024-06-21 PROCEDURE — 36415 COLL VENOUS BLD VENIPUNCTURE: CPT | Performed by: FAMILY MEDICINE

## 2024-06-21 PROCEDURE — 99214 OFFICE O/P EST MOD 30 MIN: CPT | Mod: 25 | Performed by: FAMILY MEDICINE

## 2024-06-21 PROCEDURE — 83001 ASSAY OF GONADOTROPIN (FSH): CPT | Performed by: FAMILY MEDICINE

## 2024-06-21 PROCEDURE — 87389 HIV-1 AG W/HIV-1&-2 AB AG IA: CPT | Performed by: FAMILY MEDICINE

## 2024-06-21 PROCEDURE — 85027 COMPLETE CBC AUTOMATED: CPT | Performed by: FAMILY MEDICINE

## 2024-06-21 PROCEDURE — 86696 HERPES SIMPLEX TYPE 2 TEST: CPT | Performed by: FAMILY MEDICINE

## 2024-06-21 PROCEDURE — 83002 ASSAY OF GONADOTROPIN (LH): CPT | Performed by: FAMILY MEDICINE

## 2024-06-21 PROCEDURE — 86695 HERPES SIMPLEX TYPE 1 TEST: CPT | Performed by: FAMILY MEDICINE

## 2024-06-21 PROCEDURE — 80053 COMPREHEN METABOLIC PANEL: CPT | Performed by: FAMILY MEDICINE

## 2024-06-21 PROCEDURE — 86803 HEPATITIS C AB TEST: CPT | Performed by: FAMILY MEDICINE

## 2024-06-21 RX ORDER — EPTINEZUMAB-JJMR 100 MG/ML
100 INJECTION INTRAVENOUS
COMMUNITY
Start: 2023-08-22

## 2024-06-21 RX ORDER — ONDANSETRON 4 MG/1
4 TABLET, FILM COATED ORAL EVERY 8 HOURS PRN
Qty: 30 TABLET | Refills: 1 | Status: SHIPPED | OUTPATIENT
Start: 2024-06-21

## 2024-06-21 RX ORDER — ALBUTEROL SULFATE 90 UG/1
2 AEROSOL, METERED RESPIRATORY (INHALATION) EVERY 4 HOURS PRN
Qty: 18 G | Refills: 0 | Status: SHIPPED | OUTPATIENT
Start: 2024-06-21

## 2024-06-21 RX ORDER — SOLRIAMFETOL 75 MG/1
TABLET, FILM COATED ORAL
COMMUNITY
Start: 2024-05-30

## 2024-06-21 SDOH — HEALTH STABILITY: PHYSICAL HEALTH: ON AVERAGE, HOW MANY MINUTES DO YOU ENGAGE IN EXERCISE AT THIS LEVEL?: PATIENT DECLINED

## 2024-06-21 SDOH — HEALTH STABILITY: PHYSICAL HEALTH
ON AVERAGE, HOW MANY DAYS PER WEEK DO YOU ENGAGE IN MODERATE TO STRENUOUS EXERCISE (LIKE A BRISK WALK)?: PATIENT DECLINED

## 2024-06-21 ASSESSMENT — SOCIAL DETERMINANTS OF HEALTH (SDOH): HOW OFTEN DO YOU GET TOGETHER WITH FRIENDS OR RELATIVES?: PATIENT DECLINED

## 2024-06-21 ASSESSMENT — PATIENT HEALTH QUESTIONNAIRE - PHQ9
SUM OF ALL RESPONSES TO PHQ QUESTIONS 1-9: 13
SUM OF ALL RESPONSES TO PHQ QUESTIONS 1-9: 13
10. IF YOU CHECKED OFF ANY PROBLEMS, HOW DIFFICULT HAVE THESE PROBLEMS MADE IT FOR YOU TO DO YOUR WORK, TAKE CARE OF THINGS AT HOME, OR GET ALONG WITH OTHER PEOPLE: EXTREMELY DIFFICULT

## 2024-06-21 ASSESSMENT — PAIN SCALES - GENERAL: PAINLEVEL: NO PAIN (0)

## 2024-06-21 NOTE — PROGRESS NOTES
Preventive Care Visit  [facility]  Susan Galan MD, Family Medicine  Jun 21, 2024      Assessment & Plan     (Z00.00) Routine general medical examination at a health care facility  (primary encounter diagnosis)  Comment: Pap and mammogram done at outside provider last year. Patient follows up with them and has next appointments scheduled. No other concerns at this time.  Plan: Lipid panel reflex to direct LDL Fasting, CBC         with platelets, Comprehensive metabolic panel         (BMP + Alb, Alk Phos, ALT, AST, Total. Bili,         TP)             (R11.0) Nausea  Comment: Chronic, unchanged, associated with her migraines. Managed with Zofran as needed.  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  Plan: Hepatitis B core antibody, Hepatitis B Surface         Antibody        Positive immunity to hepatitis B (vaccine mediated)    (N95.1) Menopausal syndrome (hot flashes)  Comment: Patient notes mild sweating, hot flashes. Has not had period for several years.  Plan: TSH with free T4 reflex, Follicle stimulating         hormone, Luteinizing Hormone          Postmenopausal state confirmed    (R06.2) Wheezing  Comment: Chronic, unchanged. Previously managed with albuterol inhaler as needed.  Plan: albuterol (PROAIR HFA/PROVENTIL HFA/VENTOLIN         HFA) 108 (90 Base) MCG/ACT inhaler            Patient has been advised of split billing requirements and indicates understanding: Yes        BMI  Estimated body mass  "index is 25.65 kg/m  as calculated from the following:    Height as of this encounter: 1.588 m (5' 2.5\").    Weight as of this encounter: 64.6 kg (142 lb 8 oz).   Weight management plan: Discussed healthy diet and exercise guidelines      Counseling  Appropriate preventive services were discussed with this patient, including applicable screening as appropriate for fall prevention, nutrition, physical activity, Tobacco-use cessation, weight loss and cognition.  Checklist reviewing preventive services available has been given to the patient.  Reviewed patient's diet, addressing concerns and/or questions.   The patient was instructed to see the dentist every 6 months.   The patient's PHQ-9 score is consistent with moderate depression. She was provided with information regarding depression.           Cailin Tovar is a 43 year old, presenting for the following:  Physical        6/21/2024     3:17 PM   Additional Questions   Roomed by Christiano PENALOZA CMA        Via the Health Maintenance questionnaire, the patient has reported the following services have been completed -Mammogram: [facility] 2023-04-01-Cervical Cancer Screening: [facility] [place] 2023-04-01, this information has been sent to the abstraction team.      CRISTÓBAL Tovar is a 43 year old woman presenting for an annual physical and concerns of menopausal symptoms. She has been having hot flashes and sweating during the daytime for the past few months. Has not had a period in several years. Expresses desire for hormonal testing. She has no other concerns at this time.                 6/21/2024   General Health   How would you rate your overall physical health? (!) FAIR   Feel stress (tense, anxious, or unable to sleep) Rather much      (!) STRESS CONCERN      6/21/2024   Nutrition   Three or more servings of calcium each day? (!) NO   Diet: Regular (no restrictions)   How many servings of fruit and vegetables per day? (!) 0-1   How many sweetened beverages each " day? 0-1            2024   Exercise   Days per week of moderate/strenous exercise Patient declined   Average minutes spent exercising at this level Patient declined            2024   Social Factors   Frequency of gathering with friends or relatives Patient declined   Worry food won't last until get money to buy more No   Food not last or not have enough money for food? No   Do you have housing? (Housing is defined as stable permanent housing and does not include staying ouside in a car, in a tent, in an abandoned building, in an overnight shelter, or couch-surfing.) Yes   Are you worried about losing your housing? No   Lack of transportation? No   Unable to get utilities (heat,electricity)? No            2024   Dental   Dentist two times every year? (!) NO            2024   TB Screening   Were you born outside of the [place]? No          Today's PHQ-9 Score:       2024     3:06 PM   PHQ-9 SCORE   PHQ-9 Total Score MyChart 13 (Moderate depression)   PHQ-9 Total Score 13         2024   Substance Use   Alcohol more than 3/day or more than 7/wk Not Applicable   Do you use any other substances recreationally? No        Social History     Tobacco Use    Smoking status: Former     Current packs/day: 0.00     Types: Cigarettes     Quit date: 2017     Years since quittin.4    Smokeless tobacco: Never   Vaping Use    Vaping status: Never Used   Substance Use Topics    Alcohol use: Not Currently    Drug use: Never           2023   LAST FHS-7 RESULTS   1st degree relative breast or ovarian cancer Yes   Any relative bilateral breast cancer Unknown   Any male have breast cancer Unknown   Any ONE woman have BOTH breast AND ovarian cancer Unknown   Any woman with breast cancer before 50yrs Unknown   2 or more relatives with breast AND/OR ovarian cancer Unknown   2 or more relatives with breast AND/OR bowel cancer Unknown                   2024   STI Screening   New sexual partner(s) since  "last STI/HIV test? (!) YES declined additional testing           ASCVD Risk   The 10-year ASCVD risk score (Navya LORA, et al., 2019) is: 1.2%    Values used to calculate the score:      Age: 43 years      Sex: Female      Is Non- : No      Diabetic: No      Tobacco smoker: No      Systolic Blood Pressure: 110 mmHg      Is BP treated: No      HDL Cholesterol: 35 mg/dL      Total Cholesterol: 193 mg/dL       Reviewed and updated as needed this visit by Provider                             Objective    Exam  /78 (BP Location: Right arm, Patient Position: Sitting, Cuff Size: Adult Regular)   Pulse 103   Temp 98.3  F (36.8  C) (Oral)   Resp 17   Ht 1.588 m (5' 2.5\")   Wt 64.6 kg (142 lb 8 oz)   LMP  (LMP Unknown)   SpO2 96%   BMI 25.65 kg/m     Estimated body mass index is 25.65 kg/m  as calculated from the following:    Height as of this encounter: 1.588 m (5' 2.5\").    Weight as of this encounter: 64.6 kg (142 lb 8 oz).    Physical Exam  GENERAL: alert and no distress  EYES: Eyes grossly normal to inspection, PERRL and conjunctivae and sclerae normal  HENT: ear canals and TM's normal, nose and mouth without ulcers or lesions  NECK: no adenopathy, no asymmetry, masses, or scars  RESP: lungs clear to auscultation - no rales, rhonchi or wheezes  CV: regular rate and rhythm, normal S1 S2, no S3 or S4, no murmur, click or rub, no peripheral edema  ABDOMEN: soft, nontender, no hepatosplenomegaly, no masses and bowel sounds normal  MS: no gross musculoskeletal defects noted, no edema      Bladimir Dukes, MS3      Signed Electronically by: Susan Galan MD    "

## 2024-06-24 LAB
HSV1 IGG SERPL QL IA: 7.24 INDEX
HSV1 IGG SERPL QL IA: ABNORMAL
HSV2 IGG SERPL QL IA: 0.06 INDEX
HSV2 IGG SERPL QL IA: ABNORMAL

## 2024-06-26 PROBLEM — B00.9 HSV-1 INFECTION: Status: ACTIVE | Noted: 2024-06-26

## 2024-06-26 PROBLEM — Z01.84 IMMUNITY TO HEPATITIS B VIRUS DEMONSTRATED BY SEROLOGIC TEST: Status: ACTIVE | Noted: 2024-06-26

## 2024-06-26 PROBLEM — Z78.0 POSTMENOPAUSAL STATUS: Status: ACTIVE | Noted: 2024-06-26

## 2024-08-08 ENCOUNTER — VIRTUAL VISIT (OUTPATIENT)
Dept: PSYCHOLOGY | Facility: CLINIC | Age: 44
End: 2024-08-08
Payer: COMMERCIAL

## 2024-08-08 DIAGNOSIS — F90.2 ATTENTION DEFICIT HYPERACTIVITY DISORDER (ADHD), COMBINED TYPE: ICD-10-CM

## 2024-08-08 DIAGNOSIS — F33.0 MDD (MAJOR DEPRESSIVE DISORDER), RECURRENT EPISODE, MILD (H): ICD-10-CM

## 2024-08-08 DIAGNOSIS — F41.1 GAD (GENERALIZED ANXIETY DISORDER): Primary | ICD-10-CM

## 2024-08-08 PROCEDURE — 90837 PSYTX W PT 60 MINUTES: CPT | Mod: 95 | Performed by: SOCIAL WORKER

## 2024-08-08 NOTE — PROGRESS NOTES
[facility]                                     Progress Note    Patient Name: Mendy Roe  Date:  2024       Service Type: Individual      Session Start Time: 1306        Session End Time: 1358    Session Length: 53+    Session #: 58    Attendees: Client    Service Modality:  Video Visit:      Provider verified identity through the following two step process.  Patient provided: Patient  and Patient previous known to provider     Telemedicine Visit: The patient's condition can be safely assessed and treated via synchronous audio and visual telemedicine encounter.       Reason for Telemedicine Visit: Patient has requested telehealth visit     Originating Site (Patient Location): Patient's home     Distant Site (Provider Location): Provider Remote Setting- Home Office     Consent:  The patient/guardian has verbally consented to: the potential risks and benefits of telemedicine (video visit) versus in person care; bill my insurance or make self-payment for services provided; and responsibility for payment of non-covered services.      Patient would like the video invitation sent by: email/text     Mode of Communication: Video Conference via [employer]well     Distant Location (Provider): Off-site     As the provider I attest to compliance with applicable laws and regulations related to telemedicine.        DATA  Interactive Complexity: No  Crisis: No        Progress Since Last Session (Related to Symptoms / Goals / Homework):   Symptoms: Worsening but better than a month ago    Homework: Achieved / completed to satisfaction  Partially completed      Episode of Care Goals: Minimal progress - PREPARATION (Decided to change - considering how); Intervened by negotiating a change plan and determining options / strategies for behavior change, identifying triggers, exploring social supports, and working towards setting a date to begin behavior change     Current / Ongoing Stressors and  "Concerns:   Blended family, moving, kids, grandchildren, partner     Treatment Objective(s) Addressed in This Session:     Patient will demonstrate and report a level of anxiety that can be managed at a lower level of care.  Absence of persistent anxious mood and report of reduced frequency and intensity of worry and absence of persistent anxious mood to acceptable levels, no panic attacks, report subjective comfort with rumination for a period of 90 days, within 6 months as clinically observed and by patient self-report.  Patient will demonstrate and report a level of depression that can be managed at a lower level of care.  Absence of persistent depression mood and report of reduced frequency and intensity of low mood and absence of persistent low energy and motivation to acceptable levels, report subjective improved motivation and increased energy for a period of 90 days, within 6 months as clinically observed and by patient self-report.     Intervention:     Therapist met with patient to review goals and interventions. Therapist utilized reflected listening as patient gave brief reflection of week. Patient processed last month and external stressors. Therapist supported patient as she processed and validated patient. Therapist provided patient with education on BPD, boundaries, expectations and coached patient through how to manage her emotions and reactions around partners daughter. Therapist offered resources and encouraged patient to get the book \"stop walking on eggshells\" and for her to try to establish her own boundaries around the situation.   Patient presented with an anxious affect. Patient was engaged in session and open to feedback. Patient reported no safety concerns.       There has been demonstrated improvement in functioning while patient has been engaged in psychotherapy/psychological service- if withdrawn the patient would deteriorate and/or relapse.       Assessments completed prior to " visit:  The following assessments were completed by patient for this visit:  PHQ9:       3/21/2023     3:00 PM 4/17/2023     2:27 PM 6/6/2023     9:46 AM 12/12/2023     1:00 PM 3/26/2024    12:00 PM 6/21/2024     3:06 PM 7/11/2024     4:41 PM   PHQ-9 SCORE   PHQ-9 Total Score MyChart  20 (Severe depression) 10 (Moderate depression)   13 (Moderate depression) 25 (Severe depression)   PHQ-9 Total Score 19 20 10 13 12 13 25     GAD7:       7/28/2022     1:00 PM 11/22/2022     4:00 PM 3/21/2023     3:00 PM 6/6/2023     9:46 AM 12/12/2023     1:00 PM 3/26/2024    12:00 PM 7/11/2024     4:41 PM   MEG-7 SCORE   Total Score    6 (mild anxiety)   21 (severe anxiety)   Total Score 13 13 20 6 9 8 21   PROMIS-10 Scores        6/6/2023     9:59 AM 12/12/2023     1:00 PM 3/26/2024    12:00 PM   PROMIS-10 Total Score w/o Sub Scores   PROMIS TOTAL - SUBSCORES 23 21 21    Lockhart Suicide Severity Rating Scale (Short Version)      7/6/2021     4:00 PM 12/21/2021     1:00 PM 10/11/2022     5:00 PM 11/22/2022     4:00 PM 3/21/2023     3:00 PM 12/12/2023     1:00 PM 3/26/2024    12:00 PM   Lockhart Suicide Severity Rating (Short Version)   Over the past 2 weeks have you felt down, depressed, or hopeless? yes yes        Over the past 2 weeks have you had thoughts of killing yourself? no no        Have you ever attempted to kill yourself? no no        1. Wish to be Dead (Since Last Contact)   N N N N N   2. Non-Specific Active Suicidal Thoughts (Since Last Contact)   N N N N N   Actual Attempt (Since Last Contact)   N N N N N   Has subject engaged in non-suicidal self-injurious behavior? (Since Last Contact)   N N N N N   Interrupted Attempts (Since Last Contact)   N N N N N   Aborted or Self-Interrupted Attempt (Since Last Contact)   N N N N N   Preparatory Acts or Behavior (Since Last Contact)   N N N N N   Suicide (Since Last Contact)   N N N N N   Calculated C-SSRS Risk Score (Since Last Contact)   No Risk Indicated No Risk Indicated  No Risk Indicated No Risk Indicated No Risk Indicated            ASSESSMENT: Current Emotional / Mental Status (status of significant symptoms):   Risk status (Self / Other harm or suicidal ideation)   Patient denies current fears or concerns for personal safety.   Patient denies current or recent suicidal ideation or behaviors.   Patient denies current or recent homicidal ideation or behaviors.   Patient denies current or recent self injurious behavior or ideation.   Patient denies other safety concerns.   Patient reports there has been no change in risk factors since their last session.     Patient reports there has been no change in protective factors since their last session.     A safety and risk management plan has been developed including: Patient consented to co-developed safety plan on 5/8/2023.  Safety and risk management plan was reviewed.   Patient agreed to use safety plan should any safety concerns arise.  A copy was made available to the patient.     Appearance:   Appropriate    Eye Contact:   Fair    Psychomotor Behavior: Restless    Attitude:   Cooperative    Orientation:   All   Speech    Rate / Production: Emotional Talkative    Volume:  Normal    Mood:    Anxious  Depressed    Affect:    Worrisome    Thought Content:  Clear    Thought Form:  Coherent    Insight:    Fair      Medication Review:   No changes to current psychiatric medication(s)     Medication Compliance:   Yes     Changes in Health Issues:   None reported     Chemical Use Review:   Substance Use: Chemical use reviewed, no active concerns identified      Tobacco Use: No current tobacco use.      Diagnosis:  1. EMG (generalized anxiety disorder)    2. MDD (major depressive disorder), recurrent episode, mild (H24)    3. Attention deficit hyperactivity disorder (ADHD), combined type        Collateral Reports Completed:   Not Applicable    PLAN: (Patient Tasks / Therapist Tasks / Other)      Increase self care  Visiting kids and  "grandchildren more  Gym  Continue house looking  Therapist provided patient with education on BPD, boundaries, expectations and coached patient through how to manage her emotions and reactions around partners daughter. Therapist offered resources and encouraged patient to get the book \"stop walking on eggshells\" and for her to try to establish her own boundaries around the situation.         Patito Humphries, LICSW  8/8/2024                                                           ______________________________________________________________________    Individual Treatment Plan    Patient's Name: Mendy Roe  YOB: 1980    Date of Creation: 12/21/2021  Date Treatment Plan Last Reviewed/Revised: 8/8/2024 due 11/8/2024    DSM5 Diagnoses: 296.31 (F33.0) Major Depressive Disorder, Recurrent Episode, Mild With mixed features or 300.02 (F41.1) Generalized Anxiety Disorder  Psychosocial / Contextual Factors: Blended family, moving, kids, grandchildren, partner  PROMIS (reviewed every 90 days): 8/8/2024 assigned     Referral / Collaboration:  Referral to another professional/service is not indicated at this time..    Anticipated number of session for this episode of care: 12  Anticipation frequency of session: Monthly  Anticipated Duration of each session: 38-52 minutes  Treatment plan will be reviewed in 90 days or when goals have been changed.       MeasurableTreatment Goal(s) related to diagnosis / functional impairment(s)  Goal 1: Patient will report absence of persistent anxiety mood and report of reduced frequency and intensity of worry and absence of persistent anxious mood to acceptable levels, no panic attacks, report subjective comfort with rumination or a period of 90 days. Within 6 months as clinically observed and by patient self-report    I will know I've met my goal when manage my anxiety.      Objective #A (Patient Action)    Patient will demonstrate and report a level of anxiety that " can be managed at a lower level of care.  Absence of persistent anxious mood and report of reduced frequency and intensity of worry and absence of persistent anxious mood to acceptable levels, no panic attacks, report subjective comfort with rumination for a period of 90 days, within 6 months as clinically observed and by patient self-report.  Status: Continued - Date(s): 8/8/2024    Intervention(s)  Therapist will provide individual therapy to identify triggers to anxiety, gain feedback on helpful coping tools and thought-reframing techniques, and identify preferred way of being. Tx to include discuss current stressors and interpersonal conflicts and how to cope with these, coaching, diagnostic testing, referral for medication as indicated, use prescribed medication, cognitive restructuring, interpersonal, family therapy, supportive therapy services.        Goal 2: Patient will report absence of persistent depression mood and report of reduced frequency and intensity of low mood and absence of persistent low energy and motivation to acceptable levels, report subjective improved motivation and increased energy for a period of 90 days, within 6 months as clinically observed and by patient self-report    I will know I've met my goal when feeling more balanced.      Objective #A (Patient Action)    Status: Continued - Date(s):8/8/2024  Patient will demonstrate and report a level of depression that can be managed at a lower level of care.  Absence of persistent depression mood and report of reduced frequency and intensity of low mood and absence of persistent low energy and motivation to acceptable levels, report subjective improved motivation and increased energy for a period of 90 days, within 6 months as clinically observed and by patient self-report.    Intervention(s)  Therapist will provide individual therapy to identify triggers to depression, gain feedback on helpful coping tools and thought-reframing  techniques, and identify preferred way of being.  Tx to include discussion of current stressors and interpersonal conflicts and how to cope with these, coaching, diagnostic testing, referral for medication as indicated, use prescribed medication, cognitive restructuring, interpersonal, family therapy, supportive therapy services.        Patient has reviewed and agreed to the above plan.      RADHA Neves  8/8/2024          [facility]               Name:   Mendy Roe     Therapist Name: RADHA Neves    SAFETY PLAN: 5/8/2023    Therapist suggested patient go stay with her daughters to allow self a break, speak to partner about where they are and where they are going and if patient continues to feel unsafe to go to the ED and patient agreed.            [facility]               Name:   Mendy Roe     Therapist Name: RADHA Neves    SAFETY PLAN:    Step 1: Warning signs / cues (thoughts, feelings, what I do, what others do) that tell me I'm not doing well:     What do I think?  What do I say to myself? I want to die      Pictures in my head:  none     How do I feel? really sad, worried, angry, hopeless and annoyed     What do I do? sit in my room and be alone     When do I feel this way?  relationship and money          Step 2: Coping strategies - Things I can do to help myself feel better:     Coping skills: use my coping skills and be around others      Games and activities: go outside, go for a walk and deep breathing     Focus on helpful thoughts: this is temporary, I will get through this, ride the wave and people care about me children      Step 3: People and places that help me feel better:     People: daughters     Places (with permission): work       Step 4: People and things that are special to me that remind me why it's worth getting better:      My family        Step 6: Things  that will help me stay safe:     be around others    Step 7: Professionals or agencies I can contact when I need help:     Suicide Prevention Lifeline: Call or Text 988     Local Crisis Services: 988     Call 911 or go to my nearest emergency department.     I helped develop this safety plan and agree to use it when needed.  I have been given a copy of this plan.      Client signature:     _________________________________________________________________  Today's date:  5/16/2023    Adapted from Safety Plan Template 2008 Rocio Neal and Ronnie Juan is reprinted with the express permission of the authors.  No portion of the Safety Plan Template may be reproduced without the express, written permission.  You can contact the authors at [email or web address]@[email or web address] or [email or web address].

## 2024-08-26 ENCOUNTER — VIRTUAL VISIT (OUTPATIENT)
Dept: PSYCHOLOGY | Facility: CLINIC | Age: 44
End: 2024-08-26
Payer: COMMERCIAL

## 2024-08-26 DIAGNOSIS — F41.1 GAD (GENERALIZED ANXIETY DISORDER): Primary | ICD-10-CM

## 2024-08-26 DIAGNOSIS — F33.0 MDD (MAJOR DEPRESSIVE DISORDER), RECURRENT EPISODE, MILD (H): ICD-10-CM

## 2024-08-26 DIAGNOSIS — F90.2 ATTENTION DEFICIT HYPERACTIVITY DISORDER (ADHD), COMBINED TYPE: ICD-10-CM

## 2024-08-26 PROCEDURE — 90832 PSYTX W PT 30 MINUTES: CPT | Mod: 93 | Performed by: SOCIAL WORKER

## 2024-08-26 ASSESSMENT — ANXIETY QUESTIONNAIRES
3. WORRYING TOO MUCH ABOUT DIFFERENT THINGS: MORE THAN HALF THE DAYS
5. BEING SO RESTLESS THAT IT IS HARD TO SIT STILL: SEVERAL DAYS
GAD7 TOTAL SCORE: 10
2. NOT BEING ABLE TO STOP OR CONTROL WORRYING: MORE THAN HALF THE DAYS
7. FEELING AFRAID AS IF SOMETHING AWFUL MIGHT HAPPEN: SEVERAL DAYS
6. BECOMING EASILY ANNOYED OR IRRITABLE: SEVERAL DAYS
GAD7 TOTAL SCORE: 10
1. FEELING NERVOUS, ANXIOUS, OR ON EDGE: SEVERAL DAYS
4. TROUBLE RELAXING: MORE THAN HALF THE DAYS

## 2024-08-26 ASSESSMENT — COLUMBIA-SUICIDE SEVERITY RATING SCALE - C-SSRS
2. HAVE YOU ACTUALLY HAD ANY THOUGHTS OF KILLING YOURSELF?: NO
TOTAL  NUMBER OF ABORTED OR SELF INTERRUPTED ATTEMPTS SINCE LAST CONTACT: NO
6. HAVE YOU EVER DONE ANYTHING, STARTED TO DO ANYTHING, OR PREPARED TO DO ANYTHING TO END YOUR LIFE?: NO
TOTAL  NUMBER OF INTERRUPTED ATTEMPTS SINCE LAST CONTACT: NO
ATTEMPT SINCE LAST CONTACT: NO
1. SINCE LAST CONTACT, HAVE YOU WISHED YOU WERE DEAD OR WISHED YOU COULD GO TO SLEEP AND NOT WAKE UP?: NO
SUICIDE, SINCE LAST CONTACT: NO

## 2024-08-26 ASSESSMENT — PATIENT HEALTH QUESTIONNAIRE - PHQ9: SUM OF ALL RESPONSES TO PHQ QUESTIONS 1-9: 14

## 2024-08-26 NOTE — PROGRESS NOTES
"      [facility]                                     Progress Note    Patient Name: Mendy Roe  Date:  8/26/2024       Service Type: Individual      Session Start Time: 1304        Session End Time: 1335    Session Length: 16-37    Session #: 59    Attendees: Client    Service Modality:  Phone Visit:      Provider verified identity through the following two step process.  Patient provided:  Patient is known previously to provider     Telephone Visit: The patient's condition can be safely assessed and treated via synchronous audio telemedicine encounter.       Reason for Audio Telemedicine Visit: Services only offered telehealth     Originating Site (Patient Location): Patient's home     Distant Site (Provider Location): off site      Consent:  The patient/guardian has verbally consented to:      1. The potential risks and benefits of telemedicine (telephone visit) versus in person care;     The patient has been notified of the following:      \"We have found that certain health care needs can be provided without the need for a face to face visit.  This service lets us provide the care you need with a phone conversation.       I will have full access to your [facility] medical record during this entire phone call.   I will be taking notes for your medical record.      Since this is like an office visit, we will bill your insurance company for this service.       There are potential benefits and risks of telephone visits (e.g. limits to patient confidentiality) that differ from in-person visits.?Confidentiality still applies for telephone services, and nobody will record the visit.  It is important to be in a quiet, private space that is free of distractions (including cell phone or other devices) during the visit.??      If during the course of the call I believe a telephone visit is not appropriate, you will not be charged for this service\"     Consent has been obtained for this " service by care team member: Yes       DATA  Interactive Complexity: No  Crisis: No        Progress Since Last Session (Related to Symptoms / Goals / Homework):   Symptoms: Improving phq-9 prnice-7    Homework: Achieved / completed to satisfaction  Partially completed      Episode of Care Goals: Minimal progress - PREPARATION (Decided to change - considering how); Intervened by negotiating a change plan and determining options / strategies for behavior change, identifying triggers, exploring social supports, and working towards setting a date to begin behavior change     Current / Ongoing Stressors and Concerns:   Blended family, moving, kids, grandchildren, partner     Treatment Objective(s) Addressed in This Session:     Patient will demonstrate and report a level of anxiety that can be managed at a lower level of care.  Absence of persistent anxious mood and report of reduced frequency and intensity of worry and absence of persistent anxious mood to acceptable levels, no panic attacks, report subjective comfort with rumination for a period of 90 days, within 6 months as clinically observed and by patient self-report.  Patient will demonstrate and report a level of depression that can be managed at a lower level of care.  Absence of persistent depression mood and report of reduced frequency and intensity of low mood and absence of persistent low energy and motivation to acceptable levels, report subjective improved motivation and increased energy for a period of 90 days, within 6 months as clinically observed and by patient self-report.     Intervention:     Therapist met with patient to review goals and interventions. Therapist utilized reflected listening as patient gave brief reflection of week. Patient reported things have been going better at home and processed work. Therapist supported patient as she processed, validated patient and reviewed different ways for patient to ask for what she needs and decrease  anxiety. Therapist looked at overall outcomes and if patient is told no what she can do to change her work day.   Patient presented with an anxious affect. Patient was engaged in session and open to feedback. Patient reported no safety concerns.       There has been demonstrated improvement in functioning while patient has been engaged in psychotherapy/psychological service- if withdrawn the patient would deteriorate and/or relapse.       Assessments completed prior to visit:  The following assessments were completed by patient for this visit:  PHQ9:       4/17/2023     2:27 PM 6/6/2023     9:46 AM 12/12/2023     1:00 PM 3/26/2024    12:00 PM 6/21/2024     3:06 PM 7/11/2024     4:41 PM 8/26/2024     1:00 PM   PHQ-9 SCORE   PHQ-9 Total Score MyChart 20 (Severe depression) 10 (Moderate depression)   13 (Moderate depression) 25 (Severe depression)    PHQ-9 Total Score 20 10 13 12 13 25 14     GAD7:       11/22/2022     4:00 PM 3/21/2023     3:00 PM 6/6/2023     9:46 AM 12/12/2023     1:00 PM 3/26/2024    12:00 PM 7/11/2024     4:41 PM 8/26/2024     1:00 PM   MEG-7 SCORE   Total Score   6 (mild anxiety)   21 (severe anxiety)    Total Score 13 20 6 9 8 21 10   PROMIS-10 Scores        12/12/2023     1:00 PM 3/26/2024    12:00 PM 8/26/2024     1:00 PM   PROMIS-10 Total Score w/o Sub Scores   PROMIS TOTAL - SUBSCORES 21 21 22    [place] Suicide Severity Rating Scale (Short Version)      12/21/2021     1:00 PM 10/11/2022     5:00 PM 11/22/2022     4:00 PM 3/21/2023     3:00 PM 12/12/2023     1:00 PM 3/26/2024    12:00 PM 8/26/2024     1:00 PM   [place] Suicide Severity Rating (Short Version)   Over the past 2 weeks have you felt down, depressed, or hopeless? yes         Over the past 2 weeks have you had thoughts of killing yourself? no         Have you ever attempted to kill yourself? no         1. Wish to be Dead (Since Last Contact)  N N N N N N   2. Non-Specific Active Suicidal Thoughts (Since Last Contact)  N N N N N  N   Actual Attempt (Since Last Contact)  N N N N N N   Has subject engaged in non-suicidal self-injurious behavior? (Since Last Contact)  N N N N N N   Interrupted Attempts (Since Last Contact)  N N N N N N   Aborted or Self-Interrupted Attempt (Since Last Contact)  N N N N N N   Preparatory Acts or Behavior (Since Last Contact)  N N N N N N   Suicide (Since Last Contact)  N N N N N N   Calculated C-SSRS Risk Score (Since Last Contact)  No Risk Indicated No Risk Indicated No Risk Indicated No Risk Indicated No Risk Indicated No Risk Indicated            ASSESSMENT: Current Emotional / Mental Status (status of significant symptoms):   Risk status (Self / Other harm or suicidal ideation)   Patient denies current fears or concerns for personal safety.   Patient denies current or recent suicidal ideation or behaviors.   Patient denies current or recent homicidal ideation or behaviors.   Patient denies current or recent self injurious behavior or ideation.   Patient denies other safety concerns.   Patient reports there has been no change in risk factors since their last session.     Patient reports there has been no change in protective factors since their last session.     A safety and risk management plan has been developed including: Patient consented to co-developed safety plan on 5/8/2023.  Safety and risk management plan was reviewed.   Patient agreed to use safety plan should any safety concerns arise.  A copy was made available to the patient.     Appearance:   phone    Eye Contact:   phone     Psychomotor Behavior: phone     Attitude:   Cooperative    Orientation:   All   Speech    Rate / Production: Emotional Talkative    Volume:  Normal    Mood:    Anxious  Depressed    Affect:    Worrisome    Thought Content:  Clear    Thought Form:  Coherent    Insight:    Fair      Medication Review:   No changes to current psychiatric medication(s)     Medication Compliance:   Yes     Changes in Health Issues:   None  reported     Chemical Use Review:   Substance Use: Chemical use reviewed, no active concerns identified      Tobacco Use: No current tobacco use.      Diagnosis:  1. MEG (generalized anxiety disorder)    2. MDD (major depressive disorder), recurrent episode, mild (H24)    3. Attention deficit hyperactivity disorder (ADHD), combined type        Collateral Reports Completed:   Not Applicable    PLAN: (Patient Tasks / Therapist Tasks / Other)      Increase self care    Gym    Therapist supported patient as she processed, validated patient and reviewed different ways for patient to ask for what she needs and decrease anxiety. Therapist looked at overall outcomes and if patient is told no what she can do to change her work day.       Patito Humphries, Health system 8/26/2024                                                           ______________________________________________________________________    Individual Treatment Plan    Patient's Name: Mendy Roe  YOB: 1980    Date of Creation: 12/21/2021  Date Treatment Plan Last Reviewed/Revised: 8/8/2024 due 11/8/2024    DSM5 Diagnoses: 296.31 (F33.0) Major Depressive Disorder, Recurrent Episode, Mild With mixed features or 300.02 (F41.1) Generalized Anxiety Disorder  Psychosocial / Contextual Factors: Blended family, moving, kids, grandchildren, partner  PROMIS (reviewed every 90 days): 8/26/2024    Referral / Collaboration:  Referral to another professional/service is not indicated at this time..    Anticipated number of session for this episode of care: 12  Anticipation frequency of session: Monthly  Anticipated Duration of each session: 38-52 minutes  Treatment plan will be reviewed in 90 days or when goals have been changed.       MeasurableTreatment Goal(s) related to diagnosis / functional impairment(s)  Goal 1: Patient will report absence of persistent anxiety mood and report of reduced frequency and intensity of worry and absence of persistent  anxious mood to acceptable levels, no panic attacks, report subjective comfort with rumination or a period of 90 days. Within 6 months as clinically observed and by patient self-report    I will know I've met my goal when manage my anxiety.      Objective #A (Patient Action)    Patient will demonstrate and report a level of anxiety that can be managed at a lower level of care.  Absence of persistent anxious mood and report of reduced frequency and intensity of worry and absence of persistent anxious mood to acceptable levels, no panic attacks, report subjective comfort with rumination for a period of 90 days, within 6 months as clinically observed and by patient self-report.  Status: Continued - Date(s): 8/8/2024    Intervention(s)  Therapist will provide individual therapy to identify triggers to anxiety, gain feedback on helpful coping tools and thought-reframing techniques, and identify preferred way of being. Tx to include discuss current stressors and interpersonal conflicts and how to cope with these, coaching, diagnostic testing, referral for medication as indicated, use prescribed medication, cognitive restructuring, interpersonal, family therapy, supportive therapy services.        Goal 2: Patient will report absence of persistent depression mood and report of reduced frequency and intensity of low mood and absence of persistent low energy and motivation to acceptable levels, report subjective improved motivation and increased energy for a period of 90 days, within 6 months as clinically observed and by patient self-report    I will know I've met my goal when feeling more balanced.      Objective #A (Patient Action)    Status: Continued - Date(s):8/8/2024  Patient will demonstrate and report a level of depression that can be managed at a lower level of care.  Absence of persistent depression mood and report of reduced frequency and intensity of low mood and absence of persistent low energy and motivation to  acceptable levels, report subjective improved motivation and increased energy for a period of 90 days, within 6 months as clinically observed and by patient self-report.    Intervention(s)  Therapist will provide individual therapy to identify triggers to depression, gain feedback on helpful coping tools and thought-reframing techniques, and identify preferred way of being.  Tx to include discussion of current stressors and interpersonal conflicts and how to cope with these, coaching, diagnostic testing, referral for medication as indicated, use prescribed medication, cognitive restructuring, interpersonal, family therapy, supportive therapy services.        Patient has reviewed and agreed to the above plan.      RADHA Neves  8/8/2024          [facility]               Name:   Mendy Roe     [facility]    SAFETY PLAN: 5/8/2023    Therapist suggested patient go stay with her daughters to allow self a break, speak to partner about where they are and where they are going and if patient continues to feel unsafe to go to the ED and patient agreed.            [facility]               Name:   Mendy Roe     [facility] Humphries [facility]    SAFETY PLAN:    Step 1: Warning signs / cues (thoughts, feelings, what I do, what others do) that tell me I'm not doing well:     What do I think?  What do I say to myself? I want to die      Pictures in my head:  none     How do I feel? really sad, worried, angry, hopeless and annoyed     What do I do? sit in my room and be alone     When do I feel this way?  relationship and money          Step 2: Coping strategies - Things I can do to help myself feel better:     Coping skills: use my coping skills and be around others      Games and activities: go outside, go for a walk and deep breathing     Focus on helpful thoughts: this is temporary,  I will get through this, ride the wave and people care about me children      Step 3: People and places that help me feel better:     People: daughters     Places (with permission): work       Step 4: People and things that are special to me that remind me why it's worth getting better:      My family        Step 6: Things that will help me stay safe:     be around others    Step 7: Professionals or agencies I can contact when I need help:     Suicide Prevention Lifeline: Call or Text 988     Local Crisis Services: 988     Call 911 or go to my nearest emergency department.     I helped develop this safety plan and agree to use it when needed.  I have been given a copy of this plan.      Client signature:     _________________________________________________________________  Today's date:  5/16/2023    Adapted from Safety Plan Template 2008 Rocio Neal and Ronnie Juan is reprinted with the express permission of the authors.  No portion of the Safety Plan Template may be reproduced without the express, written permission.  You can contact the authors at [email or web address] or [email or web address].

## 2024-09-27 ENCOUNTER — VIRTUAL VISIT (OUTPATIENT)
Dept: PSYCHOLOGY | Facility: CLINIC | Age: 44
End: 2024-09-27
Payer: COMMERCIAL

## 2024-09-27 DIAGNOSIS — F33.0 MDD (MAJOR DEPRESSIVE DISORDER), RECURRENT EPISODE, MILD (H): ICD-10-CM

## 2024-09-27 DIAGNOSIS — F90.2 ATTENTION DEFICIT HYPERACTIVITY DISORDER (ADHD), COMBINED TYPE: ICD-10-CM

## 2024-09-27 DIAGNOSIS — F41.1 GAD (GENERALIZED ANXIETY DISORDER): Primary | ICD-10-CM

## 2024-09-27 PROCEDURE — 90832 PSYTX W PT 30 MINUTES: CPT | Mod: 93 | Performed by: SOCIAL WORKER

## 2024-09-27 NOTE — PROGRESS NOTES
"      [facility]                                     Progress Note    Patient Name: Mendy Roe  Date:  2024       Service Type: Individual      Session Start Time: 1206       Session End Time:  1233    Session Length: 16-37    Session #: 60    Attendees: Client    Service Modality:  Phone Visit:      Provider verified identity through the following two step process.  Patient provided:  Patient is known previously to provider/     Telephone Visit: The patient's condition can be safely assessed and treated via synchronous audio telemedicine encounter.       Reason for Audio Telemedicine Visit: Services only offered telehealth     Originating Site (Patient Location): Patient's home     Distant Site (Provider Location): off site      Consent:  The patient/guardian has verbally consented to:      1. The potential risks and benefits of telemedicine (telephone visit) versus in person care;     The patient has been notified of the following:      \"We have found that certain health care needs can be provided without the need for a face to face visit.  This service lets us provide the care you need with a phone conversation.       I will have full access to your [facility] medical record during this entire phone call.   I will be taking notes for your medical record.      Since this is like an office visit, we will bill your insurance company for this service.       There are potential benefits and risks of telephone visits (e.g. limits to patient confidentiality) that differ from in-person visits.?Confidentiality still applies for telephone services, and nobody will record the visit.  It is important to be in a quiet, private space that is free of distractions (including cell phone or other devices) during the visit.??      If during the course of the call I believe a telephone visit is not appropriate, you will not be charged for this service\"     Consent has been obtained for this " service by care team member: Yes       DATA  Interactive Complexity: No  Crisis: No        Progress Since Last Session (Related to Symptoms / Goals / Homework):   Symptoms: Worsening stress levels    Homework: Achieved / completed to satisfaction  Partially completed      Episode of Care Goals: Minimal progress - PREPARATION (Decided to change - considering how); Intervened by negotiating a change plan and determining options / strategies for behavior change, identifying triggers, exploring social supports, and working towards setting a date to begin behavior change     Current / Ongoing Stressors and Concerns:   Blended family, moving, kids, grandchildren, partner     Treatment Objective(s) Addressed in This Session:     Patient will demonstrate and report a level of anxiety that can be managed at a lower level of care.  Absence of persistent anxious mood and report of reduced frequency and intensity of worry and absence of persistent anxious mood to acceptable levels, no panic attacks, report subjective comfort with rumination for a period of 90 days, within 6 months as clinically observed and by patient self-report.  Patient will demonstrate and report a level of depression that can be managed at a lower level of care.  Absence of persistent depression mood and report of reduced frequency and intensity of low mood and absence of persistent low energy and motivation to acceptable levels, report subjective improved motivation and increased energy for a period of 90 days, within 6 months as clinically observed and by patient self-report.     Intervention:     Therapist met with patient to review goals and interventions. Therapist utilized reflected listening as patient gave brief reflection of week. Patient reported stress levels high and processed situation with partners daughter. Therapist supported patient as she processed and validated patient. Therapist assisted patient in managing her anxiety with looking at  the here and now and that the daughter isn't moving in yet. Therapist went over the likely lombardo of it happening soon too and reviewed that is less likely too. Therapist encouraged patient to manage anxiety and to call PCP to see if they would be willing to manage her medications.   Patient presented with an anxious affect. Patient was engaged in session and open to feedback. Patient reported no safety concerns.       There has been demonstrated improvement in functioning while patient has been engaged in psychotherapy/psychological service- if withdrawn the patient would deteriorate and/or relapse.       Assessments completed prior to visit:  The following assessments were completed by patient for this visit:  PHQ9:       4/17/2023     2:27 PM 6/6/2023     9:46 AM 12/12/2023     1:00 PM 3/26/2024    12:00 PM 6/21/2024     3:06 PM 7/11/2024     4:41 PM 8/26/2024     1:00 PM   PHQ-9 SCORE   PHQ-9 Total Score MyChart 20 (Severe depression) 10 (Moderate depression)   13 (Moderate depression) 25 (Severe depression)    PHQ-9 Total Score 20 10 13 12 13 25 14     GAD7:       11/22/2022     4:00 PM 3/21/2023     3:00 PM 6/6/2023     9:46 AM 12/12/2023     1:00 PM 3/26/2024    12:00 PM 7/11/2024     4:41 PM 8/26/2024     1:00 PM   MEG-7 SCORE   Total Score   6 (mild anxiety)   21 (severe anxiety)    Total Score 13 20 6 9 8 21 10   PROMIS-10 Scores        12/12/2023     1:00 PM 3/26/2024    12:00 PM 8/26/2024     1:00 PM   PROMIS-10 Total Score w/o Sub Scores   PROMIS TOTAL - SUBSCORES 21 21 22    [place] Suicide Severity Rating Scale (Short Version)      12/21/2021     1:00 PM 10/11/2022     5:00 PM 11/22/2022     4:00 PM 3/21/2023     3:00 PM 12/12/2023     1:00 PM 3/26/2024    12:00 PM 8/26/2024     1:00 PM   [place] Suicide Severity Rating (Short Version)   Over the past 2 weeks have you felt down, depressed, or hopeless? yes         Over the past 2 weeks have you had thoughts of killing yourself? no         Have you  ever attempted to kill yourself? no         1. Wish to be Dead (Since Last Contact)  N N N N N N   2. Non-Specific Active Suicidal Thoughts (Since Last Contact)  N N N N N N   Actual Attempt (Since Last Contact)  N N N N N N   Has subject engaged in non-suicidal self-injurious behavior? (Since Last Contact)  N N N N N N   Interrupted Attempts (Since Last Contact)  N N N N N N   Aborted or Self-Interrupted Attempt (Since Last Contact)  N N N N N N   Preparatory Acts or Behavior (Since Last Contact)  N N N N N N   Suicide (Since Last Contact)  N N N N N N   Calculated C-SSRS Risk Score (Since Last Contact)  No Risk Indicated No Risk Indicated No Risk Indicated No Risk Indicated No Risk Indicated No Risk Indicated            ASSESSMENT: Current Emotional / Mental Status (status of significant symptoms):   Risk status (Self / Other harm or suicidal ideation)   Patient denies current fears or concerns for personal safety.   Patient denies current or recent suicidal ideation or behaviors.   Patient denies current or recent homicidal ideation or behaviors.   Patient denies current or recent self injurious behavior or ideation.   Patient denies other safety concerns.   Patient reports there has been no change in risk factors since their last session.     Patient reports there has been no change in protective factors since their last session.     A safety and risk management plan has been developed including: Patient consented to co-developed safety plan on 5/8/2023.  Safety and risk management plan was reviewed.   Patient agreed to use safety plan should any safety concerns arise.  A copy was made available to the patient.     Appearance:   phone    Eye Contact:   phone     Psychomotor Behavior: phone     Attitude:   Cooperative    Orientation:   All   Speech    Rate / Production: Emotional Talkative    Volume:  Normal    Mood:    Anxious  Depressed    Affect:    Worrisome    Thought Content:  Clear    Thought  Form:  Coherent    Insight:    Fair      Medication Review:   No changes to current psychiatric medication(s)     Medication Compliance:   Yes     Changes in Health Issues:   None reported     Chemical Use Review:   Substance Use: Chemical use reviewed, no active concerns identified      Tobacco Use: No current tobacco use.      Diagnosis:  1. MEG (generalized anxiety disorder)    2. MDD (major depressive disorder), recurrent episode, mild (H)    3. Attention deficit hyperactivity disorder (ADHD), combined type        Collateral Reports Completed:   Not Applicable    PLAN: (Patient Tasks / Therapist Tasks / Other)      Increase self care    Gym  Therapist assisted patient in managing her anxiety with looking at the here and now and that the daughter isn't moving in yet. Therapist went over the likely l[place]ardo of it happening soon too and reviewed that is less likely too. Therapist encouraged patient to manage anxiety and to call PCP to see if they would be willing to manage her medications.       Patito Humphries, [facility]  9/27/2024                                                           ______________________________________________________________________    Individual Treatment Plan    Patient's Name: Mendy Roe  YOB: 1980    Date of Creation: 12/21/2021  Date Treatment Plan Last Reviewed/Revised: 8/8/2024 due 11/8/2024    DSM5 Diagnoses: 296.31 (F33.0) Major Depressive Disorder, Recurrent Episode, Mild With mixed features or 300.02 (F41.1) Generalized Anxiety Disorder  Psychosocial / Contextual Factors: Blended family, moving, kids, grandchildren, partner  PROMIS (reviewed every 90 days): 8/26/2024    Referral / Collaboration:  Referral to another professional/service is not indicated at this time..    Anticipated number of session for this episode of care: 12  Anticipation frequency of session: Monthly  Anticipated Duration of each session: 38-52 minutes  Treatment plan will be reviewed in 90  days or when goals have been changed.       MeasurableTreatment Goal(s) related to diagnosis / functional impairment(s)  Goal 1: Patient will report absence of persistent anxiety mood and report of reduced frequency and intensity of worry and absence of persistent anxious mood to acceptable levels, no panic attacks, report subjective comfort with rumination or a period of 90 days. Within 6 months as clinically observed and by patient self-report    I will know I've met my goal when manage my anxiety.      Objective #A (Patient Action)    Patient will demonstrate and report a level of anxiety that can be managed at a lower level of care.  Absence of persistent anxious mood and report of reduced frequency and intensity of worry and absence of persistent anxious mood to acceptable levels, no panic attacks, report subjective comfort with rumination for a period of 90 days, within 6 months as clinically observed and by patient self-report.  Status: Continued - Date(s): 8/8/2024    Intervention(s)  Therapist will provide individual therapy to identify triggers to anxiety, gain feedback on helpful coping tools and thought-reframing techniques, and identify preferred way of being. Tx to include discuss current stressors and interpersonal conflicts and how to cope with these, coaching, diagnostic testing, referral for medication as indicated, use prescribed medication, cognitive restructuring, interpersonal, family therapy, supportive therapy services.        Goal 2: Patient will report absence of persistent depression mood and report of reduced frequency and intensity of low mood and absence of persistent low energy and motivation to acceptable levels, report subjective improved motivation and increased energy for a period of 90 days, within 6 months as clinically observed and by patient self-report    I will know I've met my goal when feeling more balanced.      Objective #A (Patient Action)    Status: Continued -  Date(s):8/8/2024  Patient will demonstrate and report a level of depression that can be managed at a lower level of care.  Absence of persistent depression mood and report of reduced frequency and intensity of low mood and absence of persistent low energy and motivation to acceptable levels, report subjective improved motivation and increased energy for a period of 90 days, within 6 months as clinically observed and by patient self-report.    Intervention(s)  Therapist will provide individual therapy to identify triggers to depression, gain feedback on helpful coping tools and thought-reframing techniques, and identify preferred way of being.  Tx to include discussion of current stressors and interpersonal conflicts and how to cope with these, coaching, diagnostic testing, referral for medication as indicated, use prescribed medication, cognitive restructuring, interpersonal, family therapy, supportive therapy services.        Patient has reviewed and agreed to the above plan.      RADHA Neves  8/8/2024          [facility]               Name:   Mendy Roe     Therapist Name: RADHA Neves    SAFETY PLAN: 5/8/2023    Therapist suggested patient go stay with her daughters to allow self a break, speak to partner about where they are and where they are going and if patient continues to feel unsafe to go to the ED and patient agreed.            [facility]               Name:   Mendy Roe     Therapist Name: RADHA Neves    SAFETY PLAN:    Step 1: Warning signs / cues (thoughts, feelings, what I do, what others do) that tell me I'm not doing well:     What do I think?  What do I say to myself? I want to die      Pictures in my head:  none     How do I feel? really sad, worried, angry, hopeless and annoyed     What do I do? sit in my room and be alone     When do I feel this way?  relationship  and money          Step 2: Coping strategies - Things I can do to help myself feel better:     Coping skills: use my coping skills and be around others      Games and activities: go outside, go for a walk and deep breathing     Focus on helpful thoughts: this is temporary, I will get through this, ride the wave and people care about me children      Step 3: People and places that help me feel better:     People: daughters     Places (with permission): work       Step 4: People and things that are special to me that remind me why it's worth getting better:      My family        Step 6: Things that will help me stay safe:     be around others    Step 7: Professionals or agencies I can contact when I need help:     Suicide Prevention Lifeline: Call or Text 988     Local Crisis Services: 988     Call 911 or go to my nearest emergency department.     I helped develop this safety plan and agree to use it when needed.  I have been given a copy of this plan.      Client signature:     _________________________________________________________________  Today's date:  5/16/2023    Adapted from Safety Plan Template 2008 Rocio Neal and Ronnie Juan is reprinted with the express permission of the authors.  No portion of the Safety Plan Template may be reproduced without the express, written permission.  You can contact the authors at [email or web address] or [email or web address].

## 2024-10-16 ENCOUNTER — VIRTUAL VISIT (OUTPATIENT)
Dept: PSYCHOLOGY | Facility: CLINIC | Age: 44
End: 2024-10-16
Payer: COMMERCIAL

## 2024-10-16 DIAGNOSIS — F33.0 MDD (MAJOR DEPRESSIVE DISORDER), RECURRENT EPISODE, MILD (H): ICD-10-CM

## 2024-10-16 DIAGNOSIS — F41.1 GAD (GENERALIZED ANXIETY DISORDER): Primary | ICD-10-CM

## 2024-10-16 DIAGNOSIS — F90.2 ATTENTION DEFICIT HYPERACTIVITY DISORDER (ADHD), COMBINED TYPE: ICD-10-CM

## 2024-10-16 PROCEDURE — 90837 PSYTX W PT 60 MINUTES: CPT | Mod: 93 | Performed by: SOCIAL WORKER

## 2024-10-16 NOTE — PROGRESS NOTES
"      [facility]                                     Progress Note    Patient Name: Mendy Roe  Date:  10/16/2024       Service Type: Individual      Session Start Time: 1304      Session End Time:  135    Session Length: 53+    Session #: 61    Attendees: Client    Service Modality:  Phone Visit:      Provider verified identity through the following two step process.  Patient provided:  Patient is known previously to provider/     Telephone Visit: The patient's condition can be safely assessed and treated via synchronous audio telemedicine encounter.       Reason for Audio Telemedicine Visit: Services only offered telehealth     Originating Site (Patient Location): Patient's home     Distant Site (Provider Location): off site      Consent:  The patient/guardian has verbally consented to:      1. The potential risks and benefits of telemedicine (telephone visit) versus in person care;     The patient has been notified of the following:      \"We have found that certain health care needs can be provided without the need for a face to face visit.  This service lets us provide the care you need with a phone conversation.       I will have full access to your [facility] medical record during this entire phone call.   I will be taking notes for your medical record.      Since this is like an office visit, we will bill your insurance company for this service.       There are potential benefits and risks of telephone visits (e.g. limits to patient confidentiality) that differ from in-person visits.?Confidentiality still applies for telephone services, and nobody will record the visit.  It is important to be in a quiet, private space that is free of distractions (including cell phone or other devices) during the visit.??      If during the course of the call I believe a telephone visit is not appropriate, you will not be charged for this service\"     Consent has been obtained for this " service by care team member: Yes       DATA  Interactive Complexity: No  Crisis: No        Progress Since Last Session (Related to Symptoms / Goals / Homework):   Symptoms: Worsening communication     Homework: Achieved / completed to satisfaction  Partially completed      Episode of Care Goals: Minimal progress - PREPARATION (Decided to change - considering how); Intervened by negotiating a change plan and determining options / strategies for behavior change, identifying triggers, exploring social supports, and working towards setting a date to begin behavior change     Current / Ongoing Stressors and Concerns:   Blended family, moving, kids, grandchildren, partner     Treatment Objective(s) Addressed in This Session:     Patient will demonstrate and report a level of anxiety that can be managed at a lower level of care.  Absence of persistent anxious mood and report of reduced frequency and intensity of worry and absence of persistent anxious mood to acceptable levels, no panic attacks, report subjective comfort with rumination for a period of 90 days, within 6 months as clinically observed and by patient self-report.  Patient will demonstrate and report a level of depression that can be managed at a lower level of care.  Absence of persistent depression mood and report of reduced frequency and intensity of low mood and absence of persistent low energy and motivation to acceptable levels, report subjective improved motivation and increased energy for a period of 90 days, within 6 months as clinically observed and by patient self-report.     Intervention:     Therapist met with patient to review goals and interventions. Therapist utilized reflected listening as patient gave brief reflection of week. Patient reported difficult week with situation with her spouse and his daughter and processed. Therapist supported patient as she processed and validated patient's emotions. Therapist suggested patient to talk to her  partner about his boundaries with his daughter and the importance of being a father figure and suggesting getting her the help she needs with therapy. Therapist provided education and resources.   Patient presented with an anxious affect. Patient was engaged in session and open to feedback. Patient reported no safety concerns.       There has been demonstrated improvement in functioning while patient has been engaged in psychotherapy/psychological service- if withdrawn the patient would deteriorate and/or relapse.       Assessments completed prior to visit:  The following assessments were completed by patient for this visit:  PHQ9:       4/17/2023     2:27 PM 6/6/2023     9:46 AM 12/12/2023     1:00 PM 3/26/2024    12:00 PM 6/21/2024     3:06 PM 7/11/2024     4:41 PM 8/26/2024     1:00 PM   PHQ-9 SCORE   PHQ-9 Total Score MyChart 20 (Severe depression) 10 (Moderate depression)   13 (Moderate depression) 25 (Severe depression)    PHQ-9 Total Score 20 10 13 12 13 25 14     GAD7:       11/22/2022     4:00 PM 3/21/2023     3:00 PM 6/6/2023     9:46 AM 12/12/2023     1:00 PM 3/26/2024    12:00 PM 7/11/2024     4:41 PM 8/26/2024     1:00 PM   MEG-7 SCORE   Total Score   6 (mild anxiety)   21 (severe anxiety)    Total Score 13 20 6 9 8 21 10   PROMIS-10 Scores        12/12/2023     1:00 PM 3/26/2024    12:00 PM 8/26/2024     1:00 PM   PROMIS-10 Total Score w/o Sub Scores   PROMIS TOTAL - SUBSCORES 21 21 22    [place] Suicide Severity Rating Scale (Short Version)      12/21/2021     1:00 PM 10/11/2022     5:00 PM 11/22/2022     4:00 PM 3/21/2023     3:00 PM 12/12/2023     1:00 PM 3/26/2024    12:00 PM 8/26/2024     1:00 PM   [place] Suicide Severity Rating (Short Version)   Over the past 2 weeks have you felt down, depressed, or hopeless? yes         Over the past 2 weeks have you had thoughts of killing yourself? no         Have you ever attempted to kill yourself? no         1. Wish to be Dead (Since Last Contact)  N N  N N N N   2. Non-Specific Active Suicidal Thoughts (Since Last Contact)  N N N N N N   Actual Attempt (Since Last Contact)  N N N N N N   Has subject engaged in non-suicidal self-injurious behavior? (Since Last Contact)  N N N N N N   Interrupted Attempts (Since Last Contact)  N N N N N N   Aborted or Self-Interrupted Attempt (Since Last Contact)  N N N N N N   Preparatory Acts or Behavior (Since Last Contact)  N N N N N N   Suicide (Since Last Contact)  N N N N N N   Calculated C-SSRS Risk Score (Since Last Contact)  No Risk Indicated No Risk Indicated No Risk Indicated No Risk Indicated No Risk Indicated No Risk Indicated            ASSESSMENT: Current Emotional / Mental Status (status of significant symptoms):   Risk status (Self / Other harm or suicidal ideation)   Patient denies current fears or concerns for personal safety.   Patient denies current or recent suicidal ideation or behaviors.   Patient denies current or recent homicidal ideation or behaviors.   Patient denies current or recent self injurious behavior or ideation.   Patient denies other safety concerns.   Patient reports there has been no change in risk factors since their last session.     Patient reports there has been no change in protective factors since their last session.     A safety and risk management plan has been developed including: Patient consented to co-developed safety plan on 5/8/2023.  Safety and risk management plan was reviewed.   Patient agreed to use safety plan should any safety concerns arise.  A copy was made available to the patient.     Appearance:   phone    Eye Contact:   phone     Psychomotor Behavior: phone     Attitude:   Cooperative    Orientation:   All   Speech    Rate / Production: Emotional Talkative    Volume:  Normal    Mood:    Anxious  Depressed    Affect:    Worrisome    Thought Content:  Clear    Thought Form:  Coherent    Insight:    Fair      Medication Review:   No changes to current psychiatric  medication(s)     Medication Compliance:   Yes     Changes in Health Issues:   None reported     Chemical Use Review:   Substance Use: Chemical use reviewed, no active concerns identified      Tobacco Use: No current tobacco use.      Diagnosis:  1. MEG (generalized anxiety disorder)    2. MDD (major depressive disorder), recurrent episode, mild (H)    3. Attention deficit hyperactivity disorder (ADHD), combined type        Collateral Reports Completed:   Not Applicable    PLAN: (Patient Tasks / Therapist Tasks / Other)      Increase self care  Gym  Therapist supported patient as she processed and validated patient's emotions. Therapist suggested patient to talk to her partner about his boundaries with his daughter and the importance of being a father figure and suggesting getting her the help she needs with therapy. Therapist provided education and resources.       Patito Humphries, [facility]  10/16/2024                                                           ______________________________________________________________________    Individual Treatment Plan    Patient's Name: Mendy Roe  YOB: 1980    Date of Creation: 12/21/2021  Date Treatment Plan Last Reviewed/Revised: 8/8/2024 due 11/8/2024    DSM5 Diagnoses: 296.31 (F33.0) Major Depressive Disorder, Recurrent Episode, Mild With mixed features or 300.02 (F41.1) Generalized Anxiety Disorder  Psychosocial / Contextual Factors: Blended family, moving, kids, grandchildren, partner  PROMIS (reviewed every 90 days): 8/26/2024    Referral / Collaboration:  Referral to another professional/service is not indicated at this time..    Anticipated number of session for this episode of care: 12  Anticipation frequency of session: Monthly  Anticipated Duration of each session: 38-52 minutes  Treatment plan will be reviewed in 90 days or when goals have been changed.       MeasurableTreatment Goal(s) related to diagnosis / functional impairment(s)  Goal 1:  Patient will report absence of persistent anxiety mood and report of reduced frequency and intensity of worry and absence of persistent anxious mood to acceptable levels, no panic attacks, report subjective comfort with rumination or a period of 90 days. Within 6 months as clinically observed and by patient self-report    I will know I've met my goal when manage my anxiety.      Objective #A (Patient Action)    Patient will demonstrate and report a level of anxiety that can be managed at a lower level of care.  Absence of persistent anxious mood and report of reduced frequency and intensity of worry and absence of persistent anxious mood to acceptable levels, no panic attacks, report subjective comfort with rumination for a period of 90 days, within 6 months as clinically observed and by patient self-report.  Status: Continued - Date(s): 8/8/2024    Intervention(s)  Therapist will provide individual therapy to identify triggers to anxiety, gain feedback on helpful coping tools and thought-reframing techniques, and identify preferred way of being. Tx to include discuss current stressors and interpersonal conflicts and how to cope with these, coaching, diagnostic testing, referral for medication as indicated, use prescribed medication, cognitive restructuring, interpersonal, family therapy, supportive therapy services.        Goal 2: Patient will report absence of persistent depression mood and report of reduced frequency and intensity of low mood and absence of persistent low energy and motivation to acceptable levels, report subjective improved motivation and increased energy for a period of 90 days, within 6 months as clinically observed and by patient self-report    I will know I've met my goal when feeling more balanced.      Objective #A (Patient Action)    Status: Continued - Date(s):8/8/2024  Patient will demonstrate and report a level of depression that can be managed at a lower level of care.  Absence of  persistent depression mood and report of reduced frequency and intensity of low mood and absence of persistent low energy and motivation to acceptable levels, report subjective improved motivation and increased energy for a period of 90 days, within 6 months as clinically observed and by patient self-report.    Intervention(s)  Therapist will provide individual therapy to identify triggers to depression, gain feedback on helpful coping tools and thought-reframing techniques, and identify preferred way of being.  Tx to include discussion of current stressors and interpersonal conflicts and how to cope with these, coaching, diagnostic testing, referral for medication as indicated, use prescribed medication, cognitive restructuring, interpersonal, family therapy, supportive therapy services.        Patient has reviewed and agreed to the above plan.      RADHA Neves  8/8/2024          [facility]               Name:   Mendy Roe     [facility]    SAFETY PLAN: 5/8/2023    Therapist suggested patient go stay with her daughters to allow self a break, speak to partner about where they are and where they are going and if patient continues to feel unsafe to go to the ED and patient agreed.            [facility]               Name:   Mendy Roe     Therapist Name: Patito Humphries [facility]    SAFETY PLAN:    Step 1: Warning signs / cues (thoughts, feelings, what I do, what others do) that tell me I'm not doing well:     What do I think?  What do I say to myself? I want to die      Pictures in my head:  none     How do I feel? really sad, worried, angry, hopeless and annoyed     What do I do? sit in my room and be alone     When do I feel this way?  relationship and money          Step 2: Coping strategies - Things I can do to help myself feel better:     Coping skills: use my coping skills and  be around others      Games and activities: go outside, go for a walk and deep breathing     Focus on helpful thoughts: this is temporary, I will get through this, ride the wave and people care about me children      Step 3: People and places that help me feel better:     People: daughters     Places (with permission): work       Step 4: People and things that are special to me that remind me why it's worth getting better:      My family        Step 6: Things that will help me stay safe:     be around others    Step 7: Professionals or agencies I can contact when I need help:     Suicide Prevention Lifeline: Call or Text 988     Local Crisis Services: 988     Call 911 or go to my nearest emergency department.     I helped develop this safety plan and agree to use it when needed.  I have been given a copy of this plan.      Client signature:     _________________________________________________________________  Today's date:  5/16/2023    Adapted from Safety Plan Template 2008 Rocio Neal and Ronnie Juan is reprinted with the express permission of the authors.  No portion of the Safety Plan Template may be reproduced without the express, written permission.  You can contact the authors at [email or web address] or [email or web address].

## 2024-11-05 ASSESSMENT — PATIENT HEALTH QUESTIONNAIRE - PHQ9: SUM OF ALL RESPONSES TO PHQ QUESTIONS 1-9: 17

## 2024-11-11 ENCOUNTER — VIRTUAL VISIT (OUTPATIENT)
Dept: PSYCHOLOGY | Facility: CLINIC | Age: 44
End: 2024-11-11
Payer: COMMERCIAL

## 2024-11-11 DIAGNOSIS — F33.0 MDD (MAJOR DEPRESSIVE DISORDER), RECURRENT EPISODE, MILD (H): ICD-10-CM

## 2024-11-11 DIAGNOSIS — F90.2 ATTENTION DEFICIT HYPERACTIVITY DISORDER (ADHD), COMBINED TYPE: ICD-10-CM

## 2024-11-11 DIAGNOSIS — F41.1 GAD (GENERALIZED ANXIETY DISORDER): Primary | ICD-10-CM

## 2024-11-11 PROCEDURE — 90834 PSYTX W PT 45 MINUTES: CPT | Mod: 93 | Performed by: SOCIAL WORKER

## 2024-11-11 NOTE — PROGRESS NOTES
"      [facility]                                     Progress Note    Patient Name: Mendy Roe  Date:  2024       Service Type: Individual      Session Start Time: 1304     Session End Time:  1345    Session Length: 38-52    Session #: 62    Attendees: Client    Service Modality:  Phone Visit:      Provider verified identity through the following two step process.  Patient provided:  Patient is known previously to provider/     Telephone Visit: The patient's condition can be safely assessed and treated via synchronous audio telemedicine encounter.       Reason for Audio Telemedicine Visit: Services only offered telehealth     Originating Site (Patient Location): Patient's home     Distant Site (Provider Location): off site      Consent:  The patient/guardian has verbally consented to:      1. The potential risks and benefits of telemedicine (telephone visit) versus in person care;     The patient has been notified of the following:      \"We have found that certain health care needs can be provided without the need for a face to face visit.  This service lets us provide the care you need with a phone conversation.       I will have full access to your [facility] medical record during this entire phone call.   I will be taking notes for your medical record.      Since this is like an office visit, we will bill your insurance company for this service.       There are potential benefits and risks of telephone visits (e.g. limits to patient confidentiality) that differ from in-person visits.?Confidentiality still applies for telephone services, and nobody will record the visit.  It is important to be in a quiet, private space that is free of distractions (including cell phone or other devices) during the visit.??      If during the course of the call I believe a telephone visit is not appropriate, you will not be charged for this service\"     Consent has been obtained for this " service by care team member: Yes       DATA  Interactive Complexity: No  Crisis: No        Progress Since Last Session (Related to Symptoms / Goals / Homework):   Symptoms: Improving per patient    Homework: Achieved / completed to satisfaction  Partially completed      Episode of Care Goals: Minimal progress - PREPARATION (Decided to change - considering how); Intervened by negotiating a change plan and determining options / strategies for behavior change, identifying triggers, exploring social supports, and working towards setting a date to begin behavior change     Current / Ongoing Stressors and Concerns:   Blended family, moving, kids, grandchildren, partner     Treatment Objective(s) Addressed in This Session:     Patient will demonstrate and report a level of anxiety that can be managed at a lower level of care.  Absence of persistent anxious mood and report of reduced frequency and intensity of worry and absence of persistent anxious mood to acceptable levels, no panic attacks, report subjective comfort with rumination for a period of 90 days, within 6 months as clinically observed and by patient self-report.  Patient will demonstrate and report a level of depression that can be managed at a lower level of care.  Absence of persistent depression mood and report of reduced frequency and intensity of low mood and absence of persistent low energy and motivation to acceptable levels, report subjective improved motivation and increased energy for a period of 90 days, within 6 months as clinically observed and by patient self-report.     Intervention:     Therapist met with patient to review goals and interventions. Therapist utilized reflected listening as patient gave brief reflection of week. Patient reported stressors have gotten better and time to focus on self now. Therapist supported patient as she processed and validated patient. Therapist provided education on patient and measurable goals vs big goals and  assisted patient in plan for losing weight and feeling better about self. Therapist utilized strength based modality with patient and encouraged patient to make weekly movement goals along with going to the gym.   Patient presented with an anxious affect. Patient was engaged in session and open to feedback. Patient reported no safety concerns.       There has been demonstrated improvement in functioning while patient has been engaged in psychotherapy/psychological service- if withdrawn the patient would deteriorate and/or relapse.       Assessments completed prior to visit:  The following assessments were completed by patient for this visit:  PHQ9:       4/17/2023     2:27 PM 6/6/2023     9:46 AM 12/12/2023     1:00 PM 3/26/2024    12:00 PM 6/21/2024     3:06 PM 7/11/2024     4:41 PM 8/26/2024     1:00 PM   PHQ-9 SCORE   PHQ-9 Total Score MyChart 20 (Severe depression) 10 (Moderate depression)   13 (Moderate depression) 25 (Severe depression)    PHQ-9 Total Score 20 10 13 12 13 25 14     GAD7:       11/22/2022     4:00 PM 3/21/2023     3:00 PM 6/6/2023     9:46 AM 12/12/2023     1:00 PM 3/26/2024    12:00 PM 7/11/2024     4:41 PM 8/26/2024     1:00 PM   MEG-7 SCORE   Total Score   6 (mild anxiety)   21 (severe anxiety)    Total Score 13 20 6 9 8 21 10   PROMIS-10 Scores        12/12/2023     1:00 PM 3/26/2024    12:00 PM 8/26/2024     1:00 PM   PROMIS-10 Total Score w/o Sub Scores   PROMIS TOTAL - SUBSCORES 21 21 22    [place] Suicide Severity Rating Scale (Short Version)      12/21/2021     1:00 PM 10/11/2022     5:00 PM 11/22/2022     4:00 PM 3/21/2023     3:00 PM 12/12/2023     1:00 PM 3/26/2024    12:00 PM 8/26/2024     1:00 PM   [place] Suicide Severity Rating (Short Version)   Over the past 2 weeks have you felt down, depressed, or hopeless? yes         Over the past 2 weeks have you had thoughts of killing yourself? no         Have you ever attempted to kill yourself? no         1. Wish to be Dead (Since  Last Contact)  N N N N N N   2. Non-Specific Active Suicidal Thoughts (Since Last Contact)  N N N N N N   Actual Attempt (Since Last Contact)  N N N N N N   Has subject engaged in non-suicidal self-injurious behavior? (Since Last Contact)  N N N N N N   Interrupted Attempts (Since Last Contact)  N N N N N N   Aborted or Self-Interrupted Attempt (Since Last Contact)  N N N N N N   Preparatory Acts or Behavior (Since Last Contact)  N N N N N N   Suicide (Since Last Contact)  N N N N N N   Calculated C-SSRS Risk Score (Since Last Contact)  No Risk Indicated No Risk Indicated No Risk Indicated No Risk Indicated No Risk Indicated No Risk Indicated            ASSESSMENT: Current Emotional / Mental Status (status of significant symptoms):   Risk status (Self / Other harm or suicidal ideation)   Patient denies current fears or concerns for personal safety.   Patient denies current or recent suicidal ideation or behaviors.   Patient denies current or recent homicidal ideation or behaviors.   Patient denies current or recent self injurious behavior or ideation.   Patient denies other safety concerns.   Patient reports there has been no change in risk factors since their last session.     Patient reports there has been no change in protective factors since their last session.     A safety and risk management plan has been developed including: Patient consented to co-developed safety plan on 5/8/2023.  Safety and risk management plan was reviewed.   Patient agreed to use safety plan should any safety concerns arise.  A copy was made available to the patient.     Appearance:   phone    Eye Contact:   phone     Psychomotor Behavior: phone     Attitude:   Cooperative    Orientation:   All   Speech    Rate / Production: Emotional Talkative    Volume:  Normal    Mood:    Anxious  Depressed    Affect:    Worrisome    Thought Content:  Clear    Thought Form:  Coherent    Insight:    Fair      Medication Review:   No changes to current  psychiatric medication(s)     Medication Compliance:   Yes     Changes in Health Issues:   None reported     Chemical Use Review:   Substance Use: Chemical use reviewed, no active concerns identified      Tobacco Use: No current tobacco use.      Diagnosis:  1. MEG (generalized anxiety disorder)    2. MDD (major depressive disorder), recurrent episode, mild (H)    3. Attention deficit hyperactivity disorder (ADHD), combined type        Collateral Reports Completed:   Not Applicable    PLAN: (Patient Tasks / Therapist Tasks / Other)      Increase self care  Patient reported stressors have gotten better and time to focus on self now. Therapist supported patient as she processed and validated patient. Therapist provided education on patient and measurable goals vs big goals and assisted patient in plan for losing weight and feeling better about self. Therapist utilized strength based modality with patient and encouraged patient to make weekly movement goals along with going to the gym.       Patito Humphries, [facility]  11/11/2024                                                           ______________________________________________________________________    Individual Treatment Plan    Patient's Name: Mendy Roe  YOB: 1980    Date of Creation: 12/21/2021  Date Treatment Plan Last Reviewed/Revised: 11/11/2024 due 2/11/2024    DSM5 Diagnoses: 296.31 (F33.0) Major Depressive Disorder, Recurrent Episode, Mild With mixed features or 300.02 (F41.1) Generalized Anxiety Disorder  Psychosocial / Contextual Factors: Blended family, moving, kids, grandchildren, partner  PROMIS (reviewed every 90 days): 8/26/2024    Referral / Collaboration:  Referral to another professional/service is not indicated at this time..    Anticipated number of session for this episode of care: 12  Anticipation frequency of session: Monthly  Anticipated Duration of each session: 38-52 minutes  Treatment plan will be reviewed in 90  days or when goals have been changed.       MeasurableTreatment Goal(s) related to diagnosis / functional impairment(s)  Goal 1: Patient will report absence of persistent anxiety mood and report of reduced frequency and intensity of worry and absence of persistent anxious mood to acceptable levels, no panic attacks, report subjective comfort with rumination or a period of 90 days. Within 6 months as clinically observed and by patient self-report    I will know I've met my goal when manage my anxiety.      Objective #A (Patient Action)    Patient will demonstrate and report a level of anxiety that can be managed at a lower level of care.  Absence of persistent anxious mood and report of reduced frequency and intensity of worry and absence of persistent anxious mood to acceptable levels, no panic attacks, report subjective comfort with rumination for a period of 90 days, within 6 months as clinically observed and by patient self-report.  Status: Continued - Date(s): 11/11/2024    Intervention(s)  Therapist will provide individual therapy to identify triggers to anxiety, gain feedback on helpful coping tools and thought-reframing techniques, and identify preferred way of being. Tx to include discuss current stressors and interpersonal conflicts and how to cope with these, coaching, diagnostic testing, referral for medication as indicated, use prescribed medication, cognitive restructuring, interpersonal, family therapy, supportive therapy services.        Goal 2: Patient will report absence of persistent depression mood and report of reduced frequency and intensity of low mood and absence of persistent low energy and motivation to acceptable levels, report subjective improved motivation and increased energy for a period of 90 days, within 6 months as clinically observed and by patient self-report    I will know I've met my goal when feeling more balanced.      Objective #A (Patient Action)    Status: Continued -  Date(s):11/11/2024  Patient will demonstrate and report a level of depression that can be managed at a lower level of care.  Absence of persistent depression mood and report of reduced frequency and intensity of low mood and absence of persistent low energy and motivation to acceptable levels, report subjective improved motivation and increased energy for a period of 90 days, within 6 months as clinically observed and by patient self-report.    Intervention(s)  Therapist will provide individual therapy to identify triggers to depression, gain feedback on helpful coping tools and thought-reframing techniques, and identify preferred way of being.  Tx to include discussion of current stressors and interpersonal conflicts and how to cope with these, coaching, diagnostic testing, referral for medication as indicated, use prescribed medication, cognitive restructuring, interpersonal, family therapy, supportive therapy services.        Patient has reviewed and agreed to the above plan.      RADHA Neves  11/11/2024          [facility]               Name:   Mendy Roe     [facility]    SAFETY PLAN: 5/8/2023    Therapist suggested patient go stay with her daughters to allow self a break, speak to partner about where they are and where they are going and if patient continues to feel unsafe to go to the ED and patient agreed.            [facility]               Name:   Mendy Roe     Therapist Name: RADHA Neves    SAFETY PLAN:    Step 1: Warning signs / cues (thoughts, feelings, what I do, what others do) that tell me I'm not doing well:     What do I think?  What do I say to myself? I want to die      Pictures in my head:  none     How do I feel? really sad, worried, angry, hopeless and annoyed     What do I do? sit in my room and be alone     When do I feel this way?   relationship and money          Step 2: Coping strategies - Things I can do to help myself feel better:     Coping skills: use my coping skills and be around others      Games and activities: go outside, go for a walk and deep breathing     Focus on helpful thoughts: this is temporary, I will get through this, ride the wave and people care about me children      Step 3: People and places that help me feel better:     People: daughters     Places (with permission): work       Step 4: People and things that are special to me that remind me why it's worth getting better:      My family        Step 6: Things that will help me stay safe:     be around others    Step 7: Professionals or agencies I can contact when I need help:     Suicide Prevention Lifeline: Call or Text 988     Local Crisis Services: 988     Call 911 or go to my nearest emergency department.     I helped develop this safety plan and agree to use it when needed.  I have been given a copy of this plan.      Client signature:     _________________________________________________________________  Today's date:  5/16/2023    Adapted from Safety Plan Template 2008 Rocio Neal and Ronnie Juan is reprinted with the express permission of the authors.  No portion of the Safety Plan Template may be reproduced without the express, written permission.  You can contact the authors at [email or web address] or [email or web address].

## 2024-11-20 DIAGNOSIS — R06.2 WHEEZING: ICD-10-CM

## 2024-11-20 RX ORDER — ALBUTEROL SULFATE 90 UG/1
2 INHALANT RESPIRATORY (INHALATION) EVERY 4 HOURS PRN
Qty: 18 G | Refills: 0 | Status: SHIPPED | OUTPATIENT
Start: 2024-11-20

## 2024-11-20 NOTE — TELEPHONE ENCOUNTER
[facility] Prescription Refill Request    albuterol (PROAIR HFA/PROVENTIL HFA/VENTOLIN HFA) 108 (90 Base) MCG/ACT inhaler     Last Visit with PCP:  06/21/24  Next Visit with PCP:  nothing scheduled

## 2024-11-26 ENCOUNTER — VIRTUAL VISIT (OUTPATIENT)
Dept: PSYCHOLOGY | Facility: CLINIC | Age: 44
End: 2024-11-26
Payer: COMMERCIAL

## 2024-11-26 DIAGNOSIS — F90.2 ATTENTION DEFICIT HYPERACTIVITY DISORDER (ADHD), COMBINED TYPE: ICD-10-CM

## 2024-11-26 DIAGNOSIS — F33.0 MDD (MAJOR DEPRESSIVE DISORDER), RECURRENT EPISODE, MILD (H): ICD-10-CM

## 2024-11-26 DIAGNOSIS — F41.1 GAD (GENERALIZED ANXIETY DISORDER): Primary | ICD-10-CM

## 2024-11-26 PROCEDURE — 90837 PSYTX W PT 60 MINUTES: CPT | Mod: 95 | Performed by: SOCIAL WORKER

## 2024-11-26 NOTE — PROGRESS NOTES
[facility]                                     Progress Note    Patient Name: Mendy Roe  Date:  2024       Service Type: Individual      Session Start Time: 1305     Session End Time:  1600    Session Length: 53+    Session #: 63    Attendees: Client and partner    Service Modality:  Video Visit:      Provider verified identity through the following two step process.  Patient provided: Patient  and Patient previous known to provider     Telemedicine Visit: The patient's condition can be safely assessed and treated via synchronous audio and visual telemedicine encounter.       Reason for Telemedicine Visit: Patient has requested telehealth visit     Originating Site (Patient Location): Patient's home     Distant Site (Provider Location): Provider Remote Setting- Home Office     Consent:  The patient/guardian has verbally consented to: the potential risks and benefits of telemedicine (video visit) versus in person care; bill my insurance or make self-payment for services provided; and responsibility for payment of non-covered services.      Patient would like the video invitation sent by: email/text     Mode of Communication: Video Conference via [employer]     Distant Location (Provider): Off-site     As the provider I attest to compliance with applicable laws and regulations related to telemedicine.        DATA  Interactive Complexity: No  Crisis: No        Progress Since Last Session (Related to Symptoms / Goals / Homework):   Symptoms: Improving per couple    Homework: Achieved / completed to satisfaction  Partially completed      Episode of Care Goals: Minimal progress - PREPARATION (Decided to change - considering how); Intervened by negotiating a change plan and determining options / strategies for behavior change, identifying triggers, exploring social supports, and working towards setting a date to begin behavior change     Current / Ongoing Stressors and  Concerns:   Blended family, moving, kids, grandchildren, partner     Treatment Objective(s) Addressed in This Session:     Patient will demonstrate and report a level of anxiety that can be managed at a lower level of care.  Absence of persistent anxious mood and report of reduced frequency and intensity of worry and absence of persistent anxious mood to acceptable levels, no panic attacks, report subjective comfort with rumination for a period of 90 days, within 6 months as clinically observed and by patient self-report.  Patient will demonstrate and report a level of depression that can be managed at a lower level of care.  Absence of persistent depression mood and report of reduced frequency and intensity of low mood and absence of persistent low energy and motivation to acceptable levels, report subjective improved motivation and increased energy for a period of 90 days, within 6 months as clinically observed and by patient self-report.     Intervention:     Therapist met with patient to review goals and interventions. Therapist utilized reflected listening as patient gave brief reflection of week. Patient's partner joined session to discuss dynamics within the relationship and upcoming plans. Therapist supported patient and partner as they processed and provided validation and education/coaching around setting healthy, firm boundaries and understanding expectations. Therapist encouraged couple to look at their goals and their individual relationships with extended family and coached in effective communication.   Patient presented with an anxious affect. Patient was engaged in session and open to feedback. Patient reported no safety concerns.       There has been demonstrated improvement in functioning while patient has been engaged in psychotherapy/psychological service- if withdrawn the patient would deteriorate and/or relapse.       Assessments completed prior to visit:  The following assessments were  completed by patient for this visit:  PHQ9:       4/17/2023     2:27 PM 6/6/2023     9:46 AM 12/12/2023     1:00 PM 3/26/2024    12:00 PM 6/21/2024     3:06 PM 7/11/2024     4:41 PM 8/26/2024     1:00 PM   PHQ-9 SCORE   PHQ-9 Total Score MyChart 20 (Severe depression) 10 (Moderate depression)   13 (Moderate depression) 25 (Severe depression)    PHQ-9 Total Score 20 10 13 12 13 25 14     GAD7:       11/22/2022     4:00 PM 3/21/2023     3:00 PM 6/6/2023     9:46 AM 12/12/2023     1:00 PM 3/26/2024    12:00 PM 7/11/2024     4:41 PM 8/26/2024     1:00 PM   MEG-7 SCORE   Total Score   6 (mild anxiety)   21 (severe anxiety)    Total Score 13 20 6 9 8 21 10   PROMIS-10 Scores        12/12/2023     1:00 PM 3/26/2024    12:00 PM 8/26/2024     1:00 PM   PROMIS-10 Total Score w/o Sub Scores   PROMIS TOTAL - SUBSCORES 21 21 22    [place] Suicide Severity Rating Scale (Short Version)      12/21/2021     1:00 PM 10/11/2022     5:00 PM 11/22/2022     4:00 PM 3/21/2023     3:00 PM 12/12/2023     1:00 PM 3/26/2024    12:00 PM 8/26/2024     1:00 PM   [place] Suicide Severity Rating (Short Version)   Over the past 2 weeks have you felt down, depressed, or hopeless? yes         Over the past 2 weeks have you had thoughts of killing yourself? no         Have you ever attempted to kill yourself? no         1. Wish to be Dead (Since Last Contact)  N N N N N N   2. Non-Specific Active Suicidal Thoughts (Since Last Contact)  N N N N N N   Actual Attempt (Since Last Contact)  N N N N N N   Has subject engaged in non-suicidal self-injurious behavior? (Since Last Contact)  N N N N N N   Interrupted Attempts (Since Last Contact)  N N N N N N   Aborted or Self-Interrupted Attempt (Since Last Contact)  N N N N N N   Preparatory Acts or Behavior (Since Last Contact)  N N N N N N   Suicide (Since Last Contact)  N N N N N N   Calculated C-SSRS Risk Score (Since Last Contact)  No Risk Indicated No Risk Indicated No Risk Indicated No Risk Indicated  No Risk Indicated No Risk Indicated            ASSESSMENT: Current Emotional / Mental Status (status of significant symptoms):   Risk status (Self / Other harm or suicidal ideation)   Patient denies current fears or concerns for personal safety.   Patient denies current or recent suicidal ideation or behaviors.   Patient denies current or recent homicidal ideation or behaviors.   Patient denies current or recent self injurious behavior or ideation.   Patient denies other safety concerns.   Patient reports there has been no change in risk factors since their last session.     Patient reports there has been no change in protective factors since their last session.     A safety and risk management plan has been developed including: Patient consented to co-developed safety plan on 5/8/2023.  Safety and risk management plan was reviewed.   Patient agreed to use safety plan should any safety concerns arise.  A copy was made available to the patient.     Appearance:   Appropriate    Eye Contact:   Fair    Psychomotor Behavior: Normal    Attitude:   Cooperative    Orientation:   All   Speech    Rate / Production: Emotional Talkative    Volume:  Normal    Mood:    Anxious  Depressed    Affect:    Worrisome    Thought Content:  Clear    Thought Form:  Coherent    Insight:    Fair      Medication Review:   No changes to current psychiatric medication(s)     Medication Compliance:   Yes     Changes in Health Issues:   None reported     Chemical Use Review:   Substance Use: Chemical use reviewed, no active concerns identified      Tobacco Use: No current tobacco use.      Diagnosis:  1. MEG (generalized anxiety disorder)    2. MDD (major depressive disorder), recurrent episode, mild (H)    3. Attention deficit hyperactivity disorder (ADHD), combined type        Collateral Reports Completed:   Not Applicable    PLAN: (Patient Tasks / Therapist Tasks / Other)      Increase self care  Patient's partner joined session to discuss  dynamics within the relationship and upcoming plans. Therapist supported patient and partner as they processed and provided validation and education/coaching around setting healthy, firm boundaries and understanding expectations. Therapist encouraged couple to look at their goals and their individual relationships with extended family and coached in effective communication.       Patito Humphries, [facility]  11/26/2024                                                           ______________________________________________________________________    Individual Treatment Plan    Patient's Name: Mendy Roe  YOB: 1980    Date of Creation: 12/21/2021  Date Treatment Plan Last Reviewed/Revised: 11/11/2024 due 2/11/2024    DSM5 Diagnoses: 296.31 (F33.0) Major Depressive Disorder, Recurrent Episode, Mild With mixed features or 300.02 (F41.1) Generalized Anxiety Disorder  Psychosocial / Contextual Factors: Blended family, moving, kids, grandchildren, partner  PROMIS (reviewed every 90 days): 8/26/2024    Referral / Collaboration:  Referral to another professional/service is not indicated at this time..    Anticipated number of session for this episode of care: 12  Anticipation frequency of session: Monthly  Anticipated Duration of each session: 38-52 minutes  Treatment plan will be reviewed in 90 days or when goals have been changed.       MeasurableTreatment Goal(s) related to diagnosis / functional impairment(s)  Goal 1: Patient will report absence of persistent anxiety mood and report of reduced frequency and intensity of worry and absence of persistent anxious mood to acceptable levels, no panic attacks, report subjective comfort with rumination or a period of 90 days. Within 6 months as clinically observed and by patient self-report    I will know I've met my goal when manage my anxiety.      Objective #A (Patient Action)    Patient will demonstrate and report a level of anxiety that can be managed at a  lower level of care.  Absence of persistent anxious mood and report of reduced frequency and intensity of worry and absence of persistent anxious mood to acceptable levels, no panic attacks, report subjective comfort with rumination for a period of 90 days, within 6 months as clinically observed and by patient self-report.  Status: Continued - Date(s): 11/11/2024    Intervention(s)  Therapist will provide individual therapy to identify triggers to anxiety, gain feedback on helpful coping tools and thought-reframing techniques, and identify preferred way of being. Tx to include discuss current stressors and interpersonal conflicts and how to cope with these, coaching, diagnostic testing, referral for medication as indicated, use prescribed medication, cognitive restructuring, interpersonal, family therapy, supportive therapy services.        Goal 2: Patient will report absence of persistent depression mood and report of reduced frequency and intensity of low mood and absence of persistent low energy and motivation to acceptable levels, report subjective improved motivation and increased energy for a period of 90 days, within 6 months as clinically observed and by patient self-report    I will know I've met my goal when feeling more balanced.      Objective #A (Patient Action)    Status: Continued - Date(s):11/11/2024  Patient will demonstrate and report a level of depression that can be managed at a lower level of care.  Absence of persistent depression mood and report of reduced frequency and intensity of low mood and absence of persistent low energy and motivation to acceptable levels, report subjective improved motivation and increased energy for a period of 90 days, within 6 months as clinically observed and by patient self-report.    Intervention(s)  Therapist will provide individual therapy to identify triggers to depression, gain feedback on helpful coping tools and thought-reframing techniques, and identify  preferred way of being.  Tx to include discussion of current stressors and interpersonal conflicts and how to cope with these, coaching, diagnostic testing, referral for medication as indicated, use prescribed medication, cognitive restructuring, interpersonal, family therapy, supportive therapy services.        Patient has reviewed and agreed to the above plan.      RADHA Neves  11/11/2024          [facility]               Name:   Mendy Roe     Therapist Name: RADHA Neves    SAFETY PLAN: 5/8/2023    Therapist suggested patient go stay with her daughters to allow self a break, speak to partner about where they are and where they are going and if patient continues to feel unsafe to go to the ED and patient agreed.            [facility]               Name:   Mendy Roe     Therapist Name: Patito RUIZRADHA Graves    SAFETY PLAN:    Step 1: Warning signs / cues (thoughts, feelings, what I do, what others do) that tell me I'm not doing well:     What do I think?  What do I say to myself? I want to die      Pictures in my head:  none     How do I feel? really sad, worried, angry, hopeless and annoyed     What do I do? sit in my room and be alone     When do I feel this way?  relationship and money          Step 2: Coping strategies - Things I can do to help myself feel better:     Coping skills: use my coping skills and be around others      Games and activities: go outside, go for a walk and deep breathing     Focus on helpful thoughts: this is temporary, I will get through this, ride the wave and people care about me children      Step 3: People and places that help me feel better:     People: daughters     Places (with permission): work       Step 4: People and things that are special to me that remind me why it's worth getting better:      My family        Step 6: Things that will help me stay  safe:     be around others    Step 7: Professionals or agencies I can contact when I need help:     Suicide Prevention Lifeline: Call or Text 988     Local Crisis Services: 988     Call 911 or go to my nearest emergency department.     I helped develop this safety plan and agree to use it when needed.  I have been given a copy of this plan.      Client signature:     _________________________________________________________________  Today's date:  5/16/2023    Adapted from Safety Plan Template 2008 Rocio Neal and Ronnie Juan is reprinted with the express permission of the authors.  No portion of the Safety Plan Template may be reproduced without the express, written permission.  You can contact the authors at [email or web address] or [email or web address].

## 2025-05-06 ENCOUNTER — VIRTUAL VISIT (OUTPATIENT)
Dept: PSYCHOLOGY | Facility: CLINIC | Age: 45
End: 2025-05-06
Payer: COMMERCIAL

## 2025-05-06 DIAGNOSIS — F33.0 MDD (MAJOR DEPRESSIVE DISORDER), RECURRENT EPISODE, MILD: ICD-10-CM

## 2025-05-06 DIAGNOSIS — F90.2 ATTENTION DEFICIT HYPERACTIVITY DISORDER (ADHD), COMBINED TYPE: ICD-10-CM

## 2025-05-06 DIAGNOSIS — F41.1 GAD (GENERALIZED ANXIETY DISORDER): Primary | ICD-10-CM

## 2025-05-06 PROCEDURE — 90834 PSYTX W PT 45 MINUTES: CPT | Mod: 95 | Performed by: SOCIAL WORKER

## 2025-05-06 ASSESSMENT — ANXIETY QUESTIONNAIRES
4. TROUBLE RELAXING: NEARLY EVERY DAY
GAD7 TOTAL SCORE: 11
1. FEELING NERVOUS, ANXIOUS, OR ON EDGE: SEVERAL DAYS
5. BEING SO RESTLESS THAT IT IS HARD TO SIT STILL: NEARLY EVERY DAY
7. FEELING AFRAID AS IF SOMETHING AWFUL MIGHT HAPPEN: NOT AT ALL
GAD7 TOTAL SCORE: 11
GAD7 TOTAL SCORE: 11
2. NOT BEING ABLE TO STOP OR CONTROL WORRYING: SEVERAL DAYS
8. IF YOU CHECKED OFF ANY PROBLEMS, HOW DIFFICULT HAVE THESE MADE IT FOR YOU TO DO YOUR WORK, TAKE CARE OF THINGS AT HOME, OR GET ALONG WITH OTHER PEOPLE?: EXTREMELY DIFFICULT
3. WORRYING TOO MUCH ABOUT DIFFERENT THINGS: SEVERAL DAYS
IF YOU CHECKED OFF ANY PROBLEMS ON THIS QUESTIONNAIRE, HOW DIFFICULT HAVE THESE PROBLEMS MADE IT FOR YOU TO DO YOUR WORK, TAKE CARE OF THINGS AT HOME, OR GET ALONG WITH OTHER PEOPLE: EXTREMELY DIFFICULT
7. FEELING AFRAID AS IF SOMETHING AWFUL MIGHT HAPPEN: NOT AT ALL
6. BECOMING EASILY ANNOYED OR IRRITABLE: MORE THAN HALF THE DAYS

## 2025-05-06 ASSESSMENT — PATIENT HEALTH QUESTIONNAIRE - PHQ9
SUM OF ALL RESPONSES TO PHQ QUESTIONS 1-9: 14
10. IF YOU CHECKED OFF ANY PROBLEMS, HOW DIFFICULT HAVE THESE PROBLEMS MADE IT FOR YOU TO DO YOUR WORK, TAKE CARE OF THINGS AT HOME, OR GET ALONG WITH OTHER PEOPLE: EXTREMELY DIFFICULT
SUM OF ALL RESPONSES TO PHQ QUESTIONS 1-9: 14

## 2025-05-06 NOTE — PROGRESS NOTES
[facility]                                     Progress Note    Patient Name: Mendy Roe  Date:  2025       Service Type: Individual      Session Start Time: 1003     Session End Time:  104    Session Length: 38-52    Session #: 1 restarted    Attendees: Client and partner    Service Modality:  Video Visit:      Provider verified identity through the following two step process.  Patient provided: Patient  and Patient previous known to provider     Telemedicine Visit: The patient's condition can be safely assessed and treated via synchronous audio and visual telemedicine encounter.       Reason for Telemedicine Visit: Patient has requested telehealth visit     Originating Site (Patient Location): Patient's home     Distant Site (Provider Location): Provider Remote Setting- Home Office     Consent:  The patient/guardian has verbally consented to: the potential risks and benefits of telemedicine (video visit) versus in person care; bill my insurance or make self-payment for services provided; and responsibility for payment of non-covered services.      Patient would like the video invitation sent by: email/text     Mode of Communication: Video Conference via [employer]     Distant Location (Provider): Off-site     As the provider I attest to compliance with applicable laws and regulations related to telemedicine.        DATA  Interactive Complexity: No  Crisis: No        Progress Since Last Session (Related to Symptoms / Goals / Homework):   Symptoms: Worsening anxiety    Homework: Achieved / completed to satisfaction  Partially completed      Episode of Care Goals: Minimal progress - PREPARATION (Decided to change - considering how); Intervened by negotiating a change plan and determining options / strategies for behavior change, identifying triggers, exploring social supports, and working towards setting a date to begin behavior change     Current / Ongoing Stressors and  Concerns:   Blended family, moving, kids, grandchildren, partner     Treatment Objective(s) Addressed in This Session:     Patient will demonstrate and report a level of anxiety that can be managed at a lower level of care.  Absence of persistent anxious mood and report of reduced frequency and intensity of worry and absence of persistent anxious mood to acceptable levels, no panic attacks, report subjective comfort with rumination for a period of 90 days, within 6 months as clinically observed and by patient self-report.  Patient will demonstrate and report a level of depression that can be managed at a lower level of care.  Absence of persistent depression mood and report of reduced frequency and intensity of low mood and absence of persistent low energy and motivation to acceptable levels, report subjective improved motivation and increased energy for a period of 90 days, within 6 months as clinically observed and by patient self-report.     Intervention:    Motivational Interviewing  Target Behavior:  manage expectations in the moment    Stage of Change: PREPARATION (Decided to change - considering how)    MI Intervention: Expressed Empathy/Understanding, Supported Autonomy, Collaboration, Evocation, Permission to raise concern or advise, and Open-ended questions     Change Talk Expressed by the Patient: Ability to change Taking steps    Provider Response to Change Talk: A - Affirmed patient's thoughts, decisions, or attempts at behavior change and R - Reflected patient's change talk        There has been demonstrated improvement in functioning while patient has been engaged in psychotherapy/psychological service- if withdrawn the patient would deteriorate and/or relapse.       Assessments completed prior to visit:  The following assessments were completed by patient for this visit:  PHQ9:       6/6/2023     9:46 AM 12/12/2023     1:00 PM 3/26/2024    12:00 PM 6/21/2024     3:06 PM 7/11/2024     4:41 PM  8/26/2024     1:00 PM 5/6/2025     7:35 AM   PHQ-9 SCORE   PHQ-9 Total Score MyChart 10 (Moderate depression)   13 (Moderate depression) 25 (Severe depression)  14 (Moderate depression)   PHQ-9 Total Score 10 13 12 13 25 14 14        Patient-reported     GAD7:       3/21/2023     3:00 PM 6/6/2023     9:46 AM 12/12/2023     1:00 PM 3/26/2024    12:00 PM 7/11/2024     4:41 PM 8/26/2024     1:00 PM 5/6/2025     7:35 AM   MEG-7 SCORE   Total Score  6 (mild anxiety)   21 (severe anxiety)  11 (moderate anxiety)   Total Score 20 6 9 8 21 10 11        Patient-reported   PROMIS-10 Scores        3/26/2024    12:00 PM 8/26/2024     1:00 PM 5/6/2025     7:36 AM   PROMIS-10 Total Score w/o Sub Scores   PROMIS TOTAL - SUBSCORES 21 22 21        Patient-reported    [place] Suicide Severity Rating Scale (Short Version)      12/21/2021     1:00 PM 10/11/2022     5:00 PM 11/22/2022     4:00 PM 3/21/2023     3:00 PM 12/12/2023     1:00 PM 3/26/2024    12:00 PM 8/26/2024     1:00 PM   [place] Suicide Severity Rating (Short Version)   Over the past 2 weeks have you felt down, depressed, or hopeless? yes         Over the past 2 weeks have you had thoughts of killing yourself? no         Have you ever attempted to kill yourself? no         1. Wish to be Dead (Since Last Contact)  N N N N N N   2. Non-Specific Active Suicidal Thoughts (Since Last Contact)  N N N N N N   Actual Attempt (Since Last Contact)  N N N N N N   Has subject engaged in non-suicidal self-injurious behavior? (Since Last Contact)  N N N N N N   Interrupted Attempts (Since Last Contact)  N N N N N N   Aborted or Self-Interrupted Attempt (Since Last Contact)  N N N N N N   Preparatory Acts or Behavior (Since Last Contact)  N N N N N N   Suicide (Since Last Contact)  N N N N N N   Calculated C-SSRS Risk Score (Since Last Contact)  No Risk Indicated No Risk Indicated No Risk Indicated No Risk Indicated No Risk Indicated No Risk Indicated            ASSESSMENT: Current  Emotional / Mental Status (status of significant symptoms):   Risk status (Self / Other harm or suicidal ideation)   Patient denies current fears or concerns for personal safety.   Patient denies current or recent suicidal ideation or behaviors.   Patient denies current or recent homicidal ideation or behaviors.   Patient denies current or recent self injurious behavior or ideation.   Patient denies other safety concerns.   Patient reports there has been no change in risk factors since their last session.     Patient reports there has been no change in protective factors since their last session.     A safety and risk management plan has been developed including: Patient consented to co-developed safety plan on 5/8/2023.  Safety and risk management plan was reviewed.   Patient agreed to use safety plan should any safety concerns arise.  A copy was made available to the patient.     Appearance:   Appropriate    Eye Contact:   Fair    Psychomotor Behavior: Normal    Attitude:   Cooperative    Orientation:   All   Speech    Rate / Production: Emotional Talkative    Volume:  Normal    Mood:    Anxious  Depressed    Affect:    Worrisome    Thought Content:  Clear    Thought Form:  Coherent    Insight:    Fair      Medication Review:   No changes to current psychiatric medication(s)     Medication Compliance:   Yes     Changes in Health Issues:   None reported     Chemical Use Review:   Substance Use: Chemical use reviewed, no active concerns identified      Tobacco Use: No current tobacco use.      Diagnosis:  1. MEG (generalized anxiety disorder)    2. MDD (major depressive disorder), recurrent episode, mild    3. Attention deficit hyperactivity disorder (ADHD), combined type        Collateral Reports Completed:   Not Applicable    PLAN: (Patient Tasks / Therapist Tasks / Other)      Manage expectations with meeting  Speak to psychiatry about concerns with ketamine and tx plan  Name and manage anxiety in the  moment      Patito Humphries, [facility]   5/6/2025                                                           ______________________________________________________________________    Individual Treatment Plan    Patient's Name: Mendy Roe  YOB: 1980    Date of Creation: 12/21/2021  Date Treatment Plan Last Reviewed/Revised:  5/6/2025 due   8/6/2025    DSM5 Diagnoses: 296.31 (F33.0) Major Depressive Disorder, Recurrent Episode, Mild With mixed features or 300.02 (F41.1) Generalized Anxiety Disorder  Psychosocial / Contextual Factors: Blended family, moving, kids, grandchildren, partner  PROMIS (reviewed every 90 days):  5/6/2025    Referral / Collaboration:  Referral to another professional/service is not indicated at this time..    Anticipated number of session for this episode of care: 12  Anticipation frequency of session: Monthly  Anticipated Duration of each session: 38-52 minutes  Treatment plan will be reviewed in 90 days or when goals have been changed.       MeasurableTreatment Goal(s) related to diagnosis / functional impairment(s)  Goal 1: Patient will report absence of persistent anxiety mood and report of reduced frequency and intensity of worry and absence of persistent anxious mood to acceptable levels, no panic attacks, report subjective comfort with rumination or a period of 90 days. Within 6 months as clinically observed and by patient self-report    I will know I've met my goal when manage my anxiety.      Objective #A (Patient Action)    Patient will demonstrate and report a level of anxiety that can be managed at a lower level of care.  Absence of persistent anxious mood and report of reduced frequency and intensity of worry and absence of persistent anxious mood to acceptable levels, no panic attacks, report subjective comfort with rumination for a period of 90 days, within 6 months as clinically observed and by patient self-report.  Status: restarted - Date(s):   5/6/2025    Intervention(s)  Therapist will provide individual therapy to identify triggers to anxiety, gain feedback on helpful coping tools and thought-reframing techniques, and identify preferred way of being. Tx to include discuss current stressors and interpersonal conflicts and how to cope with these, coaching, diagnostic testing, referral for medication as indicated, use prescribed medication, cognitive restructuring, interpersonal, family therapy, supportive therapy services.        Goal 2: Patient will report absence of persistent depression mood and report of reduced frequency and intensity of low mood and absence of persistent low energy and motivation to acceptable levels, report subjective improved motivation and increased energy for a period of 90 days, within 6 months as clinically observed and by patient self-report    I will know I've met my goal when feeling more balanced.      Objective #A (Patient Action)    Status:  restarted - Date(s):  5/6/2025  Patient will demonstrate and report a level of depression that can be managed at a lower level of care.  Absence of persistent depression mood and report of reduced frequency and intensity of low mood and absence of persistent low energy and motivation to acceptable levels, report subjective improved motivation and increased energy for a period of 90 days, within 6 months as clinically observed and by patient self-report.    Intervention(s)  Therapist will provide individual therapy to identify triggers to depression, gain feedback on helpful coping tools and thought-reframing techniques, and identify preferred way of being.  Tx to include discussion of current stressors and interpersonal conflicts and how to cope with these, coaching, diagnostic testing, referral for medication as indicated, use prescribed medication, cognitive restructuring, interpersonal, family therapy, supportive therapy services.        Patient has reviewed and agreed to the  above plan.      Patito Humphries LICSW   5/6/2025          [facility]               Name:   Mendy Roe     [facility]    SAFETY PLAN: 5/8/2023    Therapist suggested patient go stay with her daughters to allow self a break, speak to partner about where they are and where they are going and if patient continues to feel unsafe to go to the ED and patient agreed.            [facility]               Name:   Mendy Roe     Therapist Name: Patito Humphries [facility]    SAFETY PLAN:    Step 1: Warning signs / cues (thoughts, feelings, what I do, what others do) that tell me I'm not doing well:     What do I think?  What do I say to myself? I want to die      Pictures in my head:  none     How do I feel? really sad, worried, angry, hopeless and annoyed     What do I do? sit in my room and be alone     When do I feel this way?  relationship and money          Step 2: Coping strategies - Things I can do to help myself feel better:     Coping skills: use my coping skills and be around others      Games and activities: go outside, go for a walk and deep breathing     Focus on helpful thoughts: this is temporary, I will get through this, ride the wave and people care about me children      Step 3: People and places that help me feel better:     People: daughters     Places (with permission): work       Step 4: People and things that are special to me that remind me why it's worth getting better:      My family        Step 6: Things that will help me stay safe:     be around others    Step 7: Professionals or agencies I can contact when I need help:     Suicide Prevention Lifeline: Call or Text 988     Local Crisis Services: 988     Call 911 or go to my nearest emergency department.     I helped develop this safety plan and agree to use it when needed.  I have been given a copy of this plan.      Client  signature:     _________________________________________________________________  Today's date:  5/16/2023    Adapted from Safety Plan Template 2008 Rocio Neal and Ronnie Juan is reprinted with the express permission of the authors.  No portion of the Safety Plan Template may be reproduced without the express, written permission.  You can contact the authors at [email or web address] or [email or web address].

## 2025-05-22 ENCOUNTER — PATIENT OUTREACH (OUTPATIENT)
Dept: CARE COORDINATION | Facility: CLINIC | Age: 45
End: 2025-05-22

## 2025-06-03 ENCOUNTER — VIRTUAL VISIT (OUTPATIENT)
Dept: PSYCHOLOGY | Facility: CLINIC | Age: 45
End: 2025-06-03
Payer: COMMERCIAL

## 2025-06-03 DIAGNOSIS — F41.1 GAD (GENERALIZED ANXIETY DISORDER): Primary | ICD-10-CM

## 2025-06-03 DIAGNOSIS — F90.2 ATTENTION DEFICIT HYPERACTIVITY DISORDER (ADHD), COMBINED TYPE: ICD-10-CM

## 2025-06-03 DIAGNOSIS — F33.0 MDD (MAJOR DEPRESSIVE DISORDER), RECURRENT EPISODE, MILD: ICD-10-CM

## 2025-06-03 PROCEDURE — 90834 PSYTX W PT 45 MINUTES: CPT | Mod: 95 | Performed by: SOCIAL WORKER

## 2025-06-03 NOTE — PROGRESS NOTES
[facility]                                     Progress Note    Patient Name: Mendy Roe  Date:  6/3/2025       Service Type: Individual      Session Start Time: 1203     Session End Time:  1245    Session Length: 38-52    Session #: 2    Attendees: Client and partner    Service Modality:  Video Visit:      Provider verified identity through the following two step process.  Patient provided: Patient  and Patient previous known to provider     Telemedicine Visit: The patient's condition can be safely assessed and treated via synchronous audio and visual telemedicine encounter.       Reason for Telemedicine Visit: Patient has requested telehealth visit     Originating Site (Patient Location): Patient's home     Distant Site (Provider Location): Provider Remote Setting- Home Office     Consent:  The patient/guardian has verbally consented to: the potential risks and benefits of telemedicine (video visit) versus in person care; bill my insurance or make self-payment for services provided; and responsibility for payment of non-covered services.      Patient would like the video invitation sent by: email/text     Mode of Communication: Video Conference via [facility]     Distant Location (Provider): Off-site     As the provider I attest to compliance with applicable laws and regulations related to telemedicine.        DATA  Interactive Complexity: No  Crisis: No        Progress Since Last Session (Related to Symptoms / Goals / Homework):   Symptoms: No change ups and downs    Homework: Achieved / completed to satisfaction  Partially completed      Episode of Care Goals: Minimal progress - PREPARATION (Decided to change - considering how); Intervened by negotiating a change plan and determining options / strategies for behavior change, identifying triggers, exploring social supports, and working towards setting a date to begin behavior change     Current / Ongoing Stressors and  Concerns:   Blended family, moving, kids, grandchildren, partner     Treatment Objective(s) Addressed in This Session:     Patient will demonstrate and report a level of anxiety that can be managed at a lower level of care.  Absence of persistent anxious mood and report of reduced frequency and intensity of worry and absence of persistent anxious mood to acceptable levels, no panic attacks, report subjective comfort with rumination for a period of 90 days, within 6 months as clinically observed and by patient self-report.  Patient will demonstrate and report a level of depression that can be managed at a lower level of care.  Absence of persistent depression mood and report of reduced frequency and intensity of low mood and absence of persistent low energy and motivation to acceptable levels, report subjective improved motivation and increased energy for a period of 90 days, within 6 months as clinically observed and by patient self-report.     Intervention:    Therapist met with patient to review goals and interventions. Therapist utilized reflected listening as patient gave brief reflection of week. Patient reported not hearing back from her supervisor for the last two weeks and processed. Therapist supported patient as she processed and validated patient. Therapist coached patient through effective communication and encouraged patient to keep herself grounding when talking to her along with when feeling emotional to repeat back what she's hearing and asking if that's what is being said. Therapist encouraged patient to look around at other jobs that she might be interested in.   Patient presented with an anxious affect. Patient was engaged in session and open to feedback. Patient reported no safety concerns.       There has been demonstrated improvement in functioning while patient has been engaged in psychotherapy/psychological service- if withdrawn the patient would deteriorate and/or relapse.       Assessments  completed prior to visit:  The following assessments were completed by patient for this visit:  PHQ9:       6/6/2023     9:46 AM 12/12/2023     1:00 PM 3/26/2024    12:00 PM 6/21/2024     3:06 PM 7/11/2024     4:41 PM 8/26/2024     1:00 PM 5/6/2025     7:35 AM   PHQ-9 SCORE   PHQ-9 Total Score MyChart 10 (Moderate depression)   13 (Moderate depression) 25 (Severe depression)  14 (Moderate depression)   PHQ-9 Total Score 10 13 12 13 25 14 14        Patient-reported     GAD7:       3/21/2023     3:00 PM 6/6/2023     9:46 AM 12/12/2023     1:00 PM 3/26/2024    12:00 PM 7/11/2024     4:41 PM 8/26/2024     1:00 PM 5/6/2025     7:35 AM   MEG-7 SCORE   Total Score  6 (mild anxiety)   21 (severe anxiety)  11 (moderate anxiety)   Total Score 20 6 9 8 21 10 11        Patient-reported   PROMIS-10 Scores        3/26/2024    12:00 PM 8/26/2024     1:00 PM 5/6/2025     7:36 AM   PROMIS-10 Total Score w/o Sub Scores   PROMIS TOTAL - SUBSCORES 21 22 21        Patient-reported    [place]cher Suicide Severity Rating Scale (Short Version)      12/21/2021     1:00 PM 10/11/2022     5:00 PM 11/22/2022     4:00 PM 3/21/2023     3:00 PM 12/12/2023     1:00 PM 3/26/2024    12:00 PM 8/26/2024     1:00 PM   [place]cher Suicide Severity Rating (Short Version)   Over the past 2 weeks have you felt down, depressed, or hopeless? yes         Over the past 2 weeks have you had thoughts of killing yourself? no         Have you ever attempted to kill yourself? no         1. Wish to be Dead (Since Last Contact)  N N N N N N   2. Non-Specific Active Suicidal Thoughts (Since Last Contact)  N N N N N N   Actual Attempt (Since Last Contact)  N N N N N N   Has subject engaged in non-suicidal self-injurious behavior? (Since Last Contact)  N N N N N N   Interrupted Attempts (Since Last Contact)  N N N N N N   Aborted or Self-Interrupted Attempt (Since Last Contact)  N N N N N N   Preparatory Acts or Behavior (Since Last Contact)  N N N N N N   Suicide (Since Last  Contact)  N N N N N N   Calculated C-SSRS Risk Score (Since Last Contact)  No Risk Indicated No Risk Indicated No Risk Indicated No Risk Indicated No Risk Indicated No Risk Indicated            ASSESSMENT: Current Emotional / Mental Status (status of significant symptoms):   Risk status (Self / Other harm or suicidal ideation)   Patient denies current fears or concerns for personal safety.   Patient denies current or recent suicidal ideation or behaviors.   Patient denies current or recent homicidal ideation or behaviors.   Patient denies current or recent self injurious behavior or ideation.   Patient denies other safety concerns.   Patient reports there has been no change in risk factors since their last session.     Patient reports there has been no change in protective factors since their last session.     A safety and risk management plan has been developed including: Patient consented to co-developed safety plan on 5/8/2023.  Safety and risk management plan was reviewed.   Patient agreed to use safety plan should any safety concerns arise.  A copy was made available to the patient.     Appearance:   Appropriate    Eye Contact:   Fair    Psychomotor Behavior: Normal    Attitude:   Cooperative    Orientation:   All   Speech    Rate / Production: Emotional Talkative    Volume:  Normal    Mood:    Anxious  Depressed    Affect:    Worrisome    Thought Content:  Clear    Thought Form:  Coherent    Insight:    Fair      Medication Review:   No changes to current psychiatric medication(s)     Medication Compliance:   Yes     Changes in Health Issues:   None reported     Chemical Use Review:   Substance Use: Chemical use reviewed, no active concerns identified      Tobacco Use: No current tobacco use.      Diagnosis:  1. MEG (generalized anxiety disorder)    2. MDD (major depressive disorder), recurrent episode, mild    3. Attention deficit hyperactivity disorder (ADHD), combined type        Collateral Reports  Completed:   Not Applicable    PLAN: (Patient Tasks / Therapist Tasks / Other)      Manage expectations with meeting  Speak to psychiatry about concerns with ketamine and tx plan  Name and manage anxiety in the moment  Patient reported not hearing back from her supervisor for the last two weeks and processed. Therapist supported patient as she processed and validated patient. Therapist coached patient through effective communication and encouraged patient to keep herself grounding when talking to her along with when feeling emotional to repeat back what she's hearing and asking if that's what is being said. Therapist encouraged patient to look around at other jobs that she might be interested in.       Patito Humphries, [facility]   6/3/2025                                                           ______________________________________________________________________    Individual Treatment Plan    Patient's Name: Mendy Roe  YOB: 1980    Date of Creation: 12/21/2021  Date Treatment Plan Last Reviewed/Revised:  5/6/2025 due   8/6/2025    DSM5 Diagnoses: 296.31 (F33.0) Major Depressive Disorder, Recurrent Episode, Mild With mixed features or 300.02 (F41.1) Generalized Anxiety Disorder  Psychosocial / Contextual Factors: Blended family, moving, kids, grandchildren, partner  PROMIS (reviewed every 90 days):  5/6/2025    Referral / Collaboration:  Referral to another professional/service is not indicated at this time..    Anticipated number of session for this episode of care: 12  Anticipation frequency of session: Monthly  Anticipated Duration of each session: 38-52 minutes  Treatment plan will be reviewed in 90 days or when goals have been changed.       MeasurableTreatment Goal(s) related to diagnosis / functional impairment(s)  Goal 1: Patient will report absence of persistent anxiety mood and report of reduced frequency and intensity of worry and absence of persistent anxious mood to acceptable  levels, no panic attacks, report subjective comfort with rumination or a period of 90 days. Within 6 months as clinically observed and by patient self-report    I will know I've met my goal when manage my anxiety.      Objective #A (Patient Action)    Patient will demonstrate and report a level of anxiety that can be managed at a lower level of care.  Absence of persistent anxious mood and report of reduced frequency and intensity of worry and absence of persistent anxious mood to acceptable levels, no panic attacks, report subjective comfort with rumination for a period of 90 days, within 6 months as clinically observed and by patient self-report.  Status: restarted - Date(s):  5/6/2025    Intervention(s)  Therapist will provide individual therapy to identify triggers to anxiety, gain feedback on helpful coping tools and thought-reframing techniques, and identify preferred way of being. Tx to include discuss current stressors and interpersonal conflicts and how to cope with these, coaching, diagnostic testing, referral for medication as indicated, use prescribed medication, cognitive restructuring, interpersonal, family therapy, supportive therapy services.        Goal 2: Patient will report absence of persistent depression mood and report of reduced frequency and intensity of low mood and absence of persistent low energy and motivation to acceptable levels, report subjective improved motivation and increased energy for a period of 90 days, within 6 months as clinically observed and by patient self-report    I will know I've met my goal when feeling more balanced.      Objective #A (Patient Action)    Status:  restarted - Date(s):  5/6/2025  Patient will demonstrate and report a level of depression that can be managed at a lower level of care.  Absence of persistent depression mood and report of reduced frequency and intensity of low mood and absence of persistent low energy and motivation to acceptable levels,  report subjective improved motivation and increased energy for a period of 90 days, within 6 months as clinically observed and by patient self-report.    Intervention(s)  Therapist will provide individual therapy to identify triggers to depression, gain feedback on helpful coping tools and thought-reframing techniques, and identify preferred way of being.  Tx to include discussion of current stressors and interpersonal conflicts and how to cope with these, coaching, diagnostic testing, referral for medication as indicated, use prescribed medication, cognitive restructuring, interpersonal, family therapy, supportive therapy services.        Patient has reviewed and agreed to the above plan.      RADHA Neves   5/6/2025          [facility]               Name:   Mendy Roe     Therapist Name: RADHA Neves    SAFETY PLAN: 5/8/2023    Therapist suggested patient go stay with her daughters to allow self a break, speak to partner about where they are and where they are going and if patient continues to feel unsafe to go to the ED and patient agreed.            [facility]               Name:   Mendy Roe     [facility]    SAFETY PLAN:    Step 1: Warning signs / cues (thoughts, feelings, what I do, what others do) that tell me I'm not doing well:     What do I think?  What do I say to myself? I want to die      Pictures in my head: none     How do I feel? really sad, worried, angry, hopeless and annoyed     What do I do? sit in my room and be alone     When do I feel this way? relationship and money          Step 2: Coping strategies - Things I can do to help myself feel better:     Coping skills: use my coping skills and be around others      Games and activities: go outside, go for a walk and deep breathing     Focus on helpful thoughts: this is temporary, I will get through  this, ride the wave and people care about me children      Step 3: People and places that help me feel better:     People: daughters     Places (with permission): work       Step 4: People and things that are special to me that remind me why it's worth getting better:      My family        Step 6: Things that will help me stay safe:     be around others    Step 7: Professionals or agencies I can contact when I need help:     Suicide Prevention Lifeline: Call or Text 988     Local Crisis Services: 988     Call 911 or go to my nearest emergency department.     I helped develop this safety plan and agree to use it when needed.  I have been given a copy of this plan.      Client signature:     _________________________________________________________________  Today's date:  5/16/2023    Adapted from Safety Plan Template 2008 Rocio Neal and Ronnie Juan is reprinted with the express permission of the authors.  No portion of the Safety Plan Template may be reproduced without the express, written permission.  You can contact the authors at [email or web address] or [email or web address].         minimum assist (75% patients effort)

## 2025-06-07 ENCOUNTER — HEALTH MAINTENANCE LETTER (OUTPATIENT)
Age: 45
End: 2025-06-07

## 2025-07-01 ENCOUNTER — VIRTUAL VISIT (OUTPATIENT)
Dept: PSYCHOLOGY | Facility: CLINIC | Age: 45
End: 2025-07-01
Payer: COMMERCIAL

## 2025-07-01 DIAGNOSIS — F33.0 MDD (MAJOR DEPRESSIVE DISORDER), RECURRENT EPISODE, MILD: ICD-10-CM

## 2025-07-01 DIAGNOSIS — F41.1 GAD (GENERALIZED ANXIETY DISORDER): Primary | ICD-10-CM

## 2025-07-01 DIAGNOSIS — F90.2 ATTENTION DEFICIT HYPERACTIVITY DISORDER (ADHD), COMBINED TYPE: ICD-10-CM

## 2025-07-01 PROCEDURE — 90837 PSYTX W PT 60 MINUTES: CPT | Mod: 93 | Performed by: SOCIAL WORKER

## 2025-07-01 NOTE — PROGRESS NOTES
"      [facility]                                     Progress Note    Patient Name: Mendy Roe  Date:  2025       Service Type: Individual      Session Start Time: 1206    Session End Time:  1300    Session Length: 53+    Session #: 3    Attendees: Client and partner    Service Modality:  Phone Visit:  video not working in car      Provider verified identity through the following two step process.  Patient provided:  Patient is known previously to provider/     Telephone Visit: The patient's condition can be safely assessed and treated via synchronous audio telemedicine encounter.       Reason for Audio Telemedicine Visit: Services only offered telehealth     Originating Site (Patient Location): Patient's home     Distant Site (Provider Location): off site      Consent:  The patient/guardian has verbally consented to:      1. The potential risks and benefits of telemedicine (telephone visit) versus in person care;     The patient has been notified of the following:      \"We have found that certain health care needs can be provided without the need for a face to face visit.  This service lets us provide the care you need with a phone conversation.       I will have full access to your [facility] medical record during this entire phone call.   I will be taking notes for your medical record.      Since this is like an office visit, we will bill your insurance company for this service.       There are potential benefits and risks of telephone visits (e.g. limits to patient confidentiality) that differ from in-person visits.?Confidentiality still applies for telephone services, and nobody will record the visit.  It is important to be in a quiet, private space that is free of distractions (including cell phone or other devices) during the visit.??      If during the course of the call I believe a telephone visit is not appropriate, you will not be charged for this service\"   "   Consent has been obtained for this service by care team member: Yes         DATA  Interactive Complexity: No  Crisis: No  Extended Session (53+ minutes):   - Patient's presenting concerns require more intensive intervention than could be completed within the usual service      Progress Since Last Session (Related to Symptoms / Goals / Homework):   Symptoms: Worsening daughter attempted    Homework: Achieved / completed to satisfaction  Partially completed      Episode of Care Goals: Minimal progress - PREPARATION (Decided to change - considering how); Intervened by negotiating a change plan and determining options / strategies for behavior change, identifying triggers, exploring social supports, and working towards setting a date to begin behavior change     Current / Ongoing Stressors and Concerns:   Blended family, moving, kids, grandchildren, partner     Treatment Objective(s) Addressed in This Session:     Patient will demonstrate and report a level of anxiety that can be managed at a lower level of care.  Absence of persistent anxious mood and report of reduced frequency and intensity of worry and absence of persistent anxious mood to acceptable levels, no panic attacks, report subjective comfort with rumination for a period of 90 days, within 6 months as clinically observed and by patient self-report.  Patient will demonstrate and report a level of depression that can be managed at a lower level of care.  Absence of persistent depression mood and report of reduced frequency and intensity of low mood and absence of persistent low energy and motivation to acceptable levels, report subjective improved motivation and increased energy for a period of 90 days, within 6 months as clinically observed and by patient self-report.     Intervention:    Therapist met with patient to review goals and interventions. Therapist utilized reflected listening as patient gave brief reflection of week. Patient reported her  daughter attempted suicide and processed. Therapist supported patient as she processed and validated patient's emotions. Patient reported feeling her daughter was upset her and would not let her know specifics and concerns about her honesty with her providers. Therapist coached patient in communication while knowing she can't get an an affirm or deny of care she can voice her concern to daughters psychiatrist. Therapist encouraged/assisted patient in managing anxiety  to look for a new position at a clinic with the understanding that she doesn't have to take it even if offered.   Patient presented with an anxious affect. Patient was engaged in session and open to feedback. Patient reported no safety concerns.       There has been demonstrated improvement in functioning while patient has been engaged in psychotherapy/psychological service- if withdrawn the patient would deteriorate and/or relapse.       Assessments completed prior to visit:  The following assessments were completed by patient for this visit:  PHQ9:       6/6/2023     9:46 AM 12/12/2023     1:00 PM 3/26/2024    12:00 PM 6/21/2024     3:06 PM 7/11/2024     4:41 PM 8/26/2024     1:00 PM 5/6/2025     7:35 AM   PHQ-9 SCORE   PHQ-9 Total Score MyChart 10 (Moderate depression)   13 (Moderate depression) 25 (Severe depression)  14 (Moderate depression)   PHQ-9 Total Score 10 13 12 13 25 14 14        Patient-reported     GAD7:       3/21/2023     3:00 PM 6/6/2023     9:46 AM 12/12/2023     1:00 PM 3/26/2024    12:00 PM 7/11/2024     4:41 PM 8/26/2024     1:00 PM 5/6/2025     7:35 AM   MEG-7 SCORE   Total Score  6 (mild anxiety)   21 (severe anxiety)  11 (moderate anxiety)   Total Score 20 6 9 8 21 10 11        Patient-reported   PROMIS-10 Scores        3/26/2024    12:00 PM 8/26/2024     1:00 PM 5/6/2025     7:36 AM   PROMIS-10 Total Score w/o Sub Scores   PROMIS TOTAL - SUBSCORES 21 22 21        Patient-reported    [place] Suicide Severity Rating Scale  (Short Version)      12/21/2021     1:00 PM 10/11/2022     5:00 PM 11/22/2022     4:00 PM 3/21/2023     3:00 PM 12/12/2023     1:00 PM 3/26/2024    12:00 PM 8/26/2024     1:00 PM   [place] Suicide Severity Rating (Short Version)   Over the past 2 weeks have you felt down, depressed, or hopeless? yes         Over the past 2 weeks have you had thoughts of killing yourself? no         Have you ever attempted to kill yourself? no         1. Wish to be Dead (Since Last Contact)  N N N N N N   2. Non-Specific Active Suicidal Thoughts (Since Last Contact)  N N N N N N   Actual Attempt (Since Last Contact)  N N N N N N   Has subject engaged in non-suicidal self-injurious behavior? (Since Last Contact)  N N N N N N   Interrupted Attempts (Since Last Contact)  N N N N N N   Aborted or Self-Interrupted Attempt (Since Last Contact)  N N N N N N   Preparatory Acts or Behavior (Since Last Contact)  N N N N N N   Suicide (Since Last Contact)  N N N N N N   Calculated C-SSRS Risk Score (Since Last Contact)  No Risk Indicated No Risk Indicated No Risk Indicated No Risk Indicated No Risk Indicated No Risk Indicated            ASSESSMENT: Current Emotional / Mental Status (status of significant symptoms):   Risk status (Self / Other harm or suicidal ideation)   Patient denies current fears or concerns for personal safety.   Patient denies current or recent suicidal ideation or behaviors.   Patient denies current or recent homicidal ideation or behaviors.   Patient denies current or recent self injurious behavior or ideation.   Patient denies other safety concerns.   Patient reports there has been no change in risk factors since their last session.     Patient reports there has been no change in protective factors since their last session.     A safety and risk management plan has been developed including: Patient consented to co-developed safety plan on 5/8/2023.  Safety and risk management plan was reviewed.   Patient agreed to use  safety plan should any safety concerns arise.  A copy was made available to the patient.     Appearance:   Appropriate    Eye Contact:   Fair    Psychomotor Behavior: Normal    Attitude:   Cooperative    Orientation:   All   Speech    Rate / Production: Emotional Talkative    Volume:  Normal    Mood:    Anxious  Depressed    Affect:    Worrisome    Thought Content:  Clear    Thought Form:  Coherent    Insight:    Fair      Medication Review:   No changes to current psychiatric medication(s)     Medication Compliance:   Yes     Changes in Health Issues:   None reported     Chemical Use Review:   Substance Use: Chemical use reviewed, no active concerns identified      Tobacco Use: No current tobacco use.      Diagnosis:  1. MEG (generalized anxiety disorder)    2. MDD (major depressive disorder), recurrent episode, mild    3. Attention deficit hyperactivity disorder (ADHD), combined type        Collateral Reports Completed:   Not Applicable    PLAN: (Patient Tasks / Therapist Tasks / Other)      Manage expectations with meeting  Speak to psychiatry about concerns with ketamine and tx plan  Name and manage anxiety in the moment   Patient reported her daughter attempted suicide and processed. Therapist supported patient as she processed and validated patient's emotions. Patient reported feeling her daughter was upset her and would not let her know specifics and concerns about her honesty with her providers. Therapist coached patient in communication while knowing she can't get an an affirm or deny of care she can voice her concern to daughters psychiatrist. Therapist encouraged/assisted patient in managing anxiety  to look for a new position at a clinic with the understanding that she doesn't have to take it even if offered.       Patito Humphries, LICSW   7/1/2025                                                           ______________________________________________________________________    Individual Treatment  Plan    Patient's Name: Mendy Roe  YOB: 1980    Date of Creation: 12/21/2021  Date Treatment Plan Last Reviewed/Revised:  5/6/2025 due   8/6/2025    DSM5 Diagnoses: 296.31 (F33.0) Major Depressive Disorder, Recurrent Episode, Mild With mixed features or 300.02 (F41.1) Generalized Anxiety Disorder  Psychosocial / Contextual Factors: Blended family, moving, kids, grandchildren, partner  PROMIS (reviewed every 90 days):  5/6/2025    Referral / Collaboration:  Referral to another professional/service is not indicated at this time..    Anticipated number of session for this episode of care: 12  Anticipation frequency of session: Monthly  Anticipated Duration of each session: 38-52 minutes  Treatment plan will be reviewed in 90 days or when goals have been changed.       MeasurableTreatment Goal(s) related to diagnosis / functional impairment(s)  Goal 1: Patient will report absence of persistent anxiety mood and report of reduced frequency and intensity of worry and absence of persistent anxious mood to acceptable levels, no panic attacks, report subjective comfort with rumination or a period of 90 days. Within 6 months as clinically observed and by patient self-report    I will know I've met my goal when manage my anxiety.      Objective #A (Patient Action)    Patient will demonstrate and report a level of anxiety that can be managed at a lower level of care.  Absence of persistent anxious mood and report of reduced frequency and intensity of worry and absence of persistent anxious mood to acceptable levels, no panic attacks, report subjective comfort with rumination for a period of 90 days, within 6 months as clinically observed and by patient self-report.  Status: restarted - Date(s):  5/6/2025    Intervention(s)  Therapist will provide individual therapy to identify triggers to anxiety, gain feedback on helpful coping tools and thought-reframing techniques, and identify preferred way of being. Tx  to include discuss current stressors and interpersonal conflicts and how to cope with these, coaching, diagnostic testing, referral for medication as indicated, use prescribed medication, cognitive restructuring, interpersonal, family therapy, supportive therapy services.        Goal 2: Patient will report absence of persistent depression mood and report of reduced frequency and intensity of low mood and absence of persistent low energy and motivation to acceptable levels, report subjective improved motivation and increased energy for a period of 90 days, within 6 months as clinically observed and by patient self-report    I will know I've met my goal when feeling more balanced.      Objective #A (Patient Action)    Status:  restarted - Date(s):  5/6/2025  Patient will demonstrate and report a level of depression that can be managed at a lower level of care.  Absence of persistent depression mood and report of reduced frequency and intensity of low mood and absence of persistent low energy and motivation to acceptable levels, report subjective improved motivation and increased energy for a period of 90 days, within 6 months as clinically observed and by patient self-report.    Intervention(s)  Therapist will provide individual therapy to identify triggers to depression, gain feedback on helpful coping tools and thought-reframing techniques, and identify preferred way of being.  Tx to include discussion of current stressors and interpersonal conflicts and how to cope with these, coaching, diagnostic testing, referral for medication as indicated, use prescribed medication, cognitive restructuring, interpersonal, family therapy, supportive therapy services.        Patient has reviewed and agreed to the above plan.      RAHDA Neves   5/6/2025          [facility]               Name:   Mendy Roe     Therapist Name: RADHA Neves    SAFETY PLAN:  5/8/2023    Therapist suggested patient go stay with her daughters to allow self a break, speak to partner about where they are and where they are going and if patient continues to feel unsafe to go to the ED and patient agreed.            [facility]               Name:   Emndy JESSICA Roe     Therapist Name: Patito Humphries, [facility]    SAFETY PLAN:    Step 1: Warning signs / cues (thoughts, feelings, what I do, what others do) that tell me I'm not doing well:     What do I think?  What do I say to myself? I want to die      Pictures in my head: none     How do I feel? really sad, worried, angry, hopeless and annoyed     What do I do? sit in my room and be alone     When do I feel this way? relationship and money          Step 2: Coping strategies - Things I can do to help myself feel better:     Coping skills: use my coping skills and be around others      Games and activities: go outside, go for a walk and deep breathing     Focus on helpful thoughts: this is temporary, I will get through this, ride the wave and people care about me children      Step 3: People and places that help me feel better:     People: daughters     Places (with permission): work       Step 4: People and things that are special to me that remind me why it's worth getting better:      My family        Step 6: Things that will help me stay safe:     be around others    Step 7: Professionals or agencies I can contact when I need help:     Suicide Prevention Lifeline: Call or Text 988     Local Crisis Services: 988     Call 911 or go to my nearest emergency department.     I helped develop this safety plan and agree to use it when needed.  I have been given a copy of this plan.      Client signature:     _________________________________________________________________  Today's date:  5/16/2023    Adapted from Safety Plan Template 2008 Rocio Neal and Ronnie Juan is reprinted with the express  permission of the authors.  No portion of the Safety Plan Template may be reproduced without the express, written permission.  You can contact the authors at [email or web address] or [email or web address].

## 2025-07-31 ENCOUNTER — TRANSFERRED RECORDS (OUTPATIENT)
Dept: MULTI SPECIALTY CLINIC | Facility: CLINIC | Age: 45
End: 2025-07-31
Payer: COMMERCIAL

## 2025-08-06 DIAGNOSIS — R11.0 NAUSEA: ICD-10-CM

## 2025-08-07 RX ORDER — ONDANSETRON 4 MG/1
4 TABLET, FILM COATED ORAL EVERY 8 HOURS PRN
Qty: 30 TABLET | Refills: 0 | Status: SHIPPED | OUTPATIENT
Start: 2025-08-07

## 2025-08-26 SDOH — HEALTH STABILITY: PHYSICAL HEALTH: ON AVERAGE, HOW MANY MINUTES DO YOU ENGAGE IN EXERCISE AT THIS LEVEL?: 10 MIN

## 2025-08-26 SDOH — HEALTH STABILITY: PHYSICAL HEALTH: ON AVERAGE, HOW MANY DAYS PER WEEK DO YOU ENGAGE IN MODERATE TO STRENUOUS EXERCISE (LIKE A BRISK WALK)?: 3 DAYS

## 2025-08-26 ASSESSMENT — PATIENT HEALTH QUESTIONNAIRE - PHQ9
10. IF YOU CHECKED OFF ANY PROBLEMS, HOW DIFFICULT HAVE THESE PROBLEMS MADE IT FOR YOU TO DO YOUR WORK, TAKE CARE OF THINGS AT HOME, OR GET ALONG WITH OTHER PEOPLE: EXTREMELY DIFFICULT
SUM OF ALL RESPONSES TO PHQ QUESTIONS 1-9: 13
SUM OF ALL RESPONSES TO PHQ QUESTIONS 1-9: 13

## 2025-08-27 ENCOUNTER — OFFICE VISIT (OUTPATIENT)
Dept: FAMILY MEDICINE | Facility: CLINIC | Age: 45
End: 2025-08-27
Payer: COMMERCIAL

## 2025-08-27 VITALS
TEMPERATURE: 98.3 F | SYSTOLIC BLOOD PRESSURE: 110 MMHG | BODY MASS INDEX: 27.96 KG/M2 | RESPIRATION RATE: 12 BRPM | OXYGEN SATURATION: 98 % | HEART RATE: 98 BPM | WEIGHT: 148.1 LBS | DIASTOLIC BLOOD PRESSURE: 74 MMHG | HEIGHT: 61 IN

## 2025-08-27 DIAGNOSIS — Z12.11 SCREEN FOR COLON CANCER: ICD-10-CM

## 2025-08-27 DIAGNOSIS — Z00.00 ROUTINE GENERAL MEDICAL EXAMINATION AT A HEALTH CARE FACILITY: Primary | ICD-10-CM

## 2025-08-27 LAB
ERYTHROCYTE [DISTWIDTH] IN BLOOD BY AUTOMATED COUNT: 12.1 % (ref 10–15)
HCT VFR BLD AUTO: 40 % (ref 35–47)
HGB BLD-MCNC: 13.4 G/DL (ref 11.7–15.7)
MCH RBC QN AUTO: 28.9 PG (ref 26.5–33)
MCHC RBC AUTO-ENTMCNC: 33.5 G/DL (ref 31.5–36.5)
MCV RBC AUTO: 86.2 FL (ref 78–100)
PLATELET # BLD AUTO: 276 10E3/UL (ref 150–450)
RBC # BLD AUTO: 4.64 10E6/UL (ref 3.8–5.2)
WBC # BLD AUTO: 5.98 10E3/UL (ref 4–11)

## 2025-08-27 PROCEDURE — 99396 PREV VISIT EST AGE 40-64: CPT | Performed by: FAMILY MEDICINE

## 2025-08-27 PROCEDURE — 3078F DIAST BP <80 MM HG: CPT | Performed by: FAMILY MEDICINE

## 2025-08-27 PROCEDURE — 84443 ASSAY THYROID STIM HORMONE: CPT | Performed by: FAMILY MEDICINE

## 2025-08-27 PROCEDURE — 36415 COLL VENOUS BLD VENIPUNCTURE: CPT | Performed by: FAMILY MEDICINE

## 2025-08-27 PROCEDURE — 80061 LIPID PANEL: CPT | Performed by: FAMILY MEDICINE

## 2025-08-27 PROCEDURE — 3074F SYST BP LT 130 MM HG: CPT | Performed by: FAMILY MEDICINE

## 2025-08-27 PROCEDURE — 1126F AMNT PAIN NOTED NONE PRSNT: CPT | Performed by: FAMILY MEDICINE

## 2025-08-27 PROCEDURE — 80053 COMPREHEN METABOLIC PANEL: CPT | Performed by: FAMILY MEDICINE

## 2025-08-27 PROCEDURE — 85027 COMPLETE CBC AUTOMATED: CPT | Performed by: FAMILY MEDICINE

## 2025-08-27 RX ORDER — DEXTROAMPHETAMINE SACCHARATE, AMPHETAMINE ASPARTATE, DEXTROAMPHETAMINE SULFATE AND AMPHETAMINE SULFATE 2.5; 2.5; 2.5; 2.5 MG/1; MG/1; MG/1; MG/1
10 TABLET ORAL DAILY
COMMUNITY

## 2025-08-27 RX ORDER — CLONIDINE HYDROCHLORIDE 0.1 MG/1
0.1 TABLET ORAL 2 TIMES DAILY
COMMUNITY
Start: 2025-07-17

## 2025-08-27 RX ORDER — CITALOPRAM HYDROBROMIDE 20 MG/1
20 TABLET ORAL DAILY
COMMUNITY
Start: 2024-07-15 | End: 2024-10-13

## 2025-08-27 RX ORDER — LISDEXAMFETAMINE DIMESYLATE 50 MG/1
50 CAPSULE ORAL EVERY MORNING
COMMUNITY
Start: 2025-08-05

## 2025-08-27 RX ORDER — IBUPROFEN 200 MG
200 TABLET ORAL PRN
COMMUNITY

## 2025-08-27 RX ORDER — ESKETAMINE HYDROCHLORIDE 28 MG/.2ML
84 SOLUTION NASAL ONCE
COMMUNITY

## 2025-08-27 RX ORDER — RIMEGEPANT SULFATE 75 MG/75MG
75 TABLET, ORALLY DISINTEGRATING ORAL ONCE
COMMUNITY
Start: 2025-08-04

## 2025-08-27 ASSESSMENT — PAIN SCALES - GENERAL: PAINLEVEL_OUTOF10: NO PAIN (0)

## 2025-08-28 LAB
ALBUMIN SERPL BCG-MCNC: 4.5 G/DL (ref 3.5–5.2)
ALP SERPL-CCNC: 88 U/L (ref 40–150)
ALT SERPL W P-5'-P-CCNC: 23 U/L (ref 0–50)
ANION GAP SERPL CALCULATED.3IONS-SCNC: 12 MMOL/L (ref 7–15)
AST SERPL W P-5'-P-CCNC: 26 U/L (ref 0–45)
BILIRUB SERPL-MCNC: 0.8 MG/DL
BUN SERPL-MCNC: 12.1 MG/DL (ref 6–20)
CALCIUM SERPL-MCNC: 9.6 MG/DL (ref 8.8–10.4)
CHLORIDE SERPL-SCNC: 104 MMOL/L (ref 98–107)
CHOLEST SERPL-MCNC: 211 MG/DL
CREAT SERPL-MCNC: 0.88 MG/DL (ref 0.51–0.95)
EGFRCR SERPLBLD CKD-EPI 2021: 82 ML/MIN/1.73M2
FASTING STATUS PATIENT QL REPORTED: ABNORMAL
FASTING STATUS PATIENT QL REPORTED: NORMAL
GLUCOSE SERPL-MCNC: 88 MG/DL (ref 70–99)
HCO3 SERPL-SCNC: 24 MMOL/L (ref 22–29)
HDLC SERPL-MCNC: 28 MG/DL
LDLC SERPL CALC-MCNC: 146 MG/DL
NONHDLC SERPL-MCNC: 183 MG/DL
POTASSIUM SERPL-SCNC: 4.3 MMOL/L (ref 3.4–5.3)
PROT SERPL-MCNC: 7.3 G/DL (ref 6.4–8.3)
SODIUM SERPL-SCNC: 140 MMOL/L (ref 135–145)
TRIGL SERPL-MCNC: 184 MG/DL
TSH SERPL DL<=0.005 MIU/L-ACNC: 1.34 UIU/ML (ref 0.3–4.2)